# Patient Record
Sex: MALE | Race: WHITE | NOT HISPANIC OR LATINO | Employment: OTHER | URBAN - METROPOLITAN AREA
[De-identification: names, ages, dates, MRNs, and addresses within clinical notes are randomized per-mention and may not be internally consistent; named-entity substitution may affect disease eponyms.]

---

## 2017-01-09 ENCOUNTER — CONVERSION ENCOUNTER (OUTPATIENT)
Dept: CARDIOLOGY | Facility: CLINIC | Age: 77
End: 2017-01-09

## 2017-01-16 ENCOUNTER — HOSPITAL ENCOUNTER (OUTPATIENT)
Dept: PREADMISSION TESTING | Facility: HOSPITAL | Age: 77
Discharge: HOME | End: 2017-01-16
Attending: INTERNAL MEDICINE | Admitting: INTERNAL MEDICINE

## 2017-01-16 LAB
ALBUMIN SERPL-MCNC: 3.2 G/DL (ref 3.5–4.8)
ALBUMIN/GLOB SERPL: 1.3 {RATIO} (ref 1–1.7)
ALP SERPL-CCNC: 61 IU/L (ref 32–91)
ALT SERPL-CCNC: 42 IU/L (ref 17–63)
ANION GAP SERPL CALC-SCNC: 14.8 MMOL/L (ref 10–20)
AST SERPL-CCNC: 33 IU/L (ref 15–41)
BASOPHILS # BLD AUTO: 0.1 10*3/UL (ref 0–0.2)
BASOPHILS NFR BLD AUTO: 1 % (ref 0–2)
BILIRUB SERPL-MCNC: 0.9 MG/DL (ref 0.3–1.2)
BUN SERPL-MCNC: 17 MG/DL (ref 8–20)
BUN/CREAT SERPL: 14.2 (ref 6.2–20.3)
CALCIUM SERPL-MCNC: 9 MG/DL (ref 8.9–10.3)
CHLORIDE SERPL-SCNC: 107 MMOL/L (ref 101–111)
CONV CO2: 27 MMOL/L (ref 22–32)
CONV TOTAL PROTEIN: 5.6 G/DL (ref 6.1–7.9)
CREAT UR-MCNC: 1.2 MG/DL (ref 0.7–1.2)
DIFFERENTIAL METHOD BLD: (no result)
EOSINOPHIL # BLD AUTO: 0.2 10*3/UL (ref 0–0.3)
EOSINOPHIL # BLD AUTO: 2 % (ref 0–3)
ERYTHROCYTE [DISTWIDTH] IN BLOOD BY AUTOMATED COUNT: 15.3 % (ref 11.5–14.5)
GLOBULIN UR ELPH-MCNC: 2.4 G/DL (ref 2.5–3.8)
GLUCOSE SERPL-MCNC: 106 MG/DL (ref 65–99)
HCT VFR BLD AUTO: 48.1 % (ref 40–54)
HGB BLD-MCNC: 15.7 G/DL (ref 14–18)
LYMPHOCYTES # BLD AUTO: 1.9 10*3/UL (ref 0.8–4.8)
LYMPHOCYTES NFR BLD AUTO: 23 % (ref 18–42)
MAGNESIUM SERPL-MCNC: 1.8 MG/DL (ref 1.8–2.5)
MCH RBC QN AUTO: 31.1 PG (ref 26–32)
MCHC RBC AUTO-ENTMCNC: 32.6 G/DL (ref 32–36)
MCV RBC AUTO: 95.3 FL (ref 80–94)
MONOCYTES # BLD AUTO: 0.7 10*3/UL (ref 0.1–1.3)
MONOCYTES NFR BLD AUTO: 8 % (ref 2–11)
NEUTROPHILS # BLD AUTO: 5.7 10*3/UL (ref 2.3–8.6)
NEUTROPHILS NFR BLD AUTO: 66 % (ref 50–75)
NRBC BLD AUTO-RTO: 0 /100{WBCS}
NRBC/RBC NFR BLD MANUAL: 0 10*3/UL
PLATELET # BLD AUTO: 204 10*3/UL (ref 150–450)
PMV BLD AUTO: 10 FL (ref 7.4–10.4)
POTASSIUM SERPL-SCNC: 3.8 MMOL/L (ref 3.6–5.1)
RBC # BLD AUTO: 5.05 10*6/UL (ref 4.6–6)
SODIUM SERPL-SCNC: 145 MMOL/L (ref 136–144)
TSH SERPL-ACNC: 1.57 UIU/ML (ref 0.34–5.6)
WBC # BLD AUTO: 8.5 10*3/UL (ref 4.5–11.5)

## 2017-03-06 ENCOUNTER — HOSPITAL ENCOUNTER (OUTPATIENT)
Dept: LAB | Facility: HOSPITAL | Age: 77
Discharge: HOME | End: 2017-03-06
Attending: INTERNAL MEDICINE | Admitting: INTERNAL MEDICINE

## 2017-03-06 LAB
ANION GAP SERPL CALC-SCNC: 13.3 MMOL/L (ref 10–20)
BUN SERPL-MCNC: 18 MG/DL (ref 8–20)
BUN/CREAT SERPL: 16.4 (ref 6.2–20.3)
CALCIUM SERPL-MCNC: 9.1 MG/DL (ref 8.9–10.3)
CHLORIDE SERPL-SCNC: 107 MMOL/L (ref 101–111)
CONV CO2: 26 MMOL/L (ref 22–32)
CREAT UR-MCNC: 1.1 MG/DL (ref 0.7–1.2)
GLUCOSE SERPL-MCNC: 114 MG/DL (ref 65–99)
POTASSIUM SERPL-SCNC: 4.3 MMOL/L (ref 3.6–5.1)
SODIUM SERPL-SCNC: 142 MMOL/L (ref 136–144)

## 2017-05-11 ENCOUNTER — HOSPITAL ENCOUNTER (OUTPATIENT)
Dept: FAMILY MEDICINE CLINIC | Facility: CLINIC | Age: 77
Setting detail: SPECIMEN
Discharge: HOME | End: 2017-05-11
Attending: HOSPITALIST | Admitting: HOSPITALIST

## 2017-05-11 LAB
ALBUMIN SERPL-MCNC: 3.6 G/DL (ref 3.5–4.8)
ALBUMIN/GLOB SERPL: 1.1 {RATIO} (ref 1–1.7)
ALP SERPL-CCNC: 57 IU/L (ref 32–91)
ALT SERPL-CCNC: 48 IU/L (ref 17–63)
ANION GAP SERPL CALC-SCNC: 14 MMOL/L (ref 10–20)
AST SERPL-CCNC: 43 IU/L (ref 15–41)
BASOPHILS # BLD AUTO: 0.1 10*3/UL (ref 0–0.2)
BASOPHILS NFR BLD AUTO: 1 % (ref 0–2)
BILIRUB SERPL-MCNC: 1.8 MG/DL (ref 0.3–1.2)
BUN SERPL-MCNC: 21 MG/DL (ref 8–20)
BUN/CREAT SERPL: 16.2 (ref 6.2–20.3)
CALCIUM SERPL-MCNC: 9.2 MG/DL (ref 8.9–10.3)
CHLORIDE SERPL-SCNC: 106 MMOL/L (ref 101–111)
CHOLEST SERPL-MCNC: 178 MG/DL
CHOLEST/HDLC SERPL: 4.1 {RATIO}
CONV CO2: 27 MMOL/L (ref 22–32)
CONV LDL CHOLESTEROL DIRECT: 116 MG/DL (ref 0–100)
CONV TOTAL PROTEIN: 6.8 G/DL (ref 6.1–7.9)
CREAT UR-MCNC: 1.3 MG/DL (ref 0.7–1.2)
DIFFERENTIAL METHOD BLD: (no result)
EOSINOPHIL # BLD AUTO: 0.1 10*3/UL (ref 0–0.3)
EOSINOPHIL # BLD AUTO: 2 % (ref 0–3)
ERYTHROCYTE [DISTWIDTH] IN BLOOD BY AUTOMATED COUNT: 17.1 % (ref 11.5–14.5)
FOLATE SERPL-MCNC: >24.8 NG/ML (ref 5.9–24.8)
GLOBULIN UR ELPH-MCNC: 3.2 G/DL (ref 2.5–3.8)
GLUCOSE SERPL-MCNC: 109 MG/DL (ref 65–99)
HCT VFR BLD AUTO: 51.4 % (ref 40–54)
HDLC SERPL-MCNC: 44 MG/DL
HGB BLD-MCNC: 16.9 G/DL (ref 14–18)
LDLC/HDLC SERPL: 2.6 {RATIO}
LIPID INTERPRETATION: ABNORMAL
LYMPHOCYTES # BLD AUTO: 2.1 10*3/UL (ref 0.8–4.8)
LYMPHOCYTES NFR BLD AUTO: 27 % (ref 18–42)
MCH RBC QN AUTO: 31.4 PG (ref 26–32)
MCHC RBC AUTO-ENTMCNC: 33 G/DL (ref 32–36)
MCV RBC AUTO: 95.2 FL (ref 80–94)
MONOCYTES # BLD AUTO: 0.8 10*3/UL (ref 0.1–1.3)
MONOCYTES NFR BLD AUTO: 10 % (ref 2–11)
NEUTROPHILS # BLD AUTO: 4.8 10*3/UL (ref 2.3–8.6)
NEUTROPHILS NFR BLD AUTO: 60 % (ref 50–75)
NRBC BLD AUTO-RTO: 0 /100{WBCS}
NRBC/RBC NFR BLD MANUAL: 0 10*3/UL
PLATELET # BLD AUTO: 207 10*3/UL (ref 150–450)
PMV BLD AUTO: 10.4 FL (ref 7.4–10.4)
POTASSIUM SERPL-SCNC: 4 MMOL/L (ref 3.6–5.1)
PSA SERPL-MCNC: 0.54 NG/ML (ref 0–4)
RBC # BLD AUTO: 5.4 10*6/UL (ref 4.6–6)
SODIUM SERPL-SCNC: 143 MMOL/L (ref 136–144)
TRIGL SERPL-MCNC: 86 MG/DL
VIT B12 SERPL-MCNC: 960 PG/ML (ref 180–914)
VLDLC SERPL CALC-MCNC: 18.3 MG/DL
WBC # BLD AUTO: 7.9 10*3/UL (ref 4.5–11.5)

## 2017-05-18 ENCOUNTER — HOSPITAL ENCOUNTER (OUTPATIENT)
Dept: PHYSICAL THERAPY | Facility: HOSPITAL | Age: 77
Setting detail: RECURRING SERIES
Discharge: HOME | End: 2017-06-30
Attending: HOSPITALIST | Admitting: HOSPITALIST

## 2017-07-11 ENCOUNTER — HOSPITAL ENCOUNTER (OUTPATIENT)
Dept: URGENT CARE | Facility: CLINIC | Age: 77
Setting detail: SPECIMEN
Discharge: HOME | End: 2017-07-11
Attending: FAMILY MEDICINE | Admitting: FAMILY MEDICINE

## 2017-07-11 LAB
BACTERIA SPEC AEROBE CULT: NORMAL
Lab: NORMAL
MICRO REPORT STATUS: NORMAL
SPECIMEN SOURCE: NORMAL

## 2017-07-14 ENCOUNTER — HOSPITAL ENCOUNTER (OUTPATIENT)
Dept: OTHER | Facility: HOSPITAL | Age: 77
Discharge: HOME | End: 2017-07-14
Attending: INTERNAL MEDICINE | Admitting: INTERNAL MEDICINE

## 2017-07-14 LAB
ALBUMIN SERPL-MCNC: 3.5 G/DL (ref 3.5–4.8)
ALBUMIN/GLOB SERPL: 1.1 {RATIO} (ref 1–1.7)
ALP SERPL-CCNC: 76 IU/L (ref 32–91)
ALT SERPL-CCNC: 63 IU/L (ref 17–63)
ANION GAP SERPL CALC-SCNC: 14.9 MMOL/L (ref 10–20)
AST SERPL-CCNC: 57 IU/L (ref 15–41)
BASOPHILS # BLD AUTO: 0.1 10*3/UL (ref 0–0.2)
BASOPHILS NFR BLD AUTO: 1 % (ref 0–2)
BILIRUB SERPL-MCNC: 2.2 MG/DL (ref 0.3–1.2)
BUN SERPL-MCNC: 24 MG/DL (ref 8–20)
BUN/CREAT SERPL: 16 (ref 6.2–20.3)
CALCIUM SERPL-MCNC: 9.3 MG/DL (ref 8.9–10.3)
CHLORIDE SERPL-SCNC: 108 MMOL/L (ref 101–111)
CONV CO2: 26 MMOL/L (ref 22–32)
CONV TOTAL PROTEIN: 6.6 G/DL (ref 6.1–7.9)
CREAT UR-MCNC: 1.5 MG/DL (ref 0.7–1.2)
DIFFERENTIAL METHOD BLD: (no result)
EOSINOPHIL # BLD AUTO: 0.1 10*3/UL (ref 0–0.3)
EOSINOPHIL # BLD AUTO: 1 % (ref 0–3)
ERYTHROCYTE [DISTWIDTH] IN BLOOD BY AUTOMATED COUNT: 15.4 % (ref 11.5–14.5)
GLOBULIN UR ELPH-MCNC: 3.1 G/DL (ref 2.5–3.8)
GLUCOSE SERPL-MCNC: 89 MG/DL (ref 65–99)
HCT VFR BLD AUTO: 52.2 % (ref 40–54)
HGB BLD-MCNC: 17.5 G/DL (ref 14–18)
LYMPHOCYTES # BLD AUTO: 2.9 10*3/UL (ref 0.8–4.8)
LYMPHOCYTES NFR BLD AUTO: 29 % (ref 18–42)
MCH RBC QN AUTO: 33 PG (ref 26–32)
MCHC RBC AUTO-ENTMCNC: 33.4 G/DL (ref 32–36)
MCV RBC AUTO: 98.7 FL (ref 80–94)
MONOCYTES # BLD AUTO: 0.9 10*3/UL (ref 0.1–1.3)
MONOCYTES NFR BLD AUTO: 9 % (ref 2–11)
NEUTROPHILS # BLD AUTO: 6 10*3/UL (ref 2.3–8.6)
NEUTROPHILS NFR BLD AUTO: 60 % (ref 50–75)
NRBC BLD AUTO-RTO: 0 /100{WBCS}
NRBC/RBC NFR BLD MANUAL: 0 10*3/UL
PLATELET # BLD AUTO: 175 10*3/UL (ref 150–450)
PMV BLD AUTO: 10.7 FL (ref 7.4–10.4)
POTASSIUM SERPL-SCNC: 3.9 MMOL/L (ref 3.6–5.1)
RBC # BLD AUTO: 5.29 10*6/UL (ref 4.6–6)
SODIUM SERPL-SCNC: 145 MMOL/L (ref 136–144)
TSH SERPL-ACNC: 2.23 UIU/ML (ref 0.34–5.6)
WBC # BLD AUTO: 10 10*3/UL (ref 4.5–11.5)

## 2017-07-21 ENCOUNTER — HOSPITAL ENCOUNTER (OUTPATIENT)
Dept: LAB | Facility: HOSPITAL | Age: 77
Discharge: HOME | End: 2017-07-21
Attending: INTERNAL MEDICINE | Admitting: INTERNAL MEDICINE

## 2017-07-21 LAB
ANION GAP SERPL CALC-SCNC: 16.2 MMOL/L (ref 10–20)
BUN SERPL-MCNC: 24 MG/DL (ref 8–20)
BUN/CREAT SERPL: 15 (ref 6.2–20.3)
CALCIUM SERPL-MCNC: 9.7 MG/DL (ref 8.9–10.3)
CHLORIDE SERPL-SCNC: 104 MMOL/L (ref 101–111)
CONV CO2: 30 MMOL/L (ref 22–32)
CREAT UR-MCNC: 1.6 MG/DL (ref 0.7–1.2)
GLUCOSE SERPL-MCNC: 136 MG/DL (ref 65–99)
POTASSIUM SERPL-SCNC: 5.2 MMOL/L (ref 3.6–5.1)
SODIUM SERPL-SCNC: 145 MMOL/L (ref 136–144)

## 2017-08-03 ENCOUNTER — HOSPITAL ENCOUNTER (OUTPATIENT)
Dept: FAMILY MEDICINE CLINIC | Facility: CLINIC | Age: 77
Setting detail: SPECIMEN
Discharge: HOME | End: 2017-08-03
Attending: HOSPITALIST | Admitting: HOSPITALIST

## 2017-08-03 LAB
ALBUMIN SERPL-MCNC: 3.6 G/DL (ref 3.5–4.8)
ALBUMIN/GLOB SERPL: 1.2 {RATIO} (ref 1–1.7)
ALP SERPL-CCNC: 64 IU/L (ref 32–91)
ALT SERPL-CCNC: 48 IU/L (ref 17–63)
ANION GAP SERPL CALC-SCNC: 14.7 MMOL/L (ref 10–20)
AST SERPL-CCNC: 44 IU/L (ref 15–41)
BILIRUB SERPL-MCNC: 2 MG/DL (ref 0.3–1.2)
BUN SERPL-MCNC: 24 MG/DL (ref 8–20)
BUN/CREAT SERPL: 15 (ref 6.2–20.3)
CALCIUM SERPL-MCNC: 9.5 MG/DL (ref 8.9–10.3)
CHLORIDE SERPL-SCNC: 104 MMOL/L (ref 101–111)
CONV CO2: 29 MMOL/L (ref 22–32)
CONV TOTAL PROTEIN: 6.7 G/DL (ref 6.1–7.9)
CREAT UR-MCNC: 1.6 MG/DL (ref 0.7–1.2)
GLOBULIN UR ELPH-MCNC: 3.1 G/DL (ref 2.5–3.8)
GLUCOSE SERPL-MCNC: 106 MG/DL (ref 65–99)
MAGNESIUM SERPL-MCNC: 2.3 MG/DL (ref 1.8–2.5)
POTASSIUM SERPL-SCNC: 4.7 MMOL/L (ref 3.6–5.1)
SODIUM SERPL-SCNC: 143 MMOL/L (ref 136–144)

## 2017-08-21 ENCOUNTER — HOSPITAL ENCOUNTER (OUTPATIENT)
Dept: OTHER | Facility: HOSPITAL | Age: 77
Discharge: HOME | End: 2017-08-21
Attending: INTERNAL MEDICINE | Admitting: INTERNAL MEDICINE

## 2017-08-21 LAB
ANION GAP SERPL CALC-SCNC: 12.3 MMOL/L (ref 10–20)
BASOPHILS # BLD AUTO: 0.1 10*3/UL (ref 0–0.2)
BASOPHILS NFR BLD AUTO: 1 % (ref 0–2)
BUN SERPL-MCNC: 25 MG/DL (ref 8–20)
BUN/CREAT SERPL: 16.7 (ref 6.2–20.3)
CALCIUM SERPL-MCNC: 9.2 MG/DL (ref 8.9–10.3)
CHLORIDE SERPL-SCNC: 105 MMOL/L (ref 101–111)
CONV CO2: 32 MMOL/L (ref 22–32)
CREAT UR-MCNC: 1.5 MG/DL (ref 0.7–1.2)
DIFFERENTIAL METHOD BLD: (no result)
EOSINOPHIL # BLD AUTO: 0.1 10*3/UL (ref 0–0.3)
EOSINOPHIL # BLD AUTO: 2 % (ref 0–3)
ERYTHROCYTE [DISTWIDTH] IN BLOOD BY AUTOMATED COUNT: 15.5 % (ref 11.5–14.5)
GLUCOSE SERPL-MCNC: 80 MG/DL (ref 65–99)
HCT VFR BLD AUTO: 54 % (ref 40–54)
HGB BLD-MCNC: 17.8 G/DL (ref 14–18)
INR PPP: 1.3
LYMPHOCYTES # BLD AUTO: 1.5 10*3/UL (ref 0.8–4.8)
LYMPHOCYTES NFR BLD AUTO: 19 % (ref 18–42)
MCH RBC QN AUTO: 33 PG (ref 26–32)
MCHC RBC AUTO-ENTMCNC: 33 G/DL (ref 32–36)
MCV RBC AUTO: 99.9 FL (ref 80–94)
MONOCYTES # BLD AUTO: 0.8 10*3/UL (ref 0.1–1.3)
MONOCYTES NFR BLD AUTO: 10 % (ref 2–11)
NEUTROPHILS # BLD AUTO: 5.4 10*3/UL (ref 2.3–8.6)
NEUTROPHILS NFR BLD AUTO: 68 % (ref 50–75)
NRBC BLD AUTO-RTO: 0 /100{WBCS}
NRBC/RBC NFR BLD MANUAL: 0 10*3/UL
PLATELET # BLD AUTO: 153 10*3/UL (ref 150–450)
PMV BLD AUTO: 10.4 FL (ref 7.4–10.4)
POTASSIUM SERPL-SCNC: 4.3 MMOL/L (ref 3.6–5.1)
PROTHROMBIN TIME: 13.9 SEC (ref 9.6–11.7)
RBC # BLD AUTO: 5.41 10*6/UL (ref 4.6–6)
SODIUM SERPL-SCNC: 145 MMOL/L (ref 136–144)
WBC # BLD AUTO: 7.8 10*3/UL (ref 4.5–11.5)

## 2017-08-22 ENCOUNTER — ON CAMPUS - OUTPATIENT (AMBULATORY)
Dept: URBAN - METROPOLITAN AREA HOSPITAL 85 | Facility: HOSPITAL | Age: 77
End: 2017-08-22
Payer: COMMERCIAL

## 2017-08-22 DIAGNOSIS — D69.6 THROMBOCYTOPENIA, UNSPECIFIED: ICD-10-CM

## 2017-08-22 DIAGNOSIS — K62.5 HEMORRHAGE OF ANUS AND RECTUM: ICD-10-CM

## 2017-08-22 PROCEDURE — 99243 OFF/OP CNSLTJ NEW/EST LOW 30: CPT | Performed by: INTERNAL MEDICINE

## 2017-08-28 ENCOUNTER — CONVERSION ENCOUNTER (OUTPATIENT)
Dept: CARDIOLOGY | Facility: CLINIC | Age: 77
End: 2017-08-28

## 2017-08-30 ENCOUNTER — HOSPITAL ENCOUNTER (OUTPATIENT)
Dept: FAMILY MEDICINE CLINIC | Facility: CLINIC | Age: 77
Setting detail: SPECIMEN
Discharge: HOME | End: 2017-08-30
Attending: HOSPITALIST | Admitting: HOSPITALIST

## 2017-08-30 LAB
ALBUMIN SERPL-MCNC: 3.5 G/DL (ref 3.5–4.8)
ALBUMIN/GLOB SERPL: 1 {RATIO} (ref 1–1.7)
ALP SERPL-CCNC: 76 IU/L (ref 32–91)
ALT SERPL-CCNC: 26 IU/L (ref 17–63)
ANION GAP SERPL CALC-SCNC: 15.1 MMOL/L (ref 10–20)
AST SERPL-CCNC: 33 IU/L (ref 15–41)
BASOPHILS # BLD AUTO: 0.1 10*3/UL (ref 0–0.2)
BASOPHILS NFR BLD AUTO: 1 % (ref 0–2)
BILIRUB SERPL-MCNC: 1.4 MG/DL (ref 0.3–1.2)
BUN SERPL-MCNC: 27 MG/DL (ref 8–20)
BUN/CREAT SERPL: 16.9 (ref 6.2–20.3)
CALCIUM SERPL-MCNC: 9.3 MG/DL (ref 8.9–10.3)
CHLORIDE SERPL-SCNC: 104 MMOL/L (ref 101–111)
CONV CO2: 27 MMOL/L (ref 22–32)
CONV TOTAL PROTEIN: 6.9 G/DL (ref 6.1–7.9)
CREAT UR-MCNC: 1.6 MG/DL (ref 0.7–1.2)
DIFFERENTIAL METHOD BLD: (no result)
EOSINOPHIL # BLD AUTO: 0.1 10*3/UL (ref 0–0.3)
EOSINOPHIL # BLD AUTO: 2 % (ref 0–3)
ERYTHROCYTE [DISTWIDTH] IN BLOOD BY AUTOMATED COUNT: 15 % (ref 11.5–14.5)
GLOBULIN UR ELPH-MCNC: 3.4 G/DL (ref 2.5–3.8)
GLUCOSE SERPL-MCNC: 102 MG/DL (ref 65–99)
HCT VFR BLD AUTO: 53 % (ref 40–54)
HGB BLD-MCNC: 17.9 G/DL (ref 14–18)
LYMPHOCYTES # BLD AUTO: 2.9 10*3/UL (ref 0.8–4.8)
LYMPHOCYTES NFR BLD AUTO: 31 % (ref 18–42)
MAGNESIUM SERPL-MCNC: 2.2 MG/DL (ref 1.8–2.5)
MCH RBC QN AUTO: 33.2 PG (ref 26–32)
MCHC RBC AUTO-ENTMCNC: 33.9 G/DL (ref 32–36)
MCV RBC AUTO: 98 FL (ref 80–94)
MONOCYTES # BLD AUTO: 0.9 10*3/UL (ref 0.1–1.3)
MONOCYTES NFR BLD AUTO: 10 % (ref 2–11)
NEUTROPHILS # BLD AUTO: 5.1 10*3/UL (ref 2.3–8.6)
NEUTROPHILS NFR BLD AUTO: 56 % (ref 50–75)
NRBC BLD AUTO-RTO: 0 /100{WBCS}
NRBC/RBC NFR BLD MANUAL: 0 10*3/UL
PLATELET # BLD AUTO: 249 10*3/UL (ref 150–450)
PMV BLD AUTO: 9 FL (ref 7.4–10.4)
POTASSIUM SERPL-SCNC: 4.1 MMOL/L (ref 3.6–5.1)
RBC # BLD AUTO: 5.41 10*6/UL (ref 4.6–6)
SODIUM SERPL-SCNC: 142 MMOL/L (ref 136–144)
WBC # BLD AUTO: 9.1 10*3/UL (ref 4.5–11.5)

## 2017-10-02 ENCOUNTER — HOSPITAL ENCOUNTER (OUTPATIENT)
Dept: PREADMISSION TESTING | Facility: HOSPITAL | Age: 77
Discharge: HOME | End: 2017-10-02
Attending: INTERNAL MEDICINE | Admitting: INTERNAL MEDICINE

## 2017-10-02 LAB
ALBUMIN SERPL-MCNC: 3.6 G/DL (ref 3.5–4.8)
ALBUMIN/GLOB SERPL: 1.2 {RATIO} (ref 1–1.7)
ALP SERPL-CCNC: 54 IU/L (ref 32–91)
ALT SERPL-CCNC: 27 IU/L (ref 17–63)
ANION GAP SERPL CALC-SCNC: 10.6 MMOL/L (ref 10–20)
AST SERPL-CCNC: 31 IU/L (ref 15–41)
BASOPHILS # BLD AUTO: 0.1 10*3/UL (ref 0–0.2)
BASOPHILS NFR BLD AUTO: 1 % (ref 0–2)
BILIRUB SERPL-MCNC: 1.2 MG/DL (ref 0.3–1.2)
BUN SERPL-MCNC: 20 MG/DL (ref 8–20)
BUN/CREAT SERPL: 13.3 (ref 6.2–20.3)
CALCIUM SERPL-MCNC: 9.3 MG/DL (ref 8.9–10.3)
CHLORIDE SERPL-SCNC: 106 MMOL/L (ref 101–111)
CONV CO2: 30 MMOL/L (ref 22–32)
CONV TOTAL PROTEIN: 6.7 G/DL (ref 6.1–7.9)
CREAT UR-MCNC: 1.5 MG/DL (ref 0.7–1.2)
DIFFERENTIAL METHOD BLD: (no result)
EOSINOPHIL # BLD AUTO: 0.1 10*3/UL (ref 0–0.3)
EOSINOPHIL # BLD AUTO: 2 % (ref 0–3)
ERYTHROCYTE [DISTWIDTH] IN BLOOD BY AUTOMATED COUNT: 15.2 % (ref 11.5–14.5)
GLOBULIN UR ELPH-MCNC: 3.1 G/DL (ref 2.5–3.8)
GLUCOSE SERPL-MCNC: 82 MG/DL (ref 65–99)
HCT VFR BLD AUTO: 49.2 % (ref 40–54)
HGB BLD-MCNC: 16.6 G/DL (ref 14–18)
LYMPHOCYTES # BLD AUTO: 2.2 10*3/UL (ref 0.8–4.8)
LYMPHOCYTES NFR BLD AUTO: 28 % (ref 18–42)
MAGNESIUM SERPL-MCNC: 2.1 MG/DL (ref 1.8–2.5)
MCH RBC QN AUTO: 33.1 PG (ref 26–32)
MCHC RBC AUTO-ENTMCNC: 33.7 G/DL (ref 32–36)
MCV RBC AUTO: 98.1 FL (ref 80–94)
MONOCYTES # BLD AUTO: 0.8 10*3/UL (ref 0.1–1.3)
MONOCYTES NFR BLD AUTO: 10 % (ref 2–11)
NEUTROPHILS # BLD AUTO: 4.8 10*3/UL (ref 2.3–8.6)
NEUTROPHILS NFR BLD AUTO: 59 % (ref 50–75)
NRBC BLD AUTO-RTO: 0 /100{WBCS}
NRBC/RBC NFR BLD MANUAL: 0 10*3/UL
PLATELET # BLD AUTO: 168 10*3/UL (ref 150–450)
PMV BLD AUTO: 9.9 FL (ref 7.4–10.4)
POTASSIUM SERPL-SCNC: 3.6 MMOL/L (ref 3.6–5.1)
RBC # BLD AUTO: 5.02 10*6/UL (ref 4.6–6)
SODIUM SERPL-SCNC: 143 MMOL/L (ref 136–144)
TSH SERPL-ACNC: 1.24 UIU/ML (ref 0.34–5.6)
WBC # BLD AUTO: 8 10*3/UL (ref 4.5–11.5)

## 2017-10-09 ENCOUNTER — CONVERSION ENCOUNTER (OUTPATIENT)
Dept: CARDIOLOGY | Facility: CLINIC | Age: 77
End: 2017-10-09

## 2017-11-14 ENCOUNTER — HOSPITAL ENCOUNTER (OUTPATIENT)
Dept: OTHER | Facility: HOSPITAL | Age: 77
Discharge: HOME | End: 2017-11-14
Attending: INTERNAL MEDICINE | Admitting: INTERNAL MEDICINE

## 2018-02-27 ENCOUNTER — HOSPITAL ENCOUNTER (OUTPATIENT)
Dept: FAMILY MEDICINE CLINIC | Facility: CLINIC | Age: 78
Setting detail: SPECIMEN
Discharge: HOME | End: 2018-02-27
Attending: INTERNAL MEDICINE | Admitting: INTERNAL MEDICINE

## 2018-02-27 LAB
ANION GAP SERPL CALC-SCNC: 13.5 MMOL/L (ref 10–20)
BUN SERPL-MCNC: 33 MG/DL (ref 8–20)
BUN/CREAT SERPL: 18.3 (ref 6.2–20.3)
CALCIUM SERPL-MCNC: 9.4 MG/DL (ref 8.9–10.3)
CHLORIDE SERPL-SCNC: 103 MMOL/L (ref 101–111)
CONV CO2: 30 MMOL/L (ref 22–32)
CREAT UR-MCNC: 1.8 MG/DL (ref 0.7–1.2)
GLUCOSE SERPL-MCNC: 120 MG/DL (ref 65–99)
MAGNESIUM SERPL-MCNC: 2.3 MG/DL (ref 1.8–2.5)
POTASSIUM SERPL-SCNC: 4.5 MMOL/L (ref 3.6–5.1)
SODIUM SERPL-SCNC: 142 MMOL/L (ref 136–144)

## 2018-05-24 ENCOUNTER — HOSPITAL ENCOUNTER (OUTPATIENT)
Dept: FAMILY MEDICINE CLINIC | Facility: CLINIC | Age: 78
Setting detail: SPECIMEN
Discharge: HOME | End: 2018-05-24
Attending: HOSPITALIST | Admitting: HOSPITALIST

## 2018-05-24 LAB
ALBUMIN SERPL-MCNC: 3.8 G/DL (ref 3.5–4.8)
ALBUMIN/GLOB SERPL: 1.4 {RATIO} (ref 1–1.7)
ALP SERPL-CCNC: 47 IU/L (ref 32–91)
ALT SERPL-CCNC: 19 IU/L (ref 17–63)
ANION GAP SERPL CALC-SCNC: 13.3 MMOL/L (ref 10–20)
AST SERPL-CCNC: 28 IU/L (ref 15–41)
BASOPHILS # BLD AUTO: 0.1 10*3/UL (ref 0–0.2)
BASOPHILS NFR BLD AUTO: 1 % (ref 0–2)
BILIRUB SERPL-MCNC: 1.5 MG/DL (ref 0.3–1.2)
BUN SERPL-MCNC: 29 MG/DL (ref 8–20)
BUN/CREAT SERPL: 18.1 (ref 6.2–20.3)
CALCIUM SERPL-MCNC: 9.5 MG/DL (ref 8.9–10.3)
CHLORIDE SERPL-SCNC: 104 MMOL/L (ref 101–111)
CHOLEST SERPL-MCNC: 180 MG/DL
CHOLEST/HDLC SERPL: 3.8 {RATIO}
CONV CO2: 27 MMOL/L (ref 22–32)
CONV LDL CHOLESTEROL DIRECT: 116 MG/DL (ref 0–100)
CONV TOTAL PROTEIN: 6.6 G/DL (ref 6.1–7.9)
CREAT UR-MCNC: 1.6 MG/DL (ref 0.7–1.2)
DIFFERENTIAL METHOD BLD: (no result)
EOSINOPHIL # BLD AUTO: 0.2 10*3/UL (ref 0–0.3)
EOSINOPHIL # BLD AUTO: 2 % (ref 0–3)
ERYTHROCYTE [DISTWIDTH] IN BLOOD BY AUTOMATED COUNT: 14.9 % (ref 11.5–14.5)
GLOBULIN UR ELPH-MCNC: 2.8 G/DL (ref 2.5–3.8)
GLUCOSE SERPL-MCNC: 117 MG/DL (ref 65–99)
HCT VFR BLD AUTO: 50.3 % (ref 40–54)
HDLC SERPL-MCNC: 48 MG/DL
HGB BLD-MCNC: 16.5 G/DL (ref 14–18)
LDLC/HDLC SERPL: 2.4 {RATIO}
LIPID INTERPRETATION: ABNORMAL
LYMPHOCYTES # BLD AUTO: 1.9 10*3/UL (ref 0.8–4.8)
LYMPHOCYTES NFR BLD AUTO: 23 % (ref 18–42)
MAGNESIUM SERPL-MCNC: 2.3 MG/DL (ref 1.8–2.5)
MCH RBC QN AUTO: 34 PG (ref 26–32)
MCHC RBC AUTO-ENTMCNC: 32.9 G/DL (ref 32–36)
MCV RBC AUTO: 103.5 FL (ref 80–94)
MONOCYTES # BLD AUTO: 0.6 10*3/UL (ref 0.1–1.3)
MONOCYTES NFR BLD AUTO: 7 % (ref 2–11)
NEUTROPHILS # BLD AUTO: 5.4 10*3/UL (ref 2.3–8.6)
NEUTROPHILS NFR BLD AUTO: 67 % (ref 50–75)
NRBC BLD AUTO-RTO: 0 /100{WBCS}
NRBC/RBC NFR BLD MANUAL: 0 10*3/UL
PLATELET # BLD AUTO: 165 10*3/UL (ref 150–450)
PMV BLD AUTO: 10.9 FL (ref 7.4–10.4)
POTASSIUM SERPL-SCNC: 4.3 MMOL/L (ref 3.6–5.1)
RBC # BLD AUTO: 4.86 10*6/UL (ref 4.6–6)
SODIUM SERPL-SCNC: 140 MMOL/L (ref 136–144)
TRIGL SERPL-MCNC: 101 MG/DL
VLDLC SERPL CALC-MCNC: 16.5 MG/DL
WBC # BLD AUTO: 8.1 10*3/UL (ref 4.5–11.5)

## 2018-06-26 ENCOUNTER — OFFICE (AMBULATORY)
Dept: URBAN - METROPOLITAN AREA PATHOLOGY 4 | Facility: PATHOLOGY | Age: 78
End: 2018-06-26
Payer: COMMERCIAL

## 2018-06-26 ENCOUNTER — ON CAMPUS - OUTPATIENT (AMBULATORY)
Dept: URBAN - METROPOLITAN AREA HOSPITAL 2 | Facility: HOSPITAL | Age: 78
End: 2018-06-26
Payer: COMMERCIAL

## 2018-06-26 VITALS
OXYGEN SATURATION: 99 % | TEMPERATURE: 98 F | SYSTOLIC BLOOD PRESSURE: 136 MMHG | HEART RATE: 71 BPM | DIASTOLIC BLOOD PRESSURE: 82 MMHG | RESPIRATION RATE: 17 BRPM | SYSTOLIC BLOOD PRESSURE: 88 MMHG | SYSTOLIC BLOOD PRESSURE: 104 MMHG | HEIGHT: 74 IN | SYSTOLIC BLOOD PRESSURE: 132 MMHG | OXYGEN SATURATION: 100 % | DIASTOLIC BLOOD PRESSURE: 85 MMHG | SYSTOLIC BLOOD PRESSURE: 91 MMHG | SYSTOLIC BLOOD PRESSURE: 82 MMHG | HEART RATE: 70 BPM | RESPIRATION RATE: 16 BRPM | SYSTOLIC BLOOD PRESSURE: 73 MMHG | DIASTOLIC BLOOD PRESSURE: 51 MMHG | SYSTOLIC BLOOD PRESSURE: 84 MMHG | SYSTOLIC BLOOD PRESSURE: 128 MMHG | SYSTOLIC BLOOD PRESSURE: 110 MMHG | SYSTOLIC BLOOD PRESSURE: 79 MMHG | DIASTOLIC BLOOD PRESSURE: 81 MMHG | DIASTOLIC BLOOD PRESSURE: 52 MMHG | WEIGHT: 195 LBS | DIASTOLIC BLOOD PRESSURE: 58 MMHG | DIASTOLIC BLOOD PRESSURE: 67 MMHG | SYSTOLIC BLOOD PRESSURE: 95 MMHG | SYSTOLIC BLOOD PRESSURE: 94 MMHG | HEART RATE: 72 BPM | HEART RATE: 65 BPM | DIASTOLIC BLOOD PRESSURE: 50 MMHG | HEART RATE: 69 BPM | OXYGEN SATURATION: 98 % | DIASTOLIC BLOOD PRESSURE: 60 MMHG | DIASTOLIC BLOOD PRESSURE: 68 MMHG | RESPIRATION RATE: 18 BRPM | DIASTOLIC BLOOD PRESSURE: 55 MMHG | DIASTOLIC BLOOD PRESSURE: 57 MMHG | HEART RATE: 68 BPM

## 2018-06-26 DIAGNOSIS — Z86.010 PERSONAL HISTORY OF COLONIC POLYPS: ICD-10-CM

## 2018-06-26 DIAGNOSIS — K57.30 DIVERTICULOSIS OF LARGE INTESTINE WITHOUT PERFORATION OR ABS: ICD-10-CM

## 2018-06-26 DIAGNOSIS — D12.2 BENIGN NEOPLASM OF ASCENDING COLON: ICD-10-CM

## 2018-06-26 DIAGNOSIS — K63.5 POLYP OF COLON: ICD-10-CM

## 2018-06-26 DIAGNOSIS — D12.0 BENIGN NEOPLASM OF CECUM: ICD-10-CM

## 2018-06-26 DIAGNOSIS — K64.0 FIRST DEGREE HEMORRHOIDS: ICD-10-CM

## 2018-06-26 DIAGNOSIS — K62.1 RECTAL POLYP: ICD-10-CM

## 2018-06-26 PROBLEM — D12.4 BENIGN NEOPLASM OF DESCENDING COLON: Status: ACTIVE | Noted: 2018-06-26

## 2018-06-26 LAB
GI HISTOLOGY: A. UNSPECIFIED: (no result)
GI HISTOLOGY: B. UNSPECIFIED: (no result)
GI HISTOLOGY: C. UNSPECIFIED: (no result)
GI HISTOLOGY: D. UNSPECIFIED: (no result)
GI HISTOLOGY: E. UNSPECIFIED: (no result)
GI HISTOLOGY: PDF REPORT: (no result)

## 2018-06-26 PROCEDURE — 88305 TISSUE EXAM BY PATHOLOGIST: CPT | Mod: 33 | Performed by: INTERNAL MEDICINE

## 2018-06-26 PROCEDURE — 45380 COLONOSCOPY AND BIOPSY: CPT | Mod: 33,59 | Performed by: INTERNAL MEDICINE

## 2018-06-26 PROCEDURE — 45385 COLONOSCOPY W/LESION REMOVAL: CPT | Mod: 33 | Performed by: INTERNAL MEDICINE

## 2018-06-26 RX ADMIN — PROPOFOL: 10 INJECTION, EMULSION INTRAVENOUS at 10:04

## 2019-05-09 ENCOUNTER — HOSPITAL ENCOUNTER (OUTPATIENT)
Dept: LAB | Facility: HOSPITAL | Age: 79
Discharge: HOME | End: 2019-05-09
Attending: INTERNAL MEDICINE | Admitting: INTERNAL MEDICINE

## 2019-05-09 LAB
ANION GAP SERPL CALC-SCNC: 14.7 MMOL/L (ref 10–20)
BNP SERPL-MCNC: 684 PG/ML
BUN SERPL-MCNC: 25 MG/DL (ref 8–20)
BUN/CREAT SERPL: 19.2 (ref 6.2–20.3)
CALCIUM SERPL-MCNC: 9 MG/DL (ref 8.9–10.3)
CHLORIDE SERPL-SCNC: 102 MMOL/L (ref 101–111)
CONV CO2: 26 MMOL/L (ref 22–32)
CREAT UR-MCNC: 1.3 MG/DL (ref 0.7–1.2)
GLUCOSE SERPL-MCNC: 81 MG/DL (ref 65–99)
MAGNESIUM SERPL-MCNC: 2 MG/DL (ref 1.8–2.5)
POTASSIUM SERPL-SCNC: 3.7 MMOL/L (ref 3.6–5.1)
SODIUM SERPL-SCNC: 139 MMOL/L (ref 136–144)

## 2019-05-30 ENCOUNTER — HOSPITAL ENCOUNTER (OUTPATIENT)
Dept: FAMILY MEDICINE CLINIC | Facility: CLINIC | Age: 79
Setting detail: SPECIMEN
Discharge: HOME | End: 2019-05-30
Attending: HOSPITALIST | Admitting: HOSPITALIST

## 2019-05-30 ENCOUNTER — CONVERSION ENCOUNTER (OUTPATIENT)
Dept: FAMILY MEDICINE CLINIC | Facility: CLINIC | Age: 79
End: 2019-05-30

## 2019-05-30 LAB
25(OH)D3 SERPL-MCNC: 26 NG/ML (ref 30–100)
ALBUMIN SERPL-MCNC: 4 G/DL (ref 3.5–4.8)
ALBUMIN/GLOB SERPL: 1.3 {RATIO} (ref 1–1.7)
ALP SERPL-CCNC: 73 IU/L (ref 32–91)
ALT SERPL-CCNC: 23 IU/L (ref 17–63)
ANION GAP SERPL CALC-SCNC: 12.4 MMOL/L (ref 10–20)
AST SERPL-CCNC: 33 IU/L (ref 15–41)
BASOPHILS # BLD AUTO: 0.1 10*3/UL (ref 0–0.2)
BASOPHILS NFR BLD AUTO: 1 % (ref 0–2)
BILIRUB SERPL-MCNC: 1.3 MG/DL (ref 0.3–1.2)
BUN SERPL-MCNC: 25 MG/DL (ref 8–20)
BUN/CREAT SERPL: 16.7 (ref 6.2–20.3)
CALCIUM SERPL-MCNC: 9.3 MG/DL (ref 8.9–10.3)
CHLORIDE SERPL-SCNC: 105 MMOL/L (ref 101–111)
CHOLEST SERPL-MCNC: 196 MG/DL
CHOLEST/HDLC SERPL: 3.1 {RATIO}
CONV CO2: 29 MMOL/L (ref 22–32)
CONV LDL CHOLESTEROL DIRECT: 125 MG/DL (ref 0–100)
CONV TOTAL PROTEIN: 7.2 G/DL (ref 6.1–7.9)
CREAT UR-MCNC: 1.5 MG/DL (ref 0.7–1.2)
DIFFERENTIAL METHOD BLD: (no result)
EOSINOPHIL # BLD AUTO: 0.2 10*3/UL (ref 0–0.3)
EOSINOPHIL # BLD AUTO: 3 % (ref 0–3)
ERYTHROCYTE [DISTWIDTH] IN BLOOD BY AUTOMATED COUNT: 15.8 % (ref 11.5–14.5)
FOLATE SERPL-MCNC: 14.9 NG/ML (ref 5.9–24.8)
GLOBULIN UR ELPH-MCNC: 3.2 G/DL (ref 2.5–3.8)
GLUCOSE SERPL-MCNC: 122 MG/DL (ref 65–99)
HCT VFR BLD AUTO: 53.5 % (ref 40–54)
HDLC SERPL-MCNC: 63 MG/DL
HGB BLD-MCNC: 17.8 G/DL (ref 14–18)
LDLC/HDLC SERPL: 2 {RATIO}
LIPID INTERPRETATION: ABNORMAL
LYMPHOCYTES # BLD AUTO: 1.8 10*3/UL (ref 0.8–4.8)
LYMPHOCYTES NFR BLD AUTO: 26 % (ref 18–42)
MCH RBC QN AUTO: 34.4 PG (ref 26–32)
MCHC RBC AUTO-ENTMCNC: 33.2 G/DL (ref 32–36)
MCV RBC AUTO: 103.4 FL (ref 80–94)
MONOCYTES # BLD AUTO: 0.5 10*3/UL (ref 0.1–1.3)
MONOCYTES NFR BLD AUTO: 8 % (ref 2–11)
NEUTROPHILS # BLD AUTO: 4.2 10*3/UL (ref 2.3–8.6)
NEUTROPHILS NFR BLD AUTO: 62 % (ref 50–75)
NRBC BLD AUTO-RTO: 0 /100{WBCS}
NRBC/RBC NFR BLD MANUAL: 0 10*3/UL
PLATELET # BLD AUTO: 180 10*3/UL (ref 150–450)
PMV BLD AUTO: 10.9 FL (ref 7.4–10.4)
POTASSIUM SERPL-SCNC: 4.4 MMOL/L (ref 3.6–5.1)
PSA SERPL-MCNC: 0.86 NG/ML (ref 0–4)
RBC # BLD AUTO: 5.18 10*6/UL (ref 4.6–6)
SODIUM SERPL-SCNC: 142 MMOL/L (ref 136–144)
T3 SERPL-MCNC: 0.98 NG/ML (ref 0.87–1.78)
T4 FREE SERPL-MCNC: 0.83 NG/DL (ref 0.58–1.64)
TRIGL SERPL-MCNC: 89 MG/DL
TSH SERPL-ACNC: 2.23 UIU/ML (ref 0.34–5.6)
VIT B12 SERPL-MCNC: 280 PG/ML (ref 180–914)
VLDLC SERPL CALC-MCNC: 7.6 MG/DL
WBC # BLD AUTO: 6.8 10*3/UL (ref 4.5–11.5)

## 2019-06-03 VITALS
HEART RATE: 78 BPM | SYSTOLIC BLOOD PRESSURE: 140 MMHG | DIASTOLIC BLOOD PRESSURE: 54 MMHG | SYSTOLIC BLOOD PRESSURE: 140 MMHG | DIASTOLIC BLOOD PRESSURE: 86 MMHG | SYSTOLIC BLOOD PRESSURE: 104 MMHG | DIASTOLIC BLOOD PRESSURE: 78 MMHG

## 2019-06-04 VITALS
RESPIRATION RATE: 16 BRPM | SYSTOLIC BLOOD PRESSURE: 116 MMHG | DIASTOLIC BLOOD PRESSURE: 82 MMHG | WEIGHT: 201.6 LBS | HEIGHT: 74 IN | HEART RATE: 88 BPM | BODY MASS INDEX: 25.87 KG/M2

## 2019-06-06 NOTE — PROGRESS NOTES
Vital Signs:    Patient Profile:    79 Years Old Male  Height:     74 inches  Weight:     201.6 pounds  BMI:        25.88     Temp:       97.5 degrees F oral  Pulse rate: 88 / minute  Resp:       16 per minute  BP Sittin / 82  (left arm)    Cuff size:  large      Problems: Active problems were reviewed with the patient during this visit.  Medications: Medications were reviewed with the patient during this visit.  Allergies: Allergies were reviewed with the patient during this visit.        Vitals Entered By: Jaylyn Sharma MA      Referring Provider:  Jason Ponce MD  Primary Provider:  Jason Ponce MD    CC:  physical.    History of Present Illness:         The patient comes in today for a Annual exam.  Here for physical and wellness.  Tired all the time.  Some SOA.  Saw Dr Curran in april         When questioned about any new concerns, the patient voices concerns with nothing in particular.  When asked about their health maintenence items, they report their Colonoscopy is UTD and PSA is UTD.           The ROS is positive for SOA and fatique.  The ROS is negative for chest pain, dizzyness, headaches, fever, nausea, vomiting, bowel problems and bladder problems.        Past Medical History:     Reviewed history from 2018 and no changes required:        mitral regurg        tricuspid regurg        rt BBB        Atrial Fibrillation        Cardiomyopathy, takotsubo        Coronary Artery Disease, non obstructive        Hypertension        Gouty Arthritis         Acid reflux disease        Allergies        Osteoarthritis        AF ablation 2017        bone spur neck-         Congestive heart failure         VT--- 2018    Past Surgical History:     Reviewed history from 2017 and no changes required:        Rotator Cuff Repair right        Amputation right  index and middle finger        Sinus surgery 2014        Heart Catherization - 2016 - Non  obstr CAD; Takotsubo CM        Heart Ablation- 2017 [facility]        Heart Catherization: 17 (treating medically)        Cardiac Ablation 17 [facility]    Family History Summary:      Reviewed history Last on 2019 and no changes required:2019  Mother - Has Family History of Other Medical Problems - Entered On: 2016  Father - Has Family History of Alcoholism - Entered On: 2016    General Comments - FH:  Mother - Parkinson's Disease -  of old age at 92    Social History:     Reviewed history from 2018 and no changes required:         , 5 children, retired        4-5 beers weekly        Risk Factors:     Smoked Tobacco Use:  Never smoker  Smokeless Tobacco Use:  Never  Passive smoke exposure:  no  Drug use:  no  HIV high-risk behavior:  no  Caffeine use:  3 drinks per day  Alcohol use:  yes     Type:  occasionally     Drinks per day:  <1     Has patient --        Felt need to cut down:  no        Been annoyed by complaints:  no        Felt guilty about drinking:  no        Needed eye opener in the morning:  no     Counseled to quit/cut down alcohol use:  no  Exercise:  yes     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  Seatbelt use:  100 %  Sun Exposure:  frequently    Family History Risk Factors:     Family History of MI in females < 65 years old:  no     Family History of MI in males < 55 years old:  no    Previous Tobacco Use: Signed On - 12/10/2018  Smoked Tobacco Use:  Never smoker      Pack-years:  25  Smokeless Tobacco Use:  Never  Passive smoke exposure:  no  Drug use:  no  HIV high-risk behavior:  no  Caffeine use:  3 drinks per day    Previous Alcohol Use: Signed On - 12/10/2018  Alcohol use:  yes     Type:  occasionally     Drinks per day:  <1     Has patient --        Felt need to cut down:  no        Been annoyed by complaints:  no        Felt guilty about drinking:  no        Needed eye opener in the morning:  no     Counseled to quit/cut down alcohol use:  no  Exercise:   yes     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  Seatbelt use:  100 %  Sun Exposure:  frequently    Family History Risk Factors:     Family History of MI in females < 65 years old:  no     Family History of MI in males < 55 years old:  no    Colonoscopy History:     Date of Last Colonoscopy:  06/03/2015        Physical Exam   Height:  74  Weight:  207  BP:  116/82 mm HG    Medication List:  CARDIZEM  MG ORAL CAPSULE EXTENDED RELEASE 24 HOUR (DILTIAZEM HCL COATED BEADS) Take one daily  LOSARTAN POTASSIUM 25 MG ORAL TABLET (LOSARTAN POTASSIUM) .qd  COLCHICINE 0.6 MG ORAL TABLET (COLCHICINE) as needed  DESONIDE 0.05 % EXTERNAL CREAM (DESONIDE) apply bid  * ARMENDARIZ YEAST bid  ALLOPURINOL 300 MG TABLET (ALLOPURINOL) TAKE ONE TABLET BY MOUTH DAILY  MAGNESIUM OXIDE 400 MG ORAL TABLET (MAGNESIUM OXIDE) Take one (1) tablet by mouth twice a day  XARELTO ORAL TABLET 15 MG (RIVAROXABAN) TAKE 1 TABLET BY MOUTH ONE TIME A DAY      Depression   During the past month have you often been bothered by feeling down, depressed, or hopeless? No  During the past month have you often been bothered by little interest or pleasure in doing things? No    Surgical History   Rotator Cuff Repair right  Amputation right  index and middle finger  Sinus surgery February 2014  Heart Catherization - 12/8/2016 - Non obstr CAD; Takotsubo CM  Heart Ablation- 1/2017 [facility]  Heart Catherization: 7/24/17 (treating medically)  Cardiac Ablation 8/22/17 [facility]    Risk Factors  Tobacco Use: Never smoker  Passive smoke exposure: no  Exercise: yes  Type of Exercise: yard & farm work  Illicit Drug use: no    Advanced directives discussed:   yes  Advanced directives reviewed:  yes    Level of Function   Escobar Index of Crosby in Activities of Daily Living  BATHING: Crosby  DRESSING: Crosby  TOILETING: Crosby  TRANSFERRING: Crosby  CONTINENCE: Crosby  FEEDING: Crosby  ADL Score: 0  The patient is independent.    Home Safety   Do  you have adequate housing? yes  Do you have transportation? yes  Do you have sufficient food? yes  Do you have access to a telephone? yes  Actual or threats of physical, emotional abuse? no  Do you have actual or threats of sexual abuse? no  Do you feel safe at home? yes  Home Safety Assessment Needed?   no    Falls Information:   Falls in the past 2 months? no  Falls in the past 6 months? no  CNS to follow up? no    Balance:   Are there difficulties with balance? no  Have you ever experienced lightheadedness/dizziness w/ position changes? no  Do you have episodes of dizziness? no  Have you experienced blackouts? no    Physical Examination   General Appearance   In no acute distress.  Alert & oriented.  Behavior and affect appropriate to situation  Skin   AK left upper flank  HEENT   PERRLA, EOMI, TM's normal.  Pharynx clear  Neck   Supple.  No adenopathy  Cardiovascular   Regular rate and rhythm  Lungs   Clear to auscultation  Abdomen   Soft, non-tender.  Bowel sounds present.  No hepatosplenomegaly  Back   decreased overall flexibility, degenerative changes  Musculoskeletal   OA changes, degenerative changes  Neurologic   CN grossly intact  Psych   Alert and cooperative; normal mood and affect; normal attention span and concentration        Impression & Recommendations:    Problem # 1:  Preventive Health Care Exam (ICD-V70.0) (CBJ45-V24.00)  This patient overall looks good related to their annual checkup.  Problem areas were addressed and they were encouraged to continue good lifestyle habits and behaviors.    Orders:  Medicare-Subsequent Year (AWV) ()  [facility] CBC W/DIFF; PATH REVIEW IF INDICATED (CBC)  [facility] LIPID PANEL (LIPID)  [facility] COMPREHENSIVE METABOLIC PANEL (CMP) (MPC)  [facility] VITAMIN B12 (B12)  [facility] FOLATE (FOL)  [facility] VITAMIN D TOTAL (VITD)  [facility] THYROID STIMULATING HORMONE (TSH) (TSH)  [facility] T4 THYROXINE FREE (FT4)  [facility] T3 TRIODOTHYRONINE TOTAL *not to be ordered w/ T3 FREE (T3)      Problem # 2:  Atrial  Fibrillation (ICD-427.31) (NYF51-A42.91)    The following medications were removed from the medication list:     Metoprolol Tartrate 100 Mg Tab (Metoprolol tartrate) ..... 1/2 tablet bid    Reviewed the following:  INR: 1.7 (11/22/2016)    Orders:  Medicare-Subsequent Year (AWV) ()  [facility] CBC W/DIFF; PATH REVIEW IF INDICATED (CBC)  [facility] LIPID PANEL (LIPID)  [facility] COMPREHENSIVE METABOLIC PANEL (CMP) (MPC)  [facility] VITAMIN B12 (B12)  [facility] FOLATE (FOL)  [facility] VITAMIN D TOTAL (VITD)  [facility] THYROID STIMULATING HORMONE (TSH) (TSH)  [facility] T4 THYROXINE FREE (FT4)  [facility] T3 TRIODOTHYRONINE TOTAL *not to be ordered w/ T3 FREE (T3)      Problem # 3:  HYPERTENSION (ICD-401.9) (TVG38-J88)    The following medications were removed from the medication list:     Metoprolol Tartrate 100 Mg Tab (Metoprolol tartrate) ..... 1/2 tablet bid     Furosemide 40 Mg Tablet (Furosemide) ..... Take one tablet by mouth daily    His updated medication list for this problem includes:     Cardizem Cd 180 Mg Oral Capsule Extended Release 24 Hour (Diltiazem hcl coated beads) ..... Take one daily     Losartan Potassium 25 Mg Oral Tablet (Losartan potassium) ..... .qd    Orders:  Medicare-Subsequent Year (AWV) ()  [facility] CBC W/DIFF; PATH REVIEW IF INDICATED (CBC)  [facility] LIPID PANEL (LIPID)  [facility] COMPREHENSIVE METABOLIC PANEL (CMP) (MPC)  [facility] VITAMIN B12 (B12)  [facility] FOLATE (FOL)  [facility] VITAMIN D TOTAL (VITD)  [facility] THYROID STIMULATING HORMONE (TSH) (TSH)  FM T4 THYROXINE FREE (FT4)  FM T3 TRIODOTHYRONINE TOTAL *not to be ordered w/ T3 FREE (T3)    BP today: 116/82  Prior BP: 122/70 (04/09/2019)    Labs Reviewed:  Creat: 1.3 (05/09/2019)  Chol: 180 (05/24/2018)   HDL: 48 (05/24/2018)   LDL: 116 (05/24/2018)         Problem # 4:  HYPERLIPIDEMIA (ICD-272.4) (HQT23-D49.5)    Labs Reviewed:  Chol: 180 (05/24/2018)   HDL: 48 (05/24/2018)   LDL: 116 (05/24/2018)     SGOT: 28 (05/24/2018)   SGPT: 19 (05/24/2018)    Orders:  Medicare-Subsequent Year (AWV) ()  [facility] CBC  W/DIFF; PATH REVIEW IF INDICATED (CBC)  [facility] LIPID PANEL (LIPID)  [facility] COMPREHENSIVE METABOLIC PANEL (CMP) (MPC)  [facility] VITAMIN B12 (B12)  [facility] FOLATE (FOL)  [facility] VITAMIN D TOTAL (VITD)  [facility] THYROID STIMULATING HORMONE (TSH) (TSH)  [facility] T4 THYROXINE FREE (FT4)  [facility] T3 TRIODOTHYRONINE TOTAL *not to be ordered w/ T3 FREE (T3)      Problem # 5:  Fatigue and malaise (ICD-780.79) (VUE98-J54.83)  will stop the b-blocker and try a ca-channel blocker    Problem # 6:  Takotsubo cardiomyopathy (ICD-429.83) (TNG22-X65.81)  ok to try stopping the lasix and KCL  Orders:  Medicare-Subsequent Year (AWV) ()  [facility] CBC W/DIFF; PATH REVIEW IF INDICATED (CBC)  [facility] LIPID PANEL (LIPID)  [facility] COMPREHENSIVE METABOLIC PANEL (CMP) (MPC)  [facility] VITAMIN B12 (B12)  [facility] FOLATE (FOL)  [facility] VITAMIN D TOTAL (VITD)  [facility] THYROID STIMULATING HORMONE (TSH) (TSH)  [facility] T4 THYROXINE FREE (FT4)  [facility] T3 TRIODOTHYRONINE TOTAL *not to be ordered w/ T3 FREE (T3)      Problem # 7:  Ak (actinic keratosis) (ICD-702.0) (LTI01-S79.0)  verrucca freeze to lesion because of irritation and bleeding and itching  Orders:  Verrucca Freeze ([id number])      Medications Added to Medication List This Visit:  1)  Cardizem Cd 180 Mg Oral Capsule Extended Release 24 Hour (Diltiazem hcl coated beads) .... Take one daily    Other Orders:  [facility] PSA TOTAL (SCREENING) (PSA)      Patient Instructions:  1)  During this visit for their annual exam, we reviewed their personal history, social history and family history.  We went over their medications and all the recommended health maintenence items for their age group. They were given the opportunity to ask   questions and discuss other concerns.  2)  Please schedule a follow-up appointment as needed.                Medication Administration    Orders Added:  1)  Medicare-Subsequent Year (AWV) []  2)  [facility] CBC W/DIFF; PATH REVIEW IF INDICATED [CBC]  3)  [facility] LIPID PANEL [LIPID]  4)  [facility] COMPREHENSIVE METABOLIC PANEL (CMP) [MPC]  5)  [facility]  VITAMIN B12 [B12]  6)  [facility] FOLATE [FOL]  7)  [facility] VITAMIN D TOTAL [VITD]  8)  [facility] THYROID STIMULATING HORMONE (TSH) [TSH]  9)  [facility] T4 THYROXINE FREE [FT4]  10)  [facility] T3 TRIODOTHYRONINE TOTAL *not to be ordered w/ T3 FREE [T3]  11)  [facility] PSA TOTAL (SCREENING) [PSA]  12)  Verrucca Freeze [[id number]]        Electronically signed by Jason Ponce MD on 05/30/2019 at 10:28 AM  ________________________________________________________________________       Disclaimer: Converted Note message may not contain all data elements that existed in the legacy source system. Please see [employer] for the original note details.

## 2019-06-11 ENCOUNTER — OFFICE (AMBULATORY)
Dept: URBAN - METROPOLITAN AREA PATHOLOGY 4 | Facility: PATHOLOGY | Age: 79
End: 2019-06-11

## 2019-06-11 ENCOUNTER — ON CAMPUS - OUTPATIENT (AMBULATORY)
Dept: URBAN - METROPOLITAN AREA HOSPITAL 2 | Facility: HOSPITAL | Age: 79
End: 2019-06-11
Payer: COMMERCIAL

## 2019-06-11 VITALS
DIASTOLIC BLOOD PRESSURE: 65 MMHG | TEMPERATURE: 97.3 F | DIASTOLIC BLOOD PRESSURE: 80 MMHG | SYSTOLIC BLOOD PRESSURE: 111 MMHG | HEART RATE: 75 BPM | HEIGHT: 74 IN | DIASTOLIC BLOOD PRESSURE: 70 MMHG | HEART RATE: 69 BPM | DIASTOLIC BLOOD PRESSURE: 111 MMHG | SYSTOLIC BLOOD PRESSURE: 101 MMHG | SYSTOLIC BLOOD PRESSURE: 117 MMHG | HEART RATE: 71 BPM | DIASTOLIC BLOOD PRESSURE: 73 MMHG | DIASTOLIC BLOOD PRESSURE: 63 MMHG | SYSTOLIC BLOOD PRESSURE: 123 MMHG | SYSTOLIC BLOOD PRESSURE: 112 MMHG | RESPIRATION RATE: 16 BRPM | DIASTOLIC BLOOD PRESSURE: 66 MMHG | OXYGEN SATURATION: 98 % | WEIGHT: 199 LBS | OXYGEN SATURATION: 100 % | HEART RATE: 67 BPM | SYSTOLIC BLOOD PRESSURE: 143 MMHG | DIASTOLIC BLOOD PRESSURE: 79 MMHG | SYSTOLIC BLOOD PRESSURE: 140 MMHG | SYSTOLIC BLOOD PRESSURE: 121 MMHG | HEART RATE: 72 BPM | OXYGEN SATURATION: 97 % | HEART RATE: 70 BPM

## 2019-06-11 DIAGNOSIS — Z86.010 PERSONAL HISTORY OF COLONIC POLYPS: ICD-10-CM

## 2019-06-11 DIAGNOSIS — K57.30 DIVERTICULOSIS OF LARGE INTESTINE WITHOUT PERFORATION OR ABS: ICD-10-CM

## 2019-06-11 DIAGNOSIS — D12.0 BENIGN NEOPLASM OF CECUM: ICD-10-CM

## 2019-06-11 DIAGNOSIS — D12.3 BENIGN NEOPLASM OF TRANSVERSE COLON: ICD-10-CM

## 2019-06-11 DIAGNOSIS — K64.1 SECOND DEGREE HEMORRHOIDS: ICD-10-CM

## 2019-06-11 LAB
GI HISTOLOGY: A. UNSPECIFIED: (no result)
GI HISTOLOGY: B. UNSPECIFIED: (no result)
GI HISTOLOGY: PDF REPORT: (no result)

## 2019-06-11 PROCEDURE — 45385 COLONOSCOPY W/LESION REMOVAL: CPT | Mod: PT | Performed by: INTERNAL MEDICINE

## 2019-06-11 PROCEDURE — 88305 TISSUE EXAM BY PATHOLOGIST: CPT | Performed by: INTERNAL MEDICINE

## 2019-06-11 PROCEDURE — 45380 COLONOSCOPY AND BIOPSY: CPT | Mod: 59,PT | Performed by: INTERNAL MEDICINE

## 2019-06-17 ENCOUNTER — TELEPHONE (OUTPATIENT)
Dept: FAMILY MEDICINE CLINIC | Facility: CLINIC | Age: 79
End: 2019-06-17

## 2019-07-05 ENCOUNTER — CLINICAL SUPPORT NO REQUIREMENTS (OUTPATIENT)
Dept: CARDIOLOGY | Facility: CLINIC | Age: 79
End: 2019-07-05

## 2019-07-05 DIAGNOSIS — I42.9 CARDIOMYOPATHY, UNSPECIFIED TYPE (HCC): Primary | ICD-10-CM

## 2019-07-05 DIAGNOSIS — Z95.810 PRESENCE OF BIVENTRICULAR IMPLANTABLE CARDIOVERTER-DEFIBRILLATOR (ICD): ICD-10-CM

## 2019-07-05 PROCEDURE — 93296 REM INTERROG EVL PM/IDS: CPT | Performed by: NURSE PRACTITIONER

## 2019-07-05 PROCEDURE — 93295 DEV INTERROG REMOTE 1/2/MLT: CPT | Performed by: NURSE PRACTITIONER

## 2019-07-09 ENCOUNTER — CLINICAL SUPPORT NO REQUIREMENTS (OUTPATIENT)
Dept: CARDIOLOGY | Facility: CLINIC | Age: 79
End: 2019-07-09

## 2019-07-29 PROBLEM — Z95.810 PRESENCE OF BIVENTRICULAR IMPLANTABLE CARDIOVERTER-DEFIBRILLATOR (ICD): Status: ACTIVE | Noted: 2019-07-29

## 2019-07-29 PROBLEM — I42.9 CARDIOMYOPATHY: Status: ACTIVE | Noted: 2019-07-29

## 2019-07-29 NOTE — PROGRESS NOTES
REMOTE DEVICE INTERROGATION    Remote device interrogation is reviewed.     Device Company: [employer]    Battery Stable.  Time to MADISYN 6.5 years    Presenting EGM: Bi Ventricular paced    Arrhythmia Logbook Reviewed: No sustained events    Device function appears Stable    Continue remote device interrogations every 3 months.

## 2019-09-03 RX ORDER — DILTIAZEM HYDROCHLORIDE 180 MG/1
CAPSULE, COATED, EXTENDED RELEASE ORAL
COMMUNITY
Start: 2019-05-30 | End: 2019-09-03 | Stop reason: SDUPTHER

## 2019-09-03 RX ORDER — DILTIAZEM HYDROCHLORIDE 180 MG/1
180 CAPSULE, COATED, EXTENDED RELEASE ORAL DAILY
Qty: 90 CAPSULE | Refills: 3 | Status: SHIPPED | OUTPATIENT
Start: 2019-09-03 | End: 2019-09-03 | Stop reason: SDUPTHER

## 2019-09-03 RX ORDER — DILTIAZEM HYDROCHLORIDE 180 MG/1
180 CAPSULE, COATED, EXTENDED RELEASE ORAL DAILY
Qty: 90 CAPSULE | Refills: 3 | Status: SHIPPED | OUTPATIENT
Start: 2019-09-03 | End: 2020-09-09 | Stop reason: SDUPTHER

## 2019-09-03 NOTE — TELEPHONE ENCOUNTER
PATIENT ASK IF YOU WILL SEND THE MEDICATION CARDIZEM 180 MG TO [facility]  FOR 90 DAYS WITH 3 REFILLS. HE HAS BEEN GETTING IT REFILLED AT [facility] BUT IT WILL BE CHEAPER TO SEND THRU MAIL PHARMACY  HE SAID.

## 2019-09-30 ENCOUNTER — TELEPHONE (OUTPATIENT)
Dept: FAMILY MEDICINE CLINIC | Facility: CLINIC | Age: 79
End: 2019-09-30

## 2019-09-30 RX ORDER — LOSARTAN POTASSIUM 25 MG/1
TABLET ORAL
COMMUNITY
Start: 2019-04-09 | End: 2019-09-30 | Stop reason: SDUPTHER

## 2019-09-30 RX ORDER — LOSARTAN POTASSIUM 25 MG/1
25 TABLET ORAL DAILY
Qty: 90 TABLET | Refills: 1 | Status: SHIPPED | OUTPATIENT
Start: 2019-09-30 | End: 2020-03-31 | Stop reason: SDUPTHER

## 2019-09-30 NOTE — TELEPHONE ENCOUNTER
PATIENT NEEDS REFILL ON LOSARTAN POTASSIUM 25 MG TAB, TAKE ONE TABLET BY MOUTH DAILY. QTY 90 SENT TO [facility] PLEASE.

## 2019-10-07 ENCOUNTER — FLU SHOT (OUTPATIENT)
Dept: FAMILY MEDICINE CLINIC | Facility: CLINIC | Age: 79
End: 2019-10-07

## 2019-10-07 DIAGNOSIS — Z23 IMMUNIZATION DUE: Primary | ICD-10-CM

## 2019-10-07 PROCEDURE — 90653 IIV ADJUVANT VACCINE IM: CPT | Performed by: HOSPITALIST

## 2019-10-07 PROCEDURE — G0008 ADMIN INFLUENZA VIRUS VAC: HCPCS | Performed by: HOSPITALIST

## 2019-10-08 ENCOUNTER — OFFICE VISIT (OUTPATIENT)
Dept: CARDIOLOGY | Facility: CLINIC | Age: 79
End: 2019-10-08

## 2019-10-08 ENCOUNTER — CLINICAL SUPPORT NO REQUIREMENTS (OUTPATIENT)
Dept: CARDIOLOGY | Facility: CLINIC | Age: 79
End: 2019-10-08

## 2019-10-08 VITALS
HEART RATE: 89 BPM | SYSTOLIC BLOOD PRESSURE: 132 MMHG | BODY MASS INDEX: 26.36 KG/M2 | OXYGEN SATURATION: 96 % | HEIGHT: 74 IN | DIASTOLIC BLOOD PRESSURE: 70 MMHG | WEIGHT: 205.4 LBS

## 2019-10-08 DIAGNOSIS — I10 ESSENTIAL HYPERTENSION: ICD-10-CM

## 2019-10-08 DIAGNOSIS — Z95.810 PRESENCE OF BIVENTRICULAR IMPLANTABLE CARDIOVERTER-DEFIBRILLATOR (ICD): ICD-10-CM

## 2019-10-08 DIAGNOSIS — I34.0 NONRHEUMATIC MITRAL VALVE REGURGITATION: ICD-10-CM

## 2019-10-08 DIAGNOSIS — I42.0 DILATED CARDIOMYOPATHY (HCC): Primary | ICD-10-CM

## 2019-10-08 PROBLEM — I50.9 CONGESTIVE HEART FAILURE (HCC): Status: RESOLVED | Noted: 2019-10-08 | Resolved: 2019-10-08

## 2019-10-08 PROBLEM — E78.5 HYPERLIPIDEMIA: Status: ACTIVE | Noted: 2019-10-08

## 2019-10-08 PROBLEM — I50.9 CONGESTIVE HEART FAILURE (HCC): Status: ACTIVE | Noted: 2019-10-08

## 2019-10-08 PROBLEM — E78.2 HYPERLIPIDEMIA, MIXED: Status: ACTIVE | Noted: 2019-10-08

## 2019-10-08 PROCEDURE — 93000 ELECTROCARDIOGRAM COMPLETE: CPT | Performed by: INTERNAL MEDICINE

## 2019-10-08 PROCEDURE — 99213 OFFICE O/P EST LOW 20 MIN: CPT | Performed by: INTERNAL MEDICINE

## 2019-10-08 RX ORDER — MAGNESIUM OXIDE 400 MG/1
TABLET ORAL DAILY
COMMUNITY
Start: 2017-07-21 | End: 2020-06-01

## 2019-10-08 RX ORDER — COLCHICINE 0.6 MG/1
TABLET ORAL DAILY
COMMUNITY
Start: 2019-04-09 | End: 2019-10-09 | Stop reason: SDUPTHER

## 2019-10-08 RX ORDER — BISOPROLOL FUMARATE 5 MG/1
5 TABLET, FILM COATED ORAL DAILY
Qty: 30 TABLET | Refills: 2 | Status: SHIPPED | OUTPATIENT
Start: 2019-10-08 | End: 2019-12-13 | Stop reason: SINTOL

## 2019-10-08 RX ORDER — ALLOPURINOL 300 MG/1
TABLET ORAL DAILY
COMMUNITY
Start: 2019-08-31 | End: 2020-03-23

## 2019-10-09 RX ORDER — COLCHICINE 0.6 MG/1
0.6 TABLET ORAL 2 TIMES DAILY
Qty: 30 TABLET | Refills: 2 | Status: SHIPPED | OUTPATIENT
Start: 2019-10-09 | End: 2021-04-12

## 2019-10-15 ENCOUNTER — TELEPHONE (OUTPATIENT)
Dept: CARDIOLOGY | Facility: CLINIC | Age: 79
End: 2019-10-15

## 2019-10-15 NOTE — TELEPHONE ENCOUNTER
Bisoprolol 5mg is the medication that he was put on 10/9/19.  He is extremely tired and has no energy.  You asked him to call and let you know how he is doing in a week.

## 2019-10-15 NOTE — TELEPHONE ENCOUNTER
Instructed to decrease Bystolic to 2.5mg daily and let us know if it makes a difference.  Keep track of bp and hR.  States hr consistently in the 70's.

## 2019-10-25 ENCOUNTER — CLINICAL SUPPORT (OUTPATIENT)
Dept: FAMILY MEDICINE CLINIC | Facility: CLINIC | Age: 79
End: 2019-10-25

## 2019-10-25 DIAGNOSIS — Z23 IMMUNIZATION DUE: Primary | ICD-10-CM

## 2019-10-25 PROCEDURE — G0009 ADMIN PNEUMOCOCCAL VACCINE: HCPCS | Performed by: HOSPITALIST

## 2019-10-25 PROCEDURE — 90670 PCV13 VACCINE IM: CPT | Performed by: HOSPITALIST

## 2019-11-03 PROBLEM — I42.0 DILATED CARDIOMYOPATHY: Status: ACTIVE | Noted: 2019-11-03

## 2019-12-10 ENCOUNTER — TELEPHONE (OUTPATIENT)
Dept: FAMILY MEDICINE CLINIC | Facility: CLINIC | Age: 79
End: 2019-12-10

## 2019-12-10 NOTE — TELEPHONE ENCOUNTER
Patient called stating that he is a current patient of [facility] and would like to transfer care to you after the first of the year.

## 2019-12-10 NOTE — TELEPHONE ENCOUNTER
Ok that's fine. Please see if he wouldn't mind scheduling an appt around jan/feb so we can go over a couple of things I see in his chart and change in medication that dr bryant made a couple of months ago

## 2019-12-13 ENCOUNTER — OFFICE VISIT (OUTPATIENT)
Dept: CARDIOLOGY | Facility: CLINIC | Age: 79
End: 2019-12-13

## 2019-12-13 ENCOUNTER — CLINICAL SUPPORT NO REQUIREMENTS (OUTPATIENT)
Dept: CARDIOLOGY | Facility: CLINIC | Age: 79
End: 2019-12-13

## 2019-12-13 VITALS
HEART RATE: 71 BPM | DIASTOLIC BLOOD PRESSURE: 89 MMHG | BODY MASS INDEX: 26.19 KG/M2 | SYSTOLIC BLOOD PRESSURE: 164 MMHG | WEIGHT: 204 LBS

## 2019-12-13 DIAGNOSIS — Z95.810 PRESENCE OF BIVENTRICULAR IMPLANTABLE CARDIOVERTER-DEFIBRILLATOR (ICD): ICD-10-CM

## 2019-12-13 DIAGNOSIS — I42.0 DILATED CARDIOMYOPATHY (HCC): Primary | ICD-10-CM

## 2019-12-13 DIAGNOSIS — I48.0 PAROXYSMAL ATRIAL FIBRILLATION (HCC): ICD-10-CM

## 2019-12-13 DIAGNOSIS — I10 ESSENTIAL HYPERTENSION: ICD-10-CM

## 2019-12-13 PROCEDURE — 99214 OFFICE O/P EST MOD 30 MIN: CPT | Performed by: INTERNAL MEDICINE

## 2019-12-13 RX ORDER — CHOLINE 650 MG
1000 TABLET ORAL DAILY
COMMUNITY
End: 2021-05-13

## 2019-12-13 NOTE — PROGRESS NOTES
CC---Cardiomyopathy, VT, atrial fibrillation     Sub--Mr. Hank Zarate is a very pleasant and functionally active 79-year-old white male with history of hypertensive heart disease, MR, TR, and right bundle branch block on ECG.  Patient has history of rheumatic fever since age of 11 and has been noticed to have increasing symptoms of shortness of breath and fatigue and recently evaluated in the hospital needing intravenous diuretics and a repeat cardiac catheterization showing significant LV dysfunction and referred for possible ICD insertion and also had a hematuria and was evaluated by urologist and continues to be on anti coagulation without any further problems with hematuria and had benign workup for hematuria.  Patient did improve his ejection fraction and his heart failure status when he was in sinus rhythm when he underwent ablation several months ago.  Recurrent LV dysfunction and heart failure symptoms with class 3 symptomatology with recurrent persistent atrial fibrillation noted. Patient could not tolerate Tikosyn in the past.  Redo ablation was done with extensive ablation and significant left atrial scarring and enlargement noted and patient was placed on amiodarone briefly. Patient started having recurrent atrial arrhythmias despite extensive ablation and compensated functional class 2--  persists to have severely reduced EF-- Biventricular ICD inserted and since then patient is having minimal arrhythmias with remarkable improvement of symptoms and out of hospital for more than  2 years--Patient is in functional class I and hypertension is being managed by the primary care physician and Dr. Bunny Curran and patient is unable to tolerate bisoprolol      Assessment and plan    Persistent symptomatic atrial fibrillation with recurrent class 3 combined congestive heart failure --post redo ablation -- currently in sinus rhythm  Left ventricular ejection fraction below 30% -- post ICD   prior VT with  appropriate device therapy with rescue-- no recurrence  History of rheumatic fever with chronic right bundle-branch block  Hypertension  Bilateral leg edema resolved   Biventricular ICD in situ and interrogation done without significant atrial fibrillation and thresholds and right ventricular left ventricular optimized to optimize biventricular pacing and increase the longevity of the battery and interrogation attached to the chart  Follow-up in 1 year    Vital Signs:Blood pressure 164/89 pulse rate 71 patient is afebrile respiration 12 times a minute      Current Allergies (reviewed today):  HYDROCODONE (Critical)      Past Medical History:     Reviewed history from 03/12/2018 and no changes required:        mitral regurg        tricuspid regurg        rt BBB        Atrial Fibrillation        Cardiomyopathy, takotsubo        Coronary Artery Disease, non obstructive        Hypertension        Gouty Arthritis         Acid reflux disease        Allergies        Osteoarthritis        AF ablation January 2017        bone spur neck- 2017        Congestive heart failure         VT--- February 2018    Past Surgical History:     Reviewed history from 09/01/2017 and no changes required:        Rotator Cuff Repair right        Amputation right  index and middle finger        Sinus surgery February 2014        Heart Catherization - 12/8/2016 - Non obstr CAD; Takotsubo CM        Heart Ablation- 1/2017 [facility]        Heart Catherization: 7/24/17 (treating medically)        Cardiac Ablation 8/22/17 [facility]        Social History:     Reviewed history from 05/17/2018 and no changes required:         , 5 children, retired        4-5 beers weekly        Review of Systems   General: No fatigue or tiredness, No change in weight   Eyes: No redness  Cardiovascular: No chest pain, no palpitations  Respiratory: No shortness of breath  Gastrointestinal: No nausea or vomiting, bleeding  Genitourinary: no hematuria or dysuria  Musculoskeletal:  No arthralgia or myalgia  Skin: No rash  Neurologic: No numbness, tingling, syncope  Hematologic/Lymphatic: No abnormal bleeding          Physical Exam    General:      well developed, well nourished, in no acute distress.    Head:      normocephalic and atraumatic.    Eyes:      PERRL/EOM intact, conjunctiva and sclera clear with out nystagmus.    Neck:      no masses, thyromegaly, trachea central with normal respiratory effort  Lungs:      clear bilaterally to auscultation.    Heart:      non-displaced PMI, chest non-tender; regular rate and rhythm, S1, S2 without murmurs, rubs, or gallops  Skin:      intact without lesions or rashes.    Psych:      alert and cooperative; normal mood and affect; normal attention span and concentration.      Extremities with trace ankle edema without any cyanosis or clubbing    Muscular skeletal patient walks with a normal gait without kyphosis    Neurological no focal deficits and alert oriented x3    Abdomen soft nontender no hepatomegaly      Electronically signed by Hira cOhoa MD, 12/13/19, 9:49 AM.

## 2019-12-16 PROCEDURE — 93284 PRGRMG EVAL IMPLANTABLE DFB: CPT | Performed by: INTERNAL MEDICINE

## 2020-01-03 ENCOUNTER — TELEPHONE (OUTPATIENT)
Dept: FAMILY MEDICINE CLINIC | Facility: CLINIC | Age: 80
End: 2020-01-03

## 2020-01-03 NOTE — TELEPHONE ENCOUNTER
PATIENT NEEDS REFILL ON XARELTO TABS 15MG TAKE ONE TABLET DAILY. QTY 90.     Patient is asking to have this sent to [facility] pharmacy please.

## 2020-01-06 ENCOUNTER — CLINICAL SUPPORT NO REQUIREMENTS (OUTPATIENT)
Dept: CARDIOLOGY | Facility: CLINIC | Age: 80
End: 2020-01-06

## 2020-01-06 DIAGNOSIS — I42.0 DILATED CARDIOMYOPATHY (HCC): Primary | ICD-10-CM

## 2020-01-06 DIAGNOSIS — Z95.810 PRESENCE OF BIVENTRICULAR IMPLANTABLE CARDIOVERTER-DEFIBRILLATOR (ICD): ICD-10-CM

## 2020-01-10 PROCEDURE — 93296 REM INTERROG EVL PM/IDS: CPT | Performed by: NURSE PRACTITIONER

## 2020-01-10 PROCEDURE — 93295 DEV INTERROG REMOTE 1/2/MLT: CPT | Performed by: NURSE PRACTITIONER

## 2020-01-10 NOTE — PROGRESS NOTES
REMOTE DEVICE INTERROGATION    Remote device interrogation is reviewed.     Device Company: [employer]    Battery Stable.  Time to MADISYN 7 years    Presenting EGM: A paced Bi V Paced    Arrhythmia Logbook Reviewed: No sustained events    Device function appears Stable    Continue remote device interrogations every 3 months.

## 2020-02-17 ENCOUNTER — LAB (OUTPATIENT)
Dept: FAMILY MEDICINE CLINIC | Facility: CLINIC | Age: 80
End: 2020-02-17

## 2020-02-17 ENCOUNTER — OFFICE VISIT (OUTPATIENT)
Dept: FAMILY MEDICINE CLINIC | Facility: CLINIC | Age: 80
End: 2020-02-17

## 2020-02-17 VITALS
TEMPERATURE: 97.7 F | BODY MASS INDEX: 26.05 KG/M2 | HEART RATE: 74 BPM | SYSTOLIC BLOOD PRESSURE: 144 MMHG | OXYGEN SATURATION: 95 % | WEIGHT: 203 LBS | RESPIRATION RATE: 12 BRPM | DIASTOLIC BLOOD PRESSURE: 96 MMHG | HEIGHT: 74 IN

## 2020-02-17 DIAGNOSIS — I48.91 ATRIAL FIBRILLATION, UNSPECIFIED TYPE (HCC): ICD-10-CM

## 2020-02-17 DIAGNOSIS — I42.0 DILATED CARDIOMYOPATHY (HCC): ICD-10-CM

## 2020-02-17 DIAGNOSIS — R09.89 BIBASILAR CRACKLES: ICD-10-CM

## 2020-02-17 DIAGNOSIS — M1A.9XX0 CHRONIC GOUT WITHOUT TOPHUS, UNSPECIFIED CAUSE, UNSPECIFIED SITE: ICD-10-CM

## 2020-02-17 DIAGNOSIS — I10 ESSENTIAL HYPERTENSION: ICD-10-CM

## 2020-02-17 DIAGNOSIS — I10 ESSENTIAL HYPERTENSION: Primary | ICD-10-CM

## 2020-02-17 LAB
ALBUMIN SERPL-MCNC: 4.2 G/DL (ref 3.5–5.2)
ALBUMIN/GLOB SERPL: 1.4 G/DL
ALP SERPL-CCNC: 82 U/L (ref 39–117)
ALT SERPL W P-5'-P-CCNC: 13 U/L (ref 1–41)
ANION GAP SERPL CALCULATED.3IONS-SCNC: 10.6 MMOL/L (ref 5–15)
AST SERPL-CCNC: 22 U/L (ref 1–40)
BASOPHILS # BLD AUTO: 0.09 10*3/MM3 (ref 0–0.2)
BASOPHILS NFR BLD AUTO: 1 % (ref 0–1.5)
BILIRUB SERPL-MCNC: 1.2 MG/DL (ref 0.2–1.2)
BUN BLD-MCNC: 17 MG/DL (ref 8–23)
BUN/CREAT SERPL: 14.4 (ref 7–25)
CALCIUM SPEC-SCNC: 9.5 MG/DL (ref 8.6–10.5)
CHLORIDE SERPL-SCNC: 102 MMOL/L (ref 98–107)
CHOLEST SERPL-MCNC: 165 MG/DL (ref 0–200)
CO2 SERPL-SCNC: 28.4 MMOL/L (ref 22–29)
CREAT BLD-MCNC: 1.18 MG/DL (ref 0.76–1.27)
DEPRECATED RDW RBC AUTO: 48.6 FL (ref 37–54)
EOSINOPHIL # BLD AUTO: 0.11 10*3/MM3 (ref 0–0.4)
EOSINOPHIL NFR BLD AUTO: 1.2 % (ref 0.3–6.2)
ERYTHROCYTE [DISTWIDTH] IN BLOOD BY AUTOMATED COUNT: 14 % (ref 12.3–15.4)
GFR SERPL CREATININE-BSD FRML MDRD: 59 ML/MIN/1.73
GLOBULIN UR ELPH-MCNC: 3.1 GM/DL
GLUCOSE BLD-MCNC: 98 MG/DL (ref 65–99)
HCT VFR BLD AUTO: 47.3 % (ref 37.5–51)
HDLC SERPL-MCNC: 72 MG/DL (ref 40–60)
HGB BLD-MCNC: 16.5 G/DL (ref 13–17.7)
IMM GRANULOCYTES # BLD AUTO: 0.05 10*3/MM3 (ref 0–0.05)
IMM GRANULOCYTES NFR BLD AUTO: 0.6 % (ref 0–0.5)
LDLC SERPL CALC-MCNC: 78 MG/DL (ref 0–100)
LDLC/HDLC SERPL: 1.08 {RATIO}
LYMPHOCYTES # BLD AUTO: 1.76 10*3/MM3 (ref 0.7–3.1)
LYMPHOCYTES NFR BLD AUTO: 19.7 % (ref 19.6–45.3)
MAGNESIUM SERPL-MCNC: 2.2 MG/DL (ref 1.6–2.4)
MCH RBC QN AUTO: 33.3 PG (ref 26.6–33)
MCHC RBC AUTO-ENTMCNC: 34.9 G/DL (ref 31.5–35.7)
MCV RBC AUTO: 95.6 FL (ref 79–97)
MONOCYTES # BLD AUTO: 0.86 10*3/MM3 (ref 0.1–0.9)
MONOCYTES NFR BLD AUTO: 9.6 % (ref 5–12)
NEUTROPHILS # BLD AUTO: 6.05 10*3/MM3 (ref 1.7–7)
NEUTROPHILS NFR BLD AUTO: 67.9 % (ref 42.7–76)
NRBC BLD AUTO-RTO: 0.1 /100 WBC (ref 0–0.2)
NT-PROBNP SERPL-MCNC: 760.1 PG/ML (ref 5–1800)
PLATELET # BLD AUTO: 187 10*3/MM3 (ref 140–450)
PMV BLD AUTO: 11.8 FL (ref 6–12)
POTASSIUM BLD-SCNC: 4.5 MMOL/L (ref 3.5–5.2)
PROT SERPL-MCNC: 7.3 G/DL (ref 6–8.5)
RBC # BLD AUTO: 4.95 10*6/MM3 (ref 4.14–5.8)
SODIUM BLD-SCNC: 141 MMOL/L (ref 136–145)
TRIGL SERPL-MCNC: 76 MG/DL (ref 0–150)
TSH SERPL DL<=0.05 MIU/L-ACNC: 2.68 UIU/ML (ref 0.27–4.2)
URATE SERPL-MCNC: 3.6 MG/DL (ref 3.4–7)
VLDLC SERPL-MCNC: 15.2 MG/DL (ref 5–40)
WBC NRBC COR # BLD: 8.92 10*3/MM3 (ref 3.4–10.8)

## 2020-02-17 PROCEDURE — 80061 LIPID PANEL: CPT | Performed by: INTERNAL MEDICINE

## 2020-02-17 PROCEDURE — 36415 COLL VENOUS BLD VENIPUNCTURE: CPT

## 2020-02-17 PROCEDURE — 85025 COMPLETE CBC W/AUTO DIFF WBC: CPT | Performed by: INTERNAL MEDICINE

## 2020-02-17 PROCEDURE — 80053 COMPREHEN METABOLIC PANEL: CPT | Performed by: INTERNAL MEDICINE

## 2020-02-17 PROCEDURE — 83735 ASSAY OF MAGNESIUM: CPT | Performed by: INTERNAL MEDICINE

## 2020-02-17 PROCEDURE — 84550 ASSAY OF BLOOD/URIC ACID: CPT | Performed by: INTERNAL MEDICINE

## 2020-02-17 PROCEDURE — 83880 ASSAY OF NATRIURETIC PEPTIDE: CPT | Performed by: INTERNAL MEDICINE

## 2020-02-17 PROCEDURE — 84443 ASSAY THYROID STIM HORMONE: CPT | Performed by: INTERNAL MEDICINE

## 2020-02-17 PROCEDURE — 99214 OFFICE O/P EST MOD 30 MIN: CPT | Performed by: INTERNAL MEDICINE

## 2020-02-17 RX ORDER — DESONIDE 0.5 MG/G
CREAM TOPICAL 2 TIMES DAILY
COMMUNITY
End: 2021-04-17

## 2020-02-17 RX ORDER — CLOTRIMAZOLE AND BETAMETHASONE DIPROPIONATE 10; .64 MG/G; MG/G
CREAM TOPICAL 2 TIMES DAILY PRN
COMMUNITY
End: 2021-05-13

## 2020-02-20 DIAGNOSIS — R09.89 BIBASILAR CRACKLES: ICD-10-CM

## 2020-03-23 RX ORDER — ALLOPURINOL 300 MG/1
TABLET ORAL
Qty: 90 TABLET | Refills: 4 | Status: SHIPPED | OUTPATIENT
Start: 2020-03-23 | End: 2021-03-22 | Stop reason: SDUPTHER

## 2020-03-31 ENCOUNTER — TELEPHONE (OUTPATIENT)
Dept: FAMILY MEDICINE CLINIC | Facility: CLINIC | Age: 80
End: 2020-03-31

## 2020-03-31 RX ORDER — LOSARTAN POTASSIUM 25 MG/1
25 TABLET ORAL DAILY
Qty: 90 TABLET | Refills: 1 | Status: SHIPPED | OUTPATIENT
Start: 2020-03-31 | End: 2020-09-09

## 2020-03-31 NOTE — TELEPHONE ENCOUNTER
PATIENT CALLED TO REQUEST FOR DR. GARCIA TO WRITE HIM A NEW PRESCRIPTION FOR THE LOSARTAN (COZAAR) 25 MG TABLET.    THE PATIENT STATED THAT SOMEONE FROM HIS PHARMACY INFORMED HIM THAT THEY HAD FAXED REQUESTS TO HIS PRIMARY CARE PROVIDER TO REQUEST FOR THIS PRESCRIPTION TO BE RENEWED FOR HIM, BUT THEY HAVE NOT HEARD ANYTHING IN RESPONSE YET. THE PATIENT STATED THAT HE WAS PREVIOUSLY A PATIENT OF DR. PEREZ, BUT HE IS NOW A PATIENT OF DR. GARCIA, SO THAT MAY BE WHERE THE MIX UP IS.    THE PATIENT STATED THAT HE TAKES 1 TABLET A DAY, AND HE CURRENTLY HAS ABOUT 10 TABLETS LEFT OF THIS MEDICATION. THE PATIENT STATED THAT HE GETS THIS MEDICATION AS A 90-DAY SUPPLY (90 TABLETS).    I CONFIRMED THE CORRECT PHARMACY WITH THE PATIENT [facility].    IF THERE ARE ANY QUESTIONS OR CONCERNS PLEASE CALL THE PATIENT [phone] OR [facility] -[phone].

## 2020-04-10 ENCOUNTER — CLINICAL SUPPORT NO REQUIREMENTS (OUTPATIENT)
Dept: CARDIOLOGY | Facility: CLINIC | Age: 80
End: 2020-04-10

## 2020-04-10 DIAGNOSIS — Z95.810 PRESENCE OF BIVENTRICULAR IMPLANTABLE CARDIOVERTER-DEFIBRILLATOR (ICD): ICD-10-CM

## 2020-04-10 DIAGNOSIS — I42.0 DILATED CARDIOMYOPATHY (HCC): Primary | ICD-10-CM

## 2020-04-10 PROCEDURE — 93296 REM INTERROG EVL PM/IDS: CPT | Performed by: NURSE PRACTITIONER

## 2020-04-10 PROCEDURE — 93295 DEV INTERROG REMOTE 1/2/MLT: CPT | Performed by: NURSE PRACTITIONER

## 2020-04-10 NOTE — PROGRESS NOTES
REMOTE DEVICE INTERROGATION    Received and reviewed on:   April 10, 2020    Remote device interrogation is reviewed.     Device Company: [employer]    Battery Stable.  Time to MADISYN 7 years    Presenting EGM: A paced Bi V Paced    Arrhythmia Logbook Reviewed: No sustained events    Device function appears Stable    Continue remote device interrogations every 3 months.

## 2020-06-01 ENCOUNTER — OFFICE VISIT (OUTPATIENT)
Dept: FAMILY MEDICINE CLINIC | Facility: CLINIC | Age: 80
End: 2020-06-01

## 2020-06-01 VITALS
TEMPERATURE: 97.6 F | WEIGHT: 196.4 LBS | DIASTOLIC BLOOD PRESSURE: 74 MMHG | HEIGHT: 74 IN | BODY MASS INDEX: 25.21 KG/M2 | HEART RATE: 61 BPM | RESPIRATION RATE: 12 BRPM | SYSTOLIC BLOOD PRESSURE: 138 MMHG

## 2020-06-01 DIAGNOSIS — I42.0 DILATED CARDIOMYOPATHY (HCC): ICD-10-CM

## 2020-06-01 DIAGNOSIS — Z00.00 PREVENTATIVE HEALTH CARE: Primary | ICD-10-CM

## 2020-06-01 DIAGNOSIS — I10 ESSENTIAL HYPERTENSION: ICD-10-CM

## 2020-06-01 DIAGNOSIS — R09.89 BIBASILAR CRACKLES: ICD-10-CM

## 2020-06-01 PROCEDURE — G0439 PPPS, SUBSEQ VISIT: HCPCS | Performed by: INTERNAL MEDICINE

## 2020-06-01 NOTE — PROGRESS NOTES
The ABCs of the Annual Wellness Visit  Subsequent Medicare Wellness Visit    Chief Complaint   Patient presents with   • Annual Exam       Subjective   History of Present Illness:  Hank Ponce is a 80 y.o. male who presents for a Subsequent Medicare Wellness Visit.    HEALTH RISK ASSESSMENT    Recent Hospitalizations:  No hospitalization(s) within the last year.    Current Medical Providers:  Patient Care Team:  Ruby Alvarez MD as PCP - General (Internal Medicine)    Smoking Status:  Social History     Tobacco Use   Smoking Status Never Smoker   Smokeless Tobacco Never Used       Alcohol Consumption:  Social History     Substance and Sexual Activity   Alcohol Use Yes    Comment: occasionally       Depression Screen:   PHQ-2/PHQ-9 Depression Screening 6/1/2020   Little interest or pleasure in doing things 0   Feeling down, depressed, or hopeless 0   Total Score 0       Fall Risk Screen:  STEADI Fall Risk Assessment was completed, and patient is at LOW risk for falls.Assessment completed on:2/17/2020    Health Habits and Functional and Cognitive Screening:  Functional & Cognitive Status 6/1/2020   Do you have difficulty preparing food and eating? No   Do you have difficulty bathing yourself, getting dressed or grooming yourself? No   Do you have difficulty using the toilet? No   Do you have difficulty moving around from place to place? No   Do you have trouble with steps or getting out of a bed or a chair? No   Current Diet Well Balanced Diet   Dental Exam Up to date   Eye Exam Up to date   Exercise (times per week) 7 times per week   Current Exercise Activities Include Gardening   Do you need help using the phone?  No   Are you deaf or do you have serious difficulty hearing?  No   Do you need help with transportation? No   Do you need help shopping? No   Do you need help preparing meals?  No   Do you need help with housework?  No   Do you need help with laundry? No   Do you need help taking your  medications? No   Do you need help managing money? No   Do you ever drive or ride in a car without wearing a seat belt? No   Have you felt unusual stress, anger or loneliness in the last month? No   Who do you live with? Spouse   If you need help, do you have trouble finding someone available to you? No   Have you been bothered in the last four weeks by sexual problems? No   Do you have difficulty concentrating, remembering or making decisions? No         Does the patient have evidence of cognitive impairment? No    Asprin use counseling:Does not need ASA (and currently is not on it)    Age-appropriate Screening Schedule:  Refer to the list below for future screening recommendations based on patient's age, sex and/or medical conditions. Orders for these recommended tests are listed in the plan section. The patient has been provided with a written plan.    Health Maintenance   Topic Date Due   • TDAP/TD VACCINES (1 - Tdap) 01/28/1951   • ZOSTER VACCINE (1 of 2) 01/28/1989   • INFLUENZA VACCINE  08/01/2020   • LIPID PANEL  02/17/2021          The following portions of the patient's history were reviewed and updated as appropriate: allergies, current medications, past family history, past medical history, past social history, past surgical history and problem list.    Outpatient Medications Prior to Visit   Medication Sig Dispense Refill   • allopurinol (ZYLOPRIM) 300 MG tablet TAKE 1 TABLET DAILY 90 tablet 4   • Brewers Yeast 500 MG tablet Take 1,000 mg by mouth.     • clotrimazole-betamethasone (LOTRISONE) 1-0.05 % cream Apply  topically to the appropriate area as directed 2 (Two) Times a Day.     • colchicine 0.6 MG tablet Take 1 tablet by mouth 2 (Two) Times a Day. 30 tablet 2   • diltiaZEM CD (CARDIZEM CD) 180 MG 24 hr capsule Take 1 capsule by mouth Daily. 90 capsule 3   • losartan (COZAAR) 25 MG tablet Take 1 tablet by mouth Daily. 90 tablet 1   • magnesium oxide (MAG-OX) 400 MG tablet Daily.     • rivaroxaban  (Xarelto) 15 MG tablet Take 1 tablet by mouth Daily. 90 tablet 1   • desonide (DESOWEN) 0.05 % cream Apply  topically to the appropriate area as directed 2 (Two) Times a Day.     • triamcinolone (KENALOG) 0.1 % ointment Apply  topically to the appropriate area as directed 2 (Two) Times a Day.       No facility-administered medications prior to visit.        Patient Active Problem List   Diagnosis   • Presence of biventricular implantable cardioverter-defibrillator (ICD)   • Hyperlipidemia, mixed   • Mitral regurgitation   • Tricuspid regurgitation   • Essential hypertension   • Takotsubo cardiomyopathy   • Dilated cardiomyopathy (CMS/HCC)       Advanced Care Planning:  ACP discussion was held with the patient during this visit. Patient has an advance directive in EMR which is still valid.     Review of Systems   Constitutional: Negative for activity change, appetite change, fatigue and fever.   HENT: Negative for congestion, ear pain, postnasal drip, sinus pain and sore throat.    Eyes: Negative for photophobia, pain and redness.   Respiratory: Negative for cough and shortness of breath.    Cardiovascular: Negative for chest pain and palpitations.   Gastrointestinal: Negative for abdominal pain, diarrhea, nausea and vomiting.   Endocrine: Negative for polydipsia and polyuria.   Genitourinary: Negative for dysuria and hematuria.   Musculoskeletal: Negative for arthralgias and myalgias.   Skin: Negative for rash and wound.   Neurological: Negative for dizziness, light-headedness and headaches.   Psychiatric/Behavioral: Negative for dysphoric mood and sleep disturbance. The patient is not nervous/anxious.        Compared to one year ago, the patient feels his physical health is the same.  Compared to one year ago, the patient feels his mental health is the same.    Reviewed chart for potential of high risk medication in the elderly: yes  Reviewed chart for potential of harmful drug interactions in the  "elderly:yes    Objective         Vitals:    06/01/20 0846   BP: 138/74   BP Location: Right arm   Patient Position: Sitting   Cuff Size: Adult   Pulse: 61   Resp: 12   Temp: 97.6 °F (36.4 °C)   TempSrc: Oral   Weight: 89.1 kg (196 lb 6.4 oz)   Height: 188 cm (74\")       Body mass index is 25.22 kg/m².  Discussed the patient's BMI with him. The BMI is above average; BMI management plan is completed.    Physical Exam   Constitutional: He is oriented to person, place, and time. He appears well-developed and well-nourished. No distress.   HENT:   Head: Normocephalic and atraumatic.   Right Ear: External ear normal.   Left Ear: External ear normal.   Mouth/Throat: Oropharynx is clear and moist.   Eyes: Conjunctivae and EOM are normal. Right eye exhibits no discharge. Left eye exhibits no discharge. No scleral icterus.   Neck: Normal range of motion. Neck supple. No tracheal deviation present.   Cardiovascular: Normal rate, regular rhythm and normal heart sounds. Exam reveals no gallop and no friction rub.   No murmur heard.  Pulmonary/Chest: Effort normal and breath sounds normal. No respiratory distress.   Bilateral basilar crackles   Abdominal: Soft. Bowel sounds are normal. He exhibits no distension. There is no tenderness. There is no guarding.   Musculoskeletal: Normal range of motion. He exhibits no edema or deformity.   Lymphadenopathy:     He has no cervical adenopathy.   Neurological: He is alert and oriented to person, place, and time. No cranial nerve deficit. He exhibits normal muscle tone.   Skin: Skin is warm and dry. No rash noted. He is not diaphoretic.   Psychiatric: He has a normal mood and affect. His behavior is normal. Thought content normal.   Vitals reviewed.            Assessment/Plan   Medicare Risks and Personalized Health Plan  CMS Preventative Services Quick Reference  Advance Directive Discussion  Cardiovascular risk  Prostate Cancer Screening     The above risks/problems have been discussed " with the patient.  Pertinent information has been shared with the patient in the After Visit Summary.  Follow up plans and orders are seen below in the Assessment/Plan Section.    Diagnoses and all orders for this visit:    1. Preventative health care (Primary)    2. Essential hypertension    3. Dilated cardiomyopathy (CMS/HCC)    4. Bibasilar crackles      Crackles are still present  cxr was clear  bp is better  Had a gout flare, so went back on allopurinol  Otherwise screenings are utd  Continue to work on diet/exercise to keep bp and cholesterol under cholesterol      Follow Up:  No follow-ups on file.     An After Visit Summary and PPPS were given to the patient.

## 2020-07-06 ENCOUNTER — LAB (OUTPATIENT)
Dept: FAMILY MEDICINE CLINIC | Facility: CLINIC | Age: 80
End: 2020-07-06

## 2020-07-06 DIAGNOSIS — Z12.5 SCREENING FOR PROSTATE CANCER: ICD-10-CM

## 2020-07-06 DIAGNOSIS — I10 ESSENTIAL HYPERTENSION: Primary | ICD-10-CM

## 2020-07-06 DIAGNOSIS — I10 ESSENTIAL HYPERTENSION: ICD-10-CM

## 2020-07-06 LAB
ALBUMIN SERPL-MCNC: 3.9 G/DL (ref 3.5–5.2)
ALBUMIN/GLOB SERPL: 1.4 G/DL
ALP SERPL-CCNC: 65 U/L (ref 39–117)
ALT SERPL W P-5'-P-CCNC: 17 U/L (ref 1–41)
ANION GAP SERPL CALCULATED.3IONS-SCNC: 9.1 MMOL/L (ref 5–15)
AST SERPL-CCNC: 19 U/L (ref 1–40)
BILIRUB SERPL-MCNC: 1.1 MG/DL (ref 0–1.2)
BUN SERPL-MCNC: 17 MG/DL (ref 8–23)
BUN/CREAT SERPL: 13.8 (ref 7–25)
CALCIUM SPEC-SCNC: 9.2 MG/DL (ref 8.6–10.5)
CHLORIDE SERPL-SCNC: 106 MMOL/L (ref 98–107)
CO2 SERPL-SCNC: 25.9 MMOL/L (ref 22–29)
CREAT SERPL-MCNC: 1.23 MG/DL (ref 0.76–1.27)
GFR SERPL CREATININE-BSD FRML MDRD: 56 ML/MIN/1.73
GLOBULIN UR ELPH-MCNC: 2.7 GM/DL
GLUCOSE SERPL-MCNC: 103 MG/DL (ref 65–99)
MAGNESIUM SERPL-MCNC: 2.1 MG/DL (ref 1.6–2.4)
POTASSIUM SERPL-SCNC: 4 MMOL/L (ref 3.5–5.2)
PROT SERPL-MCNC: 6.6 G/DL (ref 6–8.5)
PSA SERPL-MCNC: 1.12 NG/ML (ref 0–4)
SODIUM SERPL-SCNC: 141 MMOL/L (ref 136–145)

## 2020-07-06 PROCEDURE — 80053 COMPREHEN METABOLIC PANEL: CPT | Performed by: INTERNAL MEDICINE

## 2020-07-06 PROCEDURE — 36415 COLL VENOUS BLD VENIPUNCTURE: CPT

## 2020-07-06 PROCEDURE — 83735 ASSAY OF MAGNESIUM: CPT | Performed by: INTERNAL MEDICINE

## 2020-07-06 PROCEDURE — G0103 PSA SCREENING: HCPCS | Performed by: INTERNAL MEDICINE

## 2020-07-07 ENCOUNTER — CLINICAL SUPPORT NO REQUIREMENTS (OUTPATIENT)
Dept: CARDIOLOGY | Facility: CLINIC | Age: 80
End: 2020-07-07

## 2020-07-07 ENCOUNTER — OFFICE VISIT (OUTPATIENT)
Dept: CARDIOLOGY | Facility: CLINIC | Age: 80
End: 2020-07-07

## 2020-07-07 VITALS
HEIGHT: 74 IN | BODY MASS INDEX: 24.64 KG/M2 | HEART RATE: 70 BPM | OXYGEN SATURATION: 99 % | DIASTOLIC BLOOD PRESSURE: 68 MMHG | SYSTOLIC BLOOD PRESSURE: 110 MMHG | WEIGHT: 192 LBS

## 2020-07-07 DIAGNOSIS — Z95.810 PRESENCE OF BIVENTRICULAR IMPLANTABLE CARDIOVERTER-DEFIBRILLATOR (ICD): Primary | ICD-10-CM

## 2020-07-07 DIAGNOSIS — E78.2 HYPERLIPIDEMIA, MIXED: ICD-10-CM

## 2020-07-07 DIAGNOSIS — I10 ESSENTIAL HYPERTENSION: ICD-10-CM

## 2020-07-07 DIAGNOSIS — I34.0 NONRHEUMATIC MITRAL VALVE REGURGITATION: ICD-10-CM

## 2020-07-07 DIAGNOSIS — I42.0 DILATED CARDIOMYOPATHY (HCC): ICD-10-CM

## 2020-07-07 DIAGNOSIS — I07.1 TRICUSPID VALVE INSUFFICIENCY, UNSPECIFIED ETIOLOGY: ICD-10-CM

## 2020-07-07 DIAGNOSIS — I51.81 TAKOTSUBO CARDIOMYOPATHY: ICD-10-CM

## 2020-07-07 PROCEDURE — 99213 OFFICE O/P EST LOW 20 MIN: CPT | Performed by: INTERNAL MEDICINE

## 2020-07-07 PROCEDURE — 93000 ELECTROCARDIOGRAM COMPLETE: CPT | Performed by: INTERNAL MEDICINE

## 2020-07-07 NOTE — PROGRESS NOTES
Cardiology Office Visit Note      Referring physician:  Dr. Mandy Alvarez     Reason For Followup: 9 month follow up with device check     HPI:  Hank Ponce is a 80 y.o. male presents today for a follow up visit with device check. Patient has known hx  dilated nonischemic cardiomyopathy , right bundle branch block and recurrent atrial fibrillation who underwent AF ablation with subsequent bi V ICD implant 2017.    Hank is currently doing excellent functional activity status and has no specific cardiopulmonary or other symptoms at this time.    He denies chest pain, dyspnea, PND,  orthopnea, palpitations, near syncope, lower extremity edema or feelings of his heart racing.    He has been compliant with his medications.     Past Medical History:   Diagnosis Date   • Acid reflux disease    • Allergic rhinitis    • Arthritis     gouty   • Atrial fibrillation (CMS/HCC)    • BBB (bundle branch block)     right   • Bone spur 2017    neck   • Congestive heart failure (CHF) (CMS/HCC)    • Congestive heart failure (CMS/HCC) 10/8/2019   • Coronary artery disease     non obstructive   • Essential hypertension 11/29/2011   • Hyperlipidemia, mixed 10/8/2019   • Hypertension    • Mitral regurgitation    • Osteoarthritis    • Presence of biventricular implantable cardioverter-defibrillator (ICD) 7/29/2019   • S/P ablation of atrial fibrillation 01/2017   • Takotsubo cardiomyopathy    • Takotsubo cardiomyopathy    • Tricuspid regurgitation    • VT (ventricular tachycardia) (CMS/HCC) 02/2018       Past Surgical History:   Procedure Laterality Date   • AMPUTATION      index and middle finger   • CARDIAC ABLATION  08/22/2017    BHF   • CARDIAC CATHETERIZATION  12/08/2016    non obst CAD; takotsubo CM   • CARDIAC CATHETERIZATION  07/24/2017    treating medically   • CARDIAC DEFIBRILLATOR PLACEMENT  2017    Bs   • INTERNAL CARDIAC DEFIBRILLATOR INSERTION  2017    BiV ICD BS   • OTHER SURGICAL HISTORY  01/2017    Heart  ablation-BHF   • ROTATOR CUFF REPAIR Right    • SINUS SURGERY  02/2014         Current Outpatient Medications:   •  allopurinol (ZYLOPRIM) 300 MG tablet, TAKE 1 TABLET DAILY, Disp: 90 tablet, Rfl: 4  •  Brewers Yeast 500 MG tablet, Take 1,000 mg by mouth., Disp: , Rfl:   •  clotrimazole-betamethasone (LOTRISONE) 1-0.05 % cream, Apply  topically to the appropriate area as directed 2 (Two) Times a Day., Disp: , Rfl:   •  colchicine 0.6 MG tablet, Take 1 tablet by mouth 2 (Two) Times a Day., Disp: 30 tablet, Rfl: 2  •  desonide (DESOWEN) 0.05 % cream, Apply  topically to the appropriate area as directed 2 (Two) Times a Day., Disp: , Rfl:   •  diltiaZEM CD (CARDIZEM CD) 180 MG 24 hr capsule, Take 1 capsule by mouth Daily., Disp: 90 capsule, Rfl: 3  •  losartan (COZAAR) 25 MG tablet, Take 1 tablet by mouth Daily., Disp: 90 tablet, Rfl: 1  •  rivaroxaban (Xarelto) 15 MG tablet, Take 1 tablet by mouth Daily., Disp: 90 tablet, Rfl: 1  •  triamcinolone (KENALOG) 0.1 % ointment, Apply  topically to the appropriate area as directed 2 (Two) Times a Day., Disp: , Rfl:     Social History     Socioeconomic History   • Marital status:      Spouse name: Not on file   • Number of children: Not on file   • Years of education: Not on file   • Highest education level: Not on file   Tobacco Use   • Smoking status: Never Smoker   • Smokeless tobacco: Never Used   Substance and Sexual Activity   • Alcohol use: Yes     Comment: occasionally   • Drug use: Never   • Sexual activity: Defer       Family History   Problem Relation Age of Onset   • Parkinsonism Mother    • Alcohol abuse Father          Review of Systems   General: denies fever, chills, anorexia, weight loss  Eyes: denies blurring, diplopia  Ear/Nose/Throat: denies ear pain, nosebleeds, hoarseness  Cardiovascular: See HPI  Respiratory: denies excessive sputum, hemoptysis, wheezing  Gastrointestinal: denies nausea, vomiting, change in bowel habits, abdominal  "pain  Genitourinary: Denies hematuria dysuria; occasional nocturia  Musculoskeletal: Modest generalized arthralgias for age; no functionally limiting issues  Skin: denies rashes, itching, suspicious lesions  Neurologic: denies focal neuro deficits  Psychiatric: denies depression, anxiety  Endocrine: denies cold intolerance, heat intolerance  Hematologic/Lymphatic: denies abnormal bruising, bleeding  Allergic/Immunologic: denies urticaria or persistent infections      Objective     Visit Vitals  /68   Pulse 70   Ht 188 cm (74.02\")   Wt 87.1 kg (192 lb)   SpO2 99%   BMI 24.64 kg/m²           Physical Exam  General:     Obese, well developed,, in no acute distress.    Head:     normocephalic and atraumatic.    Eyes:    PERRL/EOM intact, conjunctiva and sclera clear with out nystagmus.    Neck:    no jvd or bruits  Chest Wall:    no deformities   Lungs:    clear bilaterally to auscultation with adequate global airflow   Heart:    non-displaced PMI; regular rate and rhythm, normal S1, S2 without murmurs, rubs, or gallops-100% AV sequential pacing rate of 70  Abdomen:  Soft, nontender without HSM  Msk:    no deformity; adequate R OM  Pulses:    pulses normal in all 4 extremities.    Extremities:    no clubbing, cyanosis, edema; missing digit from right hand-old traumatic injury  Neurologic:    no focal sensory or motor deficits  Skin:    intact without lesions or rashes.    Psych:    alert and cooperative; normal mood and affect; normal attention span and concentration.            ECG 12 Lead  Date/Time: 7/7/2020 9:07 AM  Performed by: AGUSTO Curran MD  Authorized by: AGUSTO Curran MD   Comparison: compared with previous ECG from 10/8/2019  Similar to previous ECG  Rhythm: paced    Clinical impression: abnormal EKG  Comments: A Paced   Biventricular paced at rate of 70              Assessment:   Problems Addressed this Visit        Cardiovascular and Mediastinum    Presence of biventricular implantable " cardioverter-defibrillator (ICD) - Primary    -Satisfactory device site and functioning  Full interrogation today shows satisfactory battery status with 14 mode switches totaling less than 1 minute        Hyperlipidemia, mixed    Is currently not on statin therapy;?  Intolerance  -Managed by PCP        Mitral regurgitation    -Mild by echo; hemodynamically stable well compensated        Tricuspid regurgitation    Mild; well compensated        Essential hypertension    -Well-regulated on diltiazem and losartan        Takotsubo cardiomyopathy    Hemodynamically well compensated on current meds plus biventricular ICD        Dilated cardiomyopathy (CMS/HCC)    -Hemodynamically well compensated on current meds plus bi V ICD                Plan:  Mr. Pierre remains very well compensated with excellent functional activity tolerance and no cardiopulmonary complaints at this time.  Consequently, he is advised to continue with current medications as listed reviewed.  Return to clinic 6 months or sooner if needed.    AGUSTO Curran MD  7/8/2020 23:39    This report was generated using the Dragon voice recognition system.

## 2020-07-10 ENCOUNTER — CLINICAL SUPPORT NO REQUIREMENTS (OUTPATIENT)
Dept: CARDIOLOGY | Facility: CLINIC | Age: 80
End: 2020-07-10

## 2020-07-10 DIAGNOSIS — I42.0 DILATED CARDIOMYOPATHY (HCC): Primary | ICD-10-CM

## 2020-07-10 DIAGNOSIS — Z95.810 PRESENCE OF BIVENTRICULAR IMPLANTABLE CARDIOVERTER-DEFIBRILLATOR (ICD): ICD-10-CM

## 2020-07-10 PROCEDURE — 93295 DEV INTERROG REMOTE 1/2/MLT: CPT | Performed by: NURSE PRACTITIONER

## 2020-07-10 PROCEDURE — 93296 REM INTERROG EVL PM/IDS: CPT | Performed by: NURSE PRACTITIONER

## 2020-07-16 NOTE — PROGRESS NOTES
REMOTE DEVICE INTERROGATION    Received and reviewed on:   7/10/2020    Remote device interrogation is reviewed.     Device Company: None scientific    Battery Stable.  Time to MADISYN 5.5 years    Presenting EGM: A paced Bi V Paced    Arrhythmia Logbook Reviewed: No events    Device function appears Stable    Continue remote device interrogations every 3 months.

## 2020-09-09 ENCOUNTER — TELEPHONE (OUTPATIENT)
Dept: FAMILY MEDICINE CLINIC | Facility: CLINIC | Age: 80
End: 2020-09-09

## 2020-09-09 RX ORDER — RIVAROXABAN 15 MG/1
TABLET, FILM COATED ORAL
Qty: 90 TABLET | Refills: 1 | Status: SHIPPED | OUTPATIENT
Start: 2020-09-09 | End: 2021-03-22 | Stop reason: SDUPTHER

## 2020-09-09 RX ORDER — LOSARTAN POTASSIUM 25 MG/1
TABLET ORAL
Qty: 90 TABLET | Refills: 1 | Status: SHIPPED | OUTPATIENT
Start: 2020-09-09 | End: 2021-03-22 | Stop reason: SDUPTHER

## 2020-09-09 RX ORDER — DILTIAZEM HYDROCHLORIDE 180 MG/1
180 CAPSULE, COATED, EXTENDED RELEASE ORAL DAILY
Qty: 90 CAPSULE | Refills: 3 | Status: SHIPPED | OUTPATIENT
Start: 2020-09-09 | End: 2021-01-06 | Stop reason: SDUPTHER

## 2020-09-09 NOTE — TELEPHONE ENCOUNTER
PATIENT IS REQUESTING A REFILL ON DILTIAZEM HCL ER  MG SENT TO [facility].

## 2020-09-10 ENCOUNTER — FLU SHOT (OUTPATIENT)
Dept: FAMILY MEDICINE CLINIC | Facility: CLINIC | Age: 80
End: 2020-09-10

## 2020-09-10 DIAGNOSIS — Z23 NEED FOR VACCINATION: Primary | ICD-10-CM

## 2020-09-10 PROCEDURE — G0008 ADMIN INFLUENZA VIRUS VAC: HCPCS | Performed by: INTERNAL MEDICINE

## 2020-09-10 PROCEDURE — 90653 IIV ADJUVANT VACCINE IM: CPT | Performed by: INTERNAL MEDICINE

## 2020-10-26 ENCOUNTER — TELEPHONE (OUTPATIENT)
Dept: FAMILY MEDICINE CLINIC | Facility: CLINIC | Age: 80
End: 2020-10-26

## 2020-10-26 NOTE — TELEPHONE ENCOUNTER
I can't see his immunizations for some reason. If he hasn't had both prevnar and pneumovax, then he'll need the one he's missing. The pna vaccines have to be  by at least a year

## 2020-10-27 ENCOUNTER — CLINICAL SUPPORT (OUTPATIENT)
Dept: FAMILY MEDICINE CLINIC | Facility: CLINIC | Age: 80
End: 2020-10-27

## 2020-10-27 DIAGNOSIS — Z23 NEED FOR VACCINATION: Primary | ICD-10-CM

## 2020-10-27 PROCEDURE — G0009 ADMIN PNEUMOCOCCAL VACCINE: HCPCS | Performed by: INTERNAL MEDICINE

## 2020-10-27 PROCEDURE — 90732 PPSV23 VACC 2 YRS+ SUBQ/IM: CPT | Performed by: INTERNAL MEDICINE

## 2020-11-16 ENCOUNTER — TELEPHONE (OUTPATIENT)
Dept: CARDIOLOGY | Facility: CLINIC | Age: 80
End: 2020-11-16

## 2020-11-16 ENCOUNTER — TELEPHONE (OUTPATIENT)
Dept: FAMILY MEDICINE CLINIC | Facility: CLINIC | Age: 80
End: 2020-11-16

## 2020-11-16 VITALS — DIASTOLIC BLOOD PRESSURE: 75 MMHG | HEART RATE: 103 BPM | SYSTOLIC BLOOD PRESSURE: 120 MMHG

## 2020-11-16 NOTE — TELEPHONE ENCOUNTER
Pt came into office to have his BP and HR checked. He had been keeping record at home and has noticed an increase in both since end of Oct. And is now getting concerned. His BP was 120/75 and his HR was 103.Pt states he has a pacemaker and a defibulator and his HR is set to 70.  This was after allowing pt to sit for 5 min. I advised pt to contact his cardiologist. I informed pt Dr. Alvarez was out of the office today. I spoke with Judy and she advised the same.

## 2020-11-16 NOTE — TELEPHONE ENCOUNTER
Patient called and stated that his heart rate is up in 107 range he said he has a heart rate set at 70 for his pacemaker/ICD. Patient is going to press the home monitor.

## 2020-11-17 ENCOUNTER — TELEPHONE (OUTPATIENT)
Dept: CARDIOLOGY | Facility: CLINIC | Age: 80
End: 2020-11-17

## 2020-11-17 NOTE — TELEPHONE ENCOUNTER
Gave message to patient at 11:33am.  He said he is not having any chest pains or sob.  After he left our office yesterday, he called his cardiologist Dr Curran and they accessed patient's equipment remotely and discovered he is in Afib.  Patient is waiting for a call from Dr Curran now.  So he is handling this with his cardiologist.

## 2020-11-17 NOTE — TELEPHONE ENCOUNTER
Patient called and stated that Sultana called him yesterday and told patient she was going to talk to Dr. Curran about about his remote print out and patient was wondering what Dr. Curran said.

## 2020-11-17 NOTE — TELEPHONE ENCOUNTER
A pacemaker set to 70 means it can't get below 70, but it can certainly go higher.  Is he feeling ok? Chest pain, shortness of breath?  We should probably start a low dose beta blocker until he can get in to see cardiology.

## 2020-12-08 ENCOUNTER — TELEPHONE (OUTPATIENT)
Dept: CARDIOLOGY | Facility: CLINIC | Age: 80
End: 2020-12-08

## 2020-12-08 NOTE — TELEPHONE ENCOUNTER
Home monitor Latitude showing episodes of atrial flutter.   Please check on pt.     Make sure he is still taking xarelto and diltiazem.     Please schedule him a f/u with Dr. Ochoa for atrial flutter.  He has seen him before.

## 2020-12-10 NOTE — TELEPHONE ENCOUNTER
Patient returning a call He is taking both the xarelto and diltiazem He will keep his appointment with

## 2020-12-10 NOTE — TELEPHONE ENCOUNTER
I called left message for him to call ma back . He is set up for pacer check and to see Dr Ochoa on 12/14/2020 at 9:30 and 10 am

## 2020-12-14 ENCOUNTER — CLINICAL SUPPORT NO REQUIREMENTS (OUTPATIENT)
Dept: CARDIOLOGY | Facility: CLINIC | Age: 80
End: 2020-12-14

## 2020-12-14 ENCOUNTER — OFFICE VISIT (OUTPATIENT)
Dept: CARDIOLOGY | Facility: CLINIC | Age: 80
End: 2020-12-14

## 2020-12-14 VITALS
OXYGEN SATURATION: 99 % | RESPIRATION RATE: 18 BRPM | DIASTOLIC BLOOD PRESSURE: 87 MMHG | SYSTOLIC BLOOD PRESSURE: 146 MMHG | BODY MASS INDEX: 25.54 KG/M2 | HEART RATE: 97 BPM | HEIGHT: 74 IN | WEIGHT: 199 LBS

## 2020-12-14 DIAGNOSIS — I42.0 DILATED CARDIOMYOPATHY (HCC): Primary | ICD-10-CM

## 2020-12-14 DIAGNOSIS — I48.92 ATRIAL FLUTTER WITH RAPID VENTRICULAR RESPONSE (HCC): ICD-10-CM

## 2020-12-14 DIAGNOSIS — I10 ESSENTIAL HYPERTENSION: ICD-10-CM

## 2020-12-14 DIAGNOSIS — Z95.810 PRESENCE OF BIVENTRICULAR IMPLANTABLE CARDIOVERTER-DEFIBRILLATOR (ICD): ICD-10-CM

## 2020-12-14 DIAGNOSIS — I51.81 TAKOTSUBO CARDIOMYOPATHY: ICD-10-CM

## 2020-12-14 DIAGNOSIS — I07.1 TRICUSPID VALVE INSUFFICIENCY, UNSPECIFIED ETIOLOGY: ICD-10-CM

## 2020-12-14 DIAGNOSIS — I48.0 PAROXYSMAL ATRIAL FIBRILLATION (HCC): ICD-10-CM

## 2020-12-14 DIAGNOSIS — I34.0 NONRHEUMATIC MITRAL VALVE REGURGITATION: ICD-10-CM

## 2020-12-14 PROCEDURE — 93000 ELECTROCARDIOGRAM COMPLETE: CPT | Performed by: INTERNAL MEDICINE

## 2020-12-14 PROCEDURE — 93284 PRGRMG EVAL IMPLANTABLE DFB: CPT | Performed by: INTERNAL MEDICINE

## 2020-12-14 PROCEDURE — 99214 OFFICE O/P EST MOD 30 MIN: CPT | Performed by: INTERNAL MEDICINE

## 2020-12-14 RX ORDER — LANOLIN ALCOHOL/MO/W.PET/CERES
1000 CREAM (GRAM) TOPICAL DAILY
COMMUNITY

## 2020-12-14 NOTE — PROGRESS NOTES
CC---Cardiomyopathy, VT, atrial fibrillation     Sub--Mr. Hank Zarate is a very pleasant and functionally active 80-year-old white male with history of hypertensive heart disease, MR, TR, and right bundle branch block on ECG.  Patient has history of rheumatic fever since age of 11 and has been noticed to have increasing symptoms of shortness of breath and fatigue and recently evaluated in the hospital needing intravenous diuretics and a repeat cardiac catheterization showing significant LV dysfunction and  also had a hematuria and was evaluated by urologist and continues to be on anti coagulation without any further problems with hematuria and had benign workup for hematuria.  Patient did improve his ejection fraction and his heart failure status when he was in sinus rhythm when he underwent ablation several months ago.  Recurrent LV dysfunction and heart failure symptoms with class 3 symptomatology with recurrent persistent atrial fibrillation noted. Patient could not tolerate Tikosyn in the past.  Redo ablation was done with extensive ablation and significant left atrial scarring and enlargement noted and patient was placed on amiodarone briefly. Patient started having recurrent atrial arrhythmias despite extensive ablation and compensated functional class 2--  persists to have severely reduced EF-- Biventricular ICD inserted and since then patient is having minimal arrhythmias with remarkable improvement of symptoms and out of hospital for more than  2 years--patient has noticed recurrent palpitations with elevated heart rate with minimal symptoms and was noted to have recurrent atrial flutter and came back for evaluation       Assessment and plan  Recurrent atrial flutter--could not be paced terminated--ICD programmed to DDIR mode to avoid tracking of the arrhythmia--options for arrhythmia  includes cardioversion with duration of antiarrhythmic versus AV node ablation versus flutter ablation--patient  educated and he wants to follow-up after new year and decide therapy  Persistent symptomatic atrial fibrillation with recurrent class 3 combined congestive heart failure --post redo ablation   Left ventricular ejection fraction below 30% -- post ICD   prior VT with appropriate device therapy with rescue-- no recurrence  History of rheumatic fever with chronic right bundle-branch block  Hypertension  Bilateral leg edema resolved   Biventricular ICD in situ and interrogation attached         Vital Signs:Blood pressure 146/87 pulse rate 97 patient is afebrile respiration 12 times a minute        Past Medical History:     Reviewed history from 03/12/2018 and no changes required:        mitral regurg        tricuspid regurg        rt BBB        Atrial Fibrillation        Cardiomyopathy, takotsubo        Coronary Artery Disease, non obstructive        Hypertension        Gouty Arthritis         Acid reflux disease        Allergies        Osteoarthritis        AF ablation January 2017        bone spur neck- 2017        Congestive heart failure         VT--- February 2018    Past Surgical History:     Reviewed history from 09/01/2017 and no changes required:        Rotator Cuff Repair right        Amputation right  index and middle finger        Sinus surgery February 2014        Heart Catherization - 12/8/2016 - Non obstr CAD; Takotsubo CM        Heart Ablation- 1/2017 [facility]        Heart Catherization: 7/24/17 (treating medically)        Cardiac Ablation 8/22/17 [facility]      Review of Systems   General: No fatigue or tiredness, No change in weight   Eyes: No redness  Cardiovascular: No chest pain, positive for  palpitations  Respiratory: No shortness of breath  Gastrointestinal: No nausea or vomiting, bleeding  Genitourinary: no hematuria or dysuria  Musculoskeletal: No arthralgia or myalgia  Skin: No rash  Neurologic: No numbness, tingling, syncope  Hematologic/Lymphatic: No abnormal bleeding          Physical  Exam    General:      well developed, well nourished, in no acute distress.    Head:      normocephalic and atraumatic.    Eyes:      PERRL/EOM intact, conjunctiva and sclera clear with out nystagmus.    Neck:      no masses, thyromegaly, trachea central with normal respiratory effort  Lungs:      clear bilaterally to auscultation.    Heart:     regular rate and rhythm, S1, S2 without murmurs, rubs, or gallops  Skin:      intact without lesions or rashes.    Psych:      alert and cooperative; normal mood and affect; normal attention span and concentration.      Extremities with trace ankle edema without any cyanosis or clubbing    Muscular skeletal patient walks with a normal gait without kyphosis    Neurological no focal deficits and alert oriented x3        ECG 12 Lead    Date/Time: 12/14/2020 12:41 PM  Performed by: Hira Ochoa MD  Authorized by: Hira Ochoa MD   Comparison: compared with previous ECG   Comparison to previous ECG: Compared to prior EKG atrial flutter replaced sinus rhythm  Rhythm: atrial flutter and paced  Rate: tachycardic  Conduction: right bundle branch block    Clinical impression: abnormal EKG  Comments: Ventricular pacing with fusion noted with underlying flutter          Electronically signed by Hira Ochoa MD, 12/14/20, 12:41 PM EST.   171

## 2021-01-05 ENCOUNTER — OFFICE VISIT (OUTPATIENT)
Dept: CARDIOLOGY | Facility: CLINIC | Age: 81
End: 2021-01-05

## 2021-01-05 VITALS
BODY MASS INDEX: 25.67 KG/M2 | HEIGHT: 74 IN | DIASTOLIC BLOOD PRESSURE: 88 MMHG | WEIGHT: 200 LBS | HEART RATE: 106 BPM | SYSTOLIC BLOOD PRESSURE: 150 MMHG

## 2021-01-05 DIAGNOSIS — I42.0 DILATED CARDIOMYOPATHY (HCC): ICD-10-CM

## 2021-01-05 DIAGNOSIS — Z95.810 PRESENCE OF BIVENTRICULAR IMPLANTABLE CARDIOVERTER-DEFIBRILLATOR (ICD): ICD-10-CM

## 2021-01-05 DIAGNOSIS — I34.0 NONRHEUMATIC MITRAL VALVE REGURGITATION: ICD-10-CM

## 2021-01-05 DIAGNOSIS — I10 ESSENTIAL HYPERTENSION: Primary | ICD-10-CM

## 2021-01-05 DIAGNOSIS — I07.1 TRICUSPID VALVE INSUFFICIENCY, UNSPECIFIED ETIOLOGY: ICD-10-CM

## 2021-01-05 PROCEDURE — 99214 OFFICE O/P EST MOD 30 MIN: CPT | Performed by: INTERNAL MEDICINE

## 2021-01-05 PROCEDURE — 93000 ELECTROCARDIOGRAM COMPLETE: CPT | Performed by: INTERNAL MEDICINE

## 2021-01-05 NOTE — PROGRESS NOTES
Cardiology Office Visit Note      Referring physician:     Bunny Curran MD       Reason For Followup:  6 month follow up with Device check    HPI:  Hank Ponce is a 80 y.o. male who presents for follow up on Afib/flutter    . He has a significant medical history that includes right bundle branch block, Takotsubo cardiomyopathy, mitral regurgitation, hypertensive heart disease and recurrent atrial fibrillation who underwent AF ablation with subsequent bi V ICD implant 2017.     He is currently doing well but states his heart has been racing since October and this concerns him.   He denies chest pain, increased shortness of air, dizziness or syncope but has modest lower leg edema.    Hank denies any recent fever, cough, anosmia or other COVID-19 symptoms and is unaware of any specific COVID-19 exposures.    He is compliant with his medications, they were reviewed in office today.     Past Medical History:   Diagnosis Date   • Acid reflux disease    • Allergic rhinitis    • Arthritis     gouty   • Atrial fibrillation (CMS/HCC)    • BBB (bundle branch block)     right   • Bone spur 2017    neck   • Congestive heart failure (CHF) (CMS/HCC)    • Congestive heart failure (CMS/HCC) 10/8/2019   • Coronary artery disease     non obstructive   • Essential hypertension 11/29/2011   • Hyperlipidemia, mixed 10/8/2019   • Hypertension    • Mitral regurgitation    • Osteoarthritis    • Presence of biventricular implantable cardioverter-defibrillator (ICD) 7/29/2019   • S/P ablation of atrial fibrillation 01/2017   • Takotsubo cardiomyopathy    • Takotsubo cardiomyopathy    • Tricuspid regurgitation    • VT (ventricular tachycardia) (CMS/HCC) 02/2018       Past Surgical History:   Procedure Laterality Date   • AMPUTATION      index and middle finger   • CARDIAC ABLATION  08/22/2017    B[facility]   • CARDIAC CATHETERIZATION  12/08/2016    non obst CAD; takotsubo CM   • CARDIAC CATHETERIZATION  07/24/2017    treating  medically   • CARDIAC DEFIBRILLATOR PLACEMENT  2017    Bs   • INTERNAL CARDIAC DEFIBRILLATOR INSERTION  2017    BiV ICD BS   • OTHER SURGICAL HISTORY  01/2017    Heart ablation-[facility]   • ROTATOR CUFF REPAIR Right    • SINUS SURGERY  02/2014         Current Outpatient Medications:   •  allopurinol (ZYLOPRIM) 300 MG tablet, TAKE 1 TABLET DAILY, Disp: 90 tablet, Rfl: 4  •  Brewers Yeast 500 MG tablet, Take 1,000 mg by mouth., Disp: , Rfl:   •  dilTIAZem CD (Cardizem CD) 180 MG 24 hr capsule, Take 1 capsule by mouth Daily., Disp: 90 capsule, Rfl: 3  •  losartan (COZAAR) 25 MG tablet, TAKE 1 TABLET DAILY, Disp: 90 tablet, Rfl: 1  •  vitamin B-12 (CYANOCOBALAMIN) 1000 MCG tablet, Take 1,000 mcg by mouth Daily., Disp: , Rfl:   •  XARELTO 15 MG tablet, TAKE 1 TABLET DAILY, Disp: 90 tablet, Rfl: 1  •  clotrimazole-betamethasone (LOTRISONE) 1-0.05 % cream, Apply  topically to the appropriate area as directed 2 (Two) Times a Day., Disp: , Rfl:   •  colchicine 0.6 MG tablet, Take 1 tablet by mouth 2 (Two) Times a Day. (Patient taking differently: Take 0.6 mg by mouth 2 (Two) Times a Day. Only takes as needed), Disp: 30 tablet, Rfl: 2  •  desonide (DESOWEN) 0.05 % cream, Apply  topically to the appropriate area as directed 2 (Two) Times a Day., Disp: , Rfl:   •  triamcinolone (KENALOG) 0.1 % ointment, Apply  topically to the appropriate area as directed 2 (Two) Times a Day., Disp: , Rfl:     Social History     Socioeconomic History   • Marital status:      Spouse name: Not on file   • Number of children: Not on file   • Years of education: Not on file   • Highest education level: Not on file   Tobacco Use   • Smoking status: Never Smoker   • Smokeless tobacco: Never Used   Substance and Sexual Activity   • Alcohol use: Yes     Comment: occasionally   • Drug use: Never   • Sexual activity: Defer       Family History   Problem Relation Age of Onset   • Parkinsonism Mother    • Alcohol abuse Father          Review of Systems  "  General: denies fever, chills, anorexia, weight loss  Eyes: denies blurring, diplopia  Ear/Nose/Throat: denies ear pain, nosebleeds, hoarseness  Cardiovascular: See HPI  Respiratory: denies excessive sputum, hemoptysis, wheezing  Gastrointestinal: denies nausea, vomiting, change in bowel habits, abdominal pain  Genitourinary: Denies hematuria dysuria or nocturia  Musculoskeletal: Only minor arthralgias; no functional limitations   Skin: denies rashes, itching, suspicious lesions  Neurologic: denies focal neuro deficits  Psychiatric: denies depression, anxiety  Endocrine: denies cold intolerance, heat intolerance  Hematologic/Lymphatic: denies abnormal bruising, bleeding  Allergic/Immunologic: denies urticaria or persistent infections      Objective     Visit Vitals  /88   Pulse 106   Ht 188 cm (74.02\")   Wt 90.7 kg (200 lb)   BMI 25.67 kg/m²           Physical Exam  General:      Pleasant, well-nourished, well developed,, in no acute distress.    Head:     normocephalic and atraumatic.    Eyes:    PERRL/EOM intact, conjunctiva and sclera clear with out nystagmus.    Neck:    no jvd or bruits  Chest Wall:    no deformities; satisfactory left subclavian space generator site  Lungs:    clear bilaterally to auscultation with adequate global airflow   Heart:    non-displaced PMI; regular rate and rhythm, normal S1, S2 without murmurs, rubs, or gallops  Abdomen:  Soft, nontender without HSM  Msk:     Adequate ROM; status post right index finger amputation  Pulses:    pulses normal in all 4 extremities.    Extremities:    no clubbing, cyanosis,or edema   Neurologic:    no focal sensory or motor deficits  Skin:    intact without lesions or rashes.    Psych:    alert and cooperative; normal mood and affect; normal attention span and concentration.            ECG 12 Lead    Date/Time: 1/5/2021 11:53 AM  Performed by: AGUSTO Curran MD  Authorized by: AGUSTO Curran MD   Comparison: compared with previous ECG from " 7/7/2020  Comparison to previous ECG: Last tracing showed AV sequential paced rhythm at rate of 70 bpm  Rhythm: atrial flutter  Conduction: right bundle branch block and left posterior fascicular block              Assessment:   Problems Addressed this Visit        Other    Presence of biventricular implantable cardioverter-defibrillator (ICD)  -Satisfactory device site and functioning      Mitral regurgitation    -Remains hemodynamically stable well compensated        Tricuspid regurgitation  -Mild to moderate lateral sternal findings well compensated      Essential hypertension - Primary  -Marginally well-regulated on current medication listed and reviewed in detail  -Patient educated home BP/HR log.  [place]coat component for blood pressure readings            Dilated cardiomyopathy (CMS/HCC)    -Currently well compensated on current medical therapy patient is a flutter       Atrial flutter with RVR; heart rate now in the low 100s  --Currently maintained on diltiazem  mg 1 p.o. daily and fully anticoagulated using Xarelto   Diagnoses       Codes Comments    Essential hypertension    -  Primary ICD-10-CM: I10  ICD-9-CM: 401.9     Presence of biventricular implantable cardioverter-defibrillator (ICD)     ICD-10-CM: Z95.810  ICD-9-CM: V45.02     Dilated cardiomyopathy (CMS/HCC)     ICD-10-CM: I42.0  ICD-9-CM: 425.4     Tricuspid valve insufficiency, unspecified etiology     ICD-10-CM: I07.1  ICD-9-CM: 397.0     Nonrheumatic mitral valve regurgitation     ICD-10-CM: I34.0  ICD-9-CM: 424.0             Plan:  Overall appears hemodynamically stable well compensated with no specific symptoms despite a phlebolith RVR around 100 bpm.  I am adding Toprol-XL 25 mg 1 p.o. daily today.  Mr. Pierre advised to follow-up with Dr. Ochoa regarding possible AVM cryoablation  Return to clinic 4 months or sooner if needed.    AGUSTO Curran MD  1/5/2021 18:20 EST    This report was generated using the Dragon voice  recognition system.

## 2021-01-06 ENCOUNTER — TELEPHONE (OUTPATIENT)
Dept: FAMILY MEDICINE CLINIC | Facility: CLINIC | Age: 81
End: 2021-01-06

## 2021-01-06 RX ORDER — DILTIAZEM HYDROCHLORIDE 180 MG/1
180 CAPSULE, COATED, EXTENDED RELEASE ORAL DAILY
Qty: 90 CAPSULE | Refills: 3 | Status: SHIPPED | OUTPATIENT
Start: 2021-01-06 | End: 2021-04-14 | Stop reason: HOSPADM

## 2021-01-06 NOTE — TELEPHONE ENCOUNTER
Patient states his last refill request for Diltiazem was sent to pharmacy and filled as [facility] because Filippo VILLANUEVA uses a new  for this medication.  Patient states he knows this is a common brand but the formulary is different and in the past  2-4 weeks of taking medication he has had an increase in heart rate.    Patient has seen his cardiologist regarding this and he is concerned due to the increase.    Patient wants his Diltiazem sent to his Mail order so he can get the correct formulary that works for him.    Patient brought in a log of BP and HR placed in MEBs box in front office for review.    Please send a new script of Diltiazem to mail order on file for patient.    Thank you.

## 2021-01-11 ENCOUNTER — OFFICE VISIT (OUTPATIENT)
Dept: CARDIOLOGY | Facility: CLINIC | Age: 81
End: 2021-01-11

## 2021-01-11 ENCOUNTER — CLINICAL SUPPORT NO REQUIREMENTS (OUTPATIENT)
Dept: CARDIOLOGY | Facility: CLINIC | Age: 81
End: 2021-01-11

## 2021-01-11 VITALS
DIASTOLIC BLOOD PRESSURE: 83 MMHG | HEART RATE: 90 BPM | BODY MASS INDEX: 26.05 KG/M2 | WEIGHT: 203 LBS | OXYGEN SATURATION: 99 % | SYSTOLIC BLOOD PRESSURE: 146 MMHG

## 2021-01-11 DIAGNOSIS — I48.0 PAROXYSMAL ATRIAL FIBRILLATION (HCC): Primary | ICD-10-CM

## 2021-01-11 DIAGNOSIS — I42.0 DILATED CARDIOMYOPATHY (HCC): Primary | ICD-10-CM

## 2021-01-11 DIAGNOSIS — I48.92 ATRIAL FLUTTER WITH RAPID VENTRICULAR RESPONSE (HCC): ICD-10-CM

## 2021-01-11 DIAGNOSIS — Z95.810 PRESENCE OF BIVENTRICULAR IMPLANTABLE CARDIOVERTER-DEFIBRILLATOR (ICD): ICD-10-CM

## 2021-01-11 DIAGNOSIS — I42.0 DILATED CARDIOMYOPATHY (HCC): ICD-10-CM

## 2021-01-11 DIAGNOSIS — I10 ESSENTIAL HYPERTENSION: ICD-10-CM

## 2021-01-11 PROCEDURE — 99214 OFFICE O/P EST MOD 30 MIN: CPT | Performed by: INTERNAL MEDICINE

## 2021-01-11 NOTE — PROGRESS NOTES
CC---Cardiomyopathy, VT, atrial fibrillation     Sub--Mr. Hank Zarate is a very pleasant and functionally active 80-year-old white male with history of hypertensive heart disease, MR, TR, and right bundle branch block on ECG.  Patient has history of rheumatic fever since age of 11 and has been noticed to have increasing symptoms of shortness of breath and fatigue and recently evaluated in the hospital needing intravenous diuretics and a repeat cardiac catheterization showing significant LV dysfunction and  also had a hematuria and was evaluated by urologist and continues to be on anti coagulation without any further problems with hematuria and had benign workup for hematuria.  Patient did improve his ejection fraction and his heart failure status when he was in sinus rhythm when he underwent ablation several months ago.  Recurrent LV dysfunction and heart failure symptoms with class 3 symptomatology with recurrent persistent atrial fibrillation noted. Patient could not tolerate Tikosyn in the past.  Redo ablation was done with extensive ablation and significant left atrial scarring and enlargement noted and patient was placed on amiodarone briefly. Patient started having recurrent atrial arrhythmias despite extensive ablation and compensated functional class 2--  persists to have severely reduced EF-- Biventricular ICD inserted and since then patient is having minimal arrhythmias with remarkable improvement of symptoms and out of hospital for more than  2 years--patient has noticed recurrent palpitations with elevated heart rate with minimal symptoms and was noted to have recurrent atrial flutter and came back for evaluation   Patient is on Cardizem for rate control and denies any new symptoms and is awaiting to get Covid vaccine      Assessment and plan  Recurrent atrial flutter--could not be paced terminated--ICD programmed to DDIR mode to avoid tracking of the arrhythmia--AV node ablation recommended and  patient wants to wait till he gets his vaccine   Persistent symptomatic atrial fibrillation with recurrent class 3 combined congestive heart failure --post redo ablation   Left ventricular ejection fraction below 30% -- post ICD   prior VT with appropriate device therapy with rescue-- no recurrence  History of rheumatic fever with chronic right bundle-branch block  Hypertension  Bilateral leg edema resolved   Biventricular ICD in situ  Medications reviewed and follow-up in 2 months to evaluate for AV node ablation            Past Medical History:     Reviewed history from 03/12/2018 and no changes required:        mitral regurg        tricuspid regurg        rt BBB        Atrial Fibrillation        Cardiomyopathy, takotsubo        Coronary Artery Disease, non obstructive        Hypertension        Gouty Arthritis         Acid reflux disease        Allergies        Osteoarthritis        AF ablation January 2017        bone spur neck- 2017        Congestive heart failure         VT--- February 2018    Past Surgical History:     Reviewed history from 09/01/2017 and no changes required:        Rotator Cuff Repair right        Amputation right  index and middle finger        Sinus surgery February 2014        Heart Catherization - 12/8/2016 - Non obstr CAD; Takotsubo CM        Heart Ablation- 1/2017 [facility]        Heart Catherization: 7/24/17 (treating medically)        Cardiac Ablation 8/22/17 [facility]          Physical Exam    General:      well developed, well nourished, in no acute distress.    Head:      normocephalic and atraumatic.    Eyes:      PERRL/EOM intact, conjunctiva and sclera clear with out nystagmus.    Neck:      no masses, thyromegaly, trachea central with normal respiratory effort  Lungs:      clear bilaterally to auscultation.    Heart:    irregular rate and rhythm, S1, S2 without murmurs, rubs, or gallops        Electronically signed by Hira Ochoa MD, 01/11/21, 1:00 PM EST.

## 2021-01-26 ENCOUNTER — LAB (OUTPATIENT)
Dept: FAMILY MEDICINE CLINIC | Facility: CLINIC | Age: 81
End: 2021-01-26

## 2021-01-26 ENCOUNTER — TELEPHONE (OUTPATIENT)
Dept: FAMILY MEDICINE CLINIC | Facility: CLINIC | Age: 81
End: 2021-01-26

## 2021-01-26 ENCOUNTER — OFFICE VISIT (OUTPATIENT)
Dept: FAMILY MEDICINE CLINIC | Facility: CLINIC | Age: 81
End: 2021-01-26

## 2021-01-26 VITALS
SYSTOLIC BLOOD PRESSURE: 142 MMHG | DIASTOLIC BLOOD PRESSURE: 68 MMHG | OXYGEN SATURATION: 96 % | HEART RATE: 118 BPM | RESPIRATION RATE: 16 BRPM | WEIGHT: 210 LBS | BODY MASS INDEX: 26.95 KG/M2 | TEMPERATURE: 96.4 F | HEIGHT: 74 IN

## 2021-01-26 DIAGNOSIS — I50.9 ACUTE ON CHRONIC CONGESTIVE HEART FAILURE, UNSPECIFIED HEART FAILURE TYPE (HCC): Primary | ICD-10-CM

## 2021-01-26 DIAGNOSIS — I48.21 PERMANENT ATRIAL FIBRILLATION (HCC): ICD-10-CM

## 2021-01-26 DIAGNOSIS — R60.1 GENERALIZED EDEMA: ICD-10-CM

## 2021-01-26 PROCEDURE — 99214 OFFICE O/P EST MOD 30 MIN: CPT | Performed by: FAMILY MEDICINE

## 2021-01-26 PROCEDURE — 83880 ASSAY OF NATRIURETIC PEPTIDE: CPT | Performed by: FAMILY MEDICINE

## 2021-01-26 PROCEDURE — 80048 BASIC METABOLIC PNL TOTAL CA: CPT | Performed by: FAMILY MEDICINE

## 2021-01-26 PROCEDURE — 85027 COMPLETE CBC AUTOMATED: CPT | Performed by: FAMILY MEDICINE

## 2021-01-26 PROCEDURE — 36415 COLL VENOUS BLD VENIPUNCTURE: CPT | Performed by: FAMILY MEDICINE

## 2021-01-26 RX ORDER — FUROSEMIDE 20 MG/1
20 TABLET ORAL DAILY
Qty: 90 TABLET | Refills: 0 | Status: SHIPPED | OUTPATIENT
Start: 2021-01-26 | End: 2021-04-12

## 2021-01-26 RX ORDER — METOPROLOL TARTRATE 50 MG/1
50 TABLET, FILM COATED ORAL 2 TIMES DAILY
Qty: 180 TABLET | Refills: 1 | Status: SHIPPED | OUTPATIENT
Start: 2021-01-26 | End: 2021-02-08 | Stop reason: SINTOL

## 2021-01-26 NOTE — PROGRESS NOTES
Chief Complaint   Patient presents with   • Weight Gain   • Leg Swelling     HPI  Hank Ponce is a 80 y.o. male that presents for   Chief Complaint   Patient presents with   • Weight Gain   • Leg Swelling     Weight gain: patient reports 15lb weight gain in 2 months. He further increased leg swelling and increased SOB. Patient reports intermittent orthopnea. Last night he awakened SOB, which improved w/ sitting up. He is not taking any water pills at this time.     Afib: Status post failed ablation.  Maintained on diltiazem 180 daily and Xarelto 15 daily.  today but generally around  at home.  No chest pain or palpitations.  Leg swelling as above.  Follow w/ Magdy and Gabriela    Review of Systems   Constitutional: Positive for unexpected weight gain. Negative for chills, fever and unexpected weight loss.   HENT: Negative for congestion and rhinorrhea.    Eyes: Negative for visual disturbance.   Respiratory: Positive for shortness of breath.    Cardiovascular: Positive for leg swelling. Negative for chest pain and palpitations.        Orthopnea   Gastrointestinal: Negative for abdominal pain and vomiting.     The following portions of the patient's history were reviewed and updated as appropriate: problem list, past medical history, past surgical history, allergies, current medication    Problem List Tab  Patient History Tab  Immunizations Tab  Medications Tab  Chart Review Tab  Care Everywhere Tab  Synopsis Tab    PE  Vitals:    01/26/21 1452   BP: 142/68   Pulse: 118   Resp: 16   Temp: 96.4 °F (35.8 °C)   SpO2: 96%     Body mass index is 26.96 kg/m².  General: Well nourished, NAD  Head: AT/NC  Eyes: EOMI, anicteric sclera  ENT: MMM w/o erythema  Neck: Supple, no thyromegaly or LAD  Resp: Bibasilar crackles, SCR, BS equal  CV: Tachycaridc w/ irregularly irregular rhythm. 3/6 BERTIN at RUSB; 2+ pulses  GI: Soft, NT/ND, +BS  MSK: FROM, no deformity, 1+ edema  Skin: Warm, dry, intact  Neuro:  Alert and oriented. No focal deficits  Psych: Appropriate mood and affect    Imaging  No Images in the past 120 days found..    Assessment/Plan   Hank Ponce is a 80 y.o. male that presents for   Chief Complaint   Patient presents with   • Weight Gain   • Leg Swelling     Diagnoses and all orders for this visit:    1. Acute on chronic congestive heart failure, unspecified heart failure type (CMS/HCC) (Primary): Based on patient's history of dilated cardiomyopathy (although no recent echo), reports of weight gain, increased leg swelling, and orthopnea, I am concerned for acute CHF exacerbation.  Etiology unclear but could be secondary to relative tachycardia with poorly controlled A. fib as below.  We will try to slow with new beta-blocker and diuresis with Lasix as below.  New echo likely also helpful  -     furosemide (LASIX) 20 MG tablet; Take 1 tablet by mouth Daily. Take 2 pills daily for first 3 days  Dispense: 90 tablet; Refill: 0  -     XR Chest PA & Lateral; Future  -     CBC (No Diff)  -     Basic Metabolic Panel  -     proBNP  -     Adult Transthoracic Echo Complete W/ Cont if Necessary Per Protocol; Future  -     Compression Knee High Stockings    2. Permanent atrial fibrillation (CMS/HCC): Status post failed ablation.  Heart rate 118 today and irregular.  We will add metoprolol as below as heart rates even 9200 at home.  Slowing may improve some of his CHF concerns.  Diuresis may also be helpful  -     metoprolol tartrate (LOPRESSOR) 50 MG tablet; Take 1 tablet by mouth 2 (Two) Times a Day.  Dispense: 180 tablet; Refill: 1    3. Generalized edema   -     Adult Transthoracic Echo Complete W/ Cont if Necessary Per Protocol; Future    Follow-up in 2 weeks for recheck or sooner as needed

## 2021-01-26 NOTE — TELEPHONE ENCOUNTER
PT CALLED STATING HE TOOK THE RX HE WAS GIVEN TODAY FOR COMPRESSION STOCKINGS TO [facility], AND HIS INSURANCE WON'T BE ACCEPTED THERE. HE IS WONDERING WHAT THE OTHER PHARMACY WAS DR. CRISTINA MENTIONED THAT CARRIES THE COMPRESSION STOCKINGS, SO HE CAN GO PICK THEM UP.    HE STATES IT WAS A STORE IN [place].    HE CAN BE REACHED [phone]

## 2021-01-27 ENCOUNTER — HOSPITAL ENCOUNTER (OUTPATIENT)
Dept: GENERAL RADIOLOGY | Facility: HOSPITAL | Age: 81
Discharge: HOME | End: 2021-01-27
Admitting: FAMILY MEDICINE

## 2021-01-27 DIAGNOSIS — I50.9 ACUTE ON CHRONIC CONGESTIVE HEART FAILURE, UNSPECIFIED HEART FAILURE TYPE (HCC): ICD-10-CM

## 2021-01-27 LAB
ANION GAP SERPL CALCULATED.3IONS-SCNC: 11 MMOL/L (ref 5–15)
BUN SERPL-MCNC: 18 MG/DL (ref 8–23)
BUN/CREAT SERPL: 16.7 (ref 7–25)
CALCIUM SPEC-SCNC: 9.1 MG/DL (ref 8.6–10.5)
CHLORIDE SERPL-SCNC: 105 MMOL/L (ref 98–107)
CO2 SERPL-SCNC: 27 MMOL/L (ref 22–29)
CREAT SERPL-MCNC: 1.08 MG/DL (ref 0.76–1.27)
DEPRECATED RDW RBC AUTO: 49.3 FL (ref 37–54)
ERYTHROCYTE [DISTWIDTH] IN BLOOD BY AUTOMATED COUNT: 13.6 % (ref 12.3–15.4)
GFR SERPL CREATININE-BSD FRML MDRD: 66 ML/MIN/1.73
GLUCOSE SERPL-MCNC: 96 MG/DL (ref 65–99)
HCT VFR BLD AUTO: 49.1 % (ref 37.5–51)
HGB BLD-MCNC: 15.9 G/DL (ref 13–17.7)
MCH RBC QN AUTO: 32.1 PG (ref 26.6–33)
MCHC RBC AUTO-ENTMCNC: 32.4 G/DL (ref 31.5–35.7)
MCV RBC AUTO: 99.2 FL (ref 79–97)
NT-PROBNP SERPL-MCNC: 1860 PG/ML (ref 0–1800)
PLATELET # BLD AUTO: 194 10*3/MM3 (ref 140–450)
PMV BLD AUTO: 11.9 FL (ref 6–12)
POTASSIUM SERPL-SCNC: 4.2 MMOL/L (ref 3.5–5.2)
RBC # BLD AUTO: 4.95 10*6/MM3 (ref 4.14–5.8)
SODIUM SERPL-SCNC: 143 MMOL/L (ref 136–145)
WBC # BLD AUTO: 7.64 10*3/MM3 (ref 3.4–10.8)

## 2021-01-27 PROCEDURE — 71046 X-RAY EXAM CHEST 2 VIEWS: CPT

## 2021-01-29 ENCOUNTER — HOSPITAL ENCOUNTER (OUTPATIENT)
Dept: CARDIOLOGY | Facility: HOSPITAL | Age: 81
Discharge: HOME | End: 2021-01-29
Admitting: FAMILY MEDICINE

## 2021-01-29 VITALS
DIASTOLIC BLOOD PRESSURE: 82 MMHG | SYSTOLIC BLOOD PRESSURE: 139 MMHG | HEIGHT: 74 IN | WEIGHT: 210 LBS | BODY MASS INDEX: 26.95 KG/M2

## 2021-01-29 DIAGNOSIS — R60.1 GENERALIZED EDEMA: ICD-10-CM

## 2021-01-29 DIAGNOSIS — I50.9 ACUTE ON CHRONIC CONGESTIVE HEART FAILURE, UNSPECIFIED HEART FAILURE TYPE (HCC): ICD-10-CM

## 2021-01-29 PROCEDURE — 93306 TTE W/DOPPLER COMPLETE: CPT | Performed by: INTERNAL MEDICINE

## 2021-01-29 PROCEDURE — 93306 TTE W/DOPPLER COMPLETE: CPT

## 2021-02-01 ENCOUNTER — TELEPHONE (OUTPATIENT)
Dept: FAMILY MEDICINE CLINIC | Facility: CLINIC | Age: 81
End: 2021-02-01

## 2021-02-01 NOTE — TELEPHONE ENCOUNTER
Lets have him stop the metoprolol.  His echo is still pending but I will be in touch when the results return.  If he would like, I can see him at [phone] today.  Or I can see him at a later time after having a day or 2 off the metoprolol

## 2021-02-01 NOTE — TELEPHONE ENCOUNTER
PATIENT WAS IN FOR AN APPOINTMENT WITH DR. CRISTINA ON 01/26 AND HE WAS TOLD TO CALL IN A WEEK TO LET DR. CRISTINA KNOW HOW HE IS FEELING. PATIENT STATES HIS CONDITION IS DECLINING. HE BELIEVES IT MAY BE THE MEDICATION. HE IS EXTREMELY WEAK AND HAS DIFFICULTY BREATHING WITH ANY TYPE OF ACTIVITY. PATIENT DID GET CHEST X-RAY ON Wednesday 01/27 AND THE ECHO ON Friday 01/29 IF DR. CRISTINA WILL TAKE A LOOK AT THOSE. PATIENT STATES THAT YEARS AGO THE CARDIOLOGIST DR. CRISTINA PUT HIM ON metoprolol tartrate (LOPRESSOR) 50 MG tablet   BUT TOOK HIM OFF IMMEDIATELY FOR WEAKNESS AND BREATHING SHORTAGE, HE FORGOT ABOUT THAT DURING THE APPT WHEN HE WAS GIVEN THE SCRIPT.     PATIENT WANTS DR. CRISTINA AWARE OF THAT FACT OF HIS CONDITION WORSENING.     PATIENT REQUESTING CALL BACK FROM DR. CRISTINA FOR NEXT STEP AND FINDINGS ON LAST WEEKS SCANS.     PATIENT CAN BE REACHED AT: [phone]

## 2021-02-01 NOTE — TELEPHONE ENCOUNTER
Gave message to patient at 1:29pm.  He voiced understanding.  He already has an appt scheduled with [facility] on 2/8 so he will wait until then to be seen.  I told him to call back in a few days if he wasn't getting better.

## 2021-02-02 LAB
BH CV ECHO MEAS - ACS: 2.3 CM
BH CV ECHO MEAS - AO MAX PG (FULL): 2.2 MMHG
BH CV ECHO MEAS - AO MAX PG: 3.4 MMHG
BH CV ECHO MEAS - AO MEAN PG (FULL): 1.6 MMHG
BH CV ECHO MEAS - AO MEAN PG: 2.4 MMHG
BH CV ECHO MEAS - AO ROOT AREA (BSA CORRECTED): 1.5
BH CV ECHO MEAS - AO ROOT AREA: 9.3 CM^2
BH CV ECHO MEAS - AO ROOT DIAM: 3.4 CM
BH CV ECHO MEAS - AO V2 MAX: 91.9 CM/SEC
BH CV ECHO MEAS - AO V2 MEAN: 75.5 CM/SEC
BH CV ECHO MEAS - AO V2 VTI: 16.2 CM
BH CV ECHO MEAS - AORTIC HR: 72.2 BPM
BH CV ECHO MEAS - AORTIC R-R: 0.83 SEC
BH CV ECHO MEAS - ASC AORTA: 2.5 CM
BH CV ECHO MEAS - AVA(I,A): 2 CM^2
BH CV ECHO MEAS - AVA(I,D): 2 CM^2
BH CV ECHO MEAS - AVA(V,A): 2 CM^2
BH CV ECHO MEAS - AVA(V,D): 2 CM^2
BH CV ECHO MEAS - BSA(HAYCOCK): 2.2 M^2
BH CV ECHO MEAS - BSA: 2.2 M^2
BH CV ECHO MEAS - BZI_BMI: 27 KILOGRAMS/M^2
BH CV ECHO MEAS - BZI_METRIC_HEIGHT: 188 CM
BH CV ECHO MEAS - BZI_METRIC_WEIGHT: 95.3 KG
BH CV ECHO MEAS - CI(AO): 4.9 L/MIN/M^2
BH CV ECHO MEAS - CI(LVOT): 1.1 L/MIN/M^2
BH CV ECHO MEAS - CO(AO): 10.8 L/MIN
BH CV ECHO MEAS - CO(LVOT): 2.4 L/MIN
BH CV ECHO MEAS - EDV(CUBED): 223.3 ML
BH CV ECHO MEAS - EDV(MOD-SP2): 124.5 ML
BH CV ECHO MEAS - EDV(MOD-SP4): 138.7 ML
BH CV ECHO MEAS - EDV(TEICH): 184.6 ML
BH CV ECHO MEAS - EF(CUBED): 59.1 %
BH CV ECHO MEAS - EF(MOD-BP): 35 %
BH CV ECHO MEAS - EF(MOD-SP2): 50 %
BH CV ECHO MEAS - EF(MOD-SP4): 44.6 %
BH CV ECHO MEAS - EF(TEICH): 49.8 %
BH CV ECHO MEAS - ESV(CUBED): 91.4 ML
BH CV ECHO MEAS - ESV(MOD-SP2): 62.2 ML
BH CV ECHO MEAS - ESV(MOD-SP4): 76.8 ML
BH CV ECHO MEAS - ESV(TEICH): 92.7 ML
BH CV ECHO MEAS - FS: 25.7 %
BH CV ECHO MEAS - IVS/LVPW: 1
BH CV ECHO MEAS - IVSD: 1.3 CM
BH CV ECHO MEAS - LA DIMENSION(2D): 5.1 CM
BH CV ECHO MEAS - LV DIASTOLIC VOL/BSA (35-75): 62.5 ML/M^2
BH CV ECHO MEAS - LV MASS(C)D: 373.5 GRAMS
BH CV ECHO MEAS - LV MASS(C)DI: 168.4 GRAMS/M^2
BH CV ECHO MEAS - LV MAX PG: 1.2 MMHG
BH CV ECHO MEAS - LV MEAN PG: 0.73 MMHG
BH CV ECHO MEAS - LV SYSTOLIC VOL/BSA (12-30): 34.6 ML/M^2
BH CV ECHO MEAS - LV V1 MAX: 54.7 CM/SEC
BH CV ECHO MEAS - LV V1 MEAN: 41.2 CM/SEC
BH CV ECHO MEAS - LV V1 VTI: 9.9 CM
BH CV ECHO MEAS - LVIDD: 6.1 CM
BH CV ECHO MEAS - LVIDS: 4.5 CM
BH CV ECHO MEAS - LVOT AREA: 3.3 CM^2
BH CV ECHO MEAS - LVOT DIAM: 2.1 CM
BH CV ECHO MEAS - LVPWD: 1.3 CM
BH CV ECHO MEAS - MR MAX PG: 124 MMHG
BH CV ECHO MEAS - MR MAX VEL: 491.8 CM/SEC
BH CV ECHO MEAS - MV DEC TIME: 0.17 SEC
BH CV ECHO MEAS - MV E MAX VEL: 92.3 CM/SEC
BH CV ECHO MEAS - MV MAX PG: 2.5 MMHG
BH CV ECHO MEAS - MV MEAN PG: 1 MMHG
BH CV ECHO MEAS - MV V2 MAX: 79.6 CM/SEC
BH CV ECHO MEAS - MV V2 MEAN: 46.1 CM/SEC
BH CV ECHO MEAS - MV V2 VTI: 15.8 CM
BH CV ECHO MEAS - MVA(VTI): 2.1 CM^2
BH CV ECHO MEAS - PA MAX PG (FULL): 1.6 MMHG
BH CV ECHO MEAS - PA MAX PG: 4.5 MMHG
BH CV ECHO MEAS - PA MEAN PG (FULL): 1.2 MMHG
BH CV ECHO MEAS - PA MEAN PG: 2.5 MMHG
BH CV ECHO MEAS - PA V2 MAX: 105.5 CM/SEC
BH CV ECHO MEAS - PA V2 MEAN: 75 CM/SEC
BH CV ECHO MEAS - PA V2 VTI: 19.5 CM
BH CV ECHO MEAS - PI END-D VEL: 129 CM/SEC
BH CV ECHO MEAS - PVA(I,A): 4 CM^2
BH CV ECHO MEAS - PVA(I,D): 4 CM^2
BH CV ECHO MEAS - PVA(V,A): 3.9 CM^2
BH CV ECHO MEAS - PVA(V,D): 3.9 CM^2
BH CV ECHO MEAS - QP/QS: 2.4
BH CV ECHO MEAS - RAP SYSTOLE: 15 MMHG
BH CV ECHO MEAS - RV MAX PG: 2.9 MMHG
BH CV ECHO MEAS - RV MEAN PG: 1.3 MMHG
BH CV ECHO MEAS - RV V1 MAX: 85.1 CM/SEC
BH CV ECHO MEAS - RV V1 MEAN: 51.9 CM/SEC
BH CV ECHO MEAS - RV V1 VTI: 16 CM
BH CV ECHO MEAS - RVDD: 4.4 CM
BH CV ECHO MEAS - RVOT AREA: 4.8 CM^2
BH CV ECHO MEAS - RVOT DIAM: 2.5 CM
BH CV ECHO MEAS - RVSP: 46.3 MMHG
BH CV ECHO MEAS - SI(AO): 67.4 ML/M^2
BH CV ECHO MEAS - SI(CUBED): 59.5 ML/M^2
BH CV ECHO MEAS - SI(LVOT): 14.8 ML/M^2
BH CV ECHO MEAS - SI(MOD-SP2): 28.1 ML/M^2
BH CV ECHO MEAS - SI(MOD-SP4): 27.9 ML/M^2
BH CV ECHO MEAS - SI(TEICH): 41.4 ML/M^2
BH CV ECHO MEAS - SV(AO): 149.6 ML
BH CV ECHO MEAS - SV(CUBED): 131.9 ML
BH CV ECHO MEAS - SV(LVOT): 32.8 ML
BH CV ECHO MEAS - SV(MOD-SP2): 62.3 ML
BH CV ECHO MEAS - SV(MOD-SP4): 61.8 ML
BH CV ECHO MEAS - SV(RVOT): 77.3 ML
BH CV ECHO MEAS - SV(TEICH): 91.9 ML
BH CV ECHO MEAS - TR MAX VEL: 272.9 CM/SEC
LV EF 2D ECHO EST: 35 %

## 2021-02-08 ENCOUNTER — LAB (OUTPATIENT)
Dept: FAMILY MEDICINE CLINIC | Facility: CLINIC | Age: 81
End: 2021-02-08

## 2021-02-08 ENCOUNTER — OFFICE VISIT (OUTPATIENT)
Dept: FAMILY MEDICINE CLINIC | Facility: CLINIC | Age: 81
End: 2021-02-08

## 2021-02-08 VITALS
RESPIRATION RATE: 16 BRPM | SYSTOLIC BLOOD PRESSURE: 128 MMHG | TEMPERATURE: 96.6 F | BODY MASS INDEX: 25.54 KG/M2 | OXYGEN SATURATION: 98 % | HEIGHT: 74 IN | DIASTOLIC BLOOD PRESSURE: 80 MMHG | WEIGHT: 199 LBS | HEART RATE: 65 BPM

## 2021-02-08 DIAGNOSIS — I50.23 ACUTE ON CHRONIC SYSTOLIC CONGESTIVE HEART FAILURE (HCC): Primary | ICD-10-CM

## 2021-02-08 DIAGNOSIS — I48.21 PERMANENT ATRIAL FIBRILLATION (HCC): ICD-10-CM

## 2021-02-08 LAB
ANION GAP SERPL CALCULATED.3IONS-SCNC: 13.7 MMOL/L (ref 5–15)
BUN SERPL-MCNC: 19 MG/DL (ref 8–23)
BUN/CREAT SERPL: 16.4 (ref 7–25)
CALCIUM SPEC-SCNC: 9.7 MG/DL (ref 8.6–10.5)
CHLORIDE SERPL-SCNC: 100 MMOL/L (ref 98–107)
CO2 SERPL-SCNC: 27.3 MMOL/L (ref 22–29)
CREAT SERPL-MCNC: 1.16 MG/DL (ref 0.76–1.27)
GFR SERPL CREATININE-BSD FRML MDRD: 60 ML/MIN/1.73
GLUCOSE SERPL-MCNC: 100 MG/DL (ref 65–99)
POTASSIUM SERPL-SCNC: 4.1 MMOL/L (ref 3.5–5.2)
SODIUM SERPL-SCNC: 141 MMOL/L (ref 136–145)

## 2021-02-08 PROCEDURE — 99214 OFFICE O/P EST MOD 30 MIN: CPT | Performed by: FAMILY MEDICINE

## 2021-02-08 PROCEDURE — 36415 COLL VENOUS BLD VENIPUNCTURE: CPT

## 2021-02-08 PROCEDURE — 80048 BASIC METABOLIC PNL TOTAL CA: CPT | Performed by: FAMILY MEDICINE

## 2021-02-08 NOTE — PROGRESS NOTES
Chief Complaint   Patient presents with   • Congestive Heart Failure     HPI  Hank Ponce is a 81 y.o. male that presents for   Chief Complaint   Patient presents with   • Congestive Heart Failure     CHF exacerbation: patient was seen 2 weeks ago for CHF exacerbation. He was started on metoprolol (which had to be discontinued) and increased his lasix. Patient is currently taking lasix 40 daily.  He reports that the fluid in his belly is much improved.  No significant lower extremity edema.  No more orthopnea. Echo repeated at last visit and EF found to be 30-35%. BiV ICD in place. Has appt w/ Garimella 3/19.     Review of Systems   Constitutional: Negative for chills, fever and unexpected weight loss.   Eyes: Negative for visual disturbance.   Respiratory: Negative for cough and shortness of breath.    Cardiovascular: Negative for chest pain and palpitations.     The following portions of the patient's history were reviewed and updated as appropriate: problem list, past medical history, past surgical history, allergies, current medications    Problem List Tab  Patient History Tab  Immunizations Tab  Medications Tab  Chart Review Tab  Care Everywhere Tab  Synopsis Tab    PE  Vitals:    02/08/21 1151   BP: 128/80   Pulse: 65   Resp: 16   Temp: 96.6 °F (35.9 °C)   SpO2: 98%     Body mass index is 25.55 kg/m².  General: Well nourished, NAD  Head: AT/NC  Eyes: EOMI, anicteric sclera  Resp: Bibasilar crackles, SCR, BS equal  CV: Irregularly irregular w/o m/r/g; 2+ pulses.  Distant heart sounds  GI: Soft, NT/ND, +BS  MSK: FROM, no deformity, no edema  Skin: Warm, dry, intact  Neuro: Alert and oriented. No focal deficits  Psych: Appropriate mood and affect    Imaging  Xr Chest Pa & Lateral    Result Date: 1/27/2021  Cardiomegaly, pulmonary vascular congestion and evidence of COPD without acute cardiopulmonary abnormality.  Electronically Signed By-Jason Lockhart MD On:1/27/2021 3:29 PM This report was finalized  on [id number] by  Jason Lockhart MD.      Assessment/Plan   Hank Ponce is a 81 y.o. male that presents for   Chief Complaint   Patient presents with   • Congestive Heart Failure     Diagnoses and all orders for this visit:    1. Acute on chronic systolic congestive heart failure (CMS/HCC) (Primary): January 2021 echo reviewed with EF 30 to 35%.  Patient with BiV ICD in place.  Symptomatically improved on increased Lasix.  Dry weight appears to be 199 pounds  -     Basic Metabolic Panel  - Continue home Lasix 20 daily  - Instructed to increase Lasix dose to 40 mg in the event that his weight increases by 3 pounds  - Continue cardiology [id number]    2. Permanent atrial fibrillation (CMS/HCC): Patient with chronic, permanent atrial fibrillation.  He has undergone ablation that has failed in the past.  It has been recommended that he undergo another ablation.  Patient is concerned as the procedure has been described as completely suffering the conduction system of his heart.  Patient was concerned about dependence on pacemaker and inability of his heart to beat if anything were to happen to the pacemaker.  This was discussed with the patient and discussed ventricular or junctional rhythm rather than asystole that would occur in the event of pacemaker failure.  We also discussed the dependability of pacemaker   -Continue home Xarelto and diltiazem   -Continue cardiology [id number]    Follow-up as needed

## 2021-03-19 ENCOUNTER — OFFICE VISIT (OUTPATIENT)
Dept: CARDIOLOGY | Facility: CLINIC | Age: 81
End: 2021-03-19

## 2021-03-19 ENCOUNTER — PREP FOR SURGERY (OUTPATIENT)
Dept: OTHER | Facility: HOSPITAL | Age: 81
End: 2021-03-19

## 2021-03-19 VITALS
DIASTOLIC BLOOD PRESSURE: 88 MMHG | SYSTOLIC BLOOD PRESSURE: 142 MMHG | HEART RATE: 115 BPM | BODY MASS INDEX: 25.55 KG/M2 | OXYGEN SATURATION: 99 % | WEIGHT: 199 LBS

## 2021-03-19 DIAGNOSIS — I34.0 NONRHEUMATIC MITRAL VALVE REGURGITATION: ICD-10-CM

## 2021-03-19 DIAGNOSIS — I42.0 DILATED CARDIOMYOPATHY (HCC): ICD-10-CM

## 2021-03-19 DIAGNOSIS — I48.92 ATRIAL FLUTTER WITH RAPID VENTRICULAR RESPONSE (HCC): ICD-10-CM

## 2021-03-19 DIAGNOSIS — Z95.810 PRESENCE OF BIVENTRICULAR IMPLANTABLE CARDIOVERTER-DEFIBRILLATOR (ICD): Primary | ICD-10-CM

## 2021-03-19 PROCEDURE — 93000 ELECTROCARDIOGRAM COMPLETE: CPT | Performed by: INTERNAL MEDICINE

## 2021-03-19 PROCEDURE — 99214 OFFICE O/P EST MOD 30 MIN: CPT | Performed by: INTERNAL MEDICINE

## 2021-03-19 RX ORDER — SODIUM CHLORIDE 0.9 % (FLUSH) 0.9 %
3 SYRINGE (ML) INJECTION EVERY 12 HOURS SCHEDULED
Status: CANCELLED | OUTPATIENT
Start: 2021-03-19

## 2021-03-19 RX ORDER — SODIUM CHLORIDE 0.9 % (FLUSH) 0.9 %
3-10 SYRINGE (ML) INJECTION AS NEEDED
Status: CANCELLED | OUTPATIENT
Start: 2021-03-19

## 2021-03-19 NOTE — PROGRESS NOTES
CC---Cardiomyopathy, VT, atrial fibrillation     Sub--Mr. Hank Zarate is a very pleasant and functionally active 81-year-old white male with history of hypertensive heart disease, MR, TR, and right bundle branch block on ECG.  Patient has history of rheumatic fever since age of 11 and has been noticed to have increasing symptoms of shortness of breath and fatigue and recently evaluated in the hospital needing intravenous diuretics and a repeat cardiac catheterization showing significant LV dysfunction and  also had a hematuria and was evaluated by urologist and continues to be on anti coagulation without any further problems with hematuria and had benign workup for hematuria.  Patient did improve his ejection fraction and his heart failure status when he was in sinus rhythm when he underwent ablation several months ago.  Recurrent LV dysfunction and heart failure symptoms with class 3 symptomatology with recurrent persistent atrial fibrillation noted. Patient could not tolerate Tikosyn in the past.  Redo ablation was done with extensive ablation and significant left atrial scarring and enlargement noted and patient was placed on amiodarone briefly. Patient started having recurrent atrial arrhythmias despite extensive ablation and compensated functional class 2--  persists to have severely reduced EF-- Biventricular ICD inserted and since then patient is having minimal arrhythmias with remarkable improvement of symptoms and out of hospital for more than  2 years--patient has noticed recurrent palpitations with elevated heart rate with  symptoms and was noted to have recurrent atrial flutter and came back for evaluation   Patient is on Cardizem for rate control and denies any new symptoms and is post Covid vaccine            Past Medical History:     Reviewed history from 03/12/2018 and no changes required:        mitral regurg        tricuspid regurg        rt BBB        Atrial Fibrillation         Cardiomyopathy, takotsubo        Coronary Artery Disease, non obstructive        Hypertension        Gouty Arthritis         Acid reflux disease        Allergies        Osteoarthritis        AF ablation January 2017        bone spur neck- 2017        Congestive heart failure         VT--- February 2018    Past Surgical History:     Reviewed history from 09/01/2017 and no changes required:        Rotator Cuff Repair right        Amputation right  index and middle finger        Sinus surgery February 2014        Heart Catherization - 12/8/2016 - Non obstr CAD; Takotsubo CM        Heart Ablation- 1/2017 [facility]        Heart Catherization: 7/24/17 (treating medically)        Cardiac Ablation 8/22/17 [facility]          Physical Exam    General:      well developed, well nourished, in no acute distress.    Head:      normocephalic and atraumatic.    Eyes:      PERRL/EOM intact, conjunctiva and sclera clear with out nystagmus.    Neck:      no masses, thyromegaly, trachea central with normal respiratory effort  Lungs:      clear bilaterally to auscultation.    Heart:    irregular rate and rhythm, S1, S2 without murmurs, rubs, or gallops          Assessment and plan  Recurrent atrial flutter--could not be paced terminated--ICD programmed to DDIR mode to avoid tracking of the arrhythmia--AV node ablation recommended and scheduled   Persistent symptomatic atrial fibrillation with recurrent class 3 combined congestive heart failure --post redo ablation   Left ventricular ejection fraction 35% -- post ICD   prior VT with appropriate device therapy with rescue-- no recurrence  History of rheumatic fever with chronic right bundle-branch block  Hypertension  Bilateral leg edema resolved   Biventricular ICD in situ  Medications reviewed         ECG 12 Lead    Date/Time: 3/19/2021 11:07 AM  Performed by: Hira Ochoa MD  Authorized by: Hira Ochoa MD   Comparison: compared with previous ECG   Similar to previous ECG  Rhythm:  atrial flutter and paced  Rate: tachycardic            Electronically signed by Hira Ochoa MD, 03/19/21, 11:07 AM EDT.

## 2021-03-22 NOTE — TELEPHONE ENCOUNTER
DELETE AFTER REVIEWING: Send the encounter HIGH prior  Caller: Kris Hank LOPEZ    Relationship: Self    Best call back number: [phone]    Medication needed:   Requested Prescriptions     Pending Prescriptions Disp Refills   • losartan (COZAAR) 25 MG tablet 90 tablet 1     Sig: Take 1 tablet by mouth Daily.   • rivaroxaban (Xarelto) 15 MG tablet 90 tablet 1     Sig: Take 1 tablet by mouth Daily.   • allopurinol (ZYLOPRIM) 300 MG tablet 90 tablet 4     Sig: Take 1 tablet by mouth Daily.       When do you need the refill by: 3/22/21    What additional details did the patient provide when requesting the medication:     Does the patient have less than a 3 day supply:  [x] Yes  [] No    What is the patient's preferred pharmacy: [facility] - [place] [address] [address] [facility] - [phone] [facility] [phone] FX

## 2021-03-23 PROBLEM — I48.92 ATRIAL FLUTTER WITH RAPID VENTRICULAR RESPONSE: Status: ACTIVE | Noted: 2021-03-23

## 2021-03-23 RX ORDER — LOSARTAN POTASSIUM 25 MG/1
25 TABLET ORAL DAILY
Qty: 90 TABLET | Refills: 1 | Status: SHIPPED | OUTPATIENT
Start: 2021-03-23 | End: 2021-04-18 | Stop reason: HOSPADM

## 2021-03-23 RX ORDER — ALLOPURINOL 300 MG/1
300 TABLET ORAL DAILY
Qty: 90 TABLET | Refills: 1 | Status: SHIPPED | OUTPATIENT
Start: 2021-03-23 | End: 2021-08-17 | Stop reason: SDUPTHER

## 2021-04-10 DIAGNOSIS — I50.9 ACUTE ON CHRONIC CONGESTIVE HEART FAILURE, UNSPECIFIED HEART FAILURE TYPE (HCC): ICD-10-CM

## 2021-04-12 ENCOUNTER — LAB (OUTPATIENT)
Dept: LAB | Facility: HOSPITAL | Age: 81
End: 2021-04-12

## 2021-04-12 DIAGNOSIS — Z95.810 PRESENCE OF BIVENTRICULAR IMPLANTABLE CARDIOVERTER-DEFIBRILLATOR (ICD): ICD-10-CM

## 2021-04-12 DIAGNOSIS — I42.0 DILATED CARDIOMYOPATHY (HCC): ICD-10-CM

## 2021-04-12 DIAGNOSIS — I48.92 ATRIAL FLUTTER WITH RAPID VENTRICULAR RESPONSE (HCC): ICD-10-CM

## 2021-04-12 LAB
ALBUMIN SERPL-MCNC: 3.8 G/DL (ref 3.5–5.2)
ALBUMIN/GLOB SERPL: 1.4 G/DL
ALP SERPL-CCNC: 81 U/L (ref 39–117)
ALT SERPL W P-5'-P-CCNC: 18 U/L (ref 1–41)
ANION GAP SERPL CALCULATED.3IONS-SCNC: 9 MMOL/L (ref 5–15)
AST SERPL-CCNC: 26 U/L (ref 1–40)
BASOPHILS # BLD AUTO: 0.06 10*3/MM3 (ref 0–0.2)
BASOPHILS NFR BLD AUTO: 0.8 % (ref 0–1.5)
BILIRUB SERPL-MCNC: 1.5 MG/DL (ref 0–1.2)
BUN SERPL-MCNC: 19 MG/DL (ref 8–23)
BUN/CREAT SERPL: 15.6 (ref 7–25)
CALCIUM SPEC-SCNC: 8.8 MG/DL (ref 8.6–10.5)
CHLORIDE SERPL-SCNC: 106 MMOL/L (ref 98–107)
CO2 SERPL-SCNC: 28 MMOL/L (ref 22–29)
CREAT SERPL-MCNC: 1.22 MG/DL (ref 0.76–1.27)
DEPRECATED RDW RBC AUTO: 52.2 FL (ref 37–54)
EOSINOPHIL # BLD AUTO: 0.07 10*3/MM3 (ref 0–0.4)
EOSINOPHIL NFR BLD AUTO: 0.9 % (ref 0.3–6.2)
ERYTHROCYTE [DISTWIDTH] IN BLOOD BY AUTOMATED COUNT: 14.4 % (ref 12.3–15.4)
GFR SERPL CREATININE-BSD FRML MDRD: 57 ML/MIN/1.73
GLOBULIN UR ELPH-MCNC: 2.8 GM/DL
GLUCOSE SERPL-MCNC: 90 MG/DL (ref 65–99)
HCT VFR BLD AUTO: 48.1 % (ref 37.5–51)
HGB BLD-MCNC: 16.5 G/DL (ref 13–17.7)
IMM GRANULOCYTES # BLD AUTO: 0.05 10*3/MM3 (ref 0–0.05)
IMM GRANULOCYTES NFR BLD AUTO: 0.6 % (ref 0–0.5)
LYMPHOCYTES # BLD AUTO: 1.45 10*3/MM3 (ref 0.7–3.1)
LYMPHOCYTES NFR BLD AUTO: 18.4 % (ref 19.6–45.3)
MAGNESIUM SERPL-MCNC: 2 MG/DL (ref 1.6–2.4)
MCH RBC QN AUTO: 33.7 PG (ref 26.6–33)
MCHC RBC AUTO-ENTMCNC: 34.3 G/DL (ref 31.5–35.7)
MCV RBC AUTO: 98.2 FL (ref 79–97)
MONOCYTES # BLD AUTO: 0.73 10*3/MM3 (ref 0.1–0.9)
MONOCYTES NFR BLD AUTO: 9.2 % (ref 5–12)
NEUTROPHILS NFR BLD AUTO: 5.54 10*3/MM3 (ref 1.7–7)
NEUTROPHILS NFR BLD AUTO: 70.1 % (ref 42.7–76)
NRBC BLD AUTO-RTO: 0 /100 WBC (ref 0–0.2)
PLATELET # BLD AUTO: 186 10*3/MM3 (ref 140–450)
PMV BLD AUTO: 11.8 FL (ref 6–12)
POTASSIUM SERPL-SCNC: 4.1 MMOL/L (ref 3.5–5.2)
PROT SERPL-MCNC: 6.6 G/DL (ref 6–8.5)
RBC # BLD AUTO: 4.9 10*6/MM3 (ref 4.14–5.8)
SARS-COV-2 ORF1AB RESP QL NAA+PROBE: NOT DETECTED
SODIUM SERPL-SCNC: 143 MMOL/L (ref 136–145)
WBC # BLD AUTO: 7.9 10*3/MM3 (ref 3.4–10.8)

## 2021-04-12 PROCEDURE — U0004 COV-19 TEST NON-CDC HGH THRU: HCPCS | Performed by: INTERNAL MEDICINE

## 2021-04-12 PROCEDURE — 36415 COLL VENOUS BLD VENIPUNCTURE: CPT

## 2021-04-12 PROCEDURE — 85025 COMPLETE CBC W/AUTO DIFF WBC: CPT

## 2021-04-12 PROCEDURE — C9803 HOPD COVID-19 SPEC COLLECT: HCPCS | Performed by: INTERNAL MEDICINE

## 2021-04-12 PROCEDURE — 83735 ASSAY OF MAGNESIUM: CPT

## 2021-04-12 PROCEDURE — 80053 COMPREHEN METABOLIC PANEL: CPT

## 2021-04-12 RX ORDER — COLCHICINE 0.6 MG/1
0.6 TABLET ORAL 2 TIMES DAILY PRN
COMMUNITY
End: 2022-07-29

## 2021-04-12 RX ORDER — FUROSEMIDE 20 MG/1
TABLET ORAL
Qty: 90 TABLET | Refills: 1 | Status: SHIPPED | OUTPATIENT
Start: 2021-04-12 | End: 2021-04-12

## 2021-04-12 RX ORDER — FUROSEMIDE 20 MG/1
20 TABLET ORAL DAILY
COMMUNITY
End: 2021-11-15

## 2021-04-13 ENCOUNTER — ANESTHESIA EVENT (OUTPATIENT)
Dept: CARDIOLOGY | Facility: HOSPITAL | Age: 81
End: 2021-04-13

## 2021-04-14 ENCOUNTER — HOSPITAL ENCOUNTER (OUTPATIENT)
Facility: HOSPITAL | Age: 81
Setting detail: HOSPITAL OUTPATIENT SURGERY
Discharge: HOME | End: 2021-04-14
Attending: INTERNAL MEDICINE | Admitting: INTERNAL MEDICINE

## 2021-04-14 ENCOUNTER — ANESTHESIA (OUTPATIENT)
Dept: CARDIOLOGY | Facility: HOSPITAL | Age: 81
End: 2021-04-14

## 2021-04-14 VITALS
WEIGHT: 202.82 LBS | SYSTOLIC BLOOD PRESSURE: 121 MMHG | DIASTOLIC BLOOD PRESSURE: 62 MMHG | RESPIRATION RATE: 18 BRPM | HEIGHT: 73 IN | TEMPERATURE: 97.4 F | BODY MASS INDEX: 26.88 KG/M2 | HEART RATE: 73 BPM | OXYGEN SATURATION: 46 %

## 2021-04-14 DIAGNOSIS — I48.92 ATRIAL FLUTTER WITH RAPID VENTRICULAR RESPONSE (HCC): ICD-10-CM

## 2021-04-14 DIAGNOSIS — I42.0 DILATED CARDIOMYOPATHY (HCC): ICD-10-CM

## 2021-04-14 DIAGNOSIS — Z95.810 PRESENCE OF BIVENTRICULAR IMPLANTABLE CARDIOVERTER-DEFIBRILLATOR (ICD): ICD-10-CM

## 2021-04-14 LAB
ACT BLD: 153 SECONDS (ref 89–137)
ACT BLD: 158 SECONDS (ref 89–137)
ACT BLD: 274 SECONDS (ref 89–137)

## 2021-04-14 PROCEDURE — 85347 COAGULATION TIME ACTIVATED: CPT

## 2021-04-14 PROCEDURE — C1894 INTRO/SHEATH, NON-LASER: HCPCS | Performed by: INTERNAL MEDICINE

## 2021-04-14 PROCEDURE — 93650 ICAR CATH ABLTJ AV NODE FUNC: CPT | Performed by: INTERNAL MEDICINE

## 2021-04-14 PROCEDURE — 25010000002 PROPOFOL 10 MG/ML EMULSION: Performed by: REGISTERED NURSE

## 2021-04-14 PROCEDURE — 25010000002 PROTAMINE SULFATE PER 10 MG: Performed by: ANESTHESIOLOGY

## 2021-04-14 PROCEDURE — C1732 CATH, EP, DIAG/ABL, 3D/VECT: HCPCS | Performed by: INTERNAL MEDICINE

## 2021-04-14 PROCEDURE — 25010000002 FENTANYL CITRATE (PF) 100 MCG/2ML SOLUTION: Performed by: REGISTERED NURSE

## 2021-04-14 PROCEDURE — 25010000002 HEPARIN (PORCINE) PER 1000 UNITS: Performed by: REGISTERED NURSE

## 2021-04-14 RX ORDER — HEPARIN SODIUM 1000 [USP'U]/ML
INJECTION, SOLUTION INTRAVENOUS; SUBCUTANEOUS AS NEEDED
Status: DISCONTINUED | OUTPATIENT
Start: 2021-04-14 | End: 2021-04-14 | Stop reason: SURG

## 2021-04-14 RX ORDER — HYDROCODONE BITARTRATE AND ACETAMINOPHEN 5; 325 MG/1; MG/1
1 TABLET ORAL EVERY 4 HOURS PRN
Status: DISCONTINUED | OUTPATIENT
Start: 2021-04-14 | End: 2021-04-14 | Stop reason: HOSPADM

## 2021-04-14 RX ORDER — SODIUM CHLORIDE 0.9 % (FLUSH) 0.9 %
10 SYRINGE (ML) INJECTION EVERY 12 HOURS SCHEDULED
Status: DISCONTINUED | OUTPATIENT
Start: 2021-04-14 | End: 2021-04-14 | Stop reason: HOSPADM

## 2021-04-14 RX ORDER — ONDANSETRON 2 MG/ML
4 INJECTION INTRAMUSCULAR; INTRAVENOUS EVERY 6 HOURS PRN
Status: DISCONTINUED | OUTPATIENT
Start: 2021-04-14 | End: 2021-04-14 | Stop reason: HOSPADM

## 2021-04-14 RX ORDER — SODIUM CHLORIDE 0.9 % (FLUSH) 0.9 %
10 SYRINGE (ML) INJECTION AS NEEDED
Status: DISCONTINUED | OUTPATIENT
Start: 2021-04-14 | End: 2021-04-14 | Stop reason: HOSPADM

## 2021-04-14 RX ORDER — PHENYLEPHRINE HCL IN 0.9% NACL 1 MG/10 ML
SYRINGE (ML) INTRAVENOUS AS NEEDED
Status: DISCONTINUED | OUTPATIENT
Start: 2021-04-14 | End: 2021-04-14 | Stop reason: SURG

## 2021-04-14 RX ORDER — ACETAMINOPHEN 325 MG/1
650 TABLET ORAL EVERY 4 HOURS PRN
Status: DISCONTINUED | OUTPATIENT
Start: 2021-04-14 | End: 2021-04-14 | Stop reason: HOSPADM

## 2021-04-14 RX ORDER — SODIUM CHLORIDE 9 MG/ML
9 INJECTION, SOLUTION INTRAVENOUS CONTINUOUS PRN
Status: DISCONTINUED | OUTPATIENT
Start: 2021-04-14 | End: 2021-04-14 | Stop reason: HOSPADM

## 2021-04-14 RX ORDER — PROTAMINE SULFATE 10 MG/ML
INJECTION, SOLUTION INTRAVENOUS AS NEEDED
Status: DISCONTINUED | OUTPATIENT
Start: 2021-04-14 | End: 2021-04-14 | Stop reason: SURG

## 2021-04-14 RX ORDER — LIDOCAINE HYDROCHLORIDE 10 MG/ML
0.5 INJECTION, SOLUTION EPIDURAL; INFILTRATION; INTRACAUDAL; PERINEURAL ONCE AS NEEDED
Status: DISCONTINUED | OUTPATIENT
Start: 2021-04-14 | End: 2021-04-14 | Stop reason: HOSPADM

## 2021-04-14 RX ORDER — FENTANYL CITRATE 50 UG/ML
INJECTION, SOLUTION INTRAMUSCULAR; INTRAVENOUS AS NEEDED
Status: DISCONTINUED | OUTPATIENT
Start: 2021-04-14 | End: 2021-04-14 | Stop reason: SURG

## 2021-04-14 RX ORDER — EPHEDRINE SULFATE 50 MG/ML
INJECTION INTRAVENOUS AS NEEDED
Status: DISCONTINUED | OUTPATIENT
Start: 2021-04-14 | End: 2021-04-14 | Stop reason: SURG

## 2021-04-14 RX ORDER — LIDOCAINE HYDROCHLORIDE 20 MG/ML
INJECTION, SOLUTION INFILTRATION; PERINEURAL AS NEEDED
Status: DISCONTINUED | OUTPATIENT
Start: 2021-04-14 | End: 2021-04-14 | Stop reason: HOSPADM

## 2021-04-14 RX ORDER — SODIUM CHLORIDE 0.9 % (FLUSH) 0.9 %
3 SYRINGE (ML) INJECTION EVERY 12 HOURS SCHEDULED
Status: DISCONTINUED | OUTPATIENT
Start: 2021-04-14 | End: 2021-04-14 | Stop reason: HOSPADM

## 2021-04-14 RX ORDER — METOPROLOL SUCCINATE 25 MG/1
25 TABLET, EXTENDED RELEASE ORAL DAILY
Qty: 90 TABLET | Refills: 3 | Status: SHIPPED | OUTPATIENT
Start: 2021-04-14 | End: 2021-05-17 | Stop reason: SINTOL

## 2021-04-14 RX ORDER — METOPROLOL SUCCINATE 25 MG/1
25 TABLET, EXTENDED RELEASE ORAL DAILY
Qty: 90 TABLET | Refills: 3 | Status: SHIPPED | OUTPATIENT
Start: 2021-04-14 | End: 2021-04-14 | Stop reason: SDUPTHER

## 2021-04-14 RX ORDER — ACETAMINOPHEN 650 MG/1
650 SUPPOSITORY RECTAL EVERY 4 HOURS PRN
Status: DISCONTINUED | OUTPATIENT
Start: 2021-04-14 | End: 2021-04-14 | Stop reason: HOSPADM

## 2021-04-14 RX ORDER — SODIUM CHLORIDE 0.9 % (FLUSH) 0.9 %
3-10 SYRINGE (ML) INJECTION AS NEEDED
Status: DISCONTINUED | OUTPATIENT
Start: 2021-04-14 | End: 2021-04-14 | Stop reason: HOSPADM

## 2021-04-14 RX ADMIN — CEFAZOLIN SODIUM 2 G: 1 INJECTION, POWDER, FOR SOLUTION INTRAMUSCULAR; INTRAVENOUS at 11:06

## 2021-04-14 RX ADMIN — EPHEDRINE SULFATE 10 MG: 50 INJECTION INTRAVENOUS at 12:00

## 2021-04-14 RX ADMIN — EPHEDRINE SULFATE 5 MG: 50 INJECTION INTRAVENOUS at 11:49

## 2021-04-14 RX ADMIN — EPHEDRINE SULFATE 5 MG: 50 INJECTION INTRAVENOUS at 11:32

## 2021-04-14 RX ADMIN — Medication 100 MCG: at 11:30

## 2021-04-14 RX ADMIN — FENTANYL CITRATE 50 MCG: 50 INJECTION, SOLUTION INTRAMUSCULAR; INTRAVENOUS at 11:01

## 2021-04-14 RX ADMIN — Medication 100 MCG: at 11:24

## 2021-04-14 RX ADMIN — SODIUM CHLORIDE 9 ML/HR: 9 INJECTION, SOLUTION INTRAVENOUS at 08:31

## 2021-04-14 RX ADMIN — Medication 50 MCG: at 11:42

## 2021-04-14 RX ADMIN — HEPARIN SODIUM 5000 UNITS: 1000 INJECTION, SOLUTION INTRAVENOUS; SUBCUTANEOUS at 11:58

## 2021-04-14 RX ADMIN — PROPOFOL 75 MCG/KG/MIN: 10 INJECTION, EMULSION INTRAVENOUS at 10:56

## 2021-04-14 RX ADMIN — Medication 100 MCG: at 11:49

## 2021-04-14 RX ADMIN — Medication 100 MCG: at 12:00

## 2021-04-14 RX ADMIN — PROTAMINE SULFATE 100 MG: 10 INJECTION, SOLUTION INTRAVENOUS at 12:30

## 2021-04-14 RX ADMIN — FENTANYL CITRATE 50 MCG: 50 INJECTION, SOLUTION INTRAMUSCULAR; INTRAVENOUS at 11:06

## 2021-04-14 RX ADMIN — Medication 100 MCG: at 11:16

## 2021-04-14 RX ADMIN — EPHEDRINE SULFATE 5 MG: 50 INJECTION INTRAVENOUS at 11:42

## 2021-04-14 RX ADMIN — HEPARIN SODIUM 3000 UNITS: 1000 INJECTION, SOLUTION INTRAVENOUS; SUBCUTANEOUS at 12:04

## 2021-04-14 NOTE — ANESTHESIA PREPROCEDURE EVALUATION
Anesthesia Evaluation     Patient summary reviewed and Nursing notes reviewed   no history of anesthetic complications:  NPO Solid Status: > 8 hours  NPO Liquid Status: > 8 hours           Airway   Mallampati: II  TM distance: >3 FB  Neck ROM: full  No difficulty expected  Dental - normal exam     Pulmonary - normal exam   (+) a smoker Former,   Cardiovascular - normal exam    (+) hypertension, valvular problems/murmurs MR and TI, CAD, dysrhythmias Atrial Fib, Atrial Flutter, CHF Systolic <55%, hyperlipidemia,       Neuro/Psych- negative ROS  GI/Hepatic/Renal/Endo    (+)  GERD,      Musculoskeletal     Abdominal  - normal exam   Substance History - negative use     OB/GYN negative ob/gyn ROS         Other   arthritis,                    Anesthesia Plan    ASA 4     MAC     intravenous induction     Anesthetic plan, all risks, benefits, and alternatives have been provided, discussed and informed consent has been obtained with: patient.    Plan discussed with CRNA.

## 2021-04-14 NOTE — NURSING NOTE
At 1259, notified Dr. Ochoa of act of 153. MD okay with pulling sheath with act at 153. Also okay with patient discharging before 2100 time as long as no issues with site.

## 2021-04-14 NOTE — ANESTHESIA POSTPROCEDURE EVALUATION
Patient: Hank Ponce    Procedure Summary     Date: 04/14/21 Room / Location: [place] CATH LAB 3 / [facility] C[facility] INVASIVE LOCATION    Anesthesia Start: 1056 Anesthesia Stop: 1245    Procedure: AV node ablation (N/A ) Diagnosis:       Presence of biventricular implantable cardioverter-defibrillator (ICD)      Atrial flutter with rapid ventricular response (CMS/HCC)      Dilated cardiomyopathy (CMS/HCC)      (Successful AV node ablation via retroaortic approach without complications)    Providers: Hira Ochoa MD Provider: Hira Sierra MD    Anesthesia Type: MAC ASA Status: 4          Anesthesia Type: MAC    Vitals  Vitals Value Taken Time   /79 04/14/21 1601   Temp     Pulse 71 04/14/21 1625   Resp     SpO2 46 % 04/14/21 1513   Vitals shown include unvalidated device data.        Post Anesthesia Care and Evaluation    Patient location during evaluation: PACU  Patient participation: complete - patient participated  Level of consciousness: awake  Pain scale: See nurse's notes for pain score.  Pain management: adequate  Airway patency: patent  Anesthetic complications: No anesthetic complications  PONV Status: none  Cardiovascular status: acceptable  Respiratory status: acceptable  Hydration status: acceptable    Comments: Patient seen and examined postoperatively; vital signs stable; SpO2 greater than or equal to 90%; cardiopulmonary status stable; nausea/vomiting adequately controlled; pain adequately controlled; no apparent anesthesia complications; patient discharged from anesthesia care when discharge criteria were met

## 2021-04-14 NOTE — DISCHARGE INSTRUCTIONS
Post Cath Instructions    1) Drink plenty of fluids for the next 24 hours.  This helps to eliminate the dye used in your procedure through urination.  You may resume a normal diet; however, try to avoid foods that would cause gas or constipation.    2) Sedative medication given to you during your catheterization may decrease your judgement and reaction time for up to 24-48 hours.  Therefore:  a. DO NOT drive or operate hazardous machinery (48 hours)  b. DO NOT consume alcoholic beverages  c. DO NOT make any important/legal decisions  d. Have someone stay with you for at least 24 hours    3) To allow proper healing and prevent bleeding, the following activities are to be strictly avoided for the next 24-48 hours:  a. Excessive bending at wound site  b. Straining (anything that would tense up muscles around the affected puncture site)  c. Lifting objects greater than 5 pounds, pushing, or pulling for 5 days  i. For Groin Cases:  1. Refrain from sexual activity  2. Refrain from running or vigorous walking  3. No prolonged sitting or standing  4. Limit stair climbing as much as possible    4) Keep the puncture site clean and dry.  You may remove the dressing tomorrow and replace it with a band-aid for at least one additional day.  Gently clean the site with mild soap and water.  No scrubbing/rubbing and lightly pat the area dry.  Showers are acceptable; however, avoid submerging in water (tub baths, hot tubs, swimming pools, dishwater, etc…) for at least one week.  The site should be completely healed before resuming these activities to reduce the risk of infection.  Check the site often.  Watch for signs and symptoms of infection and notify your physician if any of the following occur:  a. Bleeding or an increase in swelling at the puncture site  b. Fever  c. Increased soreness around puncture site  d. Foul odor or significant drainage from the puncture site  e. Swelling, redness, or warmth at the puncture site    **A  bruise or small “pea sized” lump under the skin at the puncture site is not unusual.  This should disappear within 3-4 weeks.**  5) CONTACT YOUR PHYSICIAN OR CALL 911 IF YOU EXPERIENCE ANY OF THE FOLLOWING:  a. Increased angina (chest pain) or frequent sensations of pressure, burning, pain, or other discomfort in the chest, arm, jaws, or stomach  b. Lightheadedness, dizziness, faint feeling, sweating, or difficulty breathing  c. Odd sensation changes like numbness, tingling, coldness, or pain in the arm or leg in which the catheter was inserted  d. Limb in which the catheter was inserted becomes pale/bluish in color    IMPORTANT:  Although this occurs very rarely, if you should develop bright red or excessive bleeding, feel a “pop” inside at the insertion site, or notice a sudden increase in swelling larger than a walnut, you should call 911.  Hold continuous firm pressure to the access site until emergency personnel arrive.  It is best if someone else can do this for you.

## 2021-04-16 PROCEDURE — 93295 DEV INTERROG REMOTE 1/2/MLT: CPT | Performed by: NURSE PRACTITIONER

## 2021-04-16 PROCEDURE — 93296 REM INTERROG EVL PM/IDS: CPT | Performed by: NURSE PRACTITIONER

## 2021-04-17 ENCOUNTER — HOSPITAL ENCOUNTER (INPATIENT)
Facility: HOSPITAL | Age: 81
LOS: 1 days | Discharge: ANOTHER HOSPITAL | End: 2021-04-18
Attending: EMERGENCY MEDICINE | Admitting: HOSPITALIST

## 2021-04-17 ENCOUNTER — APPOINTMENT (OUTPATIENT)
Dept: GENERAL RADIOLOGY | Facility: HOSPITAL | Age: 81
End: 2021-04-17

## 2021-04-17 DIAGNOSIS — I48.11 LONGSTANDING PERSISTENT ATRIAL FIBRILLATION (HCC): ICD-10-CM

## 2021-04-17 DIAGNOSIS — R09.02 HYPOXIA: ICD-10-CM

## 2021-04-17 DIAGNOSIS — J18.9 PNEUMONIA OF RIGHT LUNG DUE TO INFECTIOUS ORGANISM, UNSPECIFIED PART OF LUNG: Primary | ICD-10-CM

## 2021-04-17 DIAGNOSIS — R77.8 ELEVATED TROPONIN: ICD-10-CM

## 2021-04-17 PROBLEM — E53.8 LOW SERUM VITAMIN B12: Status: ACTIVE | Noted: 2021-04-17

## 2021-04-17 PROBLEM — I50.20 HEART FAILURE WITH REDUCED EJECTION FRACTION: Status: ACTIVE | Noted: 2021-04-17

## 2021-04-17 PROBLEM — R79.89 ELEVATED SERUM CREATININE: Chronic | Status: ACTIVE | Noted: 2021-04-17

## 2021-04-17 PROBLEM — R79.89 ELEVATED TROPONIN: Status: ACTIVE | Noted: 2021-04-17

## 2021-04-17 LAB
ALBUMIN SERPL-MCNC: 3.2 G/DL (ref 3.5–5.2)
ALBUMIN/GLOB SERPL: 1 G/DL
ALP SERPL-CCNC: 96 U/L (ref 39–117)
ALT SERPL W P-5'-P-CCNC: 42 U/L (ref 1–41)
ANION GAP SERPL CALCULATED.3IONS-SCNC: 15 MMOL/L (ref 5–15)
APTT PPP: 36.6 SECONDS (ref 24–31)
AST SERPL-CCNC: 83 U/L (ref 1–40)
BASOPHILS # BLD AUTO: 0 10*3/MM3 (ref 0–0.2)
BASOPHILS NFR BLD AUTO: 0.2 % (ref 0–1.5)
BILIRUB SERPL-MCNC: 3 MG/DL (ref 0–1.2)
BUN SERPL-MCNC: 34 MG/DL (ref 8–23)
BUN/CREAT SERPL: 20.2 (ref 7–25)
CALCIUM SPEC-SCNC: 9.3 MG/DL (ref 8.6–10.5)
CHLORIDE SERPL-SCNC: 100 MMOL/L (ref 98–107)
CO2 SERPL-SCNC: 22 MMOL/L (ref 22–29)
CREAT SERPL-MCNC: 1.68 MG/DL (ref 0.76–1.27)
DEPRECATED RDW RBC AUTO: 55.6 FL (ref 37–54)
EOSINOPHIL # BLD AUTO: 0 10*3/MM3 (ref 0–0.4)
EOSINOPHIL NFR BLD AUTO: 0 % (ref 0.3–6.2)
ERYTHROCYTE [DISTWIDTH] IN BLOOD BY AUTOMATED COUNT: 16 % (ref 12.3–15.4)
GFR SERPL CREATININE-BSD FRML MDRD: 39 ML/MIN/1.73
GLOBULIN UR ELPH-MCNC: 3.3 GM/DL
GLUCOSE SERPL-MCNC: 166 MG/DL (ref 65–99)
HCT VFR BLD AUTO: 46.5 % (ref 37.5–51)
HGB BLD-MCNC: 15.9 G/DL (ref 13–17.7)
HOLD SPECIMEN: NORMAL
INR PPP: 1.74 (ref 0.93–1.1)
LYMPHOCYTES # BLD AUTO: 0.4 10*3/MM3 (ref 0.7–3.1)
LYMPHOCYTES NFR BLD AUTO: 4.3 % (ref 19.6–45.3)
MAGNESIUM SERPL-MCNC: 2.1 MG/DL (ref 1.6–2.4)
MCH RBC QN AUTO: 33.8 PG (ref 26.6–33)
MCHC RBC AUTO-ENTMCNC: 34.1 G/DL (ref 31.5–35.7)
MCV RBC AUTO: 99 FL (ref 79–97)
MONOCYTES # BLD AUTO: 0.6 10*3/MM3 (ref 0.1–0.9)
MONOCYTES NFR BLD AUTO: 7.1 % (ref 5–12)
NEUTROPHILS NFR BLD AUTO: 7.7 10*3/MM3 (ref 1.7–7)
NEUTROPHILS NFR BLD AUTO: 88.4 % (ref 42.7–76)
NRBC BLD AUTO-RTO: 0.2 /100 WBC (ref 0–0.2)
NT-PROBNP SERPL-MCNC: ABNORMAL PG/ML (ref 0–1800)
PLATELET # BLD AUTO: 145 10*3/MM3 (ref 140–450)
PMV BLD AUTO: 10.2 FL (ref 6–12)
POTASSIUM SERPL-SCNC: 4.4 MMOL/L (ref 3.5–5.2)
PROT SERPL-MCNC: 6.5 G/DL (ref 6–8.5)
PROTHROMBIN TIME: 18.6 SECONDS (ref 9.6–11.7)
RBC # BLD AUTO: 4.7 10*6/MM3 (ref 4.14–5.8)
SODIUM SERPL-SCNC: 137 MMOL/L (ref 136–145)
TROPONIN T SERPL-MCNC: 0.34 NG/ML (ref 0–0.03)
WBC # BLD AUTO: 8.8 10*3/MM3 (ref 3.4–10.8)

## 2021-04-17 PROCEDURE — 83735 ASSAY OF MAGNESIUM: CPT | Performed by: EMERGENCY MEDICINE

## 2021-04-17 PROCEDURE — 85610 PROTHROMBIN TIME: CPT | Performed by: EMERGENCY MEDICINE

## 2021-04-17 PROCEDURE — 25010000002 FUROSEMIDE PER 20 MG: Performed by: EMERGENCY MEDICINE

## 2021-04-17 PROCEDURE — 85025 COMPLETE CBC W/AUTO DIFF WBC: CPT | Performed by: EMERGENCY MEDICINE

## 2021-04-17 PROCEDURE — 25010000002 VANCOMYCIN 10 G RECONSTITUTED SOLUTION: Performed by: EMERGENCY MEDICINE

## 2021-04-17 PROCEDURE — 25010000003 AMPICILLIN-SULBACTAM PER 1.5 G: Performed by: EMERGENCY MEDICINE

## 2021-04-17 PROCEDURE — 87899 AGENT NOS ASSAY W/OPTIC: CPT | Performed by: PHYSICIAN ASSISTANT

## 2021-04-17 PROCEDURE — 94640 AIRWAY INHALATION TREATMENT: CPT

## 2021-04-17 PROCEDURE — 94799 UNLISTED PULMONARY SVC/PX: CPT

## 2021-04-17 PROCEDURE — 85730 THROMBOPLASTIN TIME PARTIAL: CPT | Performed by: EMERGENCY MEDICINE

## 2021-04-17 PROCEDURE — U0004 COV-19 TEST NON-CDC HGH THRU: HCPCS | Performed by: EMERGENCY MEDICINE

## 2021-04-17 PROCEDURE — 87040 BLOOD CULTURE FOR BACTERIA: CPT | Performed by: EMERGENCY MEDICINE

## 2021-04-17 PROCEDURE — 25010000002 VANCOMYCIN PER 500 MG: Performed by: EMERGENCY MEDICINE

## 2021-04-17 PROCEDURE — 80053 COMPREHEN METABOLIC PANEL: CPT | Performed by: EMERGENCY MEDICINE

## 2021-04-17 PROCEDURE — 99284 EMERGENCY DEPT VISIT MOD MDM: CPT

## 2021-04-17 PROCEDURE — 93005 ELECTROCARDIOGRAM TRACING: CPT | Performed by: EMERGENCY MEDICINE

## 2021-04-17 PROCEDURE — 87641 MR-STAPH DNA AMP PROBE: CPT | Performed by: PHYSICIAN ASSISTANT

## 2021-04-17 PROCEDURE — 71045 X-RAY EXAM CHEST 1 VIEW: CPT

## 2021-04-17 PROCEDURE — 81001 URINALYSIS AUTO W/SCOPE: CPT | Performed by: PHYSICIAN ASSISTANT

## 2021-04-17 PROCEDURE — 84300 ASSAY OF URINE SODIUM: CPT | Performed by: PHYSICIAN ASSISTANT

## 2021-04-17 PROCEDURE — 83880 ASSAY OF NATRIURETIC PEPTIDE: CPT | Performed by: EMERGENCY MEDICINE

## 2021-04-17 PROCEDURE — 93005 ELECTROCARDIOGRAM TRACING: CPT

## 2021-04-17 PROCEDURE — 84484 ASSAY OF TROPONIN QUANT: CPT | Performed by: EMERGENCY MEDICINE

## 2021-04-17 PROCEDURE — 99222 1ST HOSP IP/OBS MODERATE 55: CPT | Performed by: PHYSICIAN ASSISTANT

## 2021-04-17 RX ORDER — GUAIFENESIN 600 MG/1
600 TABLET, EXTENDED RELEASE ORAL EVERY 12 HOURS SCHEDULED
Status: DISCONTINUED | OUTPATIENT
Start: 2021-04-17 | End: 2021-04-19 | Stop reason: HOSPADM

## 2021-04-17 RX ORDER — ONDANSETRON 2 MG/ML
4 INJECTION INTRAMUSCULAR; INTRAVENOUS EVERY 6 HOURS PRN
Status: DISCONTINUED | OUTPATIENT
Start: 2021-04-17 | End: 2021-04-19 | Stop reason: HOSPADM

## 2021-04-17 RX ORDER — ONDANSETRON 4 MG/1
4 TABLET, FILM COATED ORAL EVERY 6 HOURS PRN
Status: DISCONTINUED | OUTPATIENT
Start: 2021-04-17 | End: 2021-04-19 | Stop reason: HOSPADM

## 2021-04-17 RX ORDER — SODIUM CHLORIDE 0.9 % (FLUSH) 0.9 %
10 SYRINGE (ML) INJECTION AS NEEDED
Status: DISCONTINUED | OUTPATIENT
Start: 2021-04-17 | End: 2021-04-19 | Stop reason: HOSPADM

## 2021-04-17 RX ORDER — CALCIUM GLUCONATE 20 MG/ML
1 INJECTION, SOLUTION INTRAVENOUS AS NEEDED
Status: DISCONTINUED | OUTPATIENT
Start: 2021-04-17 | End: 2021-04-19 | Stop reason: HOSPADM

## 2021-04-17 RX ORDER — SODIUM CHLORIDE 0.9 % (FLUSH) 0.9 %
10 SYRINGE (ML) INJECTION EVERY 12 HOURS SCHEDULED
Status: DISCONTINUED | OUTPATIENT
Start: 2021-04-17 | End: 2021-04-19 | Stop reason: HOSPADM

## 2021-04-17 RX ORDER — MAGNESIUM SULFATE HEPTAHYDRATE 40 MG/ML
2 INJECTION, SOLUTION INTRAVENOUS AS NEEDED
Status: DISCONTINUED | OUTPATIENT
Start: 2021-04-17 | End: 2021-04-19 | Stop reason: HOSPADM

## 2021-04-17 RX ORDER — ALLOPURINOL 300 MG/1
300 TABLET ORAL DAILY
Status: DISCONTINUED | OUTPATIENT
Start: 2021-04-18 | End: 2021-04-19 | Stop reason: HOSPADM

## 2021-04-17 RX ORDER — FUROSEMIDE 10 MG/ML
40 INJECTION INTRAMUSCULAR; INTRAVENOUS ONCE
Status: COMPLETED | OUTPATIENT
Start: 2021-04-17 | End: 2021-04-17

## 2021-04-17 RX ORDER — ACETAMINOPHEN 650 MG/1
650 SUPPOSITORY RECTAL EVERY 4 HOURS PRN
Status: DISCONTINUED | OUTPATIENT
Start: 2021-04-17 | End: 2021-04-19 | Stop reason: HOSPADM

## 2021-04-17 RX ORDER — POTASSIUM CHLORIDE 20 MEQ/1
40 TABLET, EXTENDED RELEASE ORAL AS NEEDED
Status: DISCONTINUED | OUTPATIENT
Start: 2021-04-17 | End: 2021-04-19 | Stop reason: HOSPADM

## 2021-04-17 RX ORDER — NITROGLYCERIN 0.4 MG/1
0.4 TABLET SUBLINGUAL
Status: DISCONTINUED | OUTPATIENT
Start: 2021-04-17 | End: 2021-04-19 | Stop reason: HOSPADM

## 2021-04-17 RX ORDER — FUROSEMIDE 10 MG/ML
40 INJECTION INTRAMUSCULAR; INTRAVENOUS DAILY
Status: DISCONTINUED | OUTPATIENT
Start: 2021-04-18 | End: 2021-04-19 | Stop reason: HOSPADM

## 2021-04-17 RX ORDER — VANCOMYCIN 1.75 GRAM/500 ML IN 0.9 % SODIUM CHLORIDE INTRAVENOUS
20 ONCE
Status: COMPLETED | OUTPATIENT
Start: 2021-04-17 | End: 2021-04-18

## 2021-04-17 RX ORDER — CHOLECALCIFEROL (VITAMIN D3) 125 MCG
5 CAPSULE ORAL NIGHTLY PRN
Status: DISCONTINUED | OUTPATIENT
Start: 2021-04-17 | End: 2021-04-19 | Stop reason: HOSPADM

## 2021-04-17 RX ORDER — ALBUTEROL SULFATE 2.5 MG/3ML
2.5 SOLUTION RESPIRATORY (INHALATION) EVERY 6 HOURS PRN
Status: DISCONTINUED | OUTPATIENT
Start: 2021-04-17 | End: 2021-04-19 | Stop reason: HOSPADM

## 2021-04-17 RX ORDER — ACETAMINOPHEN 325 MG/1
650 TABLET ORAL EVERY 4 HOURS PRN
Status: DISCONTINUED | OUTPATIENT
Start: 2021-04-17 | End: 2021-04-19 | Stop reason: HOSPADM

## 2021-04-17 RX ORDER — LANOLIN ALCOHOL/MO/W.PET/CERES
1000 CREAM (GRAM) TOPICAL DAILY
Status: DISCONTINUED | OUTPATIENT
Start: 2021-04-18 | End: 2021-04-19 | Stop reason: HOSPADM

## 2021-04-17 RX ORDER — ACETAMINOPHEN 160 MG/5ML
650 SOLUTION ORAL EVERY 4 HOURS PRN
Status: DISCONTINUED | OUTPATIENT
Start: 2021-04-17 | End: 2021-04-19 | Stop reason: HOSPADM

## 2021-04-17 RX ORDER — POTASSIUM CHLORIDE 1.5 G/1.77G
40 POWDER, FOR SOLUTION ORAL AS NEEDED
Status: DISCONTINUED | OUTPATIENT
Start: 2021-04-17 | End: 2021-04-19 | Stop reason: HOSPADM

## 2021-04-17 RX ORDER — CALCIUM GLUCONATE 20 MG/ML
2 INJECTION, SOLUTION INTRAVENOUS AS NEEDED
Status: DISCONTINUED | OUTPATIENT
Start: 2021-04-17 | End: 2021-04-19 | Stop reason: HOSPADM

## 2021-04-17 RX ORDER — METOPROLOL SUCCINATE 25 MG/1
25 TABLET, EXTENDED RELEASE ORAL DAILY
Status: DISCONTINUED | OUTPATIENT
Start: 2021-04-18 | End: 2021-04-19 | Stop reason: HOSPADM

## 2021-04-17 RX ORDER — MAGNESIUM SULFATE HEPTAHYDRATE 40 MG/ML
4 INJECTION, SOLUTION INTRAVENOUS AS NEEDED
Status: DISCONTINUED | OUTPATIENT
Start: 2021-04-17 | End: 2021-04-19 | Stop reason: HOSPADM

## 2021-04-17 RX ORDER — IPRATROPIUM BROMIDE AND ALBUTEROL SULFATE 2.5; .5 MG/3ML; MG/3ML
3 SOLUTION RESPIRATORY (INHALATION) ONCE
Status: COMPLETED | OUTPATIENT
Start: 2021-04-17 | End: 2021-04-17

## 2021-04-17 RX ADMIN — SODIUM CHLORIDE 500 ML: 9 INJECTION, SOLUTION INTRAVENOUS at 22:02

## 2021-04-17 RX ADMIN — SODIUM CHLORIDE 3 G: 900 INJECTION INTRAVENOUS at 21:51

## 2021-04-17 RX ADMIN — IPRATROPIUM BROMIDE AND ALBUTEROL SULFATE 3 ML: 2.5; .5 SOLUTION RESPIRATORY (INHALATION) at 21:29

## 2021-04-17 RX ADMIN — VANCOMYCIN HYDROCHLORIDE 1750 MG: 10 INJECTION, POWDER, LYOPHILIZED, FOR SOLUTION INTRAVENOUS at 22:50

## 2021-04-17 RX ADMIN — GUAIFENESIN 600 MG: 600 TABLET, EXTENDED RELEASE ORAL at 23:43

## 2021-04-17 RX ADMIN — FUROSEMIDE 40 MG: 10 INJECTION, SOLUTION INTRAMUSCULAR; INTRAVENOUS at 23:44

## 2021-04-17 RX ADMIN — Medication 10 ML: at 23:43

## 2021-04-18 ENCOUNTER — APPOINTMENT (OUTPATIENT)
Dept: CT IMAGING | Facility: HOSPITAL | Age: 81
End: 2021-04-18

## 2021-04-18 ENCOUNTER — APPOINTMENT (OUTPATIENT)
Dept: CARDIOLOGY | Facility: HOSPITAL | Age: 81
End: 2021-04-18

## 2021-04-18 VITALS
TEMPERATURE: 99.1 F | SYSTOLIC BLOOD PRESSURE: 138 MMHG | RESPIRATION RATE: 16 BRPM | BODY MASS INDEX: 26.31 KG/M2 | DIASTOLIC BLOOD PRESSURE: 75 MMHG | OXYGEN SATURATION: 94 % | HEART RATE: 69 BPM | HEIGHT: 74 IN | WEIGHT: 205 LBS

## 2021-04-18 LAB
ABO GROUP BLD: NORMAL
ALBUMIN SERPL-MCNC: 2.9 G/DL (ref 3.5–5.2)
ALBUMIN SERPL-MCNC: 3.2 G/DL (ref 3.5–5.2)
ALBUMIN/GLOB SERPL: 0.9 G/DL
ALBUMIN/GLOB SERPL: 1.1 G/DL
ALP SERPL-CCNC: 81 U/L (ref 39–117)
ALP SERPL-CCNC: 91 U/L (ref 39–117)
ALT SERPL W P-5'-P-CCNC: 41 U/L (ref 1–41)
ALT SERPL W P-5'-P-CCNC: 43 U/L (ref 1–41)
ANION GAP SERPL CALCULATED.3IONS-SCNC: 12 MMOL/L (ref 5–15)
ANION GAP SERPL CALCULATED.3IONS-SCNC: 13 MMOL/L (ref 5–15)
ANION GAP SERPL CALCULATED.3IONS-SCNC: 14 MMOL/L (ref 5–15)
ANISOCYTOSIS BLD QL: ABNORMAL
ANISOCYTOSIS BLD QL: ABNORMAL
APTT PPP: 41 SECONDS (ref 24–31)
AST SERPL-CCNC: 73 U/L (ref 1–40)
AST SERPL-CCNC: 79 U/L (ref 1–40)
BACTERIA UR QL AUTO: ABNORMAL /HPF
BASO STIPL COARSE BLD QL SMEAR: ABNORMAL
BASOPHILS # BLD AUTO: 0 10*3/MM3 (ref 0–0.2)
BASOPHILS NFR BLD AUTO: 0.2 % (ref 0–1.5)
BH CV ECHO MEAS - % IVS THICK: 47.3 %
BH CV ECHO MEAS - % LVPW THICK: 10.9 %
BH CV ECHO MEAS - ACS: 2.1 CM
BH CV ECHO MEAS - AO MAX PG (FULL): -0.72 MMHG
BH CV ECHO MEAS - AO MAX PG: 4.6 MMHG
BH CV ECHO MEAS - AO MEAN PG (FULL): -0.17 MMHG
BH CV ECHO MEAS - AO MEAN PG: 2.7 MMHG
BH CV ECHO MEAS - AO ROOT AREA (BSA CORRECTED): 1.5
BH CV ECHO MEAS - AO ROOT AREA: 8.3 CM^2
BH CV ECHO MEAS - AO ROOT DIAM: 3.3 CM
BH CV ECHO MEAS - AO V2 MAX: 107.7 CM/SEC
BH CV ECHO MEAS - AO V2 MEAN: 79.3 CM/SEC
BH CV ECHO MEAS - AO V2 VTI: 16.2 CM
BH CV ECHO MEAS - AVA(I,A): 6.1 CM^2
BH CV ECHO MEAS - AVA(I,D): 6.1 CM^2
BH CV ECHO MEAS - AVA(V,A): 5.3 CM^2
BH CV ECHO MEAS - AVA(V,D): 5.3 CM^2
BH CV ECHO MEAS - BSA(HAYCOCK): 2.2 M^2
BH CV ECHO MEAS - BSA: 2.2 M^2
BH CV ECHO MEAS - BZI_BMI: 26.3 KILOGRAMS/M^2
BH CV ECHO MEAS - BZI_METRIC_HEIGHT: 188 CM
BH CV ECHO MEAS - BZI_METRIC_WEIGHT: 93 KG
BH CV ECHO MEAS - EDV(CUBED): 160.7 ML
BH CV ECHO MEAS - EDV(MOD-SP4): 126.3 ML
BH CV ECHO MEAS - EDV(TEICH): 143.5 ML
BH CV ECHO MEAS - EF(CUBED): 41.1 %
BH CV ECHO MEAS - EF(MOD-SP4): 37.8 %
BH CV ECHO MEAS - EF(TEICH): 33.7 %
BH CV ECHO MEAS - ESV(CUBED): 94.6 ML
BH CV ECHO MEAS - ESV(MOD-SP4): 78.6 ML
BH CV ECHO MEAS - ESV(TEICH): 95.2 ML
BH CV ECHO MEAS - FS: 16.2 %
BH CV ECHO MEAS - IVS/LVPW: 0.76
BH CV ECHO MEAS - IVSD: 1.1 CM
BH CV ECHO MEAS - IVSS: 1.7 CM
BH CV ECHO MEAS - LA DIMENSION(2D): 5 CM
BH CV ECHO MEAS - LV DIASTOLIC VOL/BSA (35-75): 57.5 ML/M^2
BH CV ECHO MEAS - LV MASS(C)D: 303.8 GRAMS
BH CV ECHO MEAS - LV MASS(C)DI: 138.4 GRAMS/M^2
BH CV ECHO MEAS - LV MASS(C)S: 330 GRAMS
BH CV ECHO MEAS - LV MASS(C)SI: 150.3 GRAMS/M^2
BH CV ECHO MEAS - LV MAX PG: 5.4 MMHG
BH CV ECHO MEAS - LV MEAN PG: 2.9 MMHG
BH CV ECHO MEAS - LV SYSTOLIC VOL/BSA (12-30): 35.8 ML/M^2
BH CV ECHO MEAS - LV V1 MAX: 115.7 CM/SEC
BH CV ECHO MEAS - LV V1 MEAN: 78.6 CM/SEC
BH CV ECHO MEAS - LV V1 VTI: 19.9 CM
BH CV ECHO MEAS - LVIDD: 5.4 CM
BH CV ECHO MEAS - LVIDS: 4.6 CM
BH CV ECHO MEAS - LVOT AREA: 5 CM^2
BH CV ECHO MEAS - LVOT DIAM: 2.5 CM
BH CV ECHO MEAS - LVPWD: 1.5 CM
BH CV ECHO MEAS - LVPWS: 1.7 CM
BH CV ECHO MEAS - MV A MAX VEL: 33 CM/SEC
BH CV ECHO MEAS - MV DEC SLOPE: 702.7 CM/SEC^2
BH CV ECHO MEAS - MV DEC TIME: 0.17 SEC
BH CV ECHO MEAS - MV E MAX VEL: 119.2 CM/SEC
BH CV ECHO MEAS - MV E/A: 3.6
BH CV ECHO MEAS - MV MAX PG: 8 MMHG
BH CV ECHO MEAS - MV MEAN PG: 2.2 MMHG
BH CV ECHO MEAS - MV V2 MAX: 141.7 CM/SEC
BH CV ECHO MEAS - MV V2 MEAN: 64 CM/SEC
BH CV ECHO MEAS - MV V2 VTI: 26.6 CM
BH CV ECHO MEAS - MVA(VTI): 3.7 CM^2
BH CV ECHO MEAS - PA ACC TIME: 0.08 SEC
BH CV ECHO MEAS - PA MAX PG (FULL): 3.6 MMHG
BH CV ECHO MEAS - PA MAX PG: 6.7 MMHG
BH CV ECHO MEAS - PA MEAN PG (FULL): 2.7 MMHG
BH CV ECHO MEAS - PA MEAN PG: 4.1 MMHG
BH CV ECHO MEAS - PA PR(ACCEL): 43.2 MMHG
BH CV ECHO MEAS - PA V2 MAX: 129.4 CM/SEC
BH CV ECHO MEAS - PA V2 MEAN: 98 CM/SEC
BH CV ECHO MEAS - PA V2 VTI: 26.4 CM
BH CV ECHO MEAS - RAP SYSTOLE: 15 MMHG
BH CV ECHO MEAS - RV MAX PG: 3.1 MMHG
BH CV ECHO MEAS - RV MEAN PG: 1.4 MMHG
BH CV ECHO MEAS - RV V1 MAX: 87.8 CM/SEC
BH CV ECHO MEAS - RV V1 MEAN: 54.2 CM/SEC
BH CV ECHO MEAS - RV V1 VTI: 13.1 CM
BH CV ECHO MEAS - RVDD: 3.9 CM
BH CV ECHO MEAS - RVSP: 60.6 MMHG
BH CV ECHO MEAS - SI(AO): 61.6 ML/M^2
BH CV ECHO MEAS - SI(CUBED): 30.1 ML/M^2
BH CV ECHO MEAS - SI(LVOT): 45 ML/M^2
BH CV ECHO MEAS - SI(MOD-SP4): 21.7 ML/M^2
BH CV ECHO MEAS - SI(TEICH): 22 ML/M^2
BH CV ECHO MEAS - SV(AO): 135.2 ML
BH CV ECHO MEAS - SV(CUBED): 66.1 ML
BH CV ECHO MEAS - SV(LVOT): 98.8 ML
BH CV ECHO MEAS - SV(MOD-SP4): 47.7 ML
BH CV ECHO MEAS - SV(TEICH): 48.3 ML
BH CV ECHO MEAS - TR MAX VEL: 337.6 CM/SEC
BILIRUB SERPL-MCNC: 2.2 MG/DL (ref 0–1.2)
BILIRUB SERPL-MCNC: 2.3 MG/DL (ref 0–1.2)
BILIRUB UR QL STRIP: ABNORMAL
BLD GP AB SCN SERPL QL: NEGATIVE
BUN SERPL-MCNC: 31 MG/DL (ref 8–23)
BUN SERPL-MCNC: 33 MG/DL (ref 8–23)
BUN SERPL-MCNC: 35 MG/DL (ref 8–23)
BUN/CREAT SERPL: 20.1 (ref 7–25)
BUN/CREAT SERPL: 20.8 (ref 7–25)
BUN/CREAT SERPL: 23.6 (ref 7–25)
CALCIUM SPEC-SCNC: 7.7 MG/DL (ref 8.6–10.5)
CALCIUM SPEC-SCNC: 7.9 MG/DL (ref 8.6–10.5)
CALCIUM SPEC-SCNC: 8.7 MG/DL (ref 8.6–10.5)
CHLORIDE SERPL-SCNC: 102 MMOL/L (ref 98–107)
CHLORIDE SERPL-SCNC: 99 MMOL/L (ref 98–107)
CHLORIDE SERPL-SCNC: 99 MMOL/L (ref 98–107)
CHOLEST SERPL-MCNC: 92 MG/DL (ref 0–200)
CLARITY UR: CLEAR
CO2 SERPL-SCNC: 24 MMOL/L (ref 22–29)
CO2 SERPL-SCNC: 24 MMOL/L (ref 22–29)
CO2 SERPL-SCNC: 26 MMOL/L (ref 22–29)
COLOR UR: ABNORMAL
CREAT SERPL-MCNC: 1.48 MG/DL (ref 0.76–1.27)
CREAT SERPL-MCNC: 1.54 MG/DL (ref 0.76–1.27)
CREAT SERPL-MCNC: 1.59 MG/DL (ref 0.76–1.27)
DEPRECATED RDW RBC AUTO: 53.8 FL (ref 37–54)
DEPRECATED RDW RBC AUTO: 54.3 FL (ref 37–54)
DEPRECATED RDW RBC AUTO: 54.3 FL (ref 37–54)
EOSINOPHIL # BLD AUTO: 0 10*3/MM3 (ref 0–0.4)
EOSINOPHIL NFR BLD AUTO: 0 % (ref 0.3–6.2)
ERYTHROCYTE [DISTWIDTH] IN BLOOD BY AUTOMATED COUNT: 15.8 % (ref 12.3–15.4)
ERYTHROCYTE [DISTWIDTH] IN BLOOD BY AUTOMATED COUNT: 15.8 % (ref 12.3–15.4)
ERYTHROCYTE [DISTWIDTH] IN BLOOD BY AUTOMATED COUNT: 15.9 % (ref 12.3–15.4)
GFR SERPL CREATININE-BSD FRML MDRD: 42 ML/MIN/1.73
GFR SERPL CREATININE-BSD FRML MDRD: 43 ML/MIN/1.73
GFR SERPL CREATININE-BSD FRML MDRD: 46 ML/MIN/1.73
GLOBULIN UR ELPH-MCNC: 3 GM/DL
GLOBULIN UR ELPH-MCNC: 3.2 GM/DL
GLUCOSE BLDC GLUCOMTR-MCNC: 141 MG/DL (ref 70–105)
GLUCOSE SERPL-MCNC: 116 MG/DL (ref 65–99)
GLUCOSE SERPL-MCNC: 121 MG/DL (ref 65–99)
GLUCOSE SERPL-MCNC: 147 MG/DL (ref 65–99)
GLUCOSE UR STRIP-MCNC: NEGATIVE MG/DL
HCT VFR BLD AUTO: 41.4 % (ref 37.5–51)
HCT VFR BLD AUTO: 44.5 % (ref 37.5–51)
HCT VFR BLD AUTO: 45.9 % (ref 37.5–51)
HCT VFR BLD AUTO: 46.3 % (ref 37.5–51)
HDLC SERPL-MCNC: 37 MG/DL (ref 40–60)
HGB BLD-MCNC: 14.1 G/DL (ref 13–17.7)
HGB BLD-MCNC: 14.8 G/DL (ref 13–17.7)
HGB BLD-MCNC: 15.5 G/DL (ref 13–17.7)
HGB BLD-MCNC: 15.6 G/DL (ref 13–17.7)
HGB UR QL STRIP.AUTO: NEGATIVE
HOLD SPECIMEN: NORMAL
HYALINE CASTS UR QL AUTO: ABNORMAL /LPF
INR PPP: 2 (ref 0.93–1.1)
KETONES UR QL STRIP: NEGATIVE
L PNEUMO1 AG UR QL IA: NEGATIVE
LARGE PLATELETS: ABNORMAL
LARGE PLATELETS: ABNORMAL
LDLC SERPL CALC-MCNC: 43 MG/DL (ref 0–100)
LDLC/HDLC SERPL: 1.22 {RATIO}
LEUKOCYTE ESTERASE UR QL STRIP.AUTO: NEGATIVE
LYMPHOCYTES # BLD AUTO: 0.4 10*3/MM3 (ref 0.7–3.1)
LYMPHOCYTES # BLD MANUAL: 0.23 10*3/MM3 (ref 0.7–3.1)
LYMPHOCYTES # BLD MANUAL: 1 10*3/MM3 (ref 0.7–3.1)
LYMPHOCYTES NFR BLD AUTO: 4.8 % (ref 19.6–45.3)
LYMPHOCYTES NFR BLD MANUAL: 1 % (ref 19.6–45.3)
LYMPHOCYTES NFR BLD MANUAL: 11 % (ref 5–12)
LYMPHOCYTES NFR BLD MANUAL: 4 % (ref 19.6–45.3)
LYMPHOCYTES NFR BLD MANUAL: 7 % (ref 5–12)
MACROCYTES BLD QL SMEAR: ABNORMAL
MACROCYTES BLD QL SMEAR: ABNORMAL
MAGNESIUM SERPL-MCNC: 1.9 MG/DL (ref 1.6–2.4)
MAXIMAL PREDICTED HEART RATE: 138 BPM
MCH RBC QN AUTO: 33.7 PG (ref 26.6–33)
MCH RBC QN AUTO: 33.8 PG (ref 26.6–33)
MCH RBC QN AUTO: 34.2 PG (ref 26.6–33)
MCHC RBC AUTO-ENTMCNC: 33.6 G/DL (ref 31.5–35.7)
MCHC RBC AUTO-ENTMCNC: 34 G/DL (ref 31.5–35.7)
MCHC RBC AUTO-ENTMCNC: 34 G/DL (ref 31.5–35.7)
MCV RBC AUTO: 100.6 FL (ref 79–97)
MCV RBC AUTO: 100.6 FL (ref 79–97)
MCV RBC AUTO: 99 FL (ref 79–97)
MONOCYTES # BLD AUTO: 0.6 10*3/MM3 (ref 0.1–0.9)
MONOCYTES # BLD AUTO: 0.8 10*3/MM3 (ref 0.1–0.9)
MONOCYTES # BLD AUTO: 1.22 10*3/MM3 (ref 0.1–0.9)
MONOCYTES NFR BLD AUTO: 8.6 % (ref 5–12)
MRSA DNA SPEC QL NAA+PROBE: NORMAL
NEUTROPHILS # BLD AUTO: 10.37 10*3/MM3 (ref 1.7–7)
NEUTROPHILS # BLD AUTO: 8.88 10*3/MM3 (ref 1.7–7)
NEUTROPHILS NFR BLD AUTO: 6.4 10*3/MM3 (ref 1.7–7)
NEUTROPHILS NFR BLD AUTO: 86.4 % (ref 42.7–76)
NEUTROPHILS NFR BLD MANUAL: 79 % (ref 42.7–76)
NEUTROPHILS NFR BLD MANUAL: 88 % (ref 42.7–76)
NEUTS BAND NFR BLD MANUAL: 1 % (ref 0–5)
NEUTS BAND NFR BLD MANUAL: 3 % (ref 0–5)
NITRITE UR QL STRIP: NEGATIVE
NRBC BLD AUTO-RTO: 0.2 /100 WBC (ref 0–0.2)
PH UR STRIP.AUTO: 5.5 [PH] (ref 5–8)
PLATELET # BLD AUTO: 119 10*3/MM3 (ref 140–450)
PLATELET # BLD AUTO: 141 10*3/MM3 (ref 140–450)
PLATELET # BLD AUTO: 143 10*3/MM3 (ref 140–450)
PMV BLD AUTO: 10.2 FL (ref 6–12)
PMV BLD AUTO: 9.3 FL (ref 6–12)
PMV BLD AUTO: 9.4 FL (ref 6–12)
POTASSIUM SERPL-SCNC: 3.4 MMOL/L (ref 3.5–5.2)
POTASSIUM SERPL-SCNC: 3.4 MMOL/L (ref 3.5–5.2)
POTASSIUM SERPL-SCNC: 3.5 MMOL/L (ref 3.5–5.2)
PROCALCITONIN SERPL-MCNC: 1.5 NG/ML (ref 0–0.25)
PROT SERPL-MCNC: 6.1 G/DL (ref 6–8.5)
PROT SERPL-MCNC: 6.2 G/DL (ref 6–8.5)
PROT UR QL STRIP: ABNORMAL
PROTHROMBIN TIME: 21.3 SECONDS (ref 9.6–11.7)
QT INTERVAL: 465 MS
QT INTERVAL: 535 MS
RBC # BLD AUTO: 4.18 10*6/MM3 (ref 4.14–5.8)
RBC # BLD AUTO: 4.56 10*6/MM3 (ref 4.14–5.8)
RBC # BLD AUTO: 4.6 10*6/MM3 (ref 4.14–5.8)
RBC # UR: ABNORMAL /HPF
REF LAB TEST METHOD: ABNORMAL
RH BLD: POSITIVE
S PNEUM AG SPEC QL LA: NEGATIVE
SARS-COV-2 ORF1AB RESP QL NAA+PROBE: NOT DETECTED
SCAN SLIDE: NORMAL
SCAN SLIDE: NORMAL
SMALL PLATELETS BLD QL SMEAR: ADEQUATE
SMALL PLATELETS BLD QL SMEAR: ADEQUATE
SODIUM SERPL-SCNC: 137 MMOL/L (ref 136–145)
SODIUM SERPL-SCNC: 138 MMOL/L (ref 136–145)
SODIUM SERPL-SCNC: 138 MMOL/L (ref 136–145)
SODIUM UR-SCNC: <20 MMOL/L
SP GR UR STRIP: 1.02 (ref 1–1.03)
SQUAMOUS #/AREA URNS HPF: ABNORMAL /HPF
STRESS TARGET HR: 117 BPM
T&S EXPIRATION DATE: NORMAL
TRIGL SERPL-MCNC: 50 MG/DL (ref 0–150)
TROPONIN T SERPL-MCNC: 0.26 NG/ML (ref 0–0.03)
TROPONIN T SERPL-MCNC: 0.28 NG/ML (ref 0–0.03)
UROBILINOGEN UR QL STRIP: ABNORMAL
VANCOMYCIN SERPL-MCNC: 13.4 MCG/ML (ref 5–40)
VARIANT LYMPHS NFR BLD MANUAL: 1 % (ref 0–5)
VARIANT LYMPHS NFR BLD MANUAL: 5 % (ref 0–5)
VIT B12 BLD-MCNC: 1438 PG/ML (ref 211–946)
VLDLC SERPL-MCNC: 12 MG/DL (ref 5–40)
WBC # BLD AUTO: 11.1 10*3/MM3 (ref 3.4–10.8)
WBC # BLD AUTO: 11.4 10*3/MM3 (ref 3.4–10.8)
WBC # BLD AUTO: 7.4 10*3/MM3 (ref 3.4–10.8)
WBC MORPH BLD: NORMAL
WBC MORPH BLD: NORMAL
WBC UR QL AUTO: ABNORMAL /HPF

## 2021-04-18 PROCEDURE — 85018 HEMOGLOBIN: CPT | Performed by: HOSPITALIST

## 2021-04-18 PROCEDURE — 85025 COMPLETE CBC W/AUTO DIFF WBC: CPT | Performed by: HOSPITALIST

## 2021-04-18 PROCEDURE — 85730 THROMBOPLASTIN TIME PARTIAL: CPT | Performed by: PSYCHIATRY & NEUROLOGY

## 2021-04-18 PROCEDURE — 85610 PROTHROMBIN TIME: CPT | Performed by: PSYCHIATRY & NEUROLOGY

## 2021-04-18 PROCEDURE — 93005 ELECTROCARDIOGRAM TRACING: CPT | Performed by: HOSPITALIST

## 2021-04-18 PROCEDURE — 85014 HEMATOCRIT: CPT | Performed by: HOSPITALIST

## 2021-04-18 PROCEDURE — 72125 CT NECK SPINE W/O DYE: CPT

## 2021-04-18 PROCEDURE — 99222 1ST HOSP IP/OBS MODERATE 55: CPT | Performed by: INTERNAL MEDICINE

## 2021-04-18 PROCEDURE — 93306 TTE W/DOPPLER COMPLETE: CPT

## 2021-04-18 PROCEDURE — 85025 COMPLETE CBC W/AUTO DIFF WBC: CPT | Performed by: PHYSICIAN ASSISTANT

## 2021-04-18 PROCEDURE — 70450 CT HEAD/BRAIN W/O DYE: CPT

## 2021-04-18 PROCEDURE — 82607 VITAMIN B-12: CPT | Performed by: PHYSICIAN ASSISTANT

## 2021-04-18 PROCEDURE — 84484 ASSAY OF TROPONIN QUANT: CPT | Performed by: PHYSICIAN ASSISTANT

## 2021-04-18 PROCEDURE — 99238 HOSP IP/OBS DSCHRG MGMT 30/<: CPT | Performed by: HOSPITALIST

## 2021-04-18 PROCEDURE — 80053 COMPREHEN METABOLIC PANEL: CPT | Performed by: PHYSICIAN ASSISTANT

## 2021-04-18 PROCEDURE — 86901 BLOOD TYPING SEROLOGIC RH(D): CPT | Performed by: HOSPITALIST

## 2021-04-18 PROCEDURE — 82962 GLUCOSE BLOOD TEST: CPT

## 2021-04-18 PROCEDURE — 85007 BL SMEAR W/DIFF WBC COUNT: CPT | Performed by: PSYCHIATRY & NEUROLOGY

## 2021-04-18 PROCEDURE — 86901 BLOOD TYPING SEROLOGIC RH(D): CPT

## 2021-04-18 PROCEDURE — 25010000003 AMPICILLIN-SULBACTAM PER 1.5 G: Performed by: HOSPITALIST

## 2021-04-18 PROCEDURE — 83735 ASSAY OF MAGNESIUM: CPT | Performed by: PHYSICIAN ASSISTANT

## 2021-04-18 PROCEDURE — 25010000002 FUROSEMIDE PER 20 MG: Performed by: PHYSICIAN ASSISTANT

## 2021-04-18 PROCEDURE — 86900 BLOOD TYPING SEROLOGIC ABO: CPT

## 2021-04-18 PROCEDURE — 25010000003 AMPICILLIN-SULBACTAM PER 1.5 G: Performed by: PHYSICIAN ASSISTANT

## 2021-04-18 PROCEDURE — 70486 CT MAXILLOFACIAL W/O DYE: CPT

## 2021-04-18 PROCEDURE — 85025 COMPLETE CBC W/AUTO DIFF WBC: CPT | Performed by: PSYCHIATRY & NEUROLOGY

## 2021-04-18 PROCEDURE — 93306 TTE W/DOPPLER COMPLETE: CPT | Performed by: INTERNAL MEDICINE

## 2021-04-18 PROCEDURE — 86900 BLOOD TYPING SEROLOGIC ABO: CPT | Performed by: HOSPITALIST

## 2021-04-18 PROCEDURE — 80061 LIPID PANEL: CPT | Performed by: PHYSICIAN ASSISTANT

## 2021-04-18 PROCEDURE — 84145 PROCALCITONIN (PCT): CPT | Performed by: PHYSICIAN ASSISTANT

## 2021-04-18 PROCEDURE — 85007 BL SMEAR W/DIFF WBC COUNT: CPT | Performed by: HOSPITALIST

## 2021-04-18 PROCEDURE — 80202 ASSAY OF VANCOMYCIN: CPT | Performed by: PHYSICIAN ASSISTANT

## 2021-04-18 PROCEDURE — 86850 RBC ANTIBODY SCREEN: CPT | Performed by: HOSPITALIST

## 2021-04-18 PROCEDURE — 84484 ASSAY OF TROPONIN QUANT: CPT | Performed by: HOSPITALIST

## 2021-04-18 PROCEDURE — 80053 COMPREHEN METABOLIC PANEL: CPT | Performed by: PSYCHIATRY & NEUROLOGY

## 2021-04-18 RX ORDER — OXYMETAZOLINE HYDROCHLORIDE 0.05 G/100ML
2 SPRAY NASAL EVERY 4 HOURS PRN
Status: DISCONTINUED | OUTPATIENT
Start: 2021-04-18 | End: 2021-04-19 | Stop reason: HOSPADM

## 2021-04-18 RX ORDER — ECHINACEA PURPUREA EXTRACT 125 MG
2 TABLET ORAL
Status: DISCONTINUED | OUTPATIENT
Start: 2021-04-18 | End: 2021-04-19 | Stop reason: HOSPADM

## 2021-04-18 RX ORDER — OXYCODONE HYDROCHLORIDE 5 MG/1
5 TABLET ORAL EVERY 6 HOURS PRN
Status: DISCONTINUED | OUTPATIENT
Start: 2021-04-18 | End: 2021-04-19 | Stop reason: HOSPADM

## 2021-04-18 RX ORDER — NALOXONE HCL 0.4 MG/ML
0.2 VIAL (ML) INJECTION
Status: DISCONTINUED | OUTPATIENT
Start: 2021-04-18 | End: 2021-04-19 | Stop reason: HOSPADM

## 2021-04-18 RX ADMIN — ALLOPURINOL 300 MG: 300 TABLET ORAL at 09:34

## 2021-04-18 RX ADMIN — RIVAROXABAN 15 MG: 15 TABLET, FILM COATED ORAL at 09:34

## 2021-04-18 RX ADMIN — SODIUM CHLORIDE 3 G: 900 INJECTION INTRAVENOUS at 21:31

## 2021-04-18 RX ADMIN — SODIUM CHLORIDE 3 G: 900 INJECTION INTRAVENOUS at 09:33

## 2021-04-18 RX ADMIN — SODIUM CHLORIDE 3 G: 900 INJECTION INTRAVENOUS at 17:54

## 2021-04-18 RX ADMIN — Medication 10 ML: at 09:34

## 2021-04-18 RX ADMIN — CYANOCOBALAMIN TAB 1000 MCG 1000 MCG: 1000 TAB at 09:34

## 2021-04-18 RX ADMIN — METOPROLOL SUCCINATE 25 MG: 25 TABLET, EXTENDED RELEASE ORAL at 09:34

## 2021-04-18 RX ADMIN — FUROSEMIDE 40 MG: 10 INJECTION, SOLUTION INTRAMUSCULAR; INTRAVENOUS at 09:34

## 2021-04-18 RX ADMIN — Medication 10 ML: at 21:32

## 2021-04-18 RX ADMIN — GUAIFENESIN 600 MG: 600 TABLET, EXTENDED RELEASE ORAL at 09:34

## 2021-04-18 NOTE — PROGRESS NOTES
"Pharmacy Antimicrobial Dosing Service    Subjective:  Hank Ponce is a 81 y.o.male admitted with acute on chronic HF. Pharmacy has been consulted to dose Vancomycin for possible PNA.    PMH: HF, Afib, JASKARAN, gout      Assessment/Plan    1. Day #1 Vancomycin: Pulse dosing d/t renal dysfxn. Vanc 1750mg (~19 mg/kg ABW) given in ED. Will initiate pulse dosing at this time due to JASKARAN. Random level with am labs on 4/18. Will plan to re-dose when vancomycin level <20 mcg/mL. MRSA nasal ordered.    2. Day #1 Ampicillin/Sulbactam: 3g IV q12h for JASKARAN.    Will continue to monitor drug levels, renal function, culture and sensitivities, and patient clinical status.       Objective:  Relevant clinical data and objective history reviewed:  188 cm (74\")   93.4 kg (206 lb)   Ideal body weight: 82.2 kg (181 lb 3.5 oz)  Adjusted ideal body weight: 86.7 kg (191 lb 2.1 oz)  Body mass index is 26.45 kg/m².        Results from last 7 days   Lab Units 04/17/21 2041 04/12/21  1153   CREATININE mg/dL 1.68* 1.22     Estimated Creatinine Clearance: 44.8 mL/min (A) (by C-G formula based on SCr of 1.68 mg/dL (H)).  No intake/output data recorded.    Results from last 7 days   Lab Units 04/17/21 2041 04/12/21  1153   WBC 10*3/mm3 8.80 7.90     Temperature    04/17/21 1959 04/17/21 2206   Temp: 99 °F (37.2 °C) 98.7 °F (37.1 °C)     Baseline culture/source/susceptibility:  Microbiology Results (last 10 days)       Procedure Component Value - Date/Time    COVID PRE-OP / PRE-PROCEDURE SCREENING ORDER (NO ISOLATION) - Swab, Nasopharynx [[id number]]  (Normal) Collected: 04/12/21 1153    Lab Status: Final result Specimen: Swab from Nasopharynx Updated: 04/12/21 2051    Narrative:      The following orders were created for panel order COVID PRE-OP / PRE-PROCEDURE SCREENING ORDER (NO ISOLATION) - Swab, Nasopharynx.  Procedure                               Abnormality         Status                     ---------                               " -----------         ------                     COVID-19,APTIMA PANTHER,...[[id number]]  Normal              Final result                 Please view results for these tests on the individual orders.    COVID-19,APTIMA PANTHER,[facility] IN-[facility], NP/OP SWAB IN UTM/VTM/[facility] TRANSPORT MEDIA,24 HR TAT - Swab, Nasopharynx [[id number]]  (Normal) Collected: 04/12/21 1153    Lab Status: Final result Specimen: Swab from Nasopharynx Updated: 04/12/21 2051     COVID19 Not Detected    Narrative:      Fact sheet for providers: https://www.fda.gov/media/896960/download     Fact sheet for patients: https://www.fda.gov/media/476414/download    Test performed by RT PCR.              Anti-Infectives (From admission, onward)      Ordered     Dose/Rate Route Frequency Start Stop    04/17/21 2254  ampicillin-sulbactam (UNASYN) 3 g in sodium chloride 0.9 % 100 mL IVPB-MBP     Ordering Provider: Miguel Mcgovern PA-C    3 g Intravenous Every 6 Hours 04/18/21 0315 04/25/21 0314 04/17/21 2259  vancomycin 1500 mg/500 mL 0.9% NS IVPB ([facility])     Ordering Provider: Miguel Mcgovern PA-C    15 mg/kg × 93.4 kg Intravenous As Needed 04/17/21 2258 04/24/21 2257 04/17/21 2254  Pharmacy to dose vancomycin     Ordering Provider: Miguel Mcgovern PA-C     Does not apply Continuous PRN 04/17/21 2253 04/24/21 2252 04/17/21 2113  vancomycin IVPB 1750 mg in 0.9% Sodium Chloride (premix) 500 mL     Ordering Provider: Jonathan Chow MD    20 mg/kg × 93.4 kg  over 105 Minutes Intravenous Once 04/17/21 2115 04/17/21 2113  ampicillin-sulbactam (UNASYN) 3 g in sodium chloride 0.9 % 100 mL IVPB-MBP     Ordering Provider: Jonathan Chow MD    3 g Intravenous Once 04/17/21 2114 04/17/21 2251            Magdalena Villar [facility]  04/17/21 23:04 EDT

## 2021-04-18 NOTE — PLAN OF CARE
Problem: Adult Inpatient Plan of Care  Goal: Plan of Care Review  Outcome: Ongoing, Progressing  Goal: Patient-Specific Goal (Individualized)  Outcome: Ongoing, Progressing  Goal: Absence of Hospital-Acquired Illness or Injury  Outcome: Ongoing, Progressing  Intervention: Identify and Manage Fall Risk  Recent Flowsheet Documentation  Taken 4/18/2021 0301 by Venessa Cintron RN  Safety Promotion/Fall Prevention:   fall prevention program maintained   safety round/check completed  Taken 4/18/2021 0017 by Venessa Cintron RN  Safety Promotion/Fall Prevention:   fall prevention program maintained   safety round/check completed  Goal: Optimal Comfort and Wellbeing  Outcome: Ongoing, Progressing  Goal: Readiness for Transition of Care  Outcome: Ongoing, Progressing  Intervention: Mutually Develop Transition Plan  Recent Flowsheet Documentation  Taken 4/17/2021 2347 by Venessa Cnitron RN  Equipment Currently Used at Home: none  Transportation Anticipated:   car, drives self   family or friend will provide  Patient/Family Anticipated Services at Transition: none  Patient/Family Anticipates Transition to: home with family     Problem: Fall Injury Risk  Goal: Absence of Fall and Fall-Related Injury  Outcome: Ongoing, Progressing  Intervention: Promote Injury-Free Environment  Recent Flowsheet Documentation  Taken 4/18/2021 0301 by Venessa Cintron RN  Safety Promotion/Fall Prevention:   fall prevention program maintained   safety round/check completed  Taken 4/18/2021 0017 by Venessa Cintron RN  Safety Promotion/Fall Prevention:   fall prevention program maintained   safety round/check completed     Problem: Fluid Volume Excess  Goal: Fluid Balance  Outcome: Ongoing, Progressing   Goal Outcome Evaluation:

## 2021-04-18 NOTE — PLAN OF CARE
Goal Outcome Evaluation:  Plan of Care Reviewed With: patient  Patient was found on the floor in a pool of blood coming from gash in nose after an unwitnessed fall by bedside RN. Fast team was called. Bedside RN applied pressure to nose wound and RN's assisted

## 2021-04-18 NOTE — CONSULTS
Referring Provider: Hospitalist  Reason for Consultation: Shortness of breath    Patient Care Team:  Bunny Curran MD as PCP - General (Internal Medicine)    Chief complaint shortness of breath    Subjective .     History of present illness:  Hank Ponce is a 81 y.o. male with history of congestive heart failure with cardiomyopathy history of atrial fibrillation status post ablation hypertension hyperlipidemia history of ICD placement coronary artery disease presented to the hospital complains of increasing shortness of breath for the last 2 days.  Patient recently had AV ute ablation for his atrial fibrillation and was sent home and started having the symptoms in the last 2 days.  He has minimal lower extremity edema.  No complains any chest pain.  No PND orthopnea.  No palpitation dizziness syncope.  Patient has been taking his meds regularly.  In the ER patient had a troponin of 0.3 and a BNP level was high and was admitted to the hospital.  His EKG showed a paced rhythm at 73 bpm.  Review of Systems   Constitutional: Negative for fever and malaise/fatigue.   HENT: Negative for ear pain and nosebleeds.    Eyes: Negative for blurred vision and double vision.   Cardiovascular: Positive for leg swelling. Negative for chest pain, dyspnea on exertion and palpitations.   Respiratory: Positive for shortness of breath. Negative for cough.    Skin: Negative for rash.   Musculoskeletal: Negative for joint pain.   Gastrointestinal: Negative for abdominal pain, nausea and vomiting.   Neurological: Negative for focal weakness and headaches.   Psychiatric/Behavioral: Negative for depression. The patient is not nervous/anxious.    All other systems reviewed and are negative.      History  Past Medical History:   Diagnosis Date   • Acid reflux disease    • Allergic rhinitis    • Arthritis     gouty   • Atrial fibrillation (CMS/HCC)    • BBB (bundle branch block)     right   • Bone spur 2017    neck   •  Congestive heart failure (CHF) (CMS/[facility])    • Congestive heart failure (CMS/HCC) 10/8/2019   • Coronary artery disease     non obstructive   • Essential hypertension 2011   • Hyperlipidemia, mixed 10/8/2019   • Hypertension    • Mitral regurgitation    • Osteoarthritis    • Presence of biventricular implantable cardioverter-defibrillator (ICD) 2019   • S/P ablation of atrial fibrillation 2017   • Takotsubo cardiomyopathy    • Tricuspid regurgitation    • VT (ventricular tachycardia) (CMS/[facility]) 2018       Past Surgical History:   Procedure Laterality Date   • AMPUTATION      index and middle finger   • CARDIAC ABLATION  2017    BHF   • CARDIAC CATHETERIZATION  2016    non obst CAD; takotsubo CM   • CARDIAC CATHETERIZATION  2017    treating medically   • CARDIAC DEFIBRILLATOR PLACEMENT      Bs   • CARDIAC ELECTROPHYSIOLOGY PROCEDURE N/A 2021    Procedure: AV node ablation;  Surgeon: Hira Ochoa MD;  Location: [facility] INVASIVE LOCATION;  Service: Cardiovascular;  Laterality: N/A;   • INTERNAL CARDIAC DEFIBRILLATOR INSERTION      BiV ICD BS   • OTHER SURGICAL HISTORY  2017    Heart ablation-[facility]   • ROTATOR CUFF REPAIR Right    • SINUS SURGERY  2014       Family History   Problem Relation Age of Onset   • Parkinsonism Mother    • Alcohol abuse Father        Social History     Tobacco Use   • Smoking status: Former Smoker     Quit date:      Years since quittin.3   • Smokeless tobacco: Never Used   Vaping Use   • Vaping Use: Never used   Substance Use Topics   • Alcohol use: Yes     Comment: occasionally   • Drug use: Never        Medications Prior to Admission   Medication Sig Dispense Refill Last Dose   • allopurinol (ZYLOPRIM) 300 MG tablet Take 1 tablet by mouth Daily. 90 tablet 1    • Brewers Yeast 500 MG tablet Take 1,000 mg by mouth Daily.      • clotrimazole-betamethasone (LOTRISONE) 1-0.05 % cream Apply  topically to the appropriate area as  "directed 2 (Two) Times a Day As Needed.      • colchicine 0.6 MG tablet Take 0.6 mg by mouth 2 (Two) Times a Day As Needed for Muscle / Joint Pain.      • furosemide (LASIX) 20 MG tablet Take 20 mg by mouth Daily. Takes 2 tab for 3 days if gets over 202 lbs      • losartan (COZAAR) 25 MG tablet Take 1 tablet by mouth Daily. 90 tablet 1    • metoprolol succinate XL (TOPROL-XL) 25 MG 24 hr tablet Take 1 tablet by mouth Daily. 90 tablet 3    • rivaroxaban (Xarelto) 15 MG tablet Take 1 tablet by mouth Daily. 90 tablet 1    • vitamin B-12 (CYANOCOBALAMIN) 1000 MCG tablet Take 1,000 mcg by mouth Daily.            Hydrocodone    Scheduled Meds:allopurinol, 300 mg, Oral, Daily  ampicillin-sulbactam, 3 g, Intravenous, Q6H  furosemide, 40 mg, Intravenous, Daily  guaiFENesin, 600 mg, Oral, Q12H  metoprolol succinate XL, 25 mg, Oral, Daily  rivaroxaban, 15 mg, Oral, Daily  sodium chloride, 10 mL, Intravenous, Q12H  vitamin B-12, 1,000 mcg, Oral, Daily      Continuous Infusions:   PRN Meds:.•  acetaminophen **OR** acetaminophen **OR** acetaminophen  •  albuterol  •  Calcium Gluconate-NaCl **AND** calcium gluconate **AND** Calcium, Ionized  •  magnesium sulfate **OR** magnesium sulfate **OR** magnesium sulfate  •  melatonin  •  nitroglycerin  •  ondansetron **OR** ondansetron  •  potassium & sodium phosphates **OR** potassium & sodium phosphates  •  potassium chloride  •  potassium chloride  •  [COMPLETED] Insert peripheral IV **AND** sodium chloride  •  sodium chloride    Objective     VITAL SIGNS  Vitals:    04/18/21 0213 04/18/21 0601 04/18/21 1021 04/18/21 1319   BP: 134/63 135/75 120/65 120/65   BP Location: Left arm Left arm Right arm    Patient Position: Lying Lying Lying    Pulse: 70 70 70    Resp: 19 16 26    Temp: 98.3 °F (36.8 °C) 97.9 °F (36.6 °C) (!) 101.4 °F (38.6 °C)    TempSrc: Oral Oral Axillary    SpO2: 90% 98% 97%    Weight:  93.4 kg (205 lb 14.6 oz)  93 kg (205 lb)   Height:    188 cm (74\")       Flowsheet " "Rows      First Filed Value   Admission Height  188 cm (74\") Documented at 04/17/2021 1958   Admission Weight  93.4 kg (206 lb) Documented at 04/17/2021 1958           TELEMETRY: Ventricular pacemaker rhythm    Physical Exam:  Constitutional:       Appearance: Well-developed.   Eyes:      General: No scleral icterus.     Conjunctiva/sclera: Conjunctivae normal.      Pupils: Pupils are equal, round, and reactive to light.   HENT:      Head: Normocephalic and atraumatic.   Neck:      Vascular: No carotid bruit or JVD.   Pulmonary:      Effort: Pulmonary effort is normal.      Breath sounds: Normal breath sounds. No wheezing. No rales.   Cardiovascular:      Normal rate. Regular rhythm.   Pulses:     Intact distal pulses.   Abdominal:      General: Bowel sounds are normal.      Palpations: Abdomen is soft.   Musculoskeletal: Normal range of motion.      Cervical back: Normal range of motion and neck supple. Skin:     General: Skin is warm and dry.      Findings: No rash.   Neurological:      Mental Status: Alert.      Comments: No focal deficits          Results Review:   I reviewed the patient's new clinical results.  Lab Results (last 24 hours)     Procedure Component Value Units Date/Time    Vitamin B12 [[id number]]  (Abnormal) Collected: 04/18/21 0428    Specimen: Blood Updated: 04/18/21 1210     Vitamin B-12 1,438 pg/mL     Narrative:      Results may be falsely increased if patient taking Biotin.      Troponin [[id number]]  (Abnormal) Collected: 04/18/21 0428    Specimen: Blood Updated: 04/18/21 0529     Troponin T 0.257 ng/mL     Narrative:      Troponin T Reference Range:  <= 0.03 ng/mL-   Negative for AMI  >0.03 ng/mL-     Abnormal for myocardial necrosis.  Clinicians would have to utilize clinical acumen, EKG, Troponin and serial changes to determine if it is an Acute Myocardial Infarction or myocardial injury due to an underlying chronic condition.       Results may be falsely decreased if patient taking " "Biotin.      Procalcitonin [[id number]]  (Abnormal) Collected: 04/18/21 0428    Specimen: Blood Updated: 04/18/21 0528     Procalcitonin 1.50 ng/mL     Narrative:      As a Marker for Sepsis (Non-Neonates):     1. <0.5 ng/mL represents a low risk of severe sepsis and/or septic shock.  2. >2 ng/mL represents a high risk of severe sepsis and/or septic shock.    As a Marker for Lower Respiratory Tract Infections that require antibiotic therapy:  PCT on Admission     Antibiotic Therapy             6-12 Hrs later  >0.5                          Strongly Recommended            >0.25 - <0.5             Recommended  0.1 - 0.25                  Discouraged                       Remeasure/reassess PCT  <0.1                         Strongly Discouraged         Remeasure/reassess PCT      As 28 day mortality risk marker: \"Change in Procalcitonin Result\" (>80% or <=80%) if Day 0 (or Day 1) and Day 4 values are available. Refer to http://www.[facility] [place]pct-calculator.com/    Change in PCT <=80 %   A decrease of PCT levels below or equal to 80% defines a positive change in PCT test result representing a higher risk for 28-day all-cause mortality of patients diagnosed with severe sepsis or septic shock.    Change in PCT >80 %   A decrease of PCT levels of more than 80% defines a negative change in PCT result representing a lower risk for 28-day all-cause mortality of patients diagnosed with severe sepsis or septic shock.              Results may be falsely decreased if patient taking Biotin.     Vancomycin, Random [[id number]]  (Normal) Collected: 04/18/21 0428    Specimen: Blood Updated: 04/18/21 0526     Vancomycin Random 13.40 mcg/mL     Narrative:      Therapeutic Ranges for Vancomycin    Vancomycin Random   5.0-40.0 mcg/mL  Vancomycin Trough   5.0-20.0 mcg/mL  Vancomycin Peak     20.0-40.0 mcg/mL    Basic Metabolic Panel [[id number]]  (Abnormal) Collected: 04/18/21 0428    Specimen: Blood Updated: 04/18/21 0526     Glucose 121 mg/dL  "     BUN 33 mg/dL      Creatinine 1.59 mg/dL      Sodium 138 mmol/L      Potassium 3.5 mmol/L      Chloride 102 mmol/L      CO2 24.0 mmol/L      Calcium 7.7 mg/dL      eGFR Non African Amer 42 mL/min/1.73      BUN/Creatinine Ratio 20.8     Anion Gap 12.0 mmol/L     Narrative:      GFR Normal >60  Chronic Kidney Disease <60  Kidney Failure <15      Lipid Panel [[id number]]  (Abnormal) Collected: 04/18/21 0428    Specimen: Blood Updated: 04/18/21 0526     Total Cholesterol 92 mg/dL      Triglycerides 50 mg/dL      HDL Cholesterol 37 mg/dL      LDL Cholesterol  43 mg/dL      VLDL Cholesterol 12 mg/dL      LDL/HDL Ratio 1.22    Narrative:      Cholesterol Reference Ranges  (U.S. Department of Health and Human Services ATP III Classifications)    Desirable          <200 mg/dL  Borderline High    200-239 mg/dL  High Risk          >240 mg/dL      Triglyceride Reference Ranges  (U.S. Department of Health and Human Services ATP III Classifications)    Normal           <150 mg/dL  Borderline High  150-199 mg/dL  High             200-499 mg/dL  Very High        >500 mg/dL    HDL Reference Ranges  (U.S. Department of Health and Human Services ATP III Classifcations)    Low     <40 mg/dl (major risk factor for CHD)  High    >60 mg/dl ('negative' risk factor for CHD)        LDL Reference Ranges  (U.S. Department of Health and Human Services ATP III Classifcations)    Optimal          <100 mg/dL  Near Optimal     100-129 mg/dL  Borderline High  130-159 mg/dL  High             160-189 mg/dL  Very High        >189 mg/dL    Magnesium [[id number]]  (Normal) Collected: 04/18/21 0428    Specimen: Blood Updated: 04/18/21 0526     Magnesium 1.9 mg/dL     CBC & Differential [[id number]]  (Abnormal) Collected: 04/18/21 0428    Specimen: Blood Updated: 04/18/21 0500    Narrative:      The following orders were created for panel order CBC & Differential.  Procedure                               Abnormality         Status                      ---------                               -----------         ------                     CBC Auto Differential[[id number]]        Abnormal            Final result                 Please view results for these tests on the individual orders.    CBC Auto Differential [[id number]]  (Abnormal) Collected: 04/18/21 0428    Specimen: Blood Updated: 04/18/21 0500     WBC 7.40 10*3/mm3      RBC 4.18 10*6/mm3      Hemoglobin 14.1 g/dL      Hematocrit 41.4 %      MCV 99.0 fL      MCH 33.7 pg      MCHC 34.0 g/dL      RDW 15.8 %      RDW-SD 54.3 fl      MPV 9.3 fL      Platelets 119 10*3/mm3      Neutrophil % 86.4 %      Lymphocyte % 4.8 %      Monocyte % 8.6 %      Eosinophil % 0.0 %      Basophil % 0.2 %      Neutrophils, Absolute 6.40 10*3/mm3      Lymphocytes, Absolute 0.40 10*3/mm3      Monocytes, Absolute 0.60 10*3/mm3      Eosinophils, Absolute 0.00 10*3/mm3      Basophils, Absolute 0.00 10*3/mm3      nRBC 0.2 /100 WBC     MRSA Screen, PCR (Inpatient) - Swab, Nares [[id number]]  (Normal) Collected: 04/17/21 2359    Specimen: Swab from Nares Updated: 04/18/21 0255     MRSA PCR No MRSA Detected    Urinalysis, Microscopic Only - Urine, Clean Catch [[id number]]  (Abnormal) Collected: 04/17/21 2359    Specimen: Urine, Clean Catch Updated: 04/18/21 0057     RBC, UA 6-12 /HPF      WBC, UA 3-5 /HPF      Bacteria, UA None Seen /HPF      Squamous Epithelial Cells, UA 0-2 /HPF      Hyaline Casts, UA 3-6 /LPF      Methodology Manual Light Microscopy    Urinalysis With Microscopic If Indicated (No Culture) - Urine, Clean Catch [[id number]]  (Abnormal) Collected: 04/17/21 2359    Specimen: Urine, Clean Catch Updated: 04/18/21 0037     Color, UA Dark Yellow     Comment: Result checked         Appearance, UA Clear     pH, UA 5.5     Specific Gravity, UA 1.021     Glucose, UA Negative     Ketones, UA Negative     Bilirubin, UA Small (1+)     Comment: Confirmation testing is unavailable.  A serum bilirubin is recommended for further assessment.         Blood, UA Negative     Protein, UA 30 mg/dL (1+)     Leuk Esterase, UA Negative     Nitrite, UA Negative     Urobilinogen, UA 1.0 E.U./dL    S. Pneumo Ag Urine or CSF - Urine, Urine, Clean Catch [[id number]]  (Normal) Collected: 04/17/21 2359    Specimen: Urine, Clean Catch Updated: 04/18/21 0034     Strep Pneumo Ag Negative    Legionella Antigen, Urine - Urine, Urine, Clean Catch [[id number]]  (Normal) Collected: 04/17/21 2359    Specimen: Urine, Clean Catch Updated: 04/18/21 0034     LEGIONELLA ANTIGEN, URINE Negative    Sodium, Urine, Random - Urine, Clean Catch [[id number]] Collected: 04/17/21 2359    Specimen: Urine, Clean Catch Updated: 04/18/21 0027     Sodium, Urine <20 mmol/L     Narrative:      Reference intervals for random urine have not been established.  Clinical usage is dependent upon physician's interpretation in combination with other laboratory tests.       COVID PRE-OP / PRE-PROCEDURE SCREENING ORDER (NO ISOLATION) - Swab, Nasopharynx [[id number]] Collected: 04/17/21 2327    Specimen: Swab from Nasopharynx Updated: 04/17/21 2327    Narrative:      The following orders were created for panel order COVID PRE-OP / PRE-PROCEDURE SCREENING ORDER (NO ISOLATION) - Swab, Nasopharynx.  Procedure                               Abnormality         Status                     ---------                               -----------         ------                     COVID-19,APTIMA PANTHER,...[[id number]]                      In process                   Please view results for these tests on the individual orders.    COVID-19,APTIMA PANTHERKHALIDA IN-HOUSE, NP/OP SWAB IN UTM/VTM/[facility] TRANSPORT MEDIA,24 HR TAT - Swab, Nasopharynx [[id number]] Collected: 04/17/21 2327    Specimen: Swab from Nasopharynx Updated: 04/17/21 2327    Blood Culture - Blood, Arm, Right [[id number]] Collected: 04/17/21 2143    Specimen: Blood from Arm, Right Updated: 04/17/21 2146    Extra Tubes [[id number]] Collected: 04/17/21 2041     Specimen: Blood from Arm, Right Updated: 04/17/21 2145    Narrative:      The following orders were created for panel order Extra Tubes.  Procedure                               Abnormality         Status                     ---------                               -----------         ------                     Gold Top - SST[[id number]]                                   Final result                 Please view results for these tests on the individual orders.    Gold Top - SST [[id number]] Collected: 04/17/21 2041    Specimen: Blood from Arm, Right Updated: 04/17/21 2145     Extra Tube Hold for add-ons.     Comment: Auto resulted.       Blood Culture - Blood, Arm, Left [[id number]] Collected: 04/17/21 2134    Specimen: Blood from Arm, Left Updated: 04/17/21 2137    Comprehensive Metabolic Panel [[id number]]  (Abnormal) Collected: 04/17/21 2041    Specimen: Blood from Arm, Right Updated: 04/17/21 2111     Glucose 166 mg/dL      BUN 34 mg/dL      Creatinine 1.68 mg/dL      Sodium 137 mmol/L      Potassium 4.4 mmol/L      Comment: Slight hemolysis detected by analyzer. Results may be affected.        Chloride 100 mmol/L      CO2 22.0 mmol/L      Calcium 9.3 mg/dL      Total Protein 6.5 g/dL      Albumin 3.20 g/dL      ALT (SGPT) 42 U/L      AST (SGOT) 83 U/L      Alkaline Phosphatase 96 U/L      Total Bilirubin 3.0 mg/dL      eGFR Non African Amer 39 mL/min/1.73      Globulin 3.3 gm/dL      A/G Ratio 1.0 g/dL      BUN/Creatinine Ratio 20.2     Anion Gap 15.0 mmol/L     Narrative:      GFR Normal >60  Chronic Kidney Disease <60  Kidney Failure <15      Troponin [[id number]]  (Abnormal) Collected: 04/17/21 2041    Specimen: Blood from Arm, Right Updated: 04/17/21 2110     Troponin T 0.341 ng/mL     Narrative:      Troponin T Reference Range:  <= 0.03 ng/mL-   Negative for AMI  >0.03 ng/mL-     Abnormal for myocardial necrosis.  Clinicians would have to utilize clinical acumen, EKG, Troponin and serial changes to determine if  it is an Acute Myocardial Infarction or myocardial injury due to an underlying chronic condition.       Results may be falsely decreased if patient taking Biotin.      Magnesium [[id number]]  (Normal) Collected: 04/17/21 2041    Specimen: Blood from Arm, Right Updated: 04/17/21 2108     Magnesium 2.1 mg/dL     BNP [[id number]]  (Abnormal) Collected: 04/17/21 2041    Specimen: Blood from Arm, Right Updated: 04/17/21 2106     proBNP 14,004.0 pg/mL     Narrative:      Among patients with dyspnea, NT-proBNP is highly sensitive for the detection of acute congestive heart failure. In addition NT-proBNP of <300 pg/ml effectively rules out acute congestive heart failure with 99% negative predictive value.    Results may be falsely decreased if patient taking Biotin.      Protime-INR [[id number]]  (Abnormal) Collected: 04/17/21 2041    Specimen: Blood from Arm, Right Updated: 04/17/21 2103     Protime 18.6 Seconds      INR 1.74    aPTT [[id number]]  (Abnormal) Collected: 04/17/21 2041    Specimen: Blood from Arm, Right Updated: 04/17/21 2103     PTT 36.6 seconds     CBC & Differential [[id number]]  (Abnormal) Collected: 04/17/21 2041    Specimen: Blood from Arm, Right Updated: 04/17/21 2053    Narrative:      The following orders were created for panel order CBC & Differential.  Procedure                               Abnormality         Status                     ---------                               -----------         ------                     CBC Auto Differential[[id number]]        Abnormal            Final result                 Please view results for these tests on the individual orders.    CBC Auto Differential [[id number]]  (Abnormal) Collected: 04/17/21 2041    Specimen: Blood from Arm, Right Updated: 04/17/21 2053     WBC 8.80 10*3/mm3      RBC 4.70 10*6/mm3      Hemoglobin 15.9 g/dL      Hematocrit 46.5 %      MCV 99.0 fL      MCH 33.8 pg      MCHC 34.1 g/dL      RDW 16.0 %      RDW-SD 55.6 fl      MPV 10.2 fL       Platelets 145 10*3/mm3      Neutrophil % 88.4 %      Lymphocyte % 4.3 %      Monocyte % 7.1 %      Eosinophil % 0.0 %      Basophil % 0.2 %      Neutrophils, Absolute 7.70 10*3/mm3      Lymphocytes, Absolute 0.40 10*3/mm3      Monocytes, Absolute 0.60 10*3/mm3      Eosinophils, Absolute 0.00 10*3/mm3      Basophils, Absolute 0.00 10*3/mm3      nRBC 0.2 /100 WBC           Imaging Results (Last 24 Hours)     Procedure Component Value Units Date/Time    XR Chest 1 View [[id number]] Collected: 04/18/21 0743     Updated: 04/18/21 0748    Narrative:      DATE OF EXAM:  4/17/2021 8:40 PM     PROCEDURE:  XR CHEST 1 VW-     INDICATIONS:  Short of breath and congestive heart failure     COMPARISON:  01/27/2021     TECHNIQUE:   Single radiographic AP view of the chest was obtained.     FINDINGS:  The heart is enlarged. An AICD device is present. There is obscuration  margin left hemidiaphragm that may relate to underlying effusion or some  atelectasis/consolidation. There are interstitial changes involving the  right lung with more focal airspace disease in the mid chest. This could  be due to an inflammatory infectious process.        Impression:      1.Interval change with respect to the chest with findings that may  relate to multifocal pneumonia. An underlying pleural effusion at the  left base not excluded. Correlation with clinical findings recommended.  Depending on clinical findings additional imaging with CT may be  warranted.     Electronically Signed By-Moises Recinos MD On:4/18/2021 7:46 AM  This report was finalized on [id number] by  Moises Recinos MD.          EKG      I personally viewed and interpreted the patient's EKG/Telemetry data:    ECHOCARDIOGRAM:      STRESS MYOVIEW:    CARDIAC CATHETERIZATION:    OTHER:         Assessment/Plan     Active Problems:    Presence of biventricular implantable cardioverter-defibrillator (ICD)    Permanent atrial fibrillation (CMS/HCC)    Hyperlipidemia, mixed    Essential  hypertension    Dilated cardiomyopathy (CMS/HCC)    Low serum vitamin B12    Heart failure with reduced ejection fraction (CMS/HCC)    Elevated serum creatinine    Elevated troponin    Pneumonia  Shortness of breath  Renal insufficiency    Patient presented with shortness of breath and had pneumonia  Patient also has congestive heart failure with mild exacerbation  Patient had recent AV ute ablation  Patient's troponin is elevated but his creatinine is also high and will watch his troponin levels  We will check an echocardiogram for LV function valve abnormalities  Patient has atrial fibrillation is currently on metoprolol and Xarelto  Patient has congestive heart failure with cardiomyopathy and is status post ICD placement along with a pacemaker and currently has a ventricular pacemaker rhythm  Patient's renal function is abnormal and will be watched carefully and a nephrology consult will be obtained  Blood pressure is currently stable on medications    I discussed the patients findings and my recommendations with patient and nurse    Wilbert Gage MD  04/18/21  13:44 EDT

## 2021-04-18 NOTE — NURSING NOTE
Dr. Abel recommend neurosurgery consult, JULIO De León [phone], [facility] states physician is calling [facility] for possible transfer

## 2021-04-18 NOTE — H&P
[facility] Medicine Services      Patient Name: Hank Ponce  : 1940  MRN: [id number]  Primary Care Physician: Bunny Curran MD  Date of admission: 2021    Patient Care Team:  Bunny Curran MD as PCP - General (Internal Medicine)          Subjective   History Present Illness     Chief Complaint:   Chief Complaint   Patient presents with   • Shortness of Breath         Mr. Ponce is a 81 y.o. male with past medical history of A. fib status post recent ablation, low B12, hyperlipidemia, heart failure with reduced ejection fraction, hypertension and cardiomyopathy who presents to [facility] complaining of dyspnea.  Patient reports that he had an AV ablation on 2021.  Since that time he has noted increasing dyspnea especially with exertion including even mild exertion such as walking short distances on level ground.  His breathing has continued to worsen since onset and he notes a significant amount of weight gain as well as peripheral edema within the same timeframe.  Some associated nausea without vomiting as well as mild fever and weakness with near syncopal episodes are also reported.  He reports some constipation as well as a decrease in urinary output.  He denies any pain including chest pain, cough, sensation of tachycardia or palpitations.  Patient does not smoke or drink alcohol.    In the ED patient had a lab significant for troponin of 0.341, proBNP: 14,004.0, creatinine: 1.68 with a BUN of 34 and a BUN/creatinine ratio of 20.2, INR: 1.74, PT: 18.6, PTT: 36.6.  ED provider notes chest x-ray shows right-sided pneumonia.  EKG shows ventricular paced rhythm at 73    History of Present Illness    Review of Systems   Constitutional: Positive for fever and weight gain. Negative for chills and decreased appetite.   HENT: Negative.    Eyes: Negative.    Cardiovascular: Positive for dyspnea on exertion, leg swelling, near-syncope  and orthopnea. Negative for chest pain, irregular heartbeat, palpitations and syncope.   Respiratory: Positive for shortness of breath. Negative for cough and sputum production.    Endocrine: Negative.    Skin: Negative.    Musculoskeletal: Negative.    Gastrointestinal: Positive for constipation and nausea. Negative for abdominal pain, diarrhea, hematemesis, hematochezia, melena and vomiting.   Genitourinary:        Decreased urinary frequency and volume   Neurological: Negative.    Psychiatric/Behavioral: Negative.            Personal History     Past Medical History:   Past Medical History:   Diagnosis Date   • Acid reflux disease    • Allergic rhinitis    • Arthritis     gouty   • Atrial fibrillation (CMS/HCC)    • BBB (bundle branch block)     right   • Bone spur 2017    neck   • Congestive heart failure (CHF) (CMS/HCC)    • Congestive heart failure (CMS/HCC) 10/8/2019   • Coronary artery disease     non obstructive   • Essential hypertension 11/29/2011   • Hyperlipidemia, mixed 10/8/2019   • Hypertension    • Mitral regurgitation    • Osteoarthritis    • Presence of biventricular implantable cardioverter-defibrillator (ICD) 7/29/2019   • S/P ablation of atrial fibrillation 01/2017   • Takotsubo cardiomyopathy    • Tricuspid regurgitation    • VT (ventricular tachycardia) (CMS/HCC) 02/2018       Surgical History:      Past Surgical History:   Procedure Laterality Date   • AMPUTATION      index and middle finger   • CARDIAC ABLATION  08/22/2017    [facility]   • CARDIAC CATHETERIZATION  12/08/2016    non obst CAD; takotsubo CM   • CARDIAC CATHETERIZATION  07/24/2017    treating medically   • CARDIAC DEFIBRILLATOR PLACEMENT  2017    Bs   • CARDIAC ELECTROPHYSIOLOGY PROCEDURE N/A 4/14/2021    Procedure: AV node ablation;  Surgeon: Hira Ochoa MD;  Location: [facility] INVASIVE LOCATION;  Service: Cardiovascular;  Laterality: N/A;   • INTERNAL CARDIAC DEFIBRILLATOR INSERTION  2017    BiV ICD BS   • OTHER SURGICAL  HISTORY  01/2017    Heart ablation-[facility]   • ROTATOR CUFF REPAIR Right    • SINUS SURGERY  02/2014           Family History: family history includes Alcohol abuse in his father; Parkinsonism in his mother. Otherwise pertinent FHx was reviewed and unremarkable.     Social History:  reports that he quit smoking about 31 years ago. He has never used smokeless tobacco. He reports current alcohol use. He reports that he does not use drugs.      Medications:  Prior to Admission medications    Medication Sig Start Date End Date Taking? Authorizing Provider   allopurinol (ZYLOPRIM) 300 MG tablet Take 1 tablet by mouth Daily. 3/23/21   Michela Bedoya MD   Brewers Yeast 500 MG tablet Take 1,000 mg by mouth Daily.    Nickolas Rankin MD   clotrimazole-betamethasone (LOTRISONE) 1-0.05 % cream Apply  topically to the appropriate area as directed 2 (Two) Times a Day.    Nickolas Rankin MD   colchicine 0.6 MG tablet Take 0.6 mg by mouth 2 (Two) Times a Day As Needed for Muscle / Joint Pain.    Nickolas Rankin MD   desonide (DESOWEN) 0.05 % cream Apply  topically to the appropriate area as directed 2 (Two) Times a Day.    Nickolas Rankin MD   furosemide (LASIX) 20 MG tablet Take 20 mg by mouth Daily. Takes 2 tab for 3 days if gets over 202 lbs    Nickolas Rankin MD   losartan (COZAAR) 25 MG tablet Take 1 tablet by mouth Daily. 3/23/21   Michela Bedoya MD   metoprolol succinate XL (TOPROL-XL) 25 MG 24 hr tablet Take 1 tablet by mouth Daily. 4/14/21   Hira Ochoa MD   rivaroxaban (Xarelto) 15 MG tablet Take 1 tablet by mouth Daily. 3/23/21   Michela Bedoya MD   triamcinolone (KENALOG) 0.1 % ointment Apply  topically to the appropriate area as directed 2 (Two) Times a Day.    Nickolas Rankin MD   vitamin B-12 (CYANOCOBALAMIN) 1000 MCG tablet Take 1,000 mcg by mouth Daily.    Nickolas Rankin MD       Allergies:    Allergies   Allergen Reactions   • Hydrocodone Urinary  Retention       Objective   Objective     Vital Signs  Temp:  [99 °F (37.2 °C)] 99 °F (37.2 °C)  Heart Rate:  [70-71] 70  Resp:  [21-32] 21  BP: (148)/(70) 148/70  SpO2:  [88 %-98 %] 94 %  on   ;   Device (Oxygen Therapy): room air  Body mass index is 26.45 kg/m².    Physical Exam  Vitals reviewed.   Constitutional:       General: He is not in acute distress.     Appearance: Normal appearance. He is normal weight. He is not ill-appearing, toxic-appearing or diaphoretic.   HENT:      Head: Normocephalic.      Right Ear: External ear normal.      Left Ear: External ear normal.      Nose: Nose normal.      Mouth/Throat:      Mouth: Mucous membranes are moist.   Eyes:      Extraocular Movements: Extraocular movements intact.   Neck:      Comments: JVD present  Cardiovascular:      Rate and Rhythm: Normal rate and regular rhythm.      Pulses: Normal pulses.   Pulmonary:      Effort: Pulmonary effort is normal.      Breath sounds: Rales present.      Comments: Bilateral rales right greater than left  Abdominal:      General: Bowel sounds are normal. There is no distension.      Tenderness: There is no abdominal tenderness.   Musculoskeletal:         General: Normal range of motion.      Cervical back: Normal range of motion.      Right lower leg: Edema present.      Left lower leg: Edema present.   Skin:     General: Skin is warm and dry.   Neurological:      General: No focal deficit present.      Mental Status: He is alert and oriented to person, place, and time.   Psychiatric:         Mood and Affect: Mood normal.         Behavior: Behavior normal.         Thought Content: Thought content normal.         Judgment: Judgment normal.           Results Review:  I have personally reviewed most recent cardiac tracings, lab results and radiology images and interpretations and agree with findings, most notably: Troponin, proBNP, CBC, CMP, INR/PT, PTT, chest x-ray and EKG.    Results from last 7 days   Lab Units 04/17/21 2041    WBC 10*3/mm3 8.80   HEMOGLOBIN g/dL 15.9   HEMATOCRIT % 46.5   PLATELETS 10*3/mm3 145   INR  1.74*     Results from last 7 days   Lab Units 04/17/21 2041   SODIUM mmol/L 137   POTASSIUM mmol/L 4.4   CHLORIDE mmol/L 100   CO2 mmol/L 22.0   BUN mg/dL 34*   CREATININE mg/dL 1.68*   GLUCOSE mg/dL 166*   CALCIUM mg/dL 9.3   ALT (SGPT) U/L 42*   AST (SGOT) U/L 83*   TROPONIN T ng/mL 0.341*   PROBNP pg/mL 14,004.0*     Estimated Creatinine Clearance: 44.8 mL/min (A) (by C-G formula based on SCr of 1.68 mg/dL (H)).  Brief Urine Lab Results     None          Microbiology Results (last 10 days)     Procedure Component Value - Date/Time    COVID PRE-OP / PRE-PROCEDURE SCREENING ORDER (NO ISOLATION) - Swab, Nasopharynx [[id number]]  (Normal) Collected: 04/12/21 1153    Lab Status: Final result Specimen: Swab from Nasopharynx Updated: 04/12/21 2051    Narrative:      The following orders were created for panel order COVID PRE-OP / PRE-PROCEDURE SCREENING ORDER (NO ISOLATION) - Swab, Nasopharynx.  Procedure                               Abnormality         Status                     ---------                               -----------         ------                     COVID-19,APTIMA PANTHER,...[[id number]]  Normal              Final result                 Please view results for these tests on the individual orders.    COVID-19,APTIMA PANTHER,[facility] IN-HOUSE, NP/OP SWAB IN UTM/VTM/SALINE TRANSPORT MEDIA,24 HR TAT - Swab, Nasopharynx [[id number]]  (Normal) Collected: 04/12/21 1153    Lab Status: Final result Specimen: Swab from Nasopharynx Updated: 04/12/21 2051     COVID19 Not Detected    Narrative:      Fact sheet for providers: https://www.fda.gov/media/432865/download     Fact sheet for patients: https://www.fda.gov/media/447240/download    Test performed by RT PCR.          ECG/EMG Results (most recent)     Procedure Component Value Units Date/Time    ECG 12 Lead [[id number]] Collected: 04/17/21 2007     Updated: 04/17/21 2009      QT Interval 535 ms     Narrative:      HEART RATE= 73  bpm  RR Interval= 817  ms  WV Interval= 105  ms  P Horizontal Axis= 15  deg  P Front Axis= 0  deg  QRSD Interval= 192  ms  QT Interval= 535  ms  QRS Axis= 187  deg  T Wave Axis= 14  deg  - ABNORMAL ECG -  Ventricular-paced complexes  Biventricular paced rhythm  Electronically Signed By:   Date and Time of Study: 2021-04-17 20:07:26              Results for orders placed during the hospital encounter of 01/29/21    Adult Transthoracic Echo Complete W/ Cont if Necessary Per Protocol    Interpretation Summary  · The left ventricular cavity is moderately dilated.  · Left ventricular wall thickness is consistent with mild to moderate eccentric hypertrophy.  · Estimated left ventricular EF = 35% Estimated left ventricular EF was in agreement with the calculated left ventricular EF. Left ventricular ejection fraction appears to be 31 - 35%. Left ventricular systolic function is moderately decreased.  · The right ventricular cavity is moderately dilated.  · Moderately reduced right ventricular systolic function noted.  · The right atrial cavity is severely dilated.  · There is mainly affecting the non-coronary, left coronary and right coronary cusp(s).  · There is moderate, bileaflet mitral valve thickening present.  · Moderate to severe mitral valve regurgitation is present with multiple jets noted.  · Moderate tricuspid valve regurgitation is present.  · Estimated right ventricular systolic pressure from tricuspid regurgitation is moderately elevated (45-55 mmHg).    Transthoracic echocardiography reveals moderately dilated left ventricle with eccentric LVH with EF between 30 to 35%  Moderately dilated right ventricle with moderate right ventricular dysfunction with moderate RVH  Severe biatrial enlargement  Moderate pulmonary hypertension  Large PFO with bidirectional flow  Moderate to severe MR  Moderate eccentric TR  Mild aortic regurgitation  No  effusion      Electronically signed by Hira Ochoa MD, 02/02/21, 12:15 PM EST.      No radiology results for the last 7 days      Estimated Creatinine Clearance: 44.8 mL/min (A) (by C-G formula based on SCr of 1.68 mg/dL (H)).    Assessment/Plan   Assessment/Plan       Active Hospital Problems    Diagnosis  POA   • Heart failure with reduced ejection fraction (CMS/HCC) [I50.20]  Yes     Priority: High   • Elevated troponin [R77.8]  Yes     Priority: High   • Elevated serum creatinine [R79.89]  Yes     Priority: Medium   • Dilated cardiomyopathy (CMS/HCC) [I42.0]  Yes     Priority: Medium   • Presence of biventricular implantable cardioverter-defibrillator (ICD) [Z95.810]  Yes     Priority: Medium   • Permanent atrial fibrillation (CMS/HCC) [I48.21]  Yes     Priority: Medium   • Essential hypertension [I10]  Yes     Priority: Medium   • Low serum vitamin B12 [E53.8]  Yes     Priority: Low   • Hyperlipidemia, mixed [E78.2]  Yes     Priority: Low      Resolved Hospital Problems   No resolved problems to display.     Acute on chronic heart failure with reduced ejection fraction  -Patient presents with worsening dyspnea especially with exertion since AV ablation procedure on 4/14/2021  -proBNP: 14,004.0  -Echocardiogram from 1/29/2021 shows a moderately dilated left ventricular cavity with left ventricular wall thickness consistent with mild to moderate eccentric hypertrophy and an EF of 31-35%  -40 mg Lasix given in ED, continue daily with close monitoring of BMP  -Monitor daily weights and I and O´s  -2g Na+ diet   -Cardiology consulted    Pneumonia  -ED provider notes right-sided pneumonia on chest x-ray, possibly hospital-acquired versus aspiration given patient's recent ablation procedure  -WBCs: 8.80  -Check procalcitonin and urine antigens for strep pneumo and Legionella  -Blood cultures obtained and are pending  -Unasyn and vancomycin started in the ED, continue for now pending further work-up for  bacterial etiology  -Mucinex  -Continue O2 and pulse oximetry    Elevated troponin  -Patient reports some HICKS but denies any chest pain at present.  He underwent AV ablation on 4/14/2021  -Troponin: 0.341, trend  -Continue cardiac monitoring  -Cardiology consulted as above    Atrial fibrillation status post recent ablation  -EKG shows ventricular paced rhythm at 73  -Continue cardiac monitoring, metoprolol and Xarelto    Elevated serum creatinine  - Creatine: 1.68, BUN: 34, BUN/creatinine ratio: 20.2, (Baseline creatinine: 1.22 on 4/12/2021)  -Check UA and urine sodium  - Hold losartan  -40 mg Lasix given in ED, continue for now with close monitoring of BMP  -Avoid nephrotoxic medication and IV dye unless urgently needed  -Monitor BMP and I's and O's while admitted  -Patient may benefit from nephrology consult if renal function continues to worsen especially in light of active diuresis    History of dilated cardiomyopathy  -Patient has implantable defibrillator    Hypertension  -Moderately controlled with a blood pressure on admission of 148/70  - Continue metoprolol  -Hold losartan temporarily secondary to elevated serum creatinine above  - Monitor while admitted    Hyperlipidemia  -Check lipid panel    Gout  -Continue allopurinol    Low vitamin B12  -Check B12 level  -Continue supplementation            VTE Prophylaxis -Xarelto  Mechanical Order History:     None      Pharmalogical Order History:     None          CODE STATUS: Full  There are no questions and answers to display.             I discussed the patient's findings and my recommendations with patient and family.      Signature:Electronically signed by Miguel Mcgovern PA-C, 04/17/21, 10:53 PM EDT.    [facility] Hospitalist Team

## 2021-04-18 NOTE — PROGRESS NOTES
The patient was found on the floor with blood on his lower extremities and face.  Apparently the patient fell while ambulating.  Last Xarelto dose was around 9 AM.  We will send for stat CT of the head/cervical neck and facial bones.  Xarelto  will be discontinued.  Type and screen has been ordered.

## 2021-04-18 NOTE — ED PROVIDER NOTES
Subjective   History of Present Illness  81-year-old male with a history of congestive heart failure as well as atrial fibrillation and implantable defibrillator had an ablation of the AV node 3 days ago and patient states that since then he has had increasing shortness of breath and weakness.  He also states that this evening he had a bowel movement that was black.  She denies any vomiting or abdominal pain.  The patient has a history of Takotsubo cardiomyopathy as well as osteoarthritis hypertension and GERD.  Review of Systems   Constitutional: Positive for activity change, appetite change and fatigue.   HENT: Negative.    Eyes: Negative.    Respiratory: Positive for shortness of breath.    Cardiovascular: Negative.    Gastrointestinal: Positive for blood in stool.   Endocrine: Negative.    Genitourinary: Negative.    Musculoskeletal: Positive for arthralgias.   Skin: Negative.    Allergic/Immunologic: Negative.    Neurological: Negative.    Hematological: Bruises/bleeds easily.   Psychiatric/Behavioral: Negative.        Past Medical History:   Diagnosis Date   • Acid reflux disease    • Allergic rhinitis    • Arthritis     gouty   • Atrial fibrillation (CMS/HCC)    • BBB (bundle branch block)     right   • Bone spur 2017    neck   • Congestive heart failure (CHF) (CMS/HCC)    • Congestive heart failure (CMS/HCC) 10/8/2019   • Coronary artery disease     non obstructive   • Essential hypertension 11/29/2011   • Hyperlipidemia, mixed 10/8/2019   • Hypertension    • Mitral regurgitation    • Osteoarthritis    • Presence of biventricular implantable cardioverter-defibrillator (ICD) 7/29/2019   • S/P ablation of atrial fibrillation 01/2017   • Takotsubo cardiomyopathy    • Tricuspid regurgitation    • VT (ventricular tachycardia) (CMS/HCC) 02/2018       Allergies   Allergen Reactions   • Hydrocodone Urinary Retention       Past Surgical History:   Procedure Laterality Date   • AMPUTATION      index and middle finger    • CARDIAC ABLATION  2017    F   • CARDIAC CATHETERIZATION  2016    non obst CAD; takotsubo CM   • CARDIAC CATHETERIZATION  2017    treating medically   • CARDIAC DEFIBRILLATOR PLACEMENT      Bs   • CARDIAC ELECTROPHYSIOLOGY PROCEDURE N/A 2021    Procedure: AV node ablation;  Surgeon: Hira Ochoa MD;  Location: [facility] CATH INVASIVE LOCATION;  Service: Cardiovascular;  Laterality: N/A;   • INTERNAL CARDIAC DEFIBRILLATOR INSERTION      BiV ICD BS   • OTHER SURGICAL HISTORY  2017    Heart ablation-[facility]   • ROTATOR CUFF REPAIR Right    • SINUS SURGERY  2014       Family History   Problem Relation Age of Onset   • Parkinsonism Mother    • Alcohol abuse Father        Social History     Socioeconomic History   • Marital status:      Spouse name: Not on file   • Number of children: Not on file   • Years of education: Not on file   • Highest education level: Not on file   Tobacco Use   • Smoking status: Former Smoker     Quit date:      Years since quittin.3   • Smokeless tobacco: Never Used   Vaping Use   • Vaping Use: Never used   Substance and Sexual Activity   • Alcohol use: Yes     Comment: occasionally   • Drug use: Never   • Sexual activity: Defer           Objective   Physical Exam  The patient is currently awake and alert he is afebrile vital signs are stable with a blood pressure of 148/70.  His O2 sat is 93% and his respirations are elevated at 32.  The HEENT exam shows no scleral icterus the oropharynx is clear his neck is supple carotids are 2+ chest reveals a pacer defibrillator in the left upper chest wall.  Cardiovascular exam reveals a regular rhythm at a rate of 70 with a soft systolic murmur at the left lower sternal border the abdomen was soft and nontender his rectal exam was negative for stool and I did not see any bladder masses.  The patient has no cyanosis clubbing or edema neurologic exam shows no acute focal deficit.  Procedures            ED Course         EKG shows a biventricular pacemaker at a rate of 70        Results for orders placed or performed during the hospital encounter of 04/17/21   Comprehensive Metabolic Panel    Specimen: Arm, Right; Blood   Result Value Ref Range    Glucose 166 (H) 65 - 99 mg/dL    BUN 34 (H) 8 - 23 mg/dL    Creatinine 1.68 (H) 0.76 - 1.27 mg/dL    Sodium 137 136 - 145 mmol/L    Potassium 4.4 3.5 - 5.2 mmol/L    Chloride 100 98 - 107 mmol/L    CO2 22.0 22.0 - 29.0 mmol/L    Calcium 9.3 8.6 - 10.5 mg/dL    Total Protein 6.5 6.0 - 8.5 g/dL    Albumin 3.20 (L) 3.50 - 5.20 g/dL    ALT (SGPT) 42 (H) 1 - 41 U/L    AST (SGOT) 83 (H) 1 - 40 U/L    Alkaline Phosphatase 96 39 - 117 U/L    Total Bilirubin 3.0 (H) 0.0 - 1.2 mg/dL    eGFR Non African Amer 39 (L) >60 mL/min/1.73    Globulin 3.3 gm/dL    A/G Ratio 1.0 g/dL    BUN/Creatinine Ratio 20.2 7.0 - 25.0    Anion Gap 15.0 5.0 - 15.0 mmol/L   Protime-INR    Specimen: Arm, Right; Blood   Result Value Ref Range    Protime 18.6 (H) 9.6 - 11.7 Seconds    INR 1.74 (H) 0.93 - 1.10   aPTT    Specimen: Arm, Right; Blood   Result Value Ref Range    PTT 36.6 (H) 24.0 - 31.0 seconds   BNP    Specimen: Arm, Right; Blood   Result Value Ref Range    proBNP 14,004.0 (H) 0.0-1,800.0 pg/mL   Troponin    Specimen: Arm, Right; Blood   Result Value Ref Range    Troponin T 0.341 (C) 0.000 - 0.030 ng/mL   Magnesium    Specimen: Arm, Right; Blood   Result Value Ref Range    Magnesium 2.1 1.6 - 2.4 mg/dL   CBC Auto Differential    Specimen: Arm, Right; Blood   Result Value Ref Range    WBC 8.80 3.40 - 10.80 10*3/mm3    RBC 4.70 4.14 - 5.80 10*6/mm3    Hemoglobin 15.9 13.0 - 17.7 g/dL    Hematocrit 46.5 37.5 - 51.0 %    MCV 99.0 (H) 79.0 - 97.0 fL    MCH 33.8 (H) 26.6 - 33.0 pg    MCHC 34.1 31.5 - 35.7 g/dL    RDW 16.0 (H) 12.3 - 15.4 %    RDW-SD 55.6 (H) 37.0 - 54.0 fl    MPV 10.2 6.0 - 12.0 fL    Platelets 145 140 - 450 10*3/mm3    Neutrophil % 88.4 (H) 42.7 - 76.0 %    Lymphocyte % 4.3 (L)  19.6 - 45.3 %    Monocyte % 7.1 5.0 - 12.0 %    Eosinophil % 0.0 (L) 0.3 - 6.2 %    Basophil % 0.2 0.0 - 1.5 %    Neutrophils, Absolute 7.70 (H) 1.70 - 7.00 10*3/mm3    Lymphocytes, Absolute 0.40 (L) 0.70 - 3.10 10*3/mm3    Monocytes, Absolute 0.60 0.10 - 0.90 10*3/mm3    Eosinophils, Absolute 0.00 0.00 - 0.40 10*3/mm3    Basophils, Absolute 0.00 0.00 - 0.20 10*3/mm3    nRBC 0.2 0.0 - 0.2 /100 WBC   ECG 12 Lead   Result Value Ref Range    QT Interval 535 ms     Medications   sodium chloride 0.9 % flush 10 mL (has no administration in time range)   sodium chloride 0.9 % bolus 500 mL (has no administration in time range)   ampicillin-sulbactam (UNASYN) 3 g in sodium chloride 0.9 % 100 mL IVPB-MBP (has no administration in time range)   vancomycin IVPB 1750 mg in 0.9% Sodium Chloride (premix) 500 mL (has no administration in time range)   ipratropium-albuterol (DUO-NEB) nebulizer solution 3 mL (has no administration in time range)     No radiology results for the last day    The patient has a right-sided pneumonia on chest x-ray                           MDM  The patient had evidence of pneumonia on his x-ray on the right side.  His white count was 8800.  His respiratory rate was 32 and his O2 sat was 93%.  The patient will be admitted for antibiotic treatment as well as supplemental O2 bronchodilators  Final diagnoses:   Pneumonia of right lung due to infectious organism, unspecified part of lung   Hypoxia   Longstanding persistent atrial fibrillation (CMS/HCC)   Elevated troponin       ED Disposition  ED Disposition     None          No follow-up provider specified.       Medication List      No changes were made to your prescriptions during this visit.          Jonathan Chow MD  04/17/21 2110       Jonathan Chow MD  04/17/21 2132

## 2021-04-18 NOTE — PROGRESS NOTES
The patient had CT head, CT cervical spine and CT facial bones done after the fall.  Spoke to on-call ENT physician Dr. Mahan regarding comminuted fracture of the nasal area, left frontal sinus, lateral and posterior walls of the left maxillary sinus and more anterior wall of the right maxillary sinus and probable fracture of the nasal septum.  He recommended normal saline nasal flush  and Afrin for bleeding.  The patient will follow in the outpatient ENT clinic.  Hemoglobin and hematocrit will be trended.  Maxillary Axillary sinus is usually operated by facial surgeons at [facility].        I have discussed plan of care with the patient's daughter at the bedside.

## 2021-04-18 NOTE — NURSING NOTE
Code stroke called    NIH =0  Left pupil greater than right  Left pupil 5-6, right pupil 3-4.    Dr. Abel Notifed, Stat CT of head ordered.    Patient is alert with no focal deficits.    Code stroke labs ordered.

## 2021-04-19 NOTE — PROGRESS NOTES
The patient has been accepted at [facility] ICU.  Discussed case with OMFS service, who agreed to consult on the patient.  I have informed the family of transfer.

## 2021-04-19 NOTE — DISCHARGE SUMMARY
[facility] Medicine Services  DISCHARGE SUMMARY        Prepared For PCP:  Bunny Curran MD    Patient Name: Hank Ponce  : 1940  MRN: [id number]      Date of Admission:   2021    Date of Discharge:  2021    Length of stay:  LOS: 1 day     Hospital Course     Presenting Problem:   Hypoxia [R09.02]  Elevated troponin [R77.8]  Longstanding persistent atrial fibrillation (CMS/HCC) [I48.11]  Pneumonia of right lung due to infectious organism, unspecified part of lung [J18.9]  Acute hypoxemic respiratory failure (CMS/HCC) [J96.01]      Active Hospital Problems    Diagnosis  POA   • **Pneumonia [J18.9]  Yes     Priority: High   • Elevated serum creatinine [R79.89]  Yes     Priority: High   • Acute on chronic systolic congestive heart failure (CMS/HCC) [I50.23]  Yes     Priority: High   • Low serum vitamin B12 [E53.8]  Yes     Priority: Medium   • Heart failure with reduced ejection fraction (CMS/HCC) [I50.20]  Yes     Priority: Medium   • Elevated troponin [R77.8]  Yes     Priority: Medium   • Dilated cardiomyopathy (CMS/HCC) [I42.0]  Yes     Priority: Medium   • Hyperlipidemia, mixed [E78.2]  Yes     Priority: Medium   • Presence of biventricular implantable cardioverter-defibrillator (ICD) [Z95.810]  Yes     Priority: Medium   • Permanent atrial fibrillation (CMS/HCC) [I48.21]  Yes     Priority: Medium   • Essential hypertension [I10]  Yes      Resolved Hospital Problems   No resolved problems to display.           Hospital Course:      Problem list addressed during the hospitalization:      Acute on chronic systolic heart failure (POA)  -Last TTE 2021--> LVEF 31-35%, MR and TR  -Continue diuresis and daily weights  -Consulted cardiology        Pneumonia:  -Elevated procalcitonin and febrile during hospitalization  -Continue antibiotics  -Consulted speech for swallow eval  -MRSA screen negative     Elevated troponin:  -Denies chest pain  -Possibly due to  recent ablation  -Continue to trend  -Cardiology following     Fall complicated with nasal laceration, maxillary sinus fracture and possible nasal septum fracture:  -s/p stat CT head, cervical spine and facial bone  -Discussed case with on-call ENT Dr. Mahan  -Needs outpatient follow-up with ENT on discharge  -Fall precaution  -Hold Xarelto last dose 9 AM 4/18/2021     Acute blood loss anemia:  -Due to nasal laceration/epistaxis/maxillary sinus fracture  -Trend H&H every 6  -Type and screen ordered        JASKARAN on CKD III:  -Nephrotoxin hold  -Gentle diuresis with Lasix  -Losartan held     Atrial fibrillation s/p ablation 4/14/21  -on metoprolol and on Xarelto at home      History of dilated cardiomyopathy s/p BiV ICD:        Hypertension  - Continue metoprolol and hold losartan     Hyperlipidemia  -Check lipid panel  -On Brewers yeast at home     Gout  -Continue allopurinol     -Vitamin B12 deficiency  -Check B12 level  -Continue supplementation             Recommendation for Outpatient Providers:             Reasons For Change In Medications and Indications for New Medications:        Day of Discharge     HPI:     The patient is an 81-year-old male with history of atrial fibrillation on 4/14/2021 on chronic Xarelto, dilated cardiomyopathy LVEF 31-35% s/p BiV ICD, hyperlipidemia, hypertension, moderate to severe MR, moderate TR and  RBBB.     The patient presented to the emergency room on 4/17/2021 complaining of increased dyspnea, HICKS walking short distances, nausea., fever and lightheadedness.     In the ED, laboratory work was significant for troponin of 0.341, proBNP 14,004, creatinine 1.68, BUN 34, chest x-ray that showed pneumonia.     The patient was admitted to the medical floor for sepsis secondary to pneumonia, decompensated systolic heart failure and  elevated troponin.  T-max was 101.4F and required supplemental oxygen on admission.     The patient was evaluated by cardiologist in the morning of 4/18/2021.   Unfortunately the patient fell in his room in the afternoon of 4/18/2021 and sustained laceration of his nose bridge and epistaxis.  Stat CT head/cervical neck and facial bones was ordered.  It was significant for LeFort II classification fractures involving the lateral/posterior walls of the left maxillary sinus, anterior wall of right maxillary sinus, possibly nasal septum and possibly left frontal sinus. On call ENT physician was notified recommended outpatient follow-up, normal saline flush and Afrin for bleeding.     I have discussed the case with OMFS at [facility]. The patient will be admitted to the ICU at [facility].  Dr. Styles has accepted the patient into his service in the ICU and  OMFS will consult.    Vital Signs:   Temp:  [97.9 °F (36.6 °C)-101.7 °F (38.7 °C)] 101.7 °F (38.7 °C)  Heart Rate:  [70-77] 70  Resp:  [16-26] 16  BP: (113-177)/() 138/75     Physical Exam:    HENT:      Head:        Mouth/Throat:      Mouth: Mucous membranes are moist.   Eyes:      General: No scleral icterus.     Extraocular Movements: Extraocular movements intact.      Pupils: Pupils are equal, round, and reactive to light.   Cardiovascular:      Rate and Rhythm: Normal rate and regular rhythm.      Pulses: Normal pulses.   Pulmonary:      Effort: Pulmonary effort is normal.      Breath sounds: Normal breath sounds.   Abdominal:      General: Bowel sounds are normal.      Palpations: Abdomen is soft.   Musculoskeletal:         General: Normal range of motion.      Cervical back: Normal range of motion.      Comments: Moves all extremities equally.  Left forearm abrasion   Lymphadenopathy:      Cervical: No cervical adenopathy.   Skin:     General: Skin is warm.   Neurological:      Mental Status: He is alert and oriented to person, place, and time. Mental status is at baseline.      Cranial Nerves: Cranial nerves are intact.   Psychiatric:         Mood and Affect: Mood normal.         Behavior: Behavior is cooperative.         Pertinent  and/or Most Recent Results     Results from last 7 days   Lab Units 04/18/21  1646 04/18/21  1403 04/18/21  0428 04/17/21 2041 04/12/21  1153   WBC 10*3/mm3 11.40* 11.10* 7.40 8.80 7.90   HEMOGLOBIN g/dL 15.6 15.5 14.1 15.9 16.5   HEMATOCRIT % 45.9 46.3 41.4 46.5 48.1   PLATELETS 10*3/mm3 143 141 119* 145 186   SODIUM mmol/L 137 138 138 137 143   POTASSIUM mmol/L 3.4* 3.4* 3.5 4.4 4.1   CHLORIDE mmol/L 99 99 102 100 106   CO2 mmol/L 24.0 26.0 24.0 22.0 28.0   BUN mg/dL 35* 31* 33* 34* 19   CREATININE mg/dL 1.48* 1.54* 1.59* 1.68* 1.22   GLUCOSE mg/dL 147* 116* 121* 166* 90   CALCIUM mg/dL 7.9* 8.7 7.7* 9.3 8.8     Results from last 7 days   Lab Units 04/18/21  1647 04/18/21  1646 04/18/21  1403 04/17/21 2041 04/12/21  1153   BILIRUBIN mg/dL  --  2.3* 2.2* 3.0* 1.5*   ALK PHOS U/L  --  81 91 96 81   ALT (SGPT) U/L  --  41 43* 42* 18   AST (SGOT) U/L  --  79* 73* 83* 26   PROTIME Seconds 21.3*  --   --  18.6*  --    INR  2.00*  --   --  1.74*  --    APTT seconds 41.0*  --   --  36.6*  --      Results from last 7 days   Lab Units 04/18/21  0428   CHOLESTEROL mg/dL 92   TRIGLYCERIDES mg/dL 50   HDL CHOL mg/dL 37*     Results from last 7 days   Lab Units 04/18/21  1646 04/18/21 0428 04/17/21 2041   PROBNP pg/mL  --   --  14,004.0*   TROPONIN T ng/mL 0.276* 0.257* 0.341*   PROCALCITONIN ng/mL  --  1.50*  --        Brief Urine Lab Results  (Last result in the past 365 days)      Color   Clarity   Blood   Leuk Est   Nitrite   Protein   CREAT   Urine HCG        04/17/21 2359 Dark Yellow  Comment:  Result checked  Clear Negative Negative Negative 30 mg/dL (1+)               Microbiology Results Abnormal     Procedure Component Value - Date/Time    COVID PRE-OP / PRE-PROCEDURE SCREENING ORDER (NO ISOLATION) - Swab, Nasopharynx [[id number]]  (Normal) Collected: 04/17/21 5037    Lab Status: Final result Specimen: Swab from Nasopharynx Updated: 04/18/21 1615    Narrative:      The following orders were  created for panel order COVID PRE-OP / PRE-PROCEDURE SCREENING ORDER (NO ISOLATION) - Swab, Nasopharynx.  Procedure                               Abnormality         Status                     ---------                               -----------         ------                     COVID-19,APTIMA PANTHER,...[[id number]]  Normal              Final result                 Please view results for these tests on the individual orders.    COVID-19,APTIMA PANTHER,[facility] IN-HOUSE, NP/OP SWAB IN UTM/VTM/SALINE TRANSPORT MEDIA,24 HR TAT - Swab, Nasopharynx [[id number]]  (Normal) Collected: 04/17/21 2327    Lab Status: Final result Specimen: Swab from Nasopharynx Updated: 04/18/21 1615     COVID19 Not Detected    Narrative:      Fact sheet for providers: https://www.fda.gov/media/865742/download     Fact sheet for patients: https://www.fda.gov/media/156742/download    Test performed by RT PCR.    MRSA Screen, PCR (Inpatient) - Swab, Nares [[id number]]  (Normal) Collected: 04/17/21 2359    Lab Status: Final result Specimen: Swab from Nares Updated: 04/18/21 0255     MRSA PCR No MRSA Detected    S. Pneumo Ag Urine or CSF - Urine, Urine, Clean Catch [[id number]]  (Normal) Collected: 04/17/21 2359    Lab Status: Final result Specimen: Urine, Clean Catch Updated: 04/18/21 0034     Strep Pneumo Ag Negative    Legionella Antigen, Urine - Urine, Urine, Clean Catch [[id number]]  (Normal) Collected: 04/17/21 2359    Lab Status: Final result Specimen: Urine, Clean Catch Updated: 04/18/21 0034     LEGIONELLA ANTIGEN, URINE Negative          CT Head Without Contrast    Result Date: 4/18/2021  Impression: 1.No significant change in appearance of the head from the earlier study. 2.Fractures of the maxillofacial area are again noted. There is density within the paranasal sinuses suggesting underlying blood.  Electronically Signed By-Moises Recinos MD On:4/18/2021 5:01 PM This report was finalized on [id number] by  Moises Recinos MD.    CT Head Without  Contrast    Result Date: 4/18/2021  Impression: 1.Cerebral atrophy. 2.The probably are small matter changes involving the cerebral hemispheres which could reflect more chronic small vessel ischemic change. 3.There are fractures of the nasal area and maxillary sinuses. There is density within the paranasal sinuses that may relate to blood associated with the injury. Please see CT report of the maxillofacial area for more detailed findings.  Electronically Signed By-Moises Recinos MD On:4/18/2021 2:45 PM This report was finalized on [id number] by  Moises Recinos MD.    CT Cervical Spine Without Contrast    Result Date: 4/18/2021  Impression: 1.There are multilevel degenerative changes involving the cervical spine. 2.Density right apical area that may be secondary to pneumonia. It looks like there probably is a right pleural effusion.  Electronically Signed By-Moises Recinos MD On:4/18/2021 3:08 PM This report was finalized on [id number] by  Moises Recinos MD.    XR Chest 1 View    Result Date: 4/18/2021  Impression: 1.Interval change with respect to the chest with findings that may relate to multifocal pneumonia. An underlying pleural effusion at the left base not excluded. Correlation with clinical findings recommended. Depending on clinical findings additional imaging with CT may be warranted.  Electronically Signed By-Moises Recinos MD On:4/18/2021 7:46 AM This report was finalized on [id number] by  Moises Recinos MD.    CT Facial Bones Without Contrast    Result Date: 4/18/2021  Impression: 1. Fractures of the maxillofacial area as described. This probably fits under the LeFort 2 classification. There is density within the paranasal sinuses which could reflect blood.  Electronically Signed By-Moises Recinos MD On:4/18/2021 3:04 PM This report was finalized on [id number] by  Moises Recinos MD.              Results for orders placed during the hospital encounter of 04/17/21    Adult Transthoracic Echo Complete W/  Cont if Necessary Per Protocol    Interpretation Summary  · The left ventricular cavity is moderately dilated.  · Left ventricular ejection fraction appears to be 31 - 35%.  · The right ventricular cavity is moderately dilated.  · Severe mitral valve regurgitation is present.  · Severe tricuspid valve regurgitation is present.  · No pericardial effusion noted              Test Results Pending at Discharge  Pending Labs     Order Current Status    Hemoglobin & Hematocrit, Blood Collected (04/18/21 2012)    Blood Culture - Blood, Arm, Left In process    Blood Culture - Blood, Arm, Right In process            Procedures Performed           Consults:   Consults     Date and Time Order Name Status Description    4/18/2021  4:07 PM Inpatient ENT Consult      4/17/2021 10:54 PM Inpatient Cardiology Consult Completed     4/17/2021  9:47 PM Hospitalist (on-call MD unless specified) Completed             Discharge Details        Discharge Medications      Continue These Medications      Instructions Start Date   allopurinol 300 MG tablet  Commonly known as: ZYLOPRIM   300 mg, Oral, Daily      Brewers Yeast 500 MG tablet   1,000 mg, Oral, Daily      clotrimazole-betamethasone 1-0.05 % cream  Commonly known as: LOTRISONE   Topical, 2 Times Daily PRN      colchicine 0.6 MG tablet   0.6 mg, Oral, 2 Times Daily PRN      furosemide 20 MG tablet  Commonly known as: LASIX   20 mg, Oral, Daily, Takes 2 tab for 3 days if gets over 202 lbs       metoprolol succinate XL 25 MG 24 hr tablet  Commonly known as: TOPROL-XL   25 mg, Oral, Daily      vitamin B-12 1000 MCG tablet  Commonly known as: CYANOCOBALAMIN   1,000 mcg, Oral, Daily         Stop These Medications    losartan 25 MG tablet  Commonly known as: COZAAR     rivaroxaban 15 MG tablet  Commonly known as: Xarelto            Allergies   Allergen Reactions   • Hydrocodone Urinary Retention         Discharge Disposition:  Short Term Hospital (DC - External)    Diet:  Hospital:  Diet  Order   Procedures   • Diet Cardiac; 2gm Na+         Discharge Activity: Bedrest        CODE STATUS:    Code Status and Medical Interventions:   Ordered at: 04/17/21 2219     Code Status:    CPR     Medical Interventions (Level of Support Prior to Arrest):    Full         Follow-up Appointments  Future Appointments   Date Time Provider Department Center   4/30/2021 11:45 AM Hira Ochoa MD [facility] None   6/2/2021 10:15 AM Bunny Curran MD [facility]   7/6/2021 10:15 AM AGUSTO Curran MD [facility]       Additional Instructions for the Follow-ups that You Need to Schedule     Discharge Follow-up with PCP   As directed       Currently Documented PCP:    Bunny Curran MD    PCP Phone Number:    [phone]     Follow Up Details: 2 weeks                 Condition on Discharge:      Stable      This patient has been examined wearing appropriate Personal Protective Equipment and discussed with nursing. 04/18/21      Electronically signed by Manolo Stafford DO, 04/18/21, 8:35 PM EDT.      Time: I spent 45  minutes on this discharge activity which included face-to-face encounter with the patient/reviewing the data in the system/coordination of the care with the nursing staff as well as consultants/documentation/entering orders.

## 2021-04-19 NOTE — SIGNIFICANT NOTE
Bedside RN Genet reported to charge nurse unequal pupils with left pupil greater than right, and patient becoming acutely forgetful.    Charge RN Carmen responded, assessed patient, agrees with Genet;s assessment.    Code stroke called    NIH = 0  Glucose = 147    Last known well:  1620    Satisfactory oxygenation with 10L non-rebreather mask.    Patient answer questions appropriately, but is forgetful of recent events.      Stat CT and stroke order set labs ordered.  Dr. Abel called.    Patient nurse transported to CT.

## 2021-04-19 NOTE — PROGRESS NOTES
Spoke to you about transfer center regarding transferring the patient to [facility] service.  I was told that the patient was not accepted.  I try to transfer patient to [place] and spoke to Dr. [facility] oral surgeon he recommended transfer to [facility].  I have called back  again requested to talk to the ENT/trauma team and awaiting callback

## 2021-04-22 LAB
BACTERIA SPEC AEROBE CULT: NORMAL
BACTERIA SPEC AEROBE CULT: NORMAL

## 2021-05-10 ENCOUNTER — TELEPHONE (OUTPATIENT)
Dept: FAMILY MEDICINE CLINIC | Facility: CLINIC | Age: 81
End: 2021-05-10

## 2021-05-10 NOTE — TELEPHONE ENCOUNTER
Caller: Hank Ponce    Relationship to patient: Self    Best call back number: [phone]     New or established patient?  [] New  [x] Established    Date of discharge:5/3/2021 ([facility])    Facility discharged from: [facility]    Diagnosis/Symptoms:LEFORT 2 FRACTURE    Length of stay (If applicable): 4/22/2021 -5/3/2021      MR PONCE WAS ADMITED TO [facility] 4/22-5/3/2021, HE WAS DISCHARGED TO [facility] REHAB WHERE HE CURRENTLY IS LOCATED.       [facility] 4/22/2021-5/3/2021  [facility] 5/3/2021- CURRENT

## 2021-05-10 NOTE — TELEPHONE ENCOUNTER
Caller:     Relationship to patient: Hank Ponce     Best call back number: [phone]     Patient is needing:  MR. PONCE IS SCHEDULED FOR HOSPITAL FOLLOW UP 5/13/2021 WITH DR. JONNIE ARRIAGA WAS 5/24/2021

## 2021-05-13 ENCOUNTER — OFFICE VISIT (OUTPATIENT)
Dept: FAMILY MEDICINE CLINIC | Facility: CLINIC | Age: 81
End: 2021-05-13

## 2021-05-13 VITALS
OXYGEN SATURATION: 96 % | WEIGHT: 172 LBS | TEMPERATURE: 96.6 F | DIASTOLIC BLOOD PRESSURE: 60 MMHG | BODY MASS INDEX: 22.07 KG/M2 | RESPIRATION RATE: 16 BRPM | HEIGHT: 74 IN | HEART RATE: 77 BPM | SYSTOLIC BLOOD PRESSURE: 120 MMHG

## 2021-05-13 DIAGNOSIS — J18.9 PNEUMONIA DUE TO INFECTIOUS ORGANISM, UNSPECIFIED LATERALITY, UNSPECIFIED PART OF LUNG: ICD-10-CM

## 2021-05-13 DIAGNOSIS — Z09 HOSPITAL DISCHARGE FOLLOW-UP: Primary | ICD-10-CM

## 2021-05-13 PROBLEM — N18.30 STAGE 3 CHRONIC KIDNEY DISEASE: Status: ACTIVE | Noted: 2021-05-13

## 2021-05-13 PROCEDURE — 99496 TRANSJ CARE MGMT HIGH F2F 7D: CPT | Performed by: NURSE PRACTITIONER

## 2021-05-13 RX ORDER — CHLORHEXIDINE GLUCONATE 0.12 MG/ML
15 RINSE ORAL 2 TIMES DAILY
COMMUNITY
End: 2021-08-06

## 2021-05-13 RX ORDER — CHOLECALCIFEROL (VITAMIN D3) 125 MCG
5 CAPSULE ORAL
COMMUNITY
End: 2021-08-06

## 2021-05-13 RX ORDER — FAMOTIDINE 20 MG/1
20 TABLET, FILM COATED ORAL 2 TIMES DAILY
COMMUNITY
End: 2021-08-06

## 2021-05-13 RX ORDER — AMOXICILLIN 250 MG
1 CAPSULE ORAL DAILY
COMMUNITY
End: 2021-08-06

## 2021-05-13 NOTE — PATIENT INSTRUCTIONS
drink plenty of fluids for hydration   Stay as active as possible  Do deep breathing exercises with coughing every 2-4 hours through the day as discussed  Keep scheduled follow-up with Dr. Curran in [place]

## 2021-05-13 NOTE — PROGRESS NOTES
Transitional Care Follow Up Visit  Subjective     Hank Ponce is a 81 y.o. male who presents for a transitional care management visit.    Within 48 business hours after discharge our office contacted him via telephone to coordinate his care and needs.      I reviewed and discussed the details of that call along with the discharge summary, hospital problems, inpatient lab results, inpatient diagnostic studies, and consultation reports with Hank.     Current outpatient and discharge medications have been reconciled for the patient.  Reviewed by: TRINA Ernandez      No flowsheet data found.  Risk for Readmission (LACE) No data recorded    History of Present Illness     Continues to have shortness of breath when he is up and moving, is better if he takes his time rather than moving about quickly  Has now started to have a productive cough with thick green-yellow sputum  Has not been having any fevers or chest pain    Has follow up scheduled for next week with cardiology  Is concerned about the side effects of the toprol causing him to feel tired and sluggish - this was the same for him when he took this medicine in the past  Will defer management of heart medicines to his cardiologist    Is at home now, has support from children and siblings    Has lost some weight over the course of this illness and injury  Has been making an effort to eat - understands the importance of nutrition to his revocery  Encouraged to continue with a well balanced diet - expect return of appetite and weight gain now that he is home and to continue once he is able to eat his usual diet    Course During Hospital Stay:   Copied from  Hospital Note:  Problem list addressed during the hospitalization:      Acute on chronic systolic heart failure (POA)  -Last TTE 1/21/2021--> LVEF 31-35%, MR and TR  -Continue diuresis and daily weights  -Consulted cardiology     Pneumonia:  -Elevated procalcitonin and febrile during  hospitalization  -Continue antibiotics  -Consulted speech for swallow eval  -MRSA screen negative     Elevated troponin:  -Denies chest pain  -Possibly due to recent ablation  -Continue to trend  -Cardiology following     Fall complicated with nasal laceration, maxillary sinus fracture and possible nasal septum fracture:  -s/p stat CT head, cervical spine and facial bone  -Discussed case with on-call ENT Dr. Mahan  -Needs outpatient follow-up with ENT on discharge  -Fall precaution  -Hold Xarelto last dose 9 AM 4/18/2021     Acute blood loss anemia:  -Due to nasal laceration/epistaxis/maxillary sinus fracture  -Trend H&H every 6  -Type and screen ordered        JASKARAN on CKD III:  -Nephrotoxin hold  -Gentle diuresis with Lasix  -Losartan held     Atrial fibrillation s/p ablation 4/14/21  -on metoprolol and on Xarelto at home      History of dilated cardiomyopathy s/p BiV ICD:      Hypertension  - Continue metoprolol and hold losartan     Hyperlipidemia  -Check lipid panel  -On Brewers yeast at home     Gout  -Continue allopurinol     -Vitamin B12 deficiency  -Check B12 level  -Continue supplementation    **transferred to [facility] after the fall, then to rehab facility.     The following portions of the patient's history were reviewed and updated as appropriate: allergies, current medications and problem list.    Review of Systems   Constitutional: Negative.  Negative for fever.   HENT:        Recent fall with jaw fracture - findings as expected:  Facial edema  Nasal congestion  bruising   Respiratory: Positive for cough and shortness of breath. Negative for wheezing and stridor.         Shortness of breath is improving, cough has become productive    Genitourinary: Negative.    Neurological: Negative.        Objective   Physical Exam  Vitals reviewed. Exam conducted with a chaperone present (son).   Constitutional:       Appearance: Normal appearance.   HENT:      Mouth/Throat:      Mouth: Mucous membranes are  moist.      Pharynx: Oropharynx is clear.      Comments: Has wiring in place in the mouth from the jaw fracture repair, is able to open and close the mouth and swallow and chew  Neck:      Vascular: No carotid bruit.   Cardiovascular:      Rate and Rhythm: Normal rate.      Pulses: Normal pulses.   Pulmonary:      Effort: Pulmonary effort is normal.      Breath sounds: Rales present.      Comments: Crackles left upper lobe  Abdominal:      General: Abdomen is flat. Bowel sounds are normal.      Palpations: Abdomen is soft.   Musculoskeletal:         General: Swelling present. Normal range of motion.      Cervical back: Normal range of motion and neck supple.      Right lower le+ Edema present.      Left lower le+ Edema present.   Skin:     General: Skin is warm and dry.   Neurological:      Mental Status: He is alert and oriented to person, place, and time.         Assessment/Plan   Diagnoses and all orders for this visit:    1. Hospital discharge follow-up (Primary)    2. Pneumonia due to infectious organism, unspecified laterality, unspecified part of lung    pneumonia seems to be improving, continue with fluids for hydration, deep breathing and coughing exercises  Follow up if develops fever, increased shortness of breath, other symptoms of illness

## 2021-05-17 ENCOUNTER — OFFICE VISIT (OUTPATIENT)
Dept: CARDIOLOGY | Facility: CLINIC | Age: 81
End: 2021-05-17

## 2021-05-17 VITALS
SYSTOLIC BLOOD PRESSURE: 128 MMHG | DIASTOLIC BLOOD PRESSURE: 76 MMHG | OXYGEN SATURATION: 97 % | WEIGHT: 170 LBS | BODY MASS INDEX: 21.83 KG/M2

## 2021-05-17 DIAGNOSIS — I50.22 CHRONIC SYSTOLIC CONGESTIVE HEART FAILURE (HCC): ICD-10-CM

## 2021-05-17 DIAGNOSIS — Z95.810 PRESENCE OF BIVENTRICULAR IMPLANTABLE CARDIOVERTER-DEFIBRILLATOR (ICD): ICD-10-CM

## 2021-05-17 DIAGNOSIS — I42.0 DILATED CARDIOMYOPATHY (HCC): Primary | ICD-10-CM

## 2021-05-17 DIAGNOSIS — I48.11 LONGSTANDING PERSISTENT ATRIAL FIBRILLATION (HCC): ICD-10-CM

## 2021-05-17 PROCEDURE — 93284 PRGRMG EVAL IMPLANTABLE DFB: CPT | Performed by: INTERNAL MEDICINE

## 2021-05-17 PROCEDURE — 99214 OFFICE O/P EST MOD 30 MIN: CPT | Performed by: INTERNAL MEDICINE

## 2021-05-17 PROCEDURE — 93000 ELECTROCARDIOGRAM COMPLETE: CPT | Performed by: INTERNAL MEDICINE

## 2021-05-17 RX ORDER — LOSARTAN POTASSIUM 25 MG/1
1 TABLET ORAL DAILY
COMMUNITY
End: 2021-07-26 | Stop reason: SDUPTHER

## 2021-05-17 NOTE — PROGRESS NOTES
CC---Cardiomyopathy, VT, atrial fibrillation     Sub--Mr. Hank Zarate is a very pleasant and functionally active 81-year-old white male with history of hypertensive heart disease, MR, TR, and right bundle branch block on ECG.  Patient has history of rheumatic fever since age of 11 and has been noticed to have increasing symptoms of shortness of breath and fatigue and recently evaluated in the hospital needing intravenous diuretics and a repeat cardiac catheterization showing significant LV dysfunction and  also had a hematuria and was evaluated by urologist and continues to be on anti coagulation without any further problems with hematuria and had benign workup for hematuria.  Patient did improve his ejection fraction and his heart failure status when he was in sinus rhythm when he underwent ablation several months ago.  Recurrent LV dysfunction and heart failure symptoms with class 3 symptomatology with recurrent persistent atrial fibrillation noted. Patient could not tolerate Tikosyn in the past.  Redo ablation was done with extensive ablation and significant left atrial scarring and enlargement noted and patient was placed on amiodarone briefly. Patient started having recurrent atrial arrhythmias despite extensive ablation and compensated functional class 2--  persists to have severely reduced EF-- Biventricular ICD inserted and since then patient is having minimal arrhythmias with remarkable improvement of symptoms and out of hospital for more than  2 years--patient has noticed recurrent palpitations with elevated heart rate with  symptoms and was noted to have recurrent atrial flutter and AV node ablation few weeks ago   Post AV node ablation patient was discharged home without any complications.  Had a spontaneous fall with fracture of his maxillary sinus and the surrounding bony structures possibly with loss of consciousness and was transferred to [facility] and was found to have  right-sided pneumonia which was treated with antibiotics and also had surgery for his maxillary sinus fracture and comes in for evaluation  Complains of fatigue with class III dyspnea  Feels poorly with intake of beta-blockers          Past Medical History:     Reviewed history from 03/12/2018 and no changes required:        mitral regurg        tricuspid regurg        rt BBB        Atrial Fibrillation        Cardiomyopathy, takotsubo        Coronary Artery Disease, non obstructive        Hypertension        Gouty Arthritis         Acid reflux disease        Allergies        Osteoarthritis        AF ablation January 2017        bone spur neck- 2017        Congestive heart failure         VT--- February 2018    Past Surgical History:     Reviewed history from 09/01/2017 and no changes required:        Rotator Cuff Repair right        Amputation right  index and middle finger        Sinus surgery February 2014        Heart Catherization - 12/8/2016 - Non obstr CAD; Takotsubo CM        Heart Ablation- 1/2017 [facility]        Heart Catherization: 7/24/17 (treating medically)        Cardiac Ablation 8/22/17 [facility]          Physical Exam    General:      well developed, well nourished, in no acute distress.    Head:      normocephalic and atraumatic.    Eyes:      PERRL/EOM intact, conjunctiva and sclera clear with out nystagmus.    Neck:      no masses, thyromegaly, trachea central with normal respiratory effort  Lungs:      clear bilaterally to auscultation.    Heart:  Paced rhythm with occasional PVCs without murmurs, rubs, or gallops          Assessment and plan  Recurrent atrial flutter--persistent atrial arrhythmias --post AV node ablation   Post AV node ablation syncope with injury needing maxillary sinus surgery and also had pneumonia  Ongoing class III systolic heart failure and feels poorly with beta-blockers  Beta-blockers to be weaned off  Patient is referred to cardiac rehab  Records from [facility]  including labs scans discharge summary extensively reviewed  Biventricular ICD in situ--appropriately functioning and rate responsiveness programmed and patient was made to ambulate  Intermittent PVCs  Left ventricular ejection fraction 35% -- post ICD   prior VT with appropriate device therapy with rescue-- no recurrence  History of rheumatic fever with chronic right bundle-branch block  Hypertension  Bilateral leg edema mild in nature    Medications reviewed         ECG 12 Lead    Date/Time: 5/17/2021 12:48 PM  Performed by: Hira Ochoa MD  Authorized by: Hira Ochoa MD   Comparison: compared with previous ECG   Similar to previous ECG  Rhythm: atrial fibrillation and paced  Ectopy: unifocal PVCs  Rate: normal  Pacing: ventricular paced rhythm          Electronically signed by Hira Ochoa MD, 05/17/21, 12:48 PM EDT.

## 2021-05-20 ENCOUNTER — TRANSCRIBE ORDERS (OUTPATIENT)
Dept: CARDIAC REHAB | Facility: HOSPITAL | Age: 81
End: 2021-05-20

## 2021-05-20 DIAGNOSIS — I50.9 CONGESTIVE HEART FAILURE, UNSPECIFIED HF CHRONICITY, UNSPECIFIED HEART FAILURE TYPE (HCC): Primary | ICD-10-CM

## 2021-05-26 ENCOUNTER — TELEPHONE (OUTPATIENT)
Dept: CARDIAC REHAB | Facility: HOSPITAL | Age: 81
End: 2021-05-26

## 2021-05-26 NOTE — TELEPHONE ENCOUNTER
Called pt's son- Robbin to go over insurance coverage with him for father to start program. No answer, voicemail left.

## 2021-05-27 ENCOUNTER — TELEPHONE (OUTPATIENT)
Dept: FAMILY MEDICINE CLINIC | Facility: CLINIC | Age: 81
End: 2021-05-27

## 2021-05-27 NOTE — TELEPHONE ENCOUNTER
Caller: Hank Ponce    Relationship: Self    Best call back number: [phone]    What is the best time to reach you: ANYTIME    Who are you requesting to speak with (clinical staff, provider,  specific staff member): CLINICAL STAFF    Do you know the name of the person who called: HANK PONCE    What was the call regarding: PATIENT CALLED TO RE-SCHEDULE APPOINTMENT ON 21 FOR A MEDICARE WELLNESS VISIT DUE TO HIS WIFE'S  BEING THAT DAY     PATIENT RESCHEDULED FOR 2021, WANTING TO KNOW IF HE NEEDS TO BE FASTING FOR THE VISIT, HE IS CONCERNED ABOUT WAITING TIL 1:30 TO COME IN IF FASTING     Do you require a callback: YES

## 2021-06-14 ENCOUNTER — TELEPHONE (OUTPATIENT)
Dept: CARDIAC REHAB | Facility: HOSPITAL | Age: 81
End: 2021-06-14

## 2021-06-14 NOTE — TELEPHONE ENCOUNTER
Attempting to do initial  Assessment.  Patient does not want to start cardiac rehab program, therefore cancelled appointment provided phone number if patient wants to reschedule.

## 2021-06-15 ENCOUNTER — APPOINTMENT (OUTPATIENT)
Dept: CARDIAC REHAB | Facility: HOSPITAL | Age: 81
End: 2021-06-15

## 2021-07-02 NOTE — PROGRESS NOTES
Cardiology Office Visit Note      Referring physician:  Bunny Curran MD    Reason For Followup: Hospital follow up    HPI:  Hank Ponce is a 81 y.o. male who returns to the office for follow up. He underwent ablation per Dr. Ochoa in April, and was subsequentlyhospitalizedl for shortness of air related to pneumonia and CHF- then transferred to [facility] for aeFort's call fracture he sustained after he passed out while in the hospital... He had surgery to put plates in his skull  and then spent a month in rehab. He is feeling stronger, trying to regain some weight. No dizzy spells and no more syncopal episodes.    Even more unfortunately, his wife went into the hospital on 5/3/2021, the day he was discharged, for fallopian tube cancer and subsequently  of cancer on 2021.    His medications were reviewed today.    Past Medical History:   Diagnosis Date   • Acid reflux disease    • Allergic rhinitis    • Arthritis     gouty   • Atrial fibrillation (CMS/HCC)    • BBB (bundle branch block)     right   • Bone spur 2017    neck   • Congestive heart failure (CHF) (CMS/HCC)    • Congestive heart failure (CMS/HCC) 10/8/2019   • Coronary artery disease     non obstructive   • Essential hypertension 2011   • Hyperlipidemia, mixed 10/8/2019   • Hypertension    • Mitral regurgitation    • Osteoarthritis    • Presence of biventricular implantable cardioverter-defibrillator (ICD) 2019   • S/P ablation of atrial fibrillation 2017   • Takotsubo cardiomyopathy    • Tricuspid regurgitation    • VT (ventricular tachycardia) (CMS/HCC) 2018       Past Surgical History:   Procedure Laterality Date   • AMPUTATION      index and middle finger   • CARDIAC ABLATION  2017    BHF   • CARDIAC CATHETERIZATION  2016    non obst CAD; takotsubo CM   • CARDIAC CATHETERIZATION  2017    treating medically   • CARDIAC DEFIBRILLATOR PLACEMENT      Bs   • CARDIAC  ELECTROPHYSIOLOGY PROCEDURE N/A 2021    Procedure: AV node ablation;  Surgeon: Hira Ochoa MD;  Location: [facility] INVASIVE LOCATION;  Service: Cardiovascular;  Laterality: N/A;   • INTERNAL CARDIAC DEFIBRILLATOR INSERTION  2017    BiV ICD BS   • OTHER SURGICAL HISTORY  2017    Heart ablation-F   • ROTATOR CUFF REPAIR Right    • SINUS SURGERY  2014         Current Outpatient Medications:   •  allopurinol (ZYLOPRIM) 300 MG tablet, Take 1 tablet by mouth Daily., Disp: 90 tablet, Rfl: 1  •  BREWERS YEAST PO, Take  by mouth., Disp: , Rfl:   •  colchicine 0.6 MG tablet, Take 0.6 mg by mouth 2 (Two) Times a Day As Needed for Muscle / Joint Pain., Disp: , Rfl:   •  famotidine (PEPCID) 20 MG tablet, Take 20 mg by mouth 2 (Two) Times a Day., Disp: , Rfl:   •  furosemide (LASIX) 20 MG tablet, Take 20 mg by mouth Every Other Day., Disp: , Rfl:   •  losartan (COZAAR) 25 MG tablet, Take 1 tablet by mouth Daily., Disp: , Rfl:   •  rivaroxaban (XARELTO) 15 MG tablet, Take 15 mg by mouth Daily., Disp: , Rfl:   •  vitamin B-12 (CYANOCOBALAMIN) 1000 MCG tablet, Take 1,000 mcg by mouth Daily., Disp: , Rfl:   •  chlorhexidine (PERIDEX) 0.12 % solution, Apply 15 mL to the mouth or throat 2 (Two) Times a Day., Disp: , Rfl:   •  melatonin 5 MG tablet tablet, Take 5 mg by mouth., Disp: , Rfl:   •  sennosides-docusate (senna-docusate sodium) 8.6-50 MG per tablet, Take 1 tablet by mouth Daily., Disp: , Rfl:     Social History     Socioeconomic History   • Marital status:      Spouse name: Not on file   • Number of children: Not on file   • Years of education: Not on file   • Highest education level: Not on file   Tobacco Use   • Smoking status: Former Smoker     Quit date:      Years since quittin.5   • Smokeless tobacco: Never Used   Vaping Use   • Vaping Use: Never used   Substance and Sexual Activity   • Alcohol use: Yes     Comment: occasionally   • Drug use: Never   • Sexual activity: Defer  "      Family History   Problem Relation Age of Onset   • Parkinsonism Mother    • Alcohol abuse Father          Review of Systems   General: denies fever, chills, anorexia, weight loss  Eyes: denies blurring, diplopia  Ear/Nose/Throat: denies ear pain, nosebleeds, hoarseness  Cardiovascular: See HPI  Respiratory: denies excessive sputum, hemoptysis, wheezing  Gastrointestinal: denies nausea, vomiting, change in bowel habits, abdominal pain  Genitourinary: Has mild nocturia but no hematuria dysuria.  Musculoskeletal: Modest generalized arthralgias.  Complains of generalized lower extremity weakness, which he says is improving.  Skin: denies rashes, itching, suspicious lesions  Neurologic: denies focal neuro deficits  Psychiatric: denies depression, anxiety  Endocrine: denies cold intolerance, heat intolerance  Hematologic/Lymphatic: denies abnormal bruising, bleeding  Allergic/Immunologic: denies urticaria or persistent infections      Objective     Visit Vitals  /68   Pulse 99   Ht 188 cm (74\")   Wt 82.1 kg (181 lb)   SpO2 97%   BMI 23.24 kg/m²           Physical Exam  General:      Very pleasant, well-nourished, well developed,, in no acute distress.    Head:     normocephalic and atraumatic.    Eyes:    PERRL/EOM intact, conjunctiva and sclera clear with out nystagmus.    Neck:    no jvd or bruits  Chest Wall:    no deformities   Lungs:    clear bilaterally to auscultation with adequate global airflow   Heart:    non-displaced PMI; regular rate and rhythm, normal S1, S2 without murmurs, rubs, or gallops  Abdomen:  Soft, nontender without HSM  Msk:     Status post right index finger amputation from remote traumatic injury-well-healed,; adequate R OM and remaining joints  Pulses:    pulses normal in all 4 extremities.    Extremities:    no clubbing, cyanosis, edema   Neurologic:    no focal sensory or motor deficits  Skin:    intact without lesions or rashes.    Psych:    alert and cooperative; normal mood and " affect; normal attention span and concentration.            ECG 12 Lead    Date/Time: 7/6/2021 10:14 AM  Performed by: AGUSTO Curran MD  Authorized by: AGUSTO Curran MD   Comparison: compared with previous ECG from 5/17/2021  Similar to previous ECG  Rhythm: atrial fibrillation and paced    Clinical impression: abnormal EKG  Comments: Underlying atrial fibrillation with 100% ventricular paced rhythm              Assessment:   Problems Addressed this Visit        Other    Permanent atrial fibrillation (CMS/HCC)  -Sufficiently rate controlled, and biventricular paced      Takotsubo cardiomyopathy  -Currently well compensated, asymptomatic      Acute on chronic systolic congestive heart failure (CMS/HCC) - Primary  -Currently hemodynamically well compensated, asymptomatic    Status post BiV ICD/pacemaker in situ  -Satisfactory device site and functioning      Diagnoses       Codes Comments    Acute on chronic systolic congestive heart failure (CMS/HCC)    -  Primary ICD-10-CM: I50.23  ICD-9-CM: 428.23, 428.0     Takotsubo cardiomyopathy     ICD-10-CM: I51.81  ICD-9-CM: 429.83     Permanent atrial fibrillation (CMS/HCC)     ICD-10-CM: I48.21  ICD-9-CM: 427.31             Plan:  Continue current medications as listed reviewed in detail.  Return to clinic 4 months or sooner if needed for follow-up visit and device interrogation.    AGUSTO Curran MD  7/7/2021 18:04 EDT    This report was generated using the Dragon voice recognition system.

## 2021-07-06 ENCOUNTER — OFFICE VISIT (OUTPATIENT)
Dept: CARDIOLOGY | Facility: CLINIC | Age: 81
End: 2021-07-06

## 2021-07-06 VITALS
BODY MASS INDEX: 23.23 KG/M2 | SYSTOLIC BLOOD PRESSURE: 148 MMHG | HEIGHT: 74 IN | DIASTOLIC BLOOD PRESSURE: 68 MMHG | HEART RATE: 99 BPM | OXYGEN SATURATION: 97 % | WEIGHT: 181 LBS

## 2021-07-06 DIAGNOSIS — I51.81 TAKOTSUBO CARDIOMYOPATHY: ICD-10-CM

## 2021-07-06 DIAGNOSIS — I48.21 PERMANENT ATRIAL FIBRILLATION (HCC): ICD-10-CM

## 2021-07-06 DIAGNOSIS — I50.23 ACUTE ON CHRONIC SYSTOLIC CONGESTIVE HEART FAILURE (HCC): Primary | ICD-10-CM

## 2021-07-06 PROCEDURE — 99213 OFFICE O/P EST LOW 20 MIN: CPT | Performed by: INTERNAL MEDICINE

## 2021-07-06 PROCEDURE — 93000 ELECTROCARDIOGRAM COMPLETE: CPT | Performed by: INTERNAL MEDICINE

## 2021-07-16 PROCEDURE — 93296 REM INTERROG EVL PM/IDS: CPT | Performed by: NURSE PRACTITIONER

## 2021-07-16 PROCEDURE — 93295 DEV INTERROG REMOTE 1/2/MLT: CPT | Performed by: NURSE PRACTITIONER

## 2021-07-26 NOTE — TELEPHONE ENCOUNTER
PT IS NEEDING A REFILL FOR LOSARTAN 25 MG. PHARMACY IS [facility]

## 2021-07-27 RX ORDER — LOSARTAN POTASSIUM 25 MG/1
25 TABLET ORAL DAILY
Qty: 90 TABLET | Refills: 1 | Status: SHIPPED | OUTPATIENT
Start: 2021-07-27 | End: 2021-11-02

## 2021-07-30 ENCOUNTER — TELEPHONE (OUTPATIENT)
Dept: FAMILY MEDICINE CLINIC | Facility: CLINIC | Age: 81
End: 2021-07-30

## 2021-07-30 ENCOUNTER — HOSPITAL ENCOUNTER (EMERGENCY)
Facility: HOSPITAL | Age: 81
Discharge: HOME | End: 2021-07-30
Admitting: EMERGENCY MEDICINE

## 2021-07-30 ENCOUNTER — APPOINTMENT (OUTPATIENT)
Dept: GENERAL RADIOLOGY | Facility: HOSPITAL | Age: 81
End: 2021-07-30

## 2021-07-30 VITALS
BODY MASS INDEX: 24.02 KG/M2 | OXYGEN SATURATION: 99 % | TEMPERATURE: 97.8 F | HEART RATE: 85 BPM | DIASTOLIC BLOOD PRESSURE: 76 MMHG | HEIGHT: 74 IN | RESPIRATION RATE: 16 BRPM | WEIGHT: 187.17 LBS | SYSTOLIC BLOOD PRESSURE: 143 MMHG

## 2021-07-30 DIAGNOSIS — R06.00 DYSPNEA, UNSPECIFIED TYPE: Primary | ICD-10-CM

## 2021-07-30 LAB
ALBUMIN SERPL-MCNC: 3.5 G/DL (ref 3.5–5.2)
ALBUMIN/GLOB SERPL: 1.3 G/DL
ALP SERPL-CCNC: 90 U/L (ref 39–117)
ALT SERPL W P-5'-P-CCNC: 16 U/L (ref 1–41)
ANION GAP SERPL CALCULATED.3IONS-SCNC: 10 MMOL/L (ref 5–15)
AST SERPL-CCNC: 25 U/L (ref 1–40)
B PARAPERT DNA SPEC QL NAA+PROBE: NOT DETECTED
B PERT DNA SPEC QL NAA+PROBE: NOT DETECTED
BASOPHILS # BLD AUTO: 0.1 10*3/MM3 (ref 0–0.2)
BASOPHILS NFR BLD AUTO: 2 % (ref 0–1.5)
BILIRUB SERPL-MCNC: 1.5 MG/DL (ref 0–1.2)
BUN SERPL-MCNC: 20 MG/DL (ref 8–23)
BUN/CREAT SERPL: 17.1 (ref 7–25)
C PNEUM DNA NPH QL NAA+NON-PROBE: NOT DETECTED
CALCIUM SPEC-SCNC: 8.9 MG/DL (ref 8.6–10.5)
CHLORIDE SERPL-SCNC: 102 MMOL/L (ref 98–107)
CO2 SERPL-SCNC: 26 MMOL/L (ref 22–29)
CREAT SERPL-MCNC: 1.17 MG/DL (ref 0.76–1.27)
DEPRECATED RDW RBC AUTO: 51.2 FL (ref 37–54)
EOSINOPHIL # BLD AUTO: 0 10*3/MM3 (ref 0–0.4)
EOSINOPHIL NFR BLD AUTO: 0.6 % (ref 0.3–6.2)
ERYTHROCYTE [DISTWIDTH] IN BLOOD BY AUTOMATED COUNT: 15.4 % (ref 12.3–15.4)
FLUAV SUBTYP SPEC NAA+PROBE: NOT DETECTED
FLUBV RNA ISLT QL NAA+PROBE: NOT DETECTED
GFR SERPL CREATININE-BSD FRML MDRD: 60 ML/MIN/1.73
GLOBULIN UR ELPH-MCNC: 2.8 GM/DL
GLUCOSE SERPL-MCNC: 156 MG/DL (ref 65–99)
HADV DNA SPEC NAA+PROBE: NOT DETECTED
HCOV 229E RNA SPEC QL NAA+PROBE: NOT DETECTED
HCOV HKU1 RNA SPEC QL NAA+PROBE: NOT DETECTED
HCOV NL63 RNA SPEC QL NAA+PROBE: NOT DETECTED
HCOV OC43 RNA SPEC QL NAA+PROBE: NOT DETECTED
HCT VFR BLD AUTO: 42.5 % (ref 37.5–51)
HGB BLD-MCNC: 13.7 G/DL (ref 13–17.7)
HMPV RNA NPH QL NAA+NON-PROBE: NOT DETECTED
HPIV1 RNA SPEC QL NAA+PROBE: NOT DETECTED
HPIV2 RNA SPEC QL NAA+PROBE: NOT DETECTED
HPIV3 RNA NPH QL NAA+PROBE: NOT DETECTED
HPIV4 P GENE NPH QL NAA+PROBE: NOT DETECTED
LYMPHOCYTES # BLD AUTO: 0.7 10*3/MM3 (ref 0.7–3.1)
LYMPHOCYTES NFR BLD AUTO: 12.4 % (ref 19.6–45.3)
M PNEUMO IGG SER IA-ACNC: NOT DETECTED
MCH RBC QN AUTO: 31 PG (ref 26.6–33)
MCHC RBC AUTO-ENTMCNC: 32.4 G/DL (ref 31.5–35.7)
MCV RBC AUTO: 95.8 FL (ref 79–97)
MONOCYTES # BLD AUTO: 0.5 10*3/MM3 (ref 0.1–0.9)
MONOCYTES NFR BLD AUTO: 8.4 % (ref 5–12)
NEUTROPHILS NFR BLD AUTO: 4.4 10*3/MM3 (ref 1.7–7)
NEUTROPHILS NFR BLD AUTO: 76.6 % (ref 42.7–76)
NRBC BLD AUTO-RTO: 0.1 /100 WBC (ref 0–0.2)
NT-PROBNP SERPL-MCNC: 1461 PG/ML (ref 0–1800)
PLATELET # BLD AUTO: 198 10*3/MM3 (ref 140–450)
PMV BLD AUTO: 9.1 FL (ref 6–12)
POTASSIUM SERPL-SCNC: 4.2 MMOL/L (ref 3.5–5.2)
PROT SERPL-MCNC: 6.3 G/DL (ref 6–8.5)
RBC # BLD AUTO: 4.44 10*6/MM3 (ref 4.14–5.8)
RHINOVIRUS RNA SPEC NAA+PROBE: NOT DETECTED
RSV RNA NPH QL NAA+NON-PROBE: NOT DETECTED
SARS-COV-2 RNA NPH QL NAA+NON-PROBE: NOT DETECTED
SODIUM SERPL-SCNC: 138 MMOL/L (ref 136–145)
TROPONIN T SERPL-MCNC: <0.01 NG/ML (ref 0–0.03)
WBC # BLD AUTO: 5.8 10*3/MM3 (ref 3.4–10.8)

## 2021-07-30 PROCEDURE — 80053 COMPREHEN METABOLIC PANEL: CPT | Performed by: PHYSICIAN ASSISTANT

## 2021-07-30 PROCEDURE — 71045 X-RAY EXAM CHEST 1 VIEW: CPT

## 2021-07-30 PROCEDURE — 99283 EMERGENCY DEPT VISIT LOW MDM: CPT

## 2021-07-30 PROCEDURE — 84484 ASSAY OF TROPONIN QUANT: CPT | Performed by: PHYSICIAN ASSISTANT

## 2021-07-30 PROCEDURE — 96374 THER/PROPH/DIAG INJ IV PUSH: CPT

## 2021-07-30 PROCEDURE — 0202U NFCT DS 22 TRGT SARS-COV-2: CPT | Performed by: PHYSICIAN ASSISTANT

## 2021-07-30 PROCEDURE — 25010000002 FUROSEMIDE PER 20 MG: Performed by: PHYSICIAN ASSISTANT

## 2021-07-30 PROCEDURE — 93005 ELECTROCARDIOGRAM TRACING: CPT | Performed by: PHYSICIAN ASSISTANT

## 2021-07-30 PROCEDURE — 93005 ELECTROCARDIOGRAM TRACING: CPT

## 2021-07-30 PROCEDURE — 85025 COMPLETE CBC W/AUTO DIFF WBC: CPT | Performed by: PHYSICIAN ASSISTANT

## 2021-07-30 PROCEDURE — 83880 ASSAY OF NATRIURETIC PEPTIDE: CPT | Performed by: PHYSICIAN ASSISTANT

## 2021-07-30 RX ORDER — SODIUM CHLORIDE 0.9 % (FLUSH) 0.9 %
10 SYRINGE (ML) INJECTION AS NEEDED
Status: DISCONTINUED | OUTPATIENT
Start: 2021-07-30 | End: 2021-07-30 | Stop reason: HOSPADM

## 2021-07-30 RX ORDER — FUROSEMIDE 10 MG/ML
40 INJECTION INTRAMUSCULAR; INTRAVENOUS ONCE
Status: COMPLETED | OUTPATIENT
Start: 2021-07-30 | End: 2021-07-30

## 2021-07-30 RX ADMIN — FUROSEMIDE 40 MG: 10 INJECTION, SOLUTION INTRAMUSCULAR; INTRAVENOUS at 11:31

## 2021-07-30 NOTE — TELEPHONE ENCOUNTER
Magdy has a slot on 8/6/21 if he wants to see him instead this is an er f/u that says to f/u with cardio and pcp

## 2021-08-01 LAB — QT INTERVAL: 492 MS

## 2021-08-06 ENCOUNTER — LAB (OUTPATIENT)
Dept: FAMILY MEDICINE CLINIC | Facility: CLINIC | Age: 81
End: 2021-08-06

## 2021-08-06 ENCOUNTER — OFFICE VISIT (OUTPATIENT)
Dept: FAMILY MEDICINE CLINIC | Facility: CLINIC | Age: 81
End: 2021-08-06

## 2021-08-06 VITALS
BODY MASS INDEX: 23.49 KG/M2 | TEMPERATURE: 97.5 F | HEART RATE: 69 BPM | WEIGHT: 183 LBS | OXYGEN SATURATION: 98 % | DIASTOLIC BLOOD PRESSURE: 80 MMHG | HEIGHT: 74 IN | SYSTOLIC BLOOD PRESSURE: 130 MMHG | RESPIRATION RATE: 16 BRPM

## 2021-08-06 DIAGNOSIS — I50.23 ACUTE ON CHRONIC SYSTOLIC CONGESTIVE HEART FAILURE (HCC): ICD-10-CM

## 2021-08-06 DIAGNOSIS — I42.0 DILATED CARDIOMYOPATHY (HCC): Primary | ICD-10-CM

## 2021-08-06 DIAGNOSIS — R29.898 WEAKNESS OF BOTH LOWER EXTREMITIES: ICD-10-CM

## 2021-08-06 LAB
ANION GAP SERPL CALCULATED.3IONS-SCNC: 10.8 MMOL/L (ref 5–15)
BUN SERPL-MCNC: 24 MG/DL (ref 8–23)
BUN/CREAT SERPL: 21.6 (ref 7–25)
CALCIUM SPEC-SCNC: 9.3 MG/DL (ref 8.6–10.5)
CHLORIDE SERPL-SCNC: 105 MMOL/L (ref 98–107)
CO2 SERPL-SCNC: 28.2 MMOL/L (ref 22–29)
CREAT SERPL-MCNC: 1.11 MG/DL (ref 0.76–1.27)
CRP SERPL-MCNC: <0.3 MG/DL (ref 0–0.5)
DEPRECATED RDW RBC AUTO: 46.8 FL (ref 37–54)
ERYTHROCYTE [DISTWIDTH] IN BLOOD BY AUTOMATED COUNT: 13.4 % (ref 12.3–15.4)
ERYTHROCYTE [SEDIMENTATION RATE] IN BLOOD: 3 MM/HR (ref 0–20)
GFR SERPL CREATININE-BSD FRML MDRD: 63 ML/MIN/1.73
GLUCOSE SERPL-MCNC: 124 MG/DL (ref 65–99)
HCT VFR BLD AUTO: 43.7 % (ref 37.5–51)
HGB BLD-MCNC: 14 G/DL (ref 13–17.7)
MCH RBC QN AUTO: 30.7 PG (ref 26.6–33)
MCHC RBC AUTO-ENTMCNC: 32 G/DL (ref 31.5–35.7)
MCV RBC AUTO: 95.8 FL (ref 79–97)
PLATELET # BLD AUTO: 201 10*3/MM3 (ref 140–450)
PMV BLD AUTO: 10.9 FL (ref 6–12)
POTASSIUM SERPL-SCNC: 3.9 MMOL/L (ref 3.5–5.2)
RBC # BLD AUTO: 4.56 10*6/MM3 (ref 4.14–5.8)
SODIUM SERPL-SCNC: 144 MMOL/L (ref 136–145)
WBC # BLD AUTO: 6.48 10*3/MM3 (ref 3.4–10.8)

## 2021-08-06 PROCEDURE — 80048 BASIC METABOLIC PNL TOTAL CA: CPT | Performed by: FAMILY MEDICINE

## 2021-08-06 PROCEDURE — 36415 COLL VENOUS BLD VENIPUNCTURE: CPT | Performed by: FAMILY MEDICINE

## 2021-08-06 PROCEDURE — 99214 OFFICE O/P EST MOD 30 MIN: CPT | Performed by: FAMILY MEDICINE

## 2021-08-06 PROCEDURE — 85652 RBC SED RATE AUTOMATED: CPT | Performed by: FAMILY MEDICINE

## 2021-08-06 PROCEDURE — 86140 C-REACTIVE PROTEIN: CPT | Performed by: FAMILY MEDICINE

## 2021-08-06 PROCEDURE — 85027 COMPLETE CBC AUTOMATED: CPT | Performed by: FAMILY MEDICINE

## 2021-08-06 NOTE — PROGRESS NOTES
Chief Complaint   Patient presents with   • Shortness of Breath     ER FOLLOW UP   • Leg Pain   • Hip Pain     HPI  Hank Ponce is a 81 y.o. male that presents for   Chief Complaint   Patient presents with   • Shortness of Breath     ER FOLLOW UP   • Leg Pain   • Hip Pain     SOB: patient presented to [place] ED on 7/30 w/ complaint of progressive SOB that was worse w/ exertion and lying flat, better w/ sitting up. He also had LE edema. Work-up revealed normal BNP but with pulmonary edema on CXR. Symptoms still felt to be secondary to CHF exacerbation and patient was discharged w/ instruction to increase his lasix from 20 daily to 20 BID.    Patient reports breathing has improved w/ lasix and has now returned to lasix 20 daily. No cough or fever. He reports LE edema is stable and breathing is back to normal.     Leg weakness: patient reports weakness to his leg and weakness/pain in his low back. He reports legs are stiff and weak in the morning but improves as the day goes on. He denies any falls or gait instability.    Review of Systems   Constitutional: Negative for chills, fever and unexpected weight loss.   Respiratory: Positive for shortness of breath.    Cardiovascular: Positive for leg swelling. Negative for chest pain and palpitations.   Musculoskeletal: Positive for arthralgias and gait problem.   Neurological: Positive for weakness.     The following portions of the patient's history were reviewed and updated as appropriate: problem list, past medical history, past surgical history, allergies, current medication    Problem List Tab  Patient History Tab  Immunizations Tab  Medications Tab  Chart Review Tab  Care Everywhere Tab  Synopsis Tab    PE  Vitals:    08/06/21 0837   BP: 130/80   Pulse: 69   Resp: 16   Temp: 97.5 °F (36.4 °C)   SpO2: 98%     Body mass index is 23.5 kg/m².  General: Well nourished, NAD  Head: AT/NC  Eyes: EOMI, anicteric sclera  Resp: Bibasilar crackles, SCR, BS equal  CV:  RRR w/ 3/6 BERTIN at LMCL; 2+ pulses  GI: Soft, NT/ND, +BS  MSK: FROM, no deformity, trace LE edema to ankles  Skin: Warm, dry, intact  Neuro: Alert and oriented. No focal deficits. 5/5 strength in bilateral LEs  Psych: Appropriate mood and affect    Imaging  CT Head Without Contrast    Result Date: 4/18/2021  1.No significant change in appearance of the head from the earlier study. 2.Fractures of the maxillofacial area are again noted. There is density within the paranasal sinuses suggesting underlying blood.  Electronically Signed By-Moises Recinos MD On:4/18/2021 5:01 PM This report was finalized on [id number] by  Moises Recinos MD.    CT Head Without Contrast    Result Date: 4/18/2021  1.Cerebral atrophy. 2.The probably are small matter changes involving the cerebral hemispheres which could reflect more chronic small vessel ischemic change. 3.There are fractures of the nasal area and maxillary sinuses. There is density within the paranasal sinuses that may relate to blood associated with the injury. Please see CT report of the maxillofacial area for more detailed findings.  Electronically Signed By-Moises Recinos MD On:4/18/2021 2:45 PM This report was finalized on [id number] by  Moises Recinos MD.    CT Cervical Spine Without Contrast    Result Date: 4/18/2021  1.There are multilevel degenerative changes involving the cervical spine. 2.Density right apical area that may be secondary to pneumonia. It looks like there probably is a right pleural effusion.  Electronically Signed By-Moises Recinos MD On:4/18/2021 3:08 PM This report was finalized on [id number] by  Moises Recinos MD.    XR Chest 1 View    Result Date: 7/30/2021   1. Moderate to marked cardiac enlargement appears stable since 4/17/2021. 2. Features suggestive of interstitial and alveolar pulmonary edema with more confluent edema, resolving pneumonia in the perihilar left lung and right lower lobe. 3. Trace right basilar pleural effusion.  Electronically  Signed By-Kathie Oshea MD On:7/30/2021 10:30 AM This report was finalized on [id number] by  Kathie Oshea MD.    XR Chest 1 View    Result Date: 4/18/2021  1.Interval change with respect to the chest with findings that may relate to multifocal pneumonia. An underlying pleural effusion at the left base not excluded. Correlation with clinical findings recommended. Depending on clinical findings additional imaging with CT may be warranted.  Electronically Signed By-Moises Recinos MD On:4/18/2021 7:46 AM This report was finalized on [id number] by  Moises Recinos MD.    CT Facial Bones Without Contrast    Result Date: 4/18/2021  1. Fractures of the maxillofacial area as described. This probably fits under the LeFort 2 classification. There is density within the paranasal sinuses which could reflect blood.  Electronically Signed By-Moises Recinos MD On:4/18/2021 3:04 PM This report was finalized on [id number] by  Moises Recinos MD.      Assessment/Plan   Hank Ponce is a 81 y.o. male that presents for   Chief Complaint   Patient presents with   • Shortness of Breath     ER FOLLOW UP   • Leg Pain   • Hip Pain     Diagnoses and all orders for this visit:    1. Dilated cardiomyopathy (CMS/HCC) (Primary)  -     CBC (No Diff)  -     Basic Metabolic Panel  -     C-reactive Protein  -     Sedimentation Rate    2. Acute on chronic systolic congestive heart failure (CMS/HCC): 4/2021 echo reviewed with moderately dilated LV and RV as well as severe tricuspid and mitral regurg.  EF 31 to 35%.  Crackles on exam today concerning for some persistent fluid overload  -     CBC (No Diff)  -     Basic Metabolic Panel  -     C-reactive Protein  -     Sedimentation Rate  - Continue Lasix 20 twice daily for an additional 3 days before reducing back to daily    3. Weakness of both lower extremities: Normal exam  -     CBC (No Diff)  -     Basic Metabolic Panel  -     C-reactive Protein  -     Sedimentation Rate  - Counseled  regarding importance of regular exercise       No follow-ups on file.

## 2021-08-10 NOTE — PROGRESS NOTES
Left detailed voicemail for Hank Ponce as per verbal release. Advised Hank Ponce to call the office with any additional questions.

## 2021-08-17 ENCOUNTER — TELEPHONE (OUTPATIENT)
Dept: FAMILY MEDICINE CLINIC | Facility: CLINIC | Age: 81
End: 2021-08-17

## 2021-08-17 RX ORDER — ALLOPURINOL 300 MG/1
300 TABLET ORAL DAILY
Qty: 90 TABLET | Refills: 1 | Status: SHIPPED | OUTPATIENT
Start: 2021-08-17 | End: 2022-02-14

## 2021-08-25 ENCOUNTER — TELEPHONE (OUTPATIENT)
Dept: FAMILY MEDICINE CLINIC | Facility: CLINIC | Age: 81
End: 2021-08-25

## 2021-08-26 ENCOUNTER — TELEPHONE (OUTPATIENT)
Dept: FAMILY MEDICINE CLINIC | Facility: CLINIC | Age: 81
End: 2021-08-26

## 2021-08-26 NOTE — TELEPHONE ENCOUNTER
Caller: Hank Ponce    Relationship: Self    Best call back number: [phone]    Medication needed:   XARELTO 15MG  ONE TABLET ONE TIME A DAY     When do you need the refill by: 8/30/21    What additional details did the patient provide when requesting the medication: WILL NEED THIS FILLED   Does the patient have less than a 3 day supply:  [x] Yes  [] No    What is the [facility] [place] [address] [phone] [place] [phone]

## 2021-10-13 ENCOUNTER — OFFICE VISIT (OUTPATIENT)
Dept: FAMILY MEDICINE CLINIC | Facility: CLINIC | Age: 81
End: 2021-10-13

## 2021-10-13 VITALS
DIASTOLIC BLOOD PRESSURE: 80 MMHG | SYSTOLIC BLOOD PRESSURE: 132 MMHG | WEIGHT: 183 LBS | TEMPERATURE: 96.8 F | HEIGHT: 74 IN | HEART RATE: 60 BPM | RESPIRATION RATE: 16 BRPM | BODY MASS INDEX: 23.49 KG/M2

## 2021-10-13 DIAGNOSIS — M62.830 LUMBAR PARASPINAL MUSCLE SPASM: Primary | ICD-10-CM

## 2021-10-13 PROCEDURE — 99213 OFFICE O/P EST LOW 20 MIN: CPT | Performed by: HOSPITALIST

## 2021-10-13 RX ORDER — METHYLPREDNISOLONE 4 MG/1
TABLET ORAL
Qty: 1 EACH | Refills: 0 | Status: SHIPPED | OUTPATIENT
Start: 2021-10-13 | End: 2021-11-02

## 2021-10-13 RX ORDER — CYCLOBENZAPRINE HCL 5 MG
10 TABLET ORAL 3 TIMES DAILY PRN
Qty: 20 TABLET | Refills: 0 | Status: SHIPPED | OUTPATIENT
Start: 2021-10-13 | End: 2021-10-20 | Stop reason: SDUPTHER

## 2021-10-13 RX ORDER — MELOXICAM 7.5 MG/1
7.5 TABLET ORAL DAILY
Qty: 5 TABLET | Refills: 0 | Status: SHIPPED | OUTPATIENT
Start: 2021-10-13 | End: 2021-10-18

## 2021-10-13 NOTE — PROGRESS NOTES
"Subjective   Hank Ponce is a 81 y.o. male.     Subjective / HPI  Patient complaining of having intractable back pain for last 10 days, not getting better, no numbness or weakness in leg. Straight leg is negative    Review of Systems    Objective     /80 (BP Location: Right arm)   Pulse 60   Temp 96.8 °F (36 °C) (Infrared)   Resp 16   Ht 188 cm (74\")   Wt 83 kg (183 lb)   BMI 23.50 kg/m²      Physical Exam   Positive for muscle spasm involving the lumbar region on right side.  Straight leg is negative.     Procedures       Assessment/Plan   Diagnoses and all orders for this visit:    1. Lumbar paraspinal muscle spasm (Primary)  -     Ambulatory Referral to Physical Therapy Evaluate and treat, POST OP; Stretching, ROM, Strengthening    Other orders  -     cyclobenzaprine (FLEXERIL) 5 MG tablet; Take 2 tablets by mouth 3 (Three) Times a Day As Needed for Muscle Spasms.  Dispense: 20 tablet; Refill: 0  -     methylPREDNISolone (MEDROL) 4 MG dose pack; Take as directed on package instructions.  Dispense: 1 each; Refill: 0  -     meloxicam (Mobic) 7.5 MG tablet; Take 1 tablet by mouth Daily for 5 days.  Dispense: 5 tablet; Refill: 0                "

## 2021-10-13 NOTE — PATIENT INSTRUCTIONS
Low Back Sprain or Strain Rehab  Ask your health care provider which exercises are safe for you. Do exercises exactly as told by your health care provider and adjust them as directed. It is normal to feel mild stretching, pulling, tightness, or discomfort as you do these exercises. Stop right away if you feel sudden pain or your pain gets worse. Do not begin these exercises until told by your health care provider.  Stretching and range-of-motion exercises  These exercises warm up your muscles and joints and improve the movement and flexibility of your back. These exercises also help to relieve pain, numbness, and tingling.  Lumbar rotation    1. Lie on your back on a firm surface and bend your knees.  2. Straighten your arms out to your sides so each arm forms a 90-degree angle (right angle) with a side of your body.  3. Slowly move (rotate) both of your knees to one side of your body until you feel a stretch in your lower back (lumbar). Try not to let your shoulders lift off the floor.  4. Hold this position for __________ seconds.  5. Tense your abdominal muscles and slowly move your knees back to the starting position.  6. Repeat this exercise on the other side of your body.  Repeat __________ times. Complete this exercise __________ times a day.  Single knee to chest    1. Lie on your back on a firm surface with both legs straight.  2. Bend one of your knees. Use your hands to move your knee up toward your chest until you feel a gentle stretch in your lower back and buttock.  ? Hold your leg in this position by holding on to the front of your knee.  ? Keep your other leg as straight as possible.  3. Hold this position for __________ seconds.  4. Slowly return to the starting position.  5. Repeat with your other leg.  Repeat __________ times. Complete this exercise __________ times a day.  Prone extension on elbows    1. Lie on your abdomen on a firm surface (prone position).  2. Prop yourself up on your  elbows.  3. Use your arms to help lift your chest up until you feel a gentle stretch in your abdomen and your lower back.  ? This will place some of your body weight on your elbows. If this is uncomfortable, try stacking pillows under your chest.  ? Your hips should stay down, against the surface that you are lying on. Keep your hip and back muscles relaxed.  4. Hold this position for __________ seconds.  5. Slowly relax your upper body and return to the starting position.  Repeat __________ times. Complete this exercise __________ times a day.  Strengthening exercises  These exercises build strength and endurance in your back. Endurance is the ability to use your muscles for a long time, even after they get tired.  Pelvic tilt  This exercise strengthens the muscles that lie deep in the abdomen.  1. Lie on your back on a firm surface. Bend your knees and keep your feet flat on the floor.  2. Tense your abdominal muscles. Tip your pelvis up toward the ceiling and flatten your lower back into the floor.  ? To help with this exercise, you may place a small towel under your lower back and try to push your back into the towel.  3. Hold this position for __________ seconds.  4. Let your muscles relax completely before you repeat this exercise.  Repeat __________ times. Complete this exercise __________ times a day.  Alternating arm and leg raises    1. Get on your hands and knees on a firm surface. If you are on a hard floor, you may want to use padding, such as an exercise mat, to cushion your knees.  2. Line up your arms and legs. Your hands should be directly below your shoulders, and your knees should be directly below your hips.  3. Lift your left leg behind you. At the same time, raise your right arm and straighten it in front of you.  ? Do not lift your leg higher than your hip.  ? Do not lift your arm higher than your shoulder.  ? Keep your abdominal and back muscles tight.  ? Keep your hips facing the  ground.  ? Do not arch your back.  ? Keep your balance carefully, and do not hold your breath.  4. Hold this position for __________ seconds.  5. Slowly return to the starting position.  6. Repeat with your right leg and your left arm.  Repeat __________ times. Complete this exercise __________ times a day.  Abdominal set with straight leg raise    1. Lie on your back on a firm surface.  2. Bend one of your knees and keep your other leg straight.  3. Tense your abdominal muscles and lift your straight leg up, 4-6 inches (10-15 cm) off the ground.  4. Keep your abdominal muscles tight and hold this position for __________ seconds.  ? Do not hold your breath.  ? Do not arch your back. Keep it flat against the ground.  5. Keep your abdominal muscles tense as you slowly lower your leg back to the starting position.  6. Repeat with your other leg.  Repeat __________ times. Complete this exercise __________ times a day.  Single leg lower with bent knees  1. Lie on your back on a firm surface.  2. Tense your abdominal muscles and lift your feet off the floor, one foot at a time, so your knees and hips are bent in 90-degree angles (right angles).  ? Your knees should be over your hips and your lower legs should be parallel to the floor.  3. Keeping your abdominal muscles tense and your knee bent, slowly lower one of your legs so your toe touches the ground.  4. Lift your leg back up to return to the starting position.  ? Do not hold your breath.  ? Do not let your back arch. Keep your back flat against the ground.  5. Repeat with your other leg.  Repeat __________ times. Complete this exercise __________ times a day.  Posture and body mechanics  Good posture and healthy body mechanics can help to relieve stress in your body's tissues and joints. Body mechanics refers to the movements and positions of your body while you do your daily activities. Posture is part of body mechanics. Good posture means:  · Your spine is in its  natural S-curve position (neutral).  · Your shoulders are pulled back slightly.  · Your head is not tipped forward.  Follow these guidelines to improve your posture and body mechanics in your everyday activities.  Standing    · When standing, keep your spine neutral and your feet about hip width apart. Keep a slight bend in your knees. Your ears, shoulders, and hips should line up.  · When you do a task in which you  one place for a long time, place one foot up on a stable object that is 2-4 inches (5-10 cm) high, such as a footstool. This helps keep your spine neutral.    Sitting    · When sitting, keep your spine neutral and keep your feet flat on the floor. Use a footrest, if necessary, and keep your thighs parallel to the floor. Avoid rounding your shoulders, and avoid tilting your head forward.  · When working at a desk or a computer, keep your desk at a height where your hands are slightly lower than your elbows. Slide your chair under your desk so you are close enough to maintain good posture.  · When working at a computer, place your monitor at a height where you are looking straight ahead and you do not have to tilt your head forward or downward to look at the screen.    Resting  · When lying down and resting, avoid positions that are most painful for you.  · If you have pain with activities such as sitting, bending, stooping, or squatting, lie in a position in which your body does not bend very much. For example, avoid curling up on your side with your arms and knees near your chest (fetal position).  · If you have pain with activities such as standing for a long time or reaching with your arms, lie with your spine in a neutral position and bend your knees slightly. Try the following positions:  ? Lying on your side with a pillow between your knees.  ? Lying on your back with a pillow under your knees.  Lifting    · When lifting objects, keep your feet at least shoulder width apart and tighten your  abdominal muscles.  · Bend your knees and hips and keep your spine neutral. It is important to lift using the strength of your legs, not your back. Do not lock your knees straight out.  · Always ask for help to lift heavy or awkward objects.    This information is not intended to replace advice given to you by your health care provider. Make sure you discuss any questions you have with your health care provider.  Document Revised: 04/10/2020 Document Reviewed: 01/09/2020  Elsevier Patient Education © 2021 [employer]

## 2021-10-15 PROCEDURE — 93296 REM INTERROG EVL PM/IDS: CPT | Performed by: NURSE PRACTITIONER

## 2021-10-15 PROCEDURE — 93295 DEV INTERROG REMOTE 1/2/MLT: CPT | Performed by: NURSE PRACTITIONER

## 2021-10-20 RX ORDER — CYCLOBENZAPRINE HCL 5 MG
10 TABLET ORAL 3 TIMES DAILY PRN
Qty: 20 TABLET | Refills: 0 | Status: SHIPPED | OUTPATIENT
Start: 2021-10-20 | End: 2021-10-27 | Stop reason: SDUPTHER

## 2021-10-27 ENCOUNTER — OFFICE VISIT (OUTPATIENT)
Dept: FAMILY MEDICINE CLINIC | Facility: CLINIC | Age: 81
End: 2021-10-27

## 2021-10-27 VITALS
RESPIRATION RATE: 18 BRPM | BODY MASS INDEX: 23.74 KG/M2 | WEIGHT: 185 LBS | TEMPERATURE: 97.2 F | HEART RATE: 69 BPM | DIASTOLIC BLOOD PRESSURE: 84 MMHG | HEIGHT: 74 IN | SYSTOLIC BLOOD PRESSURE: 130 MMHG | OXYGEN SATURATION: 91 %

## 2021-10-27 DIAGNOSIS — M54.40 ACUTE BILATERAL LOW BACK PAIN WITH SCIATICA, SCIATICA LATERALITY UNSPECIFIED: Primary | ICD-10-CM

## 2021-10-27 PROCEDURE — 99213 OFFICE O/P EST LOW 20 MIN: CPT | Performed by: HOSPITALIST

## 2021-10-27 RX ORDER — CYCLOBENZAPRINE HCL 5 MG
10 TABLET ORAL 3 TIMES DAILY PRN
Qty: 60 TABLET | Refills: 0 | Status: SHIPPED | OUTPATIENT
Start: 2021-10-27 | End: 2021-11-02 | Stop reason: SDUPTHER

## 2021-10-27 NOTE — PROGRESS NOTES
"Subjective   Hank Ponce is a 81 y.o. male.     Subjective / HPI  Patient feels lot better with back pain, says improved significantly. No new complaint.     Review of Systems    Objective     /84 (BP Location: Right arm, Patient Position: Sitting, Cuff Size: Adult)   Pulse 69   Temp 97.2 °F (36.2 °C)   Resp 18   Ht 188 cm (74\")   Wt 83.9 kg (185 lb)   SpO2 91%   BMI 23.75 kg/m²      Physical Exam  Vitals and nursing note reviewed.   Constitutional:       General: He is not in acute distress.     Appearance: Normal appearance. He is well-developed. He is not ill-appearing, toxic-appearing or diaphoretic.   HENT:      Head: Normocephalic and atraumatic.      Right Ear: Ear canal and external ear normal.      Left Ear: Ear canal and external ear normal.      Nose: Nose normal. No congestion or rhinorrhea.      Mouth/Throat:      Mouth: Mucous membranes are moist.      Pharynx: No oropharyngeal exudate.   Eyes:      General: No scleral icterus.        Right eye: No discharge.         Left eye: No discharge.      Extraocular Movements: Extraocular movements intact.      Conjunctiva/sclera: Conjunctivae normal.      Pupils: Pupils are equal, round, and reactive to light.   Neck:      Thyroid: No thyromegaly.      Vascular: No carotid bruit or JVD.      Trachea: No tracheal deviation.   Cardiovascular:      Rate and Rhythm: Normal rate and regular rhythm.      Pulses: Normal pulses.      Heart sounds: Normal heart sounds. No murmur heard.  No friction rub. No gallop.    Pulmonary:      Effort: Pulmonary effort is normal. No respiratory distress.      Breath sounds: Normal breath sounds. No stridor. No wheezing, rhonchi or rales.   Chest:      Chest wall: No tenderness.   Abdominal:      General: Bowel sounds are normal. There is no distension.      Palpations: Abdomen is soft. There is no mass.      Tenderness: There is no abdominal tenderness. There is no guarding or rebound.      Hernia: No " hernia is present.   Musculoskeletal:         General: No swelling, tenderness, deformity or signs of injury. Normal range of motion.      Cervical back: Normal range of motion and neck supple. No rigidity. No muscular tenderness.      Right lower leg: No edema.      Left lower leg: No edema.   Lymphadenopathy:      Cervical: No cervical adenopathy.   Skin:     General: Skin is warm and dry.      Coloration: Skin is not jaundiced or pale.      Findings: No bruising, erythema or rash.   Neurological:      General: No focal deficit present.      Mental Status: He is alert and oriented to person, place, and time. Mental status is at baseline.      Cranial Nerves: No cranial nerve deficit.      Sensory: No sensory deficit.      Motor: No weakness or abnormal muscle tone.      Coordination: Coordination normal.   Psychiatric:         Mood and Affect: Mood normal.         Behavior: Behavior normal.         Thought Content: Thought content normal.         Judgment: Judgment normal.         Procedures       Assessment/Plan   Diagnoses and all orders for this visit:    1. Acute bilateral low back pain with sciatica, sciatica laterality unspecified (Primary)  Comments:  improved significantly with conservative treatment, advised to continue to do exercise, flexrial as needed.     Other orders  -     cyclobenzaprine (FLEXERIL) 5 MG tablet; Take 2 tablets by mouth 3 (Three) Times a Day As Needed for Muscle Spasms.  Dispense: 60 tablet; Refill: 0

## 2021-11-02 ENCOUNTER — OFFICE VISIT (OUTPATIENT)
Dept: CARDIOLOGY | Facility: CLINIC | Age: 81
End: 2021-11-02

## 2021-11-02 VITALS
HEIGHT: 74 IN | DIASTOLIC BLOOD PRESSURE: 80 MMHG | HEART RATE: 72 BPM | BODY MASS INDEX: 23.69 KG/M2 | WEIGHT: 184.6 LBS | OXYGEN SATURATION: 97 % | SYSTOLIC BLOOD PRESSURE: 180 MMHG

## 2021-11-02 DIAGNOSIS — Z95.810 PRESENCE OF BIVENTRICULAR IMPLANTABLE CARDIOVERTER-DEFIBRILLATOR (ICD): ICD-10-CM

## 2021-11-02 DIAGNOSIS — I10 ESSENTIAL HYPERTENSION: ICD-10-CM

## 2021-11-02 DIAGNOSIS — I48.21 PERMANENT ATRIAL FIBRILLATION (HCC): Primary | ICD-10-CM

## 2021-11-02 DIAGNOSIS — I42.0 DILATED CARDIOMYOPATHY (HCC): ICD-10-CM

## 2021-11-02 DIAGNOSIS — I47.29 NONSUSTAINED VENTRICULAR TACHYCARDIA (HCC): ICD-10-CM

## 2021-11-02 PROCEDURE — 93284 PRGRMG EVAL IMPLANTABLE DFB: CPT | Performed by: NURSE PRACTITIONER

## 2021-11-02 PROCEDURE — 99214 OFFICE O/P EST MOD 30 MIN: CPT | Performed by: NURSE PRACTITIONER

## 2021-11-02 PROCEDURE — 93000 ELECTROCARDIOGRAM COMPLETE: CPT | Performed by: NURSE PRACTITIONER

## 2021-11-02 RX ORDER — CYCLOBENZAPRINE HCL 5 MG
10 TABLET ORAL 3 TIMES DAILY PRN
Qty: 60 TABLET | Refills: 0 | Status: SHIPPED | OUTPATIENT
Start: 2021-11-02 | End: 2021-11-17 | Stop reason: SDUPTHER

## 2021-11-02 RX ORDER — LOSARTAN POTASSIUM 25 MG/1
25 TABLET ORAL 2 TIMES DAILY
Qty: 180 TABLET | Refills: 1
Start: 2021-11-02 | End: 2021-12-16

## 2021-11-02 NOTE — TELEPHONE ENCOUNTER
Caller: Hank Ponce    Relationship: Self      Requested Prescriptions:   Requested Prescriptions     Pending Prescriptions Disp Refills   • cyclobenzaprine (FLEXERIL) 5 MG tablet 60 tablet 0     Sig: Take 2 tablets by mouth 3 (Three) Times a Day As Needed for Muscle Spasms.        Pharmacy where request should be sent: [facility]  [place], [place] - [address] - [phone]  - [phone]   [phone]    Additional details provided by patient: HANK IS OUT OF MEDICATION. HE SAYS [facility] IS NEEDING NEW PRESCRIPTION      Best call back number: [phone]     Does the patient have less than a 3 day supply:  [x] Yes  [] No    Jenny Pierre   11/02/21 09:32 EDT

## 2021-11-02 NOTE — PROGRESS NOTES
Cardiology Office Follow Up Visit      Primary Care Provider:  Bunny Curran MD    Reason for f/u:     Heart failure  Cardiomyopathy  Hypertension  Atrial fibrillation  Biventricular ICD      Subjective     CC:    Denies chest pain or dyspnea    History of Present Illness       Hank Ponce is a 81 y.o. male.  Patient is here today for scheduled follow-up.  He has a history of atrial fibrillation, cardiomyopathy and previous biventricular ICD implant.    Review his cardiac catheterization showed nonobstructive coronary artery disease    Patient has a past medical history of rheumatic fever at age 11.    Patient has been intolerant to amiodarone, Tikosyn and previously beta-blockers that he is taken for atrial fibrillation.  He did go on to have an AV node ablation by Dr. Ochoa.    Patient had a previous spontaneous fall with fracture of his maxillary sinus and the surrounding bony structures and was treated at [facility] and had maxillofacial surgery.    Currently the patient reports he has been slowly improving.  He denies any chest pain or dyspnea at rest.  He has mild dyspnea with exertion that is relieved with rest or slowing of his pace.  He has had no recent syncope or near syncope or feelings of palpitations.  He reports compliance with medical therapy.        Past Medical History:   Diagnosis Date   • Acid reflux disease    • Allergic rhinitis    • Arthritis     gouty   • Atrial fibrillation (HCC)    • BBB (bundle branch block)     right   • Bone spur 2017    neck   • Congestive heart failure (CHF) (HCC)    • Congestive heart failure (HCC) 10/8/2019   • Coronary artery disease     non obstructive   • Essential hypertension 11/29/2011   • Hyperlipidemia, mixed 10/8/2019   • Hypertension    • Mitral regurgitation    • Osteoarthritis    • Presence of biventricular implantable cardioverter-defibrillator (ICD) 7/29/2019   • S/P ablation of atrial fibrillation 01/2017    • Takotsubo cardiomyopathy    • Tricuspid regurgitation    • VT (ventricular tachycardia) (HCC) 2018       Past Surgical History:   Procedure Laterality Date   • AMPUTATION      index and middle finger   • CARDIAC ABLATION  2017    BHF   • CARDIAC CATHETERIZATION  2016    non obst CAD; takotsubo CM   • CARDIAC CATHETERIZATION  2017    treating medically   • CARDIAC DEFIBRILLATOR PLACEMENT      Bs   • CARDIAC ELECTROPHYSIOLOGY PROCEDURE N/A 2021    Procedure: AV node ablation;  Surgeon: Hira Ochoa MD;  Location: [facility] INVASIVE LOCATION;  Service: Cardiovascular;  Laterality: N/A;   • FRACTURE SURGERY  2021    jaw    • INTERNAL CARDIAC DEFIBRILLATOR INSERTION      BiV ICD BS   • OTHER SURGICAL HISTORY  2017    Heart ablation-[facility]   • ROTATOR CUFF REPAIR Right    • SINUS SURGERY  2014         Current Outpatient Medications:   •  allopurinol (ZYLOPRIM) 300 MG tablet, Take 1 tablet by mouth Daily., Disp: 90 tablet, Rfl: 1  •  colchicine 0.6 MG tablet, Take 0.6 mg by mouth 2 (Two) Times a Day As Needed for Muscle / Joint Pain., Disp: , Rfl:   •  cyclobenzaprine (FLEXERIL) 5 MG tablet, Take 2 tablets by mouth 3 (Three) Times a Day As Needed for Muscle Spasms., Disp: 60 tablet, Rfl: 0  •  furosemide (LASIX) 20 MG tablet, Take 20 mg by mouth Daily. Per pt report, Disp: , Rfl:   •  losartan (COZAAR) 25 MG tablet, Take 1 tablet by mouth Daily., Disp: 90 tablet, Rfl: 1  •  rivaroxaban (XARELTO) 15 MG tablet, Take 1 tablet by mouth Daily., Disp: 90 tablet, Rfl: 2  •  vitamin B-12 (CYANOCOBALAMIN) 1000 MCG tablet, Take 1,000 mcg by mouth Daily., Disp: , Rfl:     Social History     Socioeconomic History   • Marital status:    Tobacco Use   • Smoking status: Former Smoker     Quit date: 1990     Years since quittin.8   • Smokeless tobacco: Never Used   Vaping Use   • Vaping Use: Never used   Substance and Sexual Activity   • Alcohol use: Yes     Comment:  "occasionally   • Drug use: Never   • Sexual activity: Defer       Family History   Problem Relation Age of Onset   • Parkinsonism Mother    • Alcohol abuse Father        The following portions of the patient's history were reviewed and updated as appropriate: allergies, current medications, past family history, past medical history, past social history, past surgical history and problem list.    Review of Systems   Constitutional: Positive for malaise/fatigue. Negative for decreased appetite and diaphoresis.   HENT: Negative for congestion, hearing loss and nosebleeds.    Cardiovascular: Negative for chest pain, claudication, dyspnea on exertion, irregular heartbeat, leg swelling, near-syncope, orthopnea, palpitations, paroxysmal nocturnal dyspnea and syncope.   Respiratory: Negative for cough, shortness of breath and sleep disturbances due to breathing.    Endocrine: Negative for polyuria.   Hematologic/Lymphatic: Does not bruise/bleed easily.   Skin: Negative for itching and rash.   Musculoskeletal: Negative for back pain, muscle weakness and myalgias.   Gastrointestinal: Negative for abdominal pain, change in bowel habit and nausea.   Genitourinary: Negative for dysuria, flank pain, frequency and hesitancy.   Neurological: Positive for weakness. Negative for dizziness and tremors.   Psychiatric/Behavioral: Negative for altered mental status. The patient does not have insomnia.        /80   Pulse 72   Ht 188 cm (74\")   Wt 83.7 kg (184 lb 9.6 oz)   SpO2 97%   BMI 23.70 kg/m² .    Objective     Vitals reviewed.   Constitutional:       General: Not in acute distress.     Appearance: Normal appearance. Well-developed.   Eyes:      Pupils: Pupils are equal, round, and reactive to light.   HENT:      Head: Normocephalic and atraumatic.   Neck:      Vascular: No JVD.   Pulmonary:      Effort: Pulmonary effort is normal.      Breath sounds: Normal breath sounds.   Cardiovascular:      Normal rate. Regular " rhythm.   Pulses:     Intact distal pulses.   Edema:     Peripheral edema absent.   Abdominal:      General: There is no distension.      Palpations: Abdomen is soft.      Tenderness: There is no abdominal tenderness.   Musculoskeletal: Normal range of motion.      Cervical back: Normal range of motion and neck supple. Skin:     General: Skin is warm and dry.   Neurological:      Mental Status: Alert and oriented to person, place, and time.           EKG ordered and reviewed by me in office    ECG 12 Lead    Date/Time: 11/2/2021 1:52 PM  Performed by: Sultana Barajas APRN  Authorized by: Sultana Barajas APRN   Comparison: not compared with previous ECG   Rhythm: paced  BPM: 72  Comments: Biventricular paced rhythm isolated PVC                In Office Device Interrogation: Reviewed    DEVICE INTERROGATION:  IN OFFICE    DEVICE TYPE:   Biventricular ICD    :   Ibercheck    BATTERY:  Stable    TIME TO ELECTIVE REPLACEMENT INDICATORS:   6.5 years    CHARGE TIME:   7.1 seconds        LEAD DATA:    Atrial:   0.4 mV, 619 ohms,        Ventricular:     7.2 mV, 434 ohms, 1.1 V@0.4 ms    LV: 7.5, 858 ohms, 1.3 V at 0.4 ms      Atrial pacing percentage: 20%    Ventricular pacing percentage: 67%      Arrhythmia Logbook Reviewed: 1 episode of nonsustained ventricular tachycardia that was in his VF zone but spontaneously terminated before therapy        Summary:    Stable Device Function    1 episode of nonsustained ventricular tachycardia    Battery status is stable.      NEXT IN OFFICE DEVICE CHECK DUE: 6 months    REMOTE DEVICE INTERROGATIONS: Ongoing      Diagnoses and all orders for this visit:    1. Permanent atrial fibrillation (CMS/HCC) (Primary)  Comments:  Ventricular rates are stable.  Patient has had a previous AV node ablation  Orders:  -     Magnesium; Future    2. Dilated cardiomyopathy (HCC)  Comments:  Clinically does not appear to be in overt heart failure at this time    3. Essential  hypertension  Comments:  Blood pressures are not currently well controlled.    4. Presence of biventricular implantable cardioverter-defibrillator (ICD)  Comments:  Biventricular ICD now programmed VVIR with stable device function    5. Nonsustained ventricular tachycardia (HCC)  Comments:  Patient noted to have episode of nonsustained ventricular tachycardia on device interrogation.  Will check labs.  Patient reports previous intolerance to beta-b           MEDICAL DECISION MAKING:           Patient is here for scheduled follow-up and appears to be fairly stable from a cardiac perspective.    He had one episode of nonsustained ventricular tachycardia noted on device interrogation occurring back in October.  The patient had no associated recollection of symptoms at that time.    He is seeing his PCP later this week and already has labs scheduled.  They are checking a CMP and TSH.  I have added a magnesium level.    In addition the patient's blood pressure is not ideally controlled.  Today it is 180/80.  He is on low-dose losartan 25 mg daily.  I have advised him to increase his losartan to 25 mg twice daily.  I have advised him to continue to check his blood pressure at home and notify us if his systolic blood pressure drops and is consistently running less than 110.    The patient has previously been intolerant to beta-blockers, amiodarone, Tikosyn.    I have scheduled the patient back for follow-up with Dr. Figueroa in approximately 8 weeks.  We will continue to follow his device via [employer] for any recurrent arrhythmias.    I have asked the patient to contact her office sooner if he should develop any new problems

## 2021-11-04 ENCOUNTER — LAB (OUTPATIENT)
Dept: FAMILY MEDICINE CLINIC | Facility: CLINIC | Age: 81
End: 2021-11-04

## 2021-11-04 DIAGNOSIS — E55.9 VITAMIN D DEFICIENCY: ICD-10-CM

## 2021-11-04 DIAGNOSIS — Z79.899 ENCOUNTER FOR LONG-TERM CURRENT USE OF MEDICATION: ICD-10-CM

## 2021-11-04 DIAGNOSIS — Z12.5 SCREENING FOR PROSTATE CANCER: Primary | ICD-10-CM

## 2021-11-04 DIAGNOSIS — I48.21 PERMANENT ATRIAL FIBRILLATION (HCC): ICD-10-CM

## 2021-11-04 DIAGNOSIS — E53.8 LOW SERUM VITAMIN B12: ICD-10-CM

## 2021-11-04 DIAGNOSIS — E78.2 HYPERLIPIDEMIA, MIXED: ICD-10-CM

## 2021-11-04 DIAGNOSIS — I10 ESSENTIAL HYPERTENSION: ICD-10-CM

## 2021-11-04 LAB
25(OH)D3 SERPL-MCNC: 34.6 NG/ML
ALBUMIN SERPL-MCNC: 3.8 G/DL (ref 3.5–5.2)
ALBUMIN/GLOB SERPL: 1.1 G/DL
ALP SERPL-CCNC: 135 U/L (ref 39–117)
ALT SERPL W P-5'-P-CCNC: 17 U/L (ref 1–41)
ANION GAP SERPL CALCULATED.3IONS-SCNC: 9.6 MMOL/L (ref 5–15)
AST SERPL-CCNC: 25 U/L (ref 1–40)
BASOPHILS # BLD AUTO: 0.06 10*3/MM3 (ref 0–0.2)
BASOPHILS NFR BLD AUTO: 0.9 % (ref 0–1.5)
BILIRUB SERPL-MCNC: 1.4 MG/DL (ref 0–1.2)
BUN SERPL-MCNC: 24 MG/DL (ref 8–23)
BUN/CREAT SERPL: 19.4 (ref 7–25)
CALCIUM SPEC-SCNC: 9.4 MG/DL (ref 8.6–10.5)
CHLORIDE SERPL-SCNC: 104 MMOL/L (ref 98–107)
CHOLEST SERPL-MCNC: 131 MG/DL (ref 0–200)
CO2 SERPL-SCNC: 27.4 MMOL/L (ref 22–29)
CREAT SERPL-MCNC: 1.24 MG/DL (ref 0.76–1.27)
DEPRECATED RDW RBC AUTO: 55.9 FL (ref 37–54)
EOSINOPHIL # BLD AUTO: 0.12 10*3/MM3 (ref 0–0.4)
EOSINOPHIL NFR BLD AUTO: 1.9 % (ref 0.3–6.2)
ERYTHROCYTE [DISTWIDTH] IN BLOOD BY AUTOMATED COUNT: 16 % (ref 12.3–15.4)
GFR SERPL CREATININE-BSD FRML MDRD: 56 ML/MIN/1.73
GLOBULIN UR ELPH-MCNC: 3.5 GM/DL
GLUCOSE SERPL-MCNC: 96 MG/DL (ref 65–99)
HBA1C MFR BLD: 5.4 % (ref 3.5–5.6)
HCT VFR BLD AUTO: 42.7 % (ref 37.5–51)
HDLC SERPL-MCNC: 58 MG/DL (ref 40–60)
HGB BLD-MCNC: 14.1 G/DL (ref 13–17.7)
IMM GRANULOCYTES # BLD AUTO: 0.03 10*3/MM3 (ref 0–0.05)
IMM GRANULOCYTES NFR BLD AUTO: 0.5 % (ref 0–0.5)
LDLC SERPL CALC-MCNC: 62 MG/DL (ref 0–100)
LDLC/HDLC SERPL: 1.09 {RATIO}
LYMPHOCYTES # BLD AUTO: 1.35 10*3/MM3 (ref 0.7–3.1)
LYMPHOCYTES NFR BLD AUTO: 21.3 % (ref 19.6–45.3)
MAGNESIUM SERPL-MCNC: 2.1 MG/DL (ref 1.6–2.4)
MCH RBC QN AUTO: 31.4 PG (ref 26.6–33)
MCHC RBC AUTO-ENTMCNC: 33 G/DL (ref 31.5–35.7)
MCV RBC AUTO: 95.1 FL (ref 79–97)
MONOCYTES # BLD AUTO: 0.62 10*3/MM3 (ref 0.1–0.9)
MONOCYTES NFR BLD AUTO: 9.8 % (ref 5–12)
NEUTROPHILS NFR BLD AUTO: 4.17 10*3/MM3 (ref 1.7–7)
NEUTROPHILS NFR BLD AUTO: 65.6 % (ref 42.7–76)
NRBC BLD AUTO-RTO: 0 /100 WBC (ref 0–0.2)
PLATELET # BLD AUTO: 215 10*3/MM3 (ref 140–450)
PMV BLD AUTO: 10.8 FL (ref 6–12)
POTASSIUM SERPL-SCNC: 4.3 MMOL/L (ref 3.5–5.2)
PROT SERPL-MCNC: 7.3 G/DL (ref 6–8.5)
PSA SERPL-MCNC: 0.83 NG/ML (ref 0–4)
RBC # BLD AUTO: 4.49 10*6/MM3 (ref 4.14–5.8)
SODIUM SERPL-SCNC: 141 MMOL/L (ref 136–145)
T4 FREE SERPL-MCNC: 1.24 NG/DL (ref 0.93–1.7)
TRIGL SERPL-MCNC: 48 MG/DL (ref 0–150)
TSH SERPL DL<=0.05 MIU/L-ACNC: 3.69 UIU/ML (ref 0.27–4.2)
VIT B12 BLD-MCNC: 1491 PG/ML (ref 211–946)
VLDLC SERPL-MCNC: 11 MG/DL (ref 5–40)
WBC # BLD AUTO: 6.35 10*3/MM3 (ref 3.4–10.8)

## 2021-11-04 PROCEDURE — 36415 COLL VENOUS BLD VENIPUNCTURE: CPT

## 2021-11-04 PROCEDURE — 85025 COMPLETE CBC W/AUTO DIFF WBC: CPT | Performed by: FAMILY MEDICINE

## 2021-11-04 PROCEDURE — 84443 ASSAY THYROID STIM HORMONE: CPT | Performed by: FAMILY MEDICINE

## 2021-11-04 PROCEDURE — 80053 COMPREHEN METABOLIC PANEL: CPT | Performed by: FAMILY MEDICINE

## 2021-11-04 PROCEDURE — 83036 HEMOGLOBIN GLYCOSYLATED A1C: CPT | Performed by: FAMILY MEDICINE

## 2021-11-04 PROCEDURE — 84439 ASSAY OF FREE THYROXINE: CPT | Performed by: FAMILY MEDICINE

## 2021-11-04 PROCEDURE — 80061 LIPID PANEL: CPT | Performed by: FAMILY MEDICINE

## 2021-11-04 PROCEDURE — 82607 VITAMIN B-12: CPT | Performed by: FAMILY MEDICINE

## 2021-11-04 PROCEDURE — 83735 ASSAY OF MAGNESIUM: CPT | Performed by: FAMILY MEDICINE

## 2021-11-04 PROCEDURE — G0103 PSA SCREENING: HCPCS | Performed by: FAMILY MEDICINE

## 2021-11-04 PROCEDURE — 82306 VITAMIN D 25 HYDROXY: CPT | Performed by: FAMILY MEDICINE

## 2021-11-15 RX ORDER — FUROSEMIDE 20 MG/1
TABLET ORAL
Qty: 90 TABLET | Refills: 2 | Status: SHIPPED | OUTPATIENT
Start: 2021-11-15 | End: 2022-01-27 | Stop reason: DRUGHIGH

## 2021-11-17 ENCOUNTER — TELEPHONE (OUTPATIENT)
Dept: FAMILY MEDICINE CLINIC | Facility: CLINIC | Age: 81
End: 2021-11-17

## 2021-11-17 ENCOUNTER — OFFICE VISIT (OUTPATIENT)
Dept: FAMILY MEDICINE CLINIC | Facility: CLINIC | Age: 81
End: 2021-11-17

## 2021-11-17 VITALS
BODY MASS INDEX: 23.74 KG/M2 | RESPIRATION RATE: 16 BRPM | SYSTOLIC BLOOD PRESSURE: 144 MMHG | HEIGHT: 74 IN | HEART RATE: 76 BPM | TEMPERATURE: 97.3 F | DIASTOLIC BLOOD PRESSURE: 72 MMHG | WEIGHT: 185 LBS

## 2021-11-17 DIAGNOSIS — S02.401D CLOSED FRACTURE OF MAXILLA WITH ROUTINE HEALING, UNSPECIFIED LATERALITY, SUBSEQUENT ENCOUNTER: ICD-10-CM

## 2021-11-17 DIAGNOSIS — Z00.00 MEDICARE ANNUAL WELLNESS VISIT, SUBSEQUENT: Primary | ICD-10-CM

## 2021-11-17 DIAGNOSIS — M10.9 GOUT, UNSPECIFIED CAUSE, UNSPECIFIED CHRONICITY, UNSPECIFIED SITE: ICD-10-CM

## 2021-11-17 DIAGNOSIS — I48.21 PERMANENT ATRIAL FIBRILLATION (HCC): ICD-10-CM

## 2021-11-17 DIAGNOSIS — I10 ESSENTIAL HYPERTENSION: ICD-10-CM

## 2021-11-17 DIAGNOSIS — S39.012D STRAIN OF LUMBAR REGION, SUBSEQUENT ENCOUNTER: ICD-10-CM

## 2021-11-17 DIAGNOSIS — R09.89 BIBASILAR CRACKLES: ICD-10-CM

## 2021-11-17 DIAGNOSIS — R93.7 ABNORMAL FINDINGS ON DIAGNOSTIC IMAGING OF OTHER PARTS OF MUSCULOSKELETAL SYSTEM: ICD-10-CM

## 2021-11-17 DIAGNOSIS — R74.8 ELEVATED ALKALINE PHOSPHATASE LEVEL: ICD-10-CM

## 2021-11-17 DIAGNOSIS — I50.20 HEART FAILURE WITH REDUCED EJECTION FRACTION (HCC): ICD-10-CM

## 2021-11-17 PROCEDURE — 1170F FXNL STATUS ASSESSED: CPT | Performed by: FAMILY MEDICINE

## 2021-11-17 PROCEDURE — 99214 OFFICE O/P EST MOD 30 MIN: CPT | Performed by: FAMILY MEDICINE

## 2021-11-17 PROCEDURE — G0439 PPPS, SUBSEQ VISIT: HCPCS | Performed by: FAMILY MEDICINE

## 2021-11-17 PROCEDURE — 1160F RVW MEDS BY RX/DR IN RCRD: CPT | Performed by: FAMILY MEDICINE

## 2021-11-17 RX ORDER — AZELASTINE HYDROCHLORIDE 0.5 MG/ML
1 SOLUTION/ DROPS OPHTHALMIC 2 TIMES DAILY
Qty: 6 ML | Refills: 12 | Status: SHIPPED | OUTPATIENT
Start: 2021-11-17 | End: 2022-07-29

## 2021-11-17 RX ORDER — CYCLOBENZAPRINE HCL 5 MG
10 TABLET ORAL 3 TIMES DAILY PRN
Qty: 60 TABLET | Refills: 0 | Status: SHIPPED | OUTPATIENT
Start: 2021-11-17 | End: 2022-01-24

## 2021-11-17 NOTE — TELEPHONE ENCOUNTER
Caller: Hnak Ponce    Relationship to patient: Self    Best call back number: [phone]    Patient is needing: PATIENT CALLED AND SAID HIS LAST COLONOSCOPY WAS 01/29/19

## 2021-11-17 NOTE — TELEPHONE ENCOUNTER
Patient is needing a refill on Flexeril. When he left room at visit, he went up to Eva and asked her for refill       This is pending if you wish to fill

## 2021-11-17 NOTE — PROGRESS NOTES
The ABCs of the Annual Wellness Visit  Subsequent Medicare Wellness Visit    Chief Complaint   Patient presents with   • Medicare Wellness-subsequent      Subjective    History of Present Illness:  Hank Ponce is a 81 y.o. male who presents for a Subsequent Medicare Wellness Visit.    Maxillary sinus fracture: patient reports falling while in the hospital in April 2021, resulting in maxillary fracture. He reports his jaw does not line-up any longer. Patient claims he has not had f/u w/ ENT    Back pain: started 1 month ago after riding on his mower. Patient was seen by Dr Nagel at that time and given steroid pack and muscle relaxer. This did not result in much change to back pain. He was referred to PT but never went. Pain is slowly improving. Doing regular stretches/exercise.     CHF: s/p defibrillator. 4/2021 echo w/ EF 31-35% w/ severe mitral and tricuspid regurg noted as well as moderately dilated LV. Maintained on lasix 20 daily. No SOB. Leg swelling stable.     Afib: Maintained on Xarelto 15 daily. [id number] score 4 (CHF, HTN, age x2). Unable to tolerate beta-blockers, amiodarone, and Tikosyn in the past. Denies CP or palpitations. Follows w/ [facility]    HTN: 144/72 today. Maintained on losartan 25 BID and lasix 20 daily. Recently increased by [facility]. Mild LH/dizziness w/ standing. No CP or SOB.     Gout: maintained on allopurinol 300 daily. No gout flare in years. Well controlled.    The following portions of the patient's history were reviewed and   updated as appropriate: allergies, current medications, past family history, past medical history, past social history, past surgical history and problem list.    Compared to one year ago, the patient feels his physical   health is worse.    Compared to one year ago, the patient feels his mental   health is the same.    Recent Hospitalizations:  This patient has had a [facility] admission record on file within the last 365 days.  4/2021- PNA and  maxillary sinus fracture    Current Medical Providers:  Patient Care Team:  Bunny Curran MD as PCP - General (Internal Medicine)  AGUSTO Curran MD as Consulting Physician (Cardiology)    Outpatient Medications Prior to Visit   Medication Sig Dispense Refill   • allopurinol (ZYLOPRIM) 300 MG tablet Take 1 tablet by mouth Daily. 90 tablet 1   • colchicine 0.6 MG tablet Take 0.6 mg by mouth 2 (Two) Times a Day As Needed for Muscle / Joint Pain.     • furosemide (LASIX) 20 MG tablet TAKE TWO TABLETS BY MOUTH DAILY FOR FIRST THREE DAYS, THEN TAKE ONE TABLET BY MOUTH DAILY THEREAFTER 90 tablet 2   • losartan (COZAAR) 25 MG tablet Take 1 tablet by mouth 2 (Two) Times a Day. 180 tablet 1   • rivaroxaban (XARELTO) 15 MG tablet Take 1 tablet by mouth Daily. 90 tablet 2   • vitamin B-12 (CYANOCOBALAMIN) 1000 MCG tablet Take 1,000 mcg by mouth Daily.     • cyclobenzaprine (FLEXERIL) 5 MG tablet Take 2 tablets by mouth 3 (Three) Times a Day As Needed for Muscle Spasms. 60 tablet 0     No facility-administered medications prior to visit.     No opioid medication identified on active medication list. I have reviewed chart for other potential  high risk medication/s and harmful drug interactions in the elderly.        Aspirin is not on active medication list.  Aspirin use is not indicated based on review of current medical condition/s. Risk of harm outweighs potential benefits.  .    Patient Active Problem List   Diagnosis   • Presence of biventricular implantable cardioverter-defibrillator (ICD)   • Permanent atrial fibrillation (CMS/HCC)   • Hyperlipidemia, mixed   • Mitral regurgitation   • Tricuspid regurgitation   • Essential hypertension   • Takotsubo cardiomyopathy   • Dilated cardiomyopathy (HCC)   • Acute on chronic systolic congestive heart failure (HCC)   • Atrial flutter with rapid ventricular response (HCC)   • Low serum vitamin B12   • Heart failure with reduced ejection fraction (HCC)   • Elevated serum  "creatinine   • Elevated troponin   • Pneumonia   • Stage 3 chronic kidney disease (HCC)   • Nonsustained ventricular tachycardia (HCC)   • Gout     Advance Care Planning  Advance Directive is on file.  ACP discussion was held with the patient during this visit. Patient has an advance directive in EMR which is still valid.   Review of Systems   Constitutional: Negative for chills and fever.   HENT: Negative for congestion and rhinorrhea.    Eyes: Negative for visual disturbance.   Respiratory: Negative for cough and shortness of breath.    Cardiovascular: Positive for leg swelling. Negative for chest pain and palpitations.   Gastrointestinal: Negative for abdominal pain and vomiting.   Genitourinary: Negative for difficulty urinating and dysuria.   Musculoskeletal: Positive for back pain. Negative for arthralgias.   Skin: Negative for rash.   Neurological: Positive for dizziness and light-headedness. Negative for numbness.   Psychiatric/Behavioral: Negative for dysphoric mood and sleep disturbance. The patient is not nervous/anxious.         Objective    Vitals:    11/17/21 1346   BP: 144/72   BP Location: Left arm   Patient Position: Sitting   Cuff Size: Adult   Pulse: 76   Resp: 16   Temp: 97.3 °F (36.3 °C)   TempSrc: Temporal   Weight: 83.9 kg (185 lb)   Height: 188 cm (74\")     BMI Readings from Last 1 Encounters:   11/17/21 23.75 kg/m²   BMI is within normal parameters. No follow-up required.    Does the patient have evidence of cognitive impairment? No    Physical Exam  Constitutional:       General: He is not in acute distress.     Appearance: He is well-developed.   HENT:      Head: Normocephalic and atraumatic.      Right Ear: Tympanic membrane and external ear normal. There is no impacted cerumen.      Left Ear: Tympanic membrane and external ear normal. There is no impacted cerumen.      Nose: Nose normal.      Mouth/Throat:      Mouth: Mucous membranes are moist.      Pharynx: No oropharyngeal exudate or " posterior oropharyngeal erythema.   Eyes:      General: No scleral icterus.        Right eye: No discharge.         Left eye: No discharge.      Extraocular Movements: Extraocular movements intact.      Conjunctiva/sclera: Conjunctivae normal.      Pupils: Pupils are equal, round, and reactive to light.   Neck:      Thyroid: No thyromegaly.      Vascular: No carotid bruit.   Cardiovascular:      Rate and Rhythm: Normal rate. Rhythm irregular.      Heart sounds: Normal heart sounds. No murmur heard.      Pulmonary:      Effort: Pulmonary effort is normal. No respiratory distress.      Breath sounds: Rales (Bibasilar) present. No wheezing.   Abdominal:      General: Bowel sounds are normal. There is no distension.      Palpations: Abdomen is soft.      Tenderness: There is no abdominal tenderness.   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 Normal range of motion.      Cervical back: Normal range of motion and neck supple.      Right lower leg: Edema (Trace) present.      Left lower leg: Edema (Trace) present.   Lymphadenopathy:      Cervical: No cervical adenopathy.   Skin:     General: Skin is warm and dry.      Findings: No rash.   Neurological:      General: No focal deficit present.      Mental Status: He is alert and oriented to person, place, and time. Mental status is at baseline.      Cranial Nerves: No cranial nerve deficit.      Sensory: No sensory deficit.   Psychiatric:         Behavior: Behavior normal.         Thought Content: Thought content normal.        Lab Results   Component Value Date    TRIG 48 2021    HDL 58 2021    LDL 62 2021    VLDL 11 2021    HGBA1C 5.4 2021        HEALTH RISK ASSESSMENT    Smoking Status:  Social History     Tobacco Use   Smoking Status Former Smoker   • Quit date:    • Years since quittin.9    Smokeless Tobacco Never Used     Alcohol Consumption:  Social History     Substance and Sexual Activity   Alcohol Use Yes    Comment: occasionally   Rare    Fall Risk Screen:  JESUS Fall Risk Assessment was completed, and patient is at HIGH risk for falls. Assessment completed on:11/17/2021  Single fall in the hospital, nothing further.     Depression Screening:  PHQ-2/PHQ-9 Depression Screening 11/17/2021   Little interest or pleasure in doing things 0   Feeling down, depressed, or hopeless 3   Feeling tired or having little energy 0   Poor appetite or overeating 0   Feeling bad about yourself - or that you are a failure or have let yourself or your family down 0   Trouble concentrating on things, such as reading the newspaper or watching television 0   Moving or speaking so slowly that other people could have noticed. Or the opposite - being so fidgety or restless that you have been moving around a lot more than usual 0   Thoughts that you would be better off dead, or of hurting yourself in some way 0   Total Score 3   If you checked off any problems, how difficult have these problems made it for you to do your work, take care of things at home, or get along with other people? Not difficult at all   Occasionally feeling down about being unable to do tasks    Health Habits and Functional and Cognitive Screening:  Functional & Cognitive Status 11/17/2021   Do you have difficulty preparing food and eating? No   Do you have difficulty bathing yourself, getting dressed or grooming yourself? No   Do you have difficulty using the toilet? No   Do you have difficulty moving around from place to place? No   Do you have trouble with steps or getting out of a bed or a chair? No   Current Diet Well Balanced Diet   Dental Exam Up to date   Eye Exam Up to date   Exercise (times per week) 0 times per week   Current Exercises Include Yard Work;House Cleaning   Current Exercise Activities Include -   Do you need help using the  phone?  No   Are you deaf or do you have serious difficulty hearing?  No   Do you need help with transportation? No   Do you need help shopping? No   Do you need help preparing meals?  No   Do you need help with housework?  No   Do you need help with laundry? No   Do you need help taking your medications? No   Do you need help managing money? No   Do you ever drive or ride in a car without wearing a seat belt? No   Have you felt unusual stress, anger or loneliness in the last month? Yes   Who do you live with? Alone   If you need help, do you have trouble finding someone available to you? No   Have you been bothered in the last four weeks by sexual problems? No   Do you have difficulty concentrating, remembering or making decisions? Yes     Age-appropriate Screening Schedule:  Refer to the list below for future screening recommendations based on patient's age, sex and/or medical conditions. Orders for these recommended tests are listed in the plan section. The patient has been provided with a written plan.    Health Maintenance   Topic Date Due   • TDAP/TD VACCINES (1 - Tdap) Never done   • ZOSTER VACCINE (1 of 2) 02/17/2022 (Originally 1/28/1990)   • LIPID PANEL  11/04/2022   • INFLUENZA VACCINE  Completed        Assessment/Plan   CMS Preventative Services Quick Reference  Risk Factors Identified During Encounter  Cardiovascular Disease  Immunizations Discussed/Encouraged (specific Immunizations; Tdap and Shingrix  The above risks/problems have been discussed with the patient.  Follow up actions/plans if indicated are seen below in the Assessment/Plan Section.  Pertinent information has been shared with the patient in the After Visit Summary.    Diagnoses and all orders for this visit:    1. Medicare annual wellness visit, subsequent (Primary)  -     Cancel: Tdap Vaccine Greater Than or Equal To 6yo IM  -     DEXA Bone Density Axial; Future  - Recent labs reviewed.  No need to repeat today  -  Counseled regarding  exercise and preventative health maintenance items/immunizations below    2. Closed fracture of maxilla with routine healing, unspecified laterality, subsequent encounter: Patient reports a fall while he was in the hospital resulting in maxillary fracture.  He states that his jaw no longer aligns appropriately.  He reportedly has not had ENT follow-up.  Will refer today [facility]  -     Ambulatory Referral to ENT (Otolaryngology)    3. Strain of lumbar region, subsequent encounter   -Recommend heat and stretches    4. Heart failure with reduced ejection fraction (HCC): s/p defibrillator. 4/2021 echo w/ EF 31-35% w/ severe mitral and tricuspid regurg noted as well as moderately dilated LV   -Continue Lasix 20 mg daily    5. Permanent atrial fibrillation (CMS/HCC): In A. fib today but rate controlled.  [id number] score of 4 (CHF, HTN, age x2)   -Continue home Xarelto 15 daily   -Continue cardiology follow-up    6. Essential hypertension: 144/72 today   -Continue home Lasix 20 daily and losartan 25 twice daily    7. Gout, unspecified cause, unspecified chronicity, unspecified site   -Continue home allopurinol 300 mg daily    8. Elevated alkaline phosphatase level: As noted on annual labs with mildly elevated bilirubin.  Will obtain ultrasound to further evaluate  -     US Liver; Future    9. Bibasilar crackles: Per exam today  -     XR Chest PA & Lateral    10. Abnormal findings on diagnostic imaging of other parts of musculoskeletal system   -     DEXA Bone Density Axial; Future    Other orders  -     azelastine (OPTIVAR) 0.05 % ophthalmic solution; Administer 1 drop to both eyes 2 (Two) Times a Day.  Dispense: 6 mL; Refill: 12    Preventative  Colonoscopy: report requested  PSA: 0.826 (11/2021)  DEXA: Ordered today  Shingles: Recommended  Pneumonia: Prevnar 10/2019, Pneumovax 10/2020  Tdap: Ordered today  Influenza: 2021  COVID: Completed 3 shots Moderna (10/2021)    Follow Up:   Return in about 4 months (around  3/17/2022) for Recheck- 30min- DEXA, liver, ENT, HTN.     An After Visit Summary and PPPS were made available to the patient.

## 2021-11-30 ENCOUNTER — HOSPITAL ENCOUNTER (OUTPATIENT)
Dept: ULTRASOUND IMAGING | Facility: HOSPITAL | Age: 81
Discharge: HOME | End: 2021-11-30

## 2021-11-30 ENCOUNTER — HOSPITAL ENCOUNTER (OUTPATIENT)
Dept: GENERAL RADIOLOGY | Facility: HOSPITAL | Age: 81
Discharge: HOME | End: 2021-11-30

## 2021-11-30 DIAGNOSIS — R74.8 ELEVATED ALKALINE PHOSPHATASE LEVEL: ICD-10-CM

## 2021-11-30 PROCEDURE — 71046 X-RAY EXAM CHEST 2 VIEWS: CPT

## 2021-11-30 PROCEDURE — 76705 ECHO EXAM OF ABDOMEN: CPT

## 2021-12-15 ENCOUNTER — TELEPHONE (OUTPATIENT)
Dept: CARDIOLOGY | Facility: CLINIC | Age: 81
End: 2021-12-15

## 2021-12-15 DIAGNOSIS — R06.09 DOE (DYSPNEA ON EXERTION): ICD-10-CM

## 2021-12-15 DIAGNOSIS — I47.20 VENTRICULAR TACHYCARDIA (HCC): Primary | ICD-10-CM

## 2021-12-15 NOTE — TELEPHONE ENCOUNTER
Spoke with patient he is ok with having tests. Will try to get scheduled prior to his follow up appointment in January.       Carlitos, can we get these scheduled for him prior to 1/4/22 please?

## 2021-12-15 NOTE — TELEPHONE ENCOUNTER
ICD transmission reviewed by dr galaviz, he would like patient to have a stress test and echo due to episodes of VT on transmission.     I left a vm to discuss this with the patient.

## 2021-12-16 RX ORDER — LOSARTAN POTASSIUM 25 MG/1
TABLET ORAL
Qty: 90 TABLET | Refills: 1 | Status: SHIPPED | OUTPATIENT
Start: 2021-12-16 | End: 2021-12-22 | Stop reason: SDUPTHER

## 2021-12-22 ENCOUNTER — TELEPHONE (OUTPATIENT)
Dept: FAMILY MEDICINE CLINIC | Facility: CLINIC | Age: 81
End: 2021-12-22

## 2021-12-22 RX ORDER — LOSARTAN POTASSIUM 25 MG/1
25 TABLET ORAL 2 TIMES DAILY
Qty: 180 TABLET | Refills: 1 | Status: ON HOLD | OUTPATIENT
Start: 2021-12-22 | End: 2022-01-13 | Stop reason: SDUPTHER

## 2021-12-22 NOTE — TELEPHONE ENCOUNTER
PATIENT IS NEEDING A REFILL FOR LOSARTAN RITA    PATIENT STATES AT HIS LAST APPT MED WAS CHANGED TO TWICE DAILY  CAN WE PLEASE UPDATE MEDICATION AND SEND TO [facility] ROSADO

## 2021-12-23 ENCOUNTER — HOSPITAL ENCOUNTER (OUTPATIENT)
Dept: BONE DENSITY | Facility: HOSPITAL | Age: 81
Discharge: HOME | End: 2021-12-23
Admitting: FAMILY MEDICINE

## 2021-12-23 DIAGNOSIS — R93.7 ABNORMAL FINDINGS ON DIAGNOSTIC IMAGING OF OTHER PARTS OF MUSCULOSKELETAL SYSTEM: ICD-10-CM

## 2021-12-23 DIAGNOSIS — Z00.00 MEDICARE ANNUAL WELLNESS VISIT, SUBSEQUENT: ICD-10-CM

## 2021-12-23 PROCEDURE — 77080 DXA BONE DENSITY AXIAL: CPT

## 2021-12-28 ENCOUNTER — TELEPHONE (OUTPATIENT)
Dept: FAMILY MEDICINE CLINIC | Facility: CLINIC | Age: 81
End: 2021-12-28

## 2021-12-28 DIAGNOSIS — I10 ESSENTIAL HYPERTENSION: Primary | ICD-10-CM

## 2021-12-28 NOTE — TELEPHONE ENCOUNTER
Hub staff attempted to follow warm transfer process and was unsuccessful     Caller: Hank Ponce    Relationship to patient: Self    Best call back number:[phone]   Patient is needing:     HANK MISSED A CALL FROM OFFICE. PLEASE ADVISE

## 2022-01-03 ENCOUNTER — HOSPITAL ENCOUNTER (OUTPATIENT)
Dept: NUCLEAR MEDICINE | Facility: HOSPITAL | Age: 82
Discharge: HOME | End: 2022-01-03

## 2022-01-03 ENCOUNTER — LAB (OUTPATIENT)
Dept: LAB | Facility: HOSPITAL | Age: 82
End: 2022-01-03

## 2022-01-03 ENCOUNTER — HOSPITAL ENCOUNTER (OUTPATIENT)
Dept: CARDIOLOGY | Facility: HOSPITAL | Age: 82
Discharge: HOME | End: 2022-01-03

## 2022-01-03 VITALS
HEIGHT: 74 IN | SYSTOLIC BLOOD PRESSURE: 134 MMHG | WEIGHT: 183 LBS | DIASTOLIC BLOOD PRESSURE: 83 MMHG | BODY MASS INDEX: 23.49 KG/M2

## 2022-01-03 DIAGNOSIS — R06.09 DOE (DYSPNEA ON EXERTION): ICD-10-CM

## 2022-01-03 DIAGNOSIS — I47.20 VENTRICULAR TACHYCARDIA: ICD-10-CM

## 2022-01-03 DIAGNOSIS — I10 ESSENTIAL HYPERTENSION: ICD-10-CM

## 2022-01-03 LAB
ALBUMIN SERPL-MCNC: 3.8 G/DL (ref 3.5–5.2)
ALBUMIN/GLOB SERPL: 1.1 G/DL
ALP SERPL-CCNC: 127 U/L (ref 39–117)
ALT SERPL W P-5'-P-CCNC: 21 U/L (ref 1–41)
ANION GAP SERPL CALCULATED.3IONS-SCNC: 7.4 MMOL/L (ref 5–15)
AST SERPL-CCNC: 35 U/L (ref 1–40)
BILIRUB SERPL-MCNC: 2.3 MG/DL (ref 0–1.2)
BUN SERPL-MCNC: 31 MG/DL (ref 8–23)
BUN/CREAT SERPL: 23.8 (ref 7–25)
CALCIUM SPEC-SCNC: 9.5 MG/DL (ref 8.6–10.5)
CHLORIDE SERPL-SCNC: 102 MMOL/L (ref 98–107)
CO2 SERPL-SCNC: 28.6 MMOL/L (ref 22–29)
CREAT SERPL-MCNC: 1.3 MG/DL (ref 0.76–1.27)
GFR SERPL CREATININE-BSD FRML MDRD: 53 ML/MIN/1.73
GLOBULIN UR ELPH-MCNC: 3.4 GM/DL
GLUCOSE SERPL-MCNC: 111 MG/DL (ref 65–99)
POTASSIUM SERPL-SCNC: 4.3 MMOL/L (ref 3.5–5.2)
PROT SERPL-MCNC: 7.2 G/DL (ref 6–8.5)
SODIUM SERPL-SCNC: 138 MMOL/L (ref 136–145)

## 2022-01-03 PROCEDURE — 93306 TTE W/DOPPLER COMPLETE: CPT | Performed by: INTERNAL MEDICINE

## 2022-01-03 PROCEDURE — 93306 TTE W/DOPPLER COMPLETE: CPT

## 2022-01-03 PROCEDURE — 80053 COMPREHEN METABOLIC PANEL: CPT

## 2022-01-03 PROCEDURE — 93016 CV STRESS TEST SUPVJ ONLY: CPT | Performed by: INTERNAL MEDICINE

## 2022-01-03 PROCEDURE — 0 TECHNETIUM TETROFOSMIN KIT: Performed by: INTERNAL MEDICINE

## 2022-01-03 PROCEDURE — A9502 TC99M TETROFOSMIN: HCPCS | Performed by: INTERNAL MEDICINE

## 2022-01-03 PROCEDURE — 78452 HT MUSCLE IMAGE SPECT MULT: CPT

## 2022-01-03 PROCEDURE — 78452 HT MUSCLE IMAGE SPECT MULT: CPT | Performed by: INTERNAL MEDICINE

## 2022-01-03 PROCEDURE — 93018 CV STRESS TEST I&R ONLY: CPT | Performed by: INTERNAL MEDICINE

## 2022-01-03 PROCEDURE — 36415 COLL VENOUS BLD VENIPUNCTURE: CPT

## 2022-01-03 PROCEDURE — 93017 CV STRESS TEST TRACING ONLY: CPT

## 2022-01-03 RX ADMIN — TETROFOSMIN 1 DOSE: 1.38 INJECTION, POWDER, LYOPHILIZED, FOR SOLUTION INTRAVENOUS at 08:33

## 2022-01-03 RX ADMIN — TETROFOSMIN 1 DOSE: 1.38 INJECTION, POWDER, LYOPHILIZED, FOR SOLUTION INTRAVENOUS at 09:50

## 2022-01-04 ENCOUNTER — OFFICE VISIT (OUTPATIENT)
Dept: CARDIOLOGY | Facility: CLINIC | Age: 82
End: 2022-01-04

## 2022-01-04 VITALS
RESPIRATION RATE: 18 BRPM | SYSTOLIC BLOOD PRESSURE: 128 MMHG | WEIGHT: 190.6 LBS | BODY MASS INDEX: 24.46 KG/M2 | DIASTOLIC BLOOD PRESSURE: 86 MMHG | HEIGHT: 74 IN | HEART RATE: 73 BPM

## 2022-01-04 DIAGNOSIS — R94.39 ABNORMAL STRESS TEST: Primary | ICD-10-CM

## 2022-01-04 DIAGNOSIS — I50.23 ACUTE ON CHRONIC SYSTOLIC CONGESTIVE HEART FAILURE: ICD-10-CM

## 2022-01-04 DIAGNOSIS — I47.29 NONSUSTAINED VENTRICULAR TACHYCARDIA: ICD-10-CM

## 2022-01-04 DIAGNOSIS — R77.8 ELEVATED TROPONIN: ICD-10-CM

## 2022-01-04 DIAGNOSIS — Z01.818 PRE-OP TESTING: ICD-10-CM

## 2022-01-04 DIAGNOSIS — R07.89 CHEST PAIN, ATYPICAL: ICD-10-CM

## 2022-01-04 DIAGNOSIS — I42.0 DILATED CARDIOMYOPATHY: ICD-10-CM

## 2022-01-04 LAB
BH CV ECHO MEAS - AO MAX PG (FULL): 1.6 MMHG
BH CV ECHO MEAS - AO MAX PG: 3 MMHG
BH CV ECHO MEAS - AO MEAN PG (FULL): 0.9 MMHG
BH CV ECHO MEAS - AO MEAN PG: 1.6 MMHG
BH CV ECHO MEAS - AO ROOT AREA (BSA CORRECTED): 1.3
BH CV ECHO MEAS - AO ROOT AREA: 5.5 CM^2
BH CV ECHO MEAS - AO ROOT DIAM: 2.6 CM
BH CV ECHO MEAS - AO V2 MAX: 86.2 CM/SEC
BH CV ECHO MEAS - AO V2 MEAN: 58.1 CM/SEC
BH CV ECHO MEAS - AO V2 VTI: 13.9 CM
BH CV ECHO MEAS - ASC AORTA: 2.9 CM
BH CV ECHO MEAS - AVA(I,A): 2 CM^2
BH CV ECHO MEAS - AVA(I,D): 2 CM^2
BH CV ECHO MEAS - AVA(V,A): 2 CM^2
BH CV ECHO MEAS - AVA(V,D): 2 CM^2
BH CV ECHO MEAS - BSA(HAYCOCK): 2.1 M^2
BH CV ECHO MEAS - BSA: 2.1 M^2
BH CV ECHO MEAS - BZI_BMI: 23.6 KILOGRAMS/M^2
BH CV ECHO MEAS - BZI_METRIC_HEIGHT: 188 CM
BH CV ECHO MEAS - BZI_METRIC_WEIGHT: 83.5 KG
BH CV ECHO MEAS - EDV(CUBED): 216.5 ML
BH CV ECHO MEAS - EDV(MOD-SP4): 114.6 ML
BH CV ECHO MEAS - EDV(TEICH): 180.3 ML
BH CV ECHO MEAS - EF(CUBED): 30.4 %
BH CV ECHO MEAS - EF(MOD-BP): 37 %
BH CV ECHO MEAS - EF(MOD-SP4): 37 %
BH CV ECHO MEAS - EF(TEICH): 24.2 %
BH CV ECHO MEAS - ESV(CUBED): 150.7 ML
BH CV ECHO MEAS - ESV(MOD-SP4): 72.2 ML
BH CV ECHO MEAS - ESV(TEICH): 136.6 ML
BH CV ECHO MEAS - FS: 11.4 %
BH CV ECHO MEAS - IVS/LVPW: 1.1
BH CV ECHO MEAS - IVSD: 1.5 CM
BH CV ECHO MEAS - LA DIMENSION(2D): 4.9 CM
BH CV ECHO MEAS - LV DIASTOLIC VOL/BSA (35-75): 54.6 ML/M^2
BH CV ECHO MEAS - LV MASS(C)D: 411.5 GRAMS
BH CV ECHO MEAS - LV MASS(C)DI: 196.2 GRAMS/M^2
BH CV ECHO MEAS - LV MAX PG: 1.4 MMHG
BH CV ECHO MEAS - LV MEAN PG: 0.71 MMHG
BH CV ECHO MEAS - LV SYSTOLIC VOL/BSA (12-30): 34.4 ML/M^2
BH CV ECHO MEAS - LV V1 MAX: 58.2 CM/SEC
BH CV ECHO MEAS - LV V1 MEAN: 38.2 CM/SEC
BH CV ECHO MEAS - LV V1 VTI: 9.3 CM
BH CV ECHO MEAS - LVIDD: 6 CM
BH CV ECHO MEAS - LVIDS: 5.3 CM
BH CV ECHO MEAS - LVOT AREA: 2.9 CM^2
BH CV ECHO MEAS - LVOT DIAM: 1.9 CM
BH CV ECHO MEAS - LVPWD: 1.4 CM
BH CV ECHO MEAS - MR MAX PG: 126.6 MMHG
BH CV ECHO MEAS - MR MAX VEL: 562.6 CM/SEC
BH CV ECHO MEAS - MV MAX PG: 8.3 MMHG
BH CV ECHO MEAS - MV MEAN PG: 3.8 MMHG
BH CV ECHO MEAS - MV V2 MAX: 143.7 CM/SEC
BH CV ECHO MEAS - MV V2 MEAN: 87.5 CM/SEC
BH CV ECHO MEAS - MV V2 VTI: 23 CM
BH CV ECHO MEAS - MVA(VTI): 1.2 CM^2
BH CV ECHO MEAS - PA ACC TIME: 0.12 SEC
BH CV ECHO MEAS - PA MAX PG (FULL): -1 MMHG
BH CV ECHO MEAS - PA MAX PG: 4.3 MMHG
BH CV ECHO MEAS - PA MEAN PG (FULL): -0.18 MMHG
BH CV ECHO MEAS - PA MEAN PG: 2.5 MMHG
BH CV ECHO MEAS - PA PR(ACCEL): 25 MMHG
BH CV ECHO MEAS - PA V2 MAX: 104 CM/SEC
BH CV ECHO MEAS - PA V2 MEAN: 73.3 CM/SEC
BH CV ECHO MEAS - PA V2 VTI: 26.1 CM
BH CV ECHO MEAS - RAP SYSTOLE: 3 MMHG
BH CV ECHO MEAS - RV MAX PG: 5.4 MMHG
BH CV ECHO MEAS - RV MEAN PG: 2.7 MMHG
BH CV ECHO MEAS - RV V1 MAX: 115.8 CM/SEC
BH CV ECHO MEAS - RV V1 MEAN: 76.2 CM/SEC
BH CV ECHO MEAS - RV V1 VTI: 23.9 CM
BH CV ECHO MEAS - RVDD: 5 CM
BH CV ECHO MEAS - RVSP: 23.9 MMHG
BH CV ECHO MEAS - SI(AO): 36.6 ML/M^2
BH CV ECHO MEAS - SI(CUBED): 31.4 ML/M^2
BH CV ECHO MEAS - SI(LVOT): 13 ML/M^2
BH CV ECHO MEAS - SI(MOD-SP4): 20.2 ML/M^2
BH CV ECHO MEAS - SI(TEICH): 20.8 ML/M^2
BH CV ECHO MEAS - SV(AO): 76.7 ML
BH CV ECHO MEAS - SV(CUBED): 65.8 ML
BH CV ECHO MEAS - SV(LVOT): 27.3 ML
BH CV ECHO MEAS - SV(MOD-SP4): 42.4 ML
BH CV ECHO MEAS - SV(TEICH): 43.7 ML
BH CV ECHO MEAS - TR MAX VEL: 228.1 CM/SEC
BH CV NUCLEAR PRIOR STUDY: 3
BH CV REST NUCLEAR ISOTOPE DOSE: 6.9 MCI
BH CV STRESS BP STAGE 2: NORMAL
BH CV STRESS BP STAGE 3: NORMAL
BH CV STRESS BP STAGE 4: NORMAL
BH CV STRESS COMMENTS STAGE 1: NORMAL
BH CV STRESS COMMENTS STAGE 2: NORMAL
BH CV STRESS DOSE REGADENOSON STAGE 1: 0.4
BH CV STRESS DURATION MIN STAGE 1: 0
BH CV STRESS DURATION MIN STAGE 2: 4
BH CV STRESS DURATION SEC STAGE 1: 10
BH CV STRESS DURATION SEC STAGE 2: 0
BH CV STRESS HR STAGE 1: 80
BH CV STRESS HR STAGE 2: 86
BH CV STRESS HR STAGE 3: 85
BH CV STRESS HR STAGE 4: 73
BH CV STRESS NUCLEAR ISOTOPE DOSE: 21.9 MCI
BH CV STRESS PROTOCOL 1: NORMAL
BH CV STRESS RECOVERY BP: NORMAL MMHG
BH CV STRESS RECOVERY HR: 72 BPM
BH CV STRESS STAGE 1: 1
BH CV STRESS STAGE 2: 2
BH CV STRESS STAGE 3: 3
BH CV STRESS STAGE 4: 4
MAXIMAL PREDICTED HEART RATE: 138 BPM
PERCENT MAX PREDICTED HR: 62.32 %
STRESS BASELINE BP: NORMAL MMHG
STRESS BASELINE HR: 82 BPM
STRESS PERCENT HR: 73 %
STRESS POST PEAK BP: NORMAL MMHG
STRESS POST PEAK HR: 86 BPM
STRESS TARGET HR: 117 BPM

## 2022-01-04 PROCEDURE — 99214 OFFICE O/P EST MOD 30 MIN: CPT | Performed by: INTERNAL MEDICINE

## 2022-01-11 ENCOUNTER — LAB (OUTPATIENT)
Dept: LAB | Facility: HOSPITAL | Age: 82
End: 2022-01-11

## 2022-01-11 DIAGNOSIS — I47.29 NONSUSTAINED VENTRICULAR TACHYCARDIA: ICD-10-CM

## 2022-01-11 DIAGNOSIS — Z01.818 PRE-OP TESTING: Primary | ICD-10-CM

## 2022-01-11 DIAGNOSIS — R94.39 ABNORMAL STRESS TEST: ICD-10-CM

## 2022-01-11 DIAGNOSIS — R79.1 ABNORMAL PARTIAL THROMBOPLASTIN TIME (PTT): ICD-10-CM

## 2022-01-11 LAB
ALBUMIN SERPL-MCNC: 3.8 G/DL (ref 3.5–5.2)
ALBUMIN/GLOB SERPL: 1.3 G/DL
ALP SERPL-CCNC: 115 U/L (ref 39–117)
ALT SERPL W P-5'-P-CCNC: 17 U/L (ref 1–41)
ANION GAP SERPL CALCULATED.3IONS-SCNC: 7.5 MMOL/L (ref 5–15)
APTT PPP: 30.5 SECONDS (ref 24–31)
AST SERPL-CCNC: 35 U/L (ref 1–40)
BASOPHILS # BLD AUTO: 0.05 10*3/MM3 (ref 0–0.2)
BASOPHILS NFR BLD AUTO: 0.8 % (ref 0–1.5)
BILIRUB SERPL-MCNC: 2.4 MG/DL (ref 0–1.2)
BUN SERPL-MCNC: 27 MG/DL (ref 8–23)
BUN/CREAT SERPL: 19 (ref 7–25)
CALCIUM SPEC-SCNC: 9.4 MG/DL (ref 8.6–10.5)
CHLORIDE SERPL-SCNC: 103 MMOL/L (ref 98–107)
CO2 SERPL-SCNC: 27.5 MMOL/L (ref 22–29)
CREAT SERPL-MCNC: 1.42 MG/DL (ref 0.76–1.27)
DEPRECATED RDW RBC AUTO: 51.7 FL (ref 37–54)
EOSINOPHIL # BLD AUTO: 0.05 10*3/MM3 (ref 0–0.4)
EOSINOPHIL NFR BLD AUTO: 0.8 % (ref 0.3–6.2)
ERYTHROCYTE [DISTWIDTH] IN BLOOD BY AUTOMATED COUNT: 14.7 % (ref 12.3–15.4)
GFR SERPL CREATININE-BSD FRML MDRD: 48 ML/MIN/1.73
GLOBULIN UR ELPH-MCNC: 3 GM/DL
GLUCOSE SERPL-MCNC: 160 MG/DL (ref 65–99)
HCT VFR BLD AUTO: 46 % (ref 37.5–51)
HGB BLD-MCNC: 14.5 G/DL (ref 13–17.7)
IMM GRANULOCYTES # BLD AUTO: 0.04 10*3/MM3 (ref 0–0.05)
IMM GRANULOCYTES NFR BLD AUTO: 0.6 % (ref 0–0.5)
INR PPP: 1.29 (ref 0.93–1.1)
LYMPHOCYTES # BLD AUTO: 0.9 10*3/MM3 (ref 0.7–3.1)
LYMPHOCYTES NFR BLD AUTO: 13.9 % (ref 19.6–45.3)
MCH RBC QN AUTO: 30.4 PG (ref 26.6–33)
MCHC RBC AUTO-ENTMCNC: 31.5 G/DL (ref 31.5–35.7)
MCV RBC AUTO: 96.4 FL (ref 79–97)
MONOCYTES # BLD AUTO: 0.58 10*3/MM3 (ref 0.1–0.9)
MONOCYTES NFR BLD AUTO: 9 % (ref 5–12)
NEUTROPHILS NFR BLD AUTO: 4.86 10*3/MM3 (ref 1.7–7)
NEUTROPHILS NFR BLD AUTO: 74.9 % (ref 42.7–76)
NRBC BLD AUTO-RTO: 0 /100 WBC (ref 0–0.2)
PLATELET # BLD AUTO: 226 10*3/MM3 (ref 140–450)
PMV BLD AUTO: 11.7 FL (ref 6–12)
POTASSIUM SERPL-SCNC: 4.8 MMOL/L (ref 3.5–5.2)
PROT SERPL-MCNC: 6.8 G/DL (ref 6–8.5)
PROTHROMBIN TIME: 14.1 SECONDS (ref 9.6–11.7)
RBC # BLD AUTO: 4.77 10*6/MM3 (ref 4.14–5.8)
SARS-COV-2 ORF1AB RESP QL NAA+PROBE: NOT DETECTED
SODIUM SERPL-SCNC: 138 MMOL/L (ref 136–145)
WBC NRBC COR # BLD: 6.48 10*3/MM3 (ref 3.4–10.8)

## 2022-01-11 PROCEDURE — C9803 HOPD COVID-19 SPEC COLLECT: HCPCS

## 2022-01-11 PROCEDURE — U0004 COV-19 TEST NON-CDC HGH THRU: HCPCS

## 2022-01-11 PROCEDURE — 85025 COMPLETE CBC W/AUTO DIFF WBC: CPT | Performed by: INTERNAL MEDICINE

## 2022-01-11 PROCEDURE — 85610 PROTHROMBIN TIME: CPT | Performed by: INTERNAL MEDICINE

## 2022-01-11 PROCEDURE — 85730 THROMBOPLASTIN TIME PARTIAL: CPT

## 2022-01-11 PROCEDURE — 80053 COMPREHEN METABOLIC PANEL: CPT | Performed by: INTERNAL MEDICINE

## 2022-01-11 PROCEDURE — 36415 COLL VENOUS BLD VENIPUNCTURE: CPT | Performed by: INTERNAL MEDICINE

## 2022-01-13 ENCOUNTER — TELEPHONE (OUTPATIENT)
Dept: CARDIOLOGY | Facility: CLINIC | Age: 82
End: 2022-01-13

## 2022-01-13 ENCOUNTER — HOSPITAL ENCOUNTER (OUTPATIENT)
Facility: HOSPITAL | Age: 82
Setting detail: HOSPITAL OUTPATIENT SURGERY
Discharge: HOME | End: 2022-01-13
Attending: INTERNAL MEDICINE | Admitting: INTERNAL MEDICINE

## 2022-01-13 VITALS
OXYGEN SATURATION: 97 % | SYSTOLIC BLOOD PRESSURE: 149 MMHG | DIASTOLIC BLOOD PRESSURE: 91 MMHG | RESPIRATION RATE: 15 BRPM | HEART RATE: 79 BPM | TEMPERATURE: 97.8 F | BODY MASS INDEX: 25.57 KG/M2 | WEIGHT: 192.9 LBS | HEIGHT: 73 IN

## 2022-01-13 DIAGNOSIS — I47.29 NONSUSTAINED VENTRICULAR TACHYCARDIA: ICD-10-CM

## 2022-01-13 DIAGNOSIS — R94.39 ABNORMAL STRESS TEST: ICD-10-CM

## 2022-01-13 DIAGNOSIS — Z01.818 PRE-OP TESTING: Primary | ICD-10-CM

## 2022-01-13 RX ORDER — SODIUM CHLORIDE 9 MG/ML
30 INJECTION, SOLUTION INTRAVENOUS CONTINUOUS
Status: DISCONTINUED | OUTPATIENT
Start: 2022-01-13 | End: 2022-01-13 | Stop reason: HOSPADM

## 2022-01-13 RX ORDER — LOSARTAN POTASSIUM 25 MG/1
50 TABLET ORAL DAILY
COMMUNITY
End: 2022-06-13

## 2022-01-13 NOTE — SIGNIFICANT NOTE
Pt took blood thinner this am. Instructions given to pt by Dr Pires office to hold Xarelto x2 days prior to procedure. Dr Figueroa informed and cath canceled. Discussed with pt and son to hold Xarelto for 2 days prior to procedure.  Clarified that his procedure is the second day several times and not to take Xarleto the morning of his Cath. Pt instructed to call office to reschedule procedure and given number to [facility] for any confusion with meds on procedure day.

## 2022-01-14 PROCEDURE — 93295 DEV INTERROG REMOTE 1/2/MLT: CPT | Performed by: NURSE PRACTITIONER

## 2022-01-14 PROCEDURE — 93296 REM INTERROG EVL PM/IDS: CPT | Performed by: NURSE PRACTITIONER

## 2022-01-17 ENCOUNTER — TRANSCRIBE ORDERS (OUTPATIENT)
Dept: ADMINISTRATIVE | Facility: HOSPITAL | Age: 82
End: 2022-01-17

## 2022-01-17 ENCOUNTER — LAB (OUTPATIENT)
Dept: LAB | Facility: HOSPITAL | Age: 82
End: 2022-01-17

## 2022-01-17 DIAGNOSIS — R07.89 CHEST PAIN, ATYPICAL: Primary | ICD-10-CM

## 2022-01-17 DIAGNOSIS — Z01.818 PREOP TESTING: ICD-10-CM

## 2022-01-17 DIAGNOSIS — Z01.818 PREOP TESTING: Primary | ICD-10-CM

## 2022-01-17 DIAGNOSIS — R07.89 CHEST PAIN, ATYPICAL: ICD-10-CM

## 2022-01-17 LAB
ALBUMIN SERPL-MCNC: 3.5 G/DL (ref 3.5–5.2)
ALBUMIN/GLOB SERPL: 1.1 G/DL
ALP SERPL-CCNC: 110 U/L (ref 39–117)
ALT SERPL W P-5'-P-CCNC: 21 U/L (ref 1–41)
ANION GAP SERPL CALCULATED.3IONS-SCNC: 7.7 MMOL/L (ref 5–15)
APTT PPP: 29.9 SECONDS (ref 61–76.5)
AST SERPL-CCNC: 34 U/L (ref 1–40)
BASOPHILS # BLD AUTO: 0.05 10*3/MM3 (ref 0–0.2)
BASOPHILS NFR BLD AUTO: 1 % (ref 0–1.5)
BILIRUB SERPL-MCNC: 2.4 MG/DL (ref 0–1.2)
BUN SERPL-MCNC: 32 MG/DL (ref 8–23)
BUN/CREAT SERPL: 21.2 (ref 7–25)
CALCIUM SPEC-SCNC: 9.5 MG/DL (ref 8.6–10.5)
CHLORIDE SERPL-SCNC: 104 MMOL/L (ref 98–107)
CO2 SERPL-SCNC: 30.3 MMOL/L (ref 22–29)
CREAT SERPL-MCNC: 1.51 MG/DL (ref 0.76–1.27)
DEPRECATED RDW RBC AUTO: 48.8 FL (ref 37–54)
EOSINOPHIL # BLD AUTO: 0.05 10*3/MM3 (ref 0–0.4)
EOSINOPHIL NFR BLD AUTO: 1 % (ref 0.3–6.2)
ERYTHROCYTE [DISTWIDTH] IN BLOOD BY AUTOMATED COUNT: 14.6 % (ref 12.3–15.4)
GFR SERPL CREATININE-BSD FRML MDRD: 44 ML/MIN/1.73
GLOBULIN UR ELPH-MCNC: 3.2 GM/DL
GLUCOSE SERPL-MCNC: 166 MG/DL (ref 65–99)
HCT VFR BLD AUTO: 41.7 % (ref 37.5–51)
HGB BLD-MCNC: 13.9 G/DL (ref 13–17.7)
IMM GRANULOCYTES # BLD AUTO: 0.02 10*3/MM3 (ref 0–0.05)
IMM GRANULOCYTES NFR BLD AUTO: 0.4 % (ref 0–0.5)
INR PPP: 1.32 (ref 2–3)
LYMPHOCYTES # BLD AUTO: 0.86 10*3/MM3 (ref 0.7–3.1)
LYMPHOCYTES NFR BLD AUTO: 16.5 % (ref 19.6–45.3)
MAGNESIUM SERPL-MCNC: 2.2 MG/DL (ref 1.6–2.4)
MCH RBC QN AUTO: 30.8 PG (ref 26.6–33)
MCHC RBC AUTO-ENTMCNC: 33.3 G/DL (ref 31.5–35.7)
MCV RBC AUTO: 92.3 FL (ref 79–97)
MONOCYTES # BLD AUTO: 0.42 10*3/MM3 (ref 0.1–0.9)
MONOCYTES NFR BLD AUTO: 8.1 % (ref 5–12)
NEUTROPHILS NFR BLD AUTO: 3.81 10*3/MM3 (ref 1.7–7)
NEUTROPHILS NFR BLD AUTO: 73 % (ref 42.7–76)
NRBC BLD AUTO-RTO: 0 /100 WBC (ref 0–0.2)
PLATELET # BLD AUTO: 205 10*3/MM3 (ref 140–450)
PMV BLD AUTO: 11.5 FL (ref 6–12)
POTASSIUM SERPL-SCNC: 4.2 MMOL/L (ref 3.5–5.2)
PROT SERPL-MCNC: 6.7 G/DL (ref 6–8.5)
PROTHROMBIN TIME: 14.4 SECONDS (ref 19.4–28.5)
RBC # BLD AUTO: 4.52 10*6/MM3 (ref 4.14–5.8)
SARS-COV-2 ORF1AB RESP QL NAA+PROBE: NOT DETECTED
SODIUM SERPL-SCNC: 142 MMOL/L (ref 136–145)
WBC NRBC COR # BLD: 5.21 10*3/MM3 (ref 3.4–10.8)

## 2022-01-17 PROCEDURE — C9803 HOPD COVID-19 SPEC COLLECT: HCPCS

## 2022-01-17 PROCEDURE — 83735 ASSAY OF MAGNESIUM: CPT

## 2022-01-17 PROCEDURE — 85610 PROTHROMBIN TIME: CPT

## 2022-01-17 PROCEDURE — U0004 COV-19 TEST NON-CDC HGH THRU: HCPCS

## 2022-01-17 PROCEDURE — 80053 COMPREHEN METABOLIC PANEL: CPT

## 2022-01-17 PROCEDURE — 36415 COLL VENOUS BLD VENIPUNCTURE: CPT

## 2022-01-17 PROCEDURE — 85025 COMPLETE CBC W/AUTO DIFF WBC: CPT

## 2022-01-17 PROCEDURE — 85730 THROMBOPLASTIN TIME PARTIAL: CPT

## 2022-01-19 ENCOUNTER — HOSPITAL ENCOUNTER (OUTPATIENT)
Facility: HOSPITAL | Age: 82
Setting detail: HOSPITAL OUTPATIENT SURGERY
Discharge: HOME | End: 2022-01-19
Attending: INTERNAL MEDICINE | Admitting: INTERNAL MEDICINE

## 2022-01-19 VITALS
BODY MASS INDEX: 24.98 KG/M2 | SYSTOLIC BLOOD PRESSURE: 132 MMHG | DIASTOLIC BLOOD PRESSURE: 82 MMHG | WEIGHT: 188.49 LBS | HEIGHT: 73 IN | HEART RATE: 72 BPM | RESPIRATION RATE: 14 BRPM | OXYGEN SATURATION: 99 % | TEMPERATURE: 97.9 F

## 2022-01-19 DIAGNOSIS — R07.89 CHEST PAIN, ATYPICAL: ICD-10-CM

## 2022-01-19 PROCEDURE — 93458 L HRT ARTERY/VENTRICLE ANGIO: CPT | Performed by: INTERNAL MEDICINE

## 2022-01-19 PROCEDURE — 99152 MOD SED SAME PHYS/QHP 5/>YRS: CPT

## 2022-01-19 PROCEDURE — 25010000002 FENTANYL CITRATE (PF) 100 MCG/2ML SOLUTION: Performed by: INTERNAL MEDICINE

## 2022-01-19 PROCEDURE — 0 IOPAMIDOL PER 1 ML: Performed by: INTERNAL MEDICINE

## 2022-01-19 PROCEDURE — C1894 INTRO/SHEATH, NON-LASER: HCPCS | Performed by: INTERNAL MEDICINE

## 2022-01-19 PROCEDURE — C1769 GUIDE WIRE: HCPCS | Performed by: INTERNAL MEDICINE

## 2022-01-19 PROCEDURE — 25010000002 MIDAZOLAM PER 1 MG: Performed by: INTERNAL MEDICINE

## 2022-01-19 RX ORDER — LIDOCAINE HYDROCHLORIDE 20 MG/ML
INJECTION, SOLUTION INFILTRATION; PERINEURAL AS NEEDED
Status: DISCONTINUED | OUTPATIENT
Start: 2022-01-19 | End: 2022-01-19 | Stop reason: HOSPADM

## 2022-01-19 RX ORDER — SODIUM CHLORIDE 9 MG/ML
250 INJECTION, SOLUTION INTRAVENOUS ONCE AS NEEDED
Status: DISCONTINUED | OUTPATIENT
Start: 2022-01-19 | End: 2022-01-19 | Stop reason: HOSPADM

## 2022-01-19 RX ORDER — SODIUM CHLORIDE 9 MG/ML
50 INJECTION, SOLUTION INTRAVENOUS CONTINUOUS
Status: DISCONTINUED | OUTPATIENT
Start: 2022-01-19 | End: 2022-01-19 | Stop reason: HOSPADM

## 2022-01-19 RX ORDER — MIDAZOLAM HYDROCHLORIDE 1 MG/ML
INJECTION INTRAMUSCULAR; INTRAVENOUS AS NEEDED
Status: DISCONTINUED | OUTPATIENT
Start: 2022-01-19 | End: 2022-01-19 | Stop reason: HOSPADM

## 2022-01-19 RX ORDER — ACETAMINOPHEN 325 MG/1
650 TABLET ORAL EVERY 4 HOURS PRN
Status: DISCONTINUED | OUTPATIENT
Start: 2022-01-19 | End: 2022-01-19 | Stop reason: HOSPADM

## 2022-01-19 RX ORDER — FENTANYL CITRATE 50 UG/ML
INJECTION, SOLUTION INTRAMUSCULAR; INTRAVENOUS AS NEEDED
Status: DISCONTINUED | OUTPATIENT
Start: 2022-01-19 | End: 2022-01-19 | Stop reason: HOSPADM

## 2022-01-19 RX ADMIN — SODIUM CHLORIDE 50 ML/HR: 9 INJECTION, SOLUTION INTRAVENOUS at 08:53

## 2022-01-19 NOTE — PROGRESS NOTES
Cardiology Clinic Note  Chidi Figueroa MD, PhD    Subjective:     Encounter Date:01/04/2022      Patient ID: Hank Ponce is a 81 y.o. male.    Chief Complaint:  Chief Complaint   Patient presents with   • Follow-up       HPI:    I the pleasure to see this 81-year-old gentleman in clinic today.  He has a history of cardiomyopathy with biventricular ICD in place, permanent atrial fibrillation hypertension hyperlipidemia, mitral vegetation, history of stress-induced cardiomyopathy dilated cardiomyopathy with acute on chronic systolic heart failure, he has had some atypical chest discomfort ultimately undergoing ischemic evaluation which was abnormal.  Risk and benefits of cardiac catheterization discussed along with chest discomfort shortness of breath and apical lateral reversibility with reduced EF 21%.  During his stress test he had frequent PVCs and nonsustained VT    Stress test reviewed and interpreted by me demonstrates the following  Interpretation Summary       · Left ventricular ejection fraction is severely reduced. .  · There is no prior study available for comparison. Abnormal nuclear Apical lateral and lateral wall reversibility seen Summed difference score of 6 Dilated ventricle EF 21%.  · Findings consistent with an equivocal ECG stress test.     Abnormal study  Paced rhythm biventricular  Frequent PVCs and nonsustained VT with triplets and quadruplets in stress  No specific ST changes and paced rhythm  EF severely reduced 20%  Severely dilated ventricle  Reversibility seen in the apical lateral lateral wall compared to baseline moderate size, mild to moderate moderate severity     Abnormal study cannot rule out ischemia of the apical lateral wall and mid lateral wall  Severely reduced EF         Historical data copied forward from previous encounters in EMR including the history, exam, and assessment/plan has been reviewed and is unchanged unless noted otherwise.    Cardiac medicines  "reviewed with risk, benefits, and necessity of each discussed.    Risk and benefit of cardiac testing/catheterization reviewed including death heart attack stroke pain bleeding infection need for vascular /cardiovascular surgery were discussed and the patient     Objective:         /86 (BP Location: Left arm, Patient Position: Sitting)   Pulse 73   Resp 18   Ht 188 cm (74\")   Wt 86.5 kg (190 lb 9.6 oz)   BMI 24.47 kg/m²     Physical Exam  Regular, 2 out of 6 systolic murmur low sternal border  No heave no lift  No clubbing cyanosis or edema  Radial pulses 1+ equal bilaterally  Normal cap refill  Normocephalic atraumatic pupils equal round  Pacemaker site clean and dry healed  Soft nontender nondistended  Clear to auscultation  Assessment:         Diagnoses and all orders for this visit:    1. Abnormal stress test (Primary)  Overview:  Added automatically from request for surgery [id number]    Orders:  -     Cancel: Case Request Cath Lab: Left Heart Cath  -     CBC & Differential  -     Comprehensive Metabolic Panel  -     Protime-INR    2. Pre-op testing  -     CBC & Differential  -     Comprehensive Metabolic Panel  -     Protime-INR    3. 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  Please call with any questions or concerns  Chidi Figueroa MD, PhD    Most recent EKG as reviewed and interpreted by me:  Procedures "     Most recent echo as reviewed and interpreted by me:  Results for orders placed during the hospital encounter of 01/03/22    Adult Transthoracic Echo Complete W/ Cont if Necessary Per Protocol    Interpretation Summary  · The left ventricular cavity is moderately dilated.  · Left ventricular ejection fraction appears to be 31 - 35%. Left ventricular systolic function is severely decreased.  · Left ventricular diastolic function is consistent with (grade II w/high LAP) pseudonormalization.  · The right ventricular cavity is moderate to severely dilated.  · Moderately reduced right ventricular systolic function noted.  · Left atrial volume is severely increased.  · The right atrial cavity is severely dilated.  · There is bileaflet mitral valve thickening present.  · Moderate mitral valve regurgitation is present with a posteriorly-directed jet noted.  · Moderate to severe tricuspid valve regurgitation is present.  · Estimated right ventricular systolic pressure from tricuspid regurgitation is moderately elevated (45-55 mmHg).  · Mild to moderate pulmonary hypertension is present.      Most recent stress test as reviewed and interpreted by me:  Results for orders placed during the hospital encounter of 01/03/22    Stress Test With Myocardial Perfusion One Day    Interpretation Summary  · Left ventricular ejection fraction is severely reduced. .  · There is no prior study available for comparison. Abnormal nuclear Apical lateral and lateral wall reversibility seen Summed difference score of 6 Dilated ventricle EF 21%.  · Findings consistent with an equivocal ECG stress test.    Abnormal study  Paced rhythm biventricular  Frequent PVCs and nonsustained VT with triplets and quadruplets in stress  No specific ST changes and paced rhythm  EF severely reduced 20%  Severely dilated ventricle  Reversibility seen in the apical lateral lateral wall compared to baseline moderate size, mild to moderate moderate  severity    Abnormal study cannot rule out ischemia of the apical lateral wall and mid lateral wall  Severely reduced EF      Most recent cardiac catheterization as reviewed interpreted by me:  No results found for this or any previous visit.    The following portions of the patient's history were reviewed and updated as appropriate: allergies, current medications, past family history, past medical history, past social history, past surgical history and problem list.      ROS:  14 point review of systems negative except as mentioned above  No current facility-administered medications for this visit.  No current outpatient medications on file.    Facility-Administered Medications Ordered in Other Visits:   •  sodium chloride 0.9 % infusion, 50 mL/hr, Intravenous, Continuous, Chidi Figueroa MD, Last Rate: 50 mL/hr at 01/19/22 0853, 50 mL/hr at 01/19/22 0853    Problem List:  Patient Active Problem List   Diagnosis   • Presence of biventricular implantable cardioverter-defibrillator (ICD)   • Permanent atrial fibrillation (CMS/HCC)   • Hyperlipidemia, mixed   • Mitral regurgitation   • Tricuspid regurgitation   • Essential hypertension   • Takotsubo cardiomyopathy   • Dilated cardiomyopathy (HCC)   • Acute on chronic systolic congestive heart failure (HCC)   • Atrial flutter with rapid ventricular response (HCC)   • Low serum vitamin B12   • Heart failure with reduced ejection fraction (HCC)   • Elevated serum creatinine   • Elevated troponin   • Pneumonia   • Stage 3 chronic kidney disease (HCC)   • Nonsustained ventricular tachycardia (HCC)   • Gout   • Abnormal stress test   • Chest pain, atypical     Past Medical History:  Past Medical History:   Diagnosis Date   • Acid reflux disease    • Allergic rhinitis    • Arthritis     gouty   • Atrial fibrillation (HCC)    • BBB (bundle branch block)     right   • Bone spur 2017    neck   • Congestive heart failure (CHF) (HCC)    • Congestive heart failure (HCC)  10/8/2019   • Coronary artery disease     non obstructive   • Essential hypertension 2011   • Hyperlipidemia, mixed 10/8/2019   • Hypertension    • Mitral regurgitation    • Osteoarthritis    • Presence of biventricular implantable cardioverter-defibrillator (ICD) 2019   • S/P ablation of atrial fibrillation 2017   • Takotsubo cardiomyopathy    • Tricuspid regurgitation    • VT (ventricular tachycardia) (HCC) 2018     Past Surgical History:  Past Surgical History:   Procedure Laterality Date   • AMPUTATION      index and middle finger   • CARDIAC ABLATION  2017    BHF   • CARDIAC CATHETERIZATION  2016    non obst CAD; takotsubo CM   • CARDIAC CATHETERIZATION  2017    treating medically   • CARDIAC DEFIBRILLATOR PLACEMENT      Bs   • CARDIAC ELECTROPHYSIOLOGY PROCEDURE N/A 2021    Procedure: AV node ablation;  Surgeon: Hira Ochoa MD;  Location: [facility] INVASIVE LOCATION;  Service: Cardiovascular;  Laterality: N/A;   • FRACTURE SURGERY  2021    jaw    • INTERNAL CARDIAC DEFIBRILLATOR INSERTION      BiV ICD BS   • OTHER SURGICAL HISTORY  2017    [facility]   • ROTATOR CUFF REPAIR Right    • SINUS SURGERY  2014     Social History:  Social History     Socioeconomic History   • Marital status:    Tobacco Use   • Smoking status: Former Smoker     Quit date: 1990     Years since quittin.0   • Smokeless tobacco: Never Used   Vaping Use   • Vaping Use: Never used   Substance and Sexual Activity   • Alcohol use: Yes     Comment: occasionally   • Drug use: Never   • Sexual activity: Defer     Allergies:  Allergies   Allergen Reactions   • Hydrocodone Urinary Retention     Immunizations:  Immunization History   Administered Date(s) Administered   • COVID-19 (MODERNA) 1st, 2nd, 3rd Dose Only 2021, 2021, 10/26/2021   • Flu Vaccine Intradermal Quad 18-64YR 2011, 2012, 2013   • Fluad Quad 65+ 10/07/2019,  09/10/2020   • Fluzone High Dose =>65 Years ([employer] ONLY) 10/03/2014, 10/09/2015, 09/28/2016, 10/02/2017, 09/25/2018   • Fluzone High-Dose 65+yrs 10/17/2021   • Pneumococcal Conjugate 13-Valent (PCV13) 10/25/2019   • Pneumococcal Polysaccharide (PPSV23) 10/27/2020            In-Office Procedure(s):  No orders to display        ASCVD RIsk Score::  The ASCVD Risk score (Roopa LIEN Henson, et al., 2013) failed to calculate for the following reasons:    The 2013 ASCVD risk score is only valid for ages 40 to 79    Imaging:    Results for orders placed in visit on 11/17/21    XR Chest PA & Lateral    Narrative  DATE OF EXAM:  11/30/2021 10:26 AM    PROCEDURE:  XR CHEST PA AND LATERAL-    INDICATIONS:  bibasilar crackles; R09.89-Other specified symptoms and signs involving  the circulatory and respiratory systems    COMPARISON:  AP portable chest 7/30/2021.    TECHNIQUE:  Two radiologic views of the chest.    FINDINGS:  Heart size appears moderately enlarged but improved since the prior  examination. Central pulmonary vasculature remains enlarged suggesting  congestive change. The interstitial and alveolar densities described on  the previous examination appear resolved. No acute airspace disease. No  pneumothorax. Questionable trace right basilar pleural fluid.    Impression  1. Moderate cardiomegaly which appears improved since the prior exam.  2. Mild central vascular congestion without overt pulmonary edema.  3. Question trace right basilar pleural fluid.    Electronically Signed By-Kathie Oshea MD On:11/30/2021 10:33 AM  This report was finalized on [id number] by  Kathie Oshea MD.       Results for orders placed during the hospital encounter of 11/30/21    US Liver    Narrative  US LIVER-    Date of Exam: 11/30/2021 10:12 AM    Indication: Elevated alk phos and bilirubin; R74.8-Abnormal levels of  other serum enzymes.    Comparison: None available.    Technique: Transverse and sagittal ultrasound images of the  right upper  quadrant were obtained. Doppler evaluation was also conducted.    FINDINGS:  Liver appears within normal limits throughout. Echogenicity within  normal limits. No evidence of a focal lesion. No evidence of biliary  dilatation/obstruction, common bile duct is normal diameter at 3 mm.    Impression  Negative ultrasound of the liver.    Electronically Signed By-Brain Noonan On:11/30/2021 10:40 AM  This report was finalized on [id number] by  Brain Noonan, .      Results for orders placed during the hospital encounter of 04/17/21    CT Head Without Contrast    Narrative  DATE OF EXAM:  4/18/2021 4:49 PM    PROCEDURE:  CT HEAD WO CONTRAST-    INDICATIONS:  Brain cancer screening, Cowden Syndrome/PHTS (Age < 19y);  J18.9-Pneumonia, unspecified organism; R09.02-Hypoxemia;  I48.11-Longstanding persistent atrial fibrillation; R77.8-Other  specified abnormalities of plasma proteins    COMPARISON:  04/18/2021 2:22 PM    TECHNIQUE:  Routine transaxial cuts were obtained through the head without the  administration of contrast. Automated exposure control and iterative  reconstruction methods were used.    FINDINGS:  There is cerebral atrophy. There is no intracranial hemorrhage. There is  no midline shift. The appearance to the head is similar to the prior  study.    There are fractures of the maxillofacial area. There is some  subcutaneous air around the nasal bridge area. There is density within  the paranasal sinuses which could relate to blood.    Impression  1.No significant change in appearance of the head from the earlier  study.  2.Fractures of the maxillofacial area are again noted. There is density  within the paranasal sinuses suggesting underlying blood.    Electronically Signed By-Moises Recinos MD On:4/18/2021 5:01 PM  This report was finalized on [id number] by  Moises Recinos MD.      Lab Review:   Office Visit on 01/04/2022   Component Date Value   • Glucose 01/11/2022 160*   • BUN 01/11/2022 27*    • Creatinine 01/11/2022 1.42*   • Sodium 01/11/2022 138    • Potassium 01/11/2022 4.8    • Chloride 01/11/2022 103    • CO2 01/11/2022 27.5    • Calcium 01/11/2022 9.4    • Total Protein 01/11/2022 6.8    • Albumin 01/11/2022 3.80    • ALT (SGPT) 01/11/2022 17    • AST (SGOT) 01/11/2022 35    • Alkaline Phosphatase 01/11/2022 115    • Total Bilirubin 01/11/2022 2.4*   • eGFR Non  Amer 01/11/2022 48*   • Globulin 01/11/2022 3.0    • A/G Ratio 01/11/2022 1.3    • BUN/Creatinine Ratio 01/11/2022 19.0    • Anion Gap 01/11/2022 7.5    • Protime 01/11/2022 14.1*   • INR 01/11/2022 1.29*   • WBC 01/11/2022 6.48    • RBC 01/11/2022 4.77    • Hemoglobin 01/11/2022 14.5    • Hematocrit 01/11/2022 46.0    • MCV 01/11/2022 96.4    • MCH 01/11/2022 30.4    • MCHC 01/11/2022 31.5    • RDW 01/11/2022 14.7    • RDW-SD 01/11/2022 51.7    • MPV 01/11/2022 11.7    • Platelets 01/11/2022 226    • Neutrophil % 01/11/2022 74.9    • Lymphocyte % 01/11/2022 13.9*   • Monocyte % 01/11/2022 9.0    • Eosinophil % 01/11/2022 0.8    • Basophil % 01/11/2022 0.8    • Immature Grans % 01/11/2022 0.6*   • Neutrophils, Absolute 01/11/2022 4.86    • Lymphocytes, Absolute 01/11/2022 0.90    • Monocytes, Absolute 01/11/2022 0.58    • Eosinophils, Absolute 01/11/2022 0.05    • Basophils, Absolute 01/11/2022 0.05    • Immature Grans, Absolute 01/11/2022 0.04    • nRBC 01/11/2022 0.0    Hospital Outpatient Visit on 01/03/2022   Component Date Value   • BSA 01/03/2022 2.1    • RVIDd 01/03/2022 5.0    • IVSd 01/03/2022 1.5    • LVIDd 01/03/2022 6.0    • LVIDs 01/03/2022 5.3    • LVPWd 01/03/2022 1.4    • IVS/LVPW 01/03/2022 1.1    • FS 01/03/2022 11.4    • EDV(Teich) 01/03/2022 180.3    • ESV(Teich) 01/03/2022 136.6    • EF(Teich) 01/03/2022 24.2    • EDV(cubed) 01/03/2022 216.5    • ESV(cubed) 01/03/2022 150.7    • EF(cubed) 01/03/2022 30.4    • LV mass(C)d 01/03/2022 411.5    • LV mass(C)dI 01/03/2022 196.2    • SV(Teich) 01/03/2022 43.7    •  SI(Teich) 01/03/2022 20.8    • SV(cubed) 01/03/2022 65.8    • SI(cubed) 01/03/2022 31.4    • Ao root diam 01/03/2022 2.6    • Ao root area 01/03/2022 5.5    • asc Aorta Diam 01/03/2022 2.9    • LVOT diam 01/03/2022 1.9    • LVOT area 01/03/2022 2.9    • EDV(MOD-sp4) 01/03/2022 114.6    • ESV(MOD-sp4) 01/03/2022 72.2    • EF(MOD-sp4) 01/03/2022 37.0    • SV(MOD-sp4) 01/03/2022 42.4    • SI(MOD-sp4) 01/03/2022 20.2    • Ao root area (BSA correc* 01/03/2022 1.3    • LV Zabala Vol (BSA correct* 01/03/2022 54.6    • LV Sys Vol (BSA correcte* 01/03/2022 34.4    • MV V2 max 01/03/2022 143.7    • MV max PG 01/03/2022 8.3    • MV V2 mean 01/03/2022 87.5    • MV mean PG 01/03/2022 3.8    • MV V2 VTI 01/03/2022 23.0    • MVA(VTI) 01/03/2022 1.2    • Ao pk renea 01/03/2022 86.2    • Ao max PG 01/03/2022 3.0    • Ao max PG (full) 01/03/2022 1.6    • Ao V2 mean 01/03/2022 58.1    • Ao mean PG 01/03/2022 1.6    • Ao mean PG (full) 01/03/2022 0.9    • Ao V2 VTI 01/03/2022 13.9    • MARIA DE JESUS(I,A) 01/03/2022 2.0    • MARIA DE JESUS(I,D) 01/03/2022 2.0    • MARIA DE JESUS(V,A) 01/03/2022 2.0    • MARIA DE JESUS(V,D) 01/03/2022 2.0    • LV V1 max PG 01/03/2022 1.4    • LV V1 mean PG 01/03/2022 0.71    • LV V1 max 01/03/2022 58.2    • LV V1 mean 01/03/2022 38.2    • LV V1 VTI 01/03/2022 9.3    • MR max renea 01/03/2022 562.6    • MR max PG 01/03/2022 126.6    • SV(Ao) 01/03/2022 76.7    • SI(Ao) 01/03/2022 36.6    • SV(LVOT) 01/03/2022 27.3    • SI(LVOT) 01/03/2022 13.0    • PA V2 max 01/03/2022 104.0    • PA max PG 01/03/2022 4.3    • PA max PG (full) 01/03/2022 -1.0    • PA V2 mean 01/03/2022 73.3    • PA mean PG 01/03/2022 2.5    • PA mean PG (full) 01/03/2022 -0.18    • PA V2 VTI 01/03/2022 26.1    • PA acc time 01/03/2022 0.12    • RV V1 max PG 01/03/2022 5.4    • RV V1 mean PG 01/03/2022 2.7    • RV V1 max 01/03/2022 115.8    • RV V1 mean 01/03/2022 76.2    • RV V1 VTI 01/03/2022 23.9    • TR max renea 01/03/2022 228.1    • RVSP(TR) 01/03/2022 23.9    • RAP systole 01/03/2022  3.0    • PA pr(Accel) 01/03/2022 25.0    • BH CV ECHO LONG - BZI_BMI 01/03/2022 23.6    • BH CV ECHO LONG - BSA(HA* 01/03/2022 2.1    • BH CV ECHO LONG - BZI_ME* 01/03/2022 83.5    • BH CV ECHO LONG - BZI_ME* 01/03/2022 188.0    • EF(MOD-bp) 01/03/2022 37.0    • LA dimension(2D) 01/03/2022 4.9    Hospital Outpatient Visit on 01/03/2022   Component Date Value   • Target HR (85%) 01/03/2022 117    • Max. Pred. HR (100%) 01/03/2022 138    • BH CV STRESS PROTOCOL 1 01/03/2022 Pharmacologic    • Stage 1 01/03/2022 1    • HR Stage 1 01/03/2022 80    • Duration Min Stage 1 01/03/2022 0    • Duration Sec Stage 1 01/03/2022 10    • Stress Dose Regadenoson * 01/03/2022 0.4    • Stress Comments Stage 1 01/03/2022 10 sec bolus injection    • Stage 2 01/03/2022 2    • HR Stage 2 01/03/2022 86    • BP Stage 2 01/03/2022 144/78    • Duration Min Stage 2 01/03/2022 4    • Duration Sec Stage 2 01/03/2022 0    • Stress Comments Stage 2 01/03/2022 recovery    • Stage 3 01/03/2022 3    • HR Stage 3 01/03/2022 85    • BP Stage 3 01/03/2022 120/65    • Stage 4 01/03/2022 4    • HR Stage 4 01/03/2022 73    • BP Stage 4 01/03/2022 140/78    • Baseline HR 01/03/2022 82    • Baseline BP 01/03/2022 139/85    • Peak HR 01/03/2022 86    • Percent Max Pred HR 01/03/2022 62.32    • Percent Target HR 01/03/2022 73    • Peak BP 01/03/2022 144/78    • Recovery HR 01/03/2022 72    • Recovery BP 01/03/2022 141/73    • Nuclear Prior Study 01/03/2022 3    • BH CV REST NUCLEAR ISOTO* 01/03/2022 6.9    • BH CV STRESS NUCLEAR ISO* 01/03/2022 21.9    Lab on 01/03/2022   Component Date Value   • Glucose 01/03/2022 111*   • BUN 01/03/2022 31*   • Creatinine 01/03/2022 1.30*   • Sodium 01/03/2022 138    • Potassium 01/03/2022 4.3    • Chloride 01/03/2022 102    • CO2 01/03/2022 28.6    • Calcium 01/03/2022 9.5    • Total Protein 01/03/2022 7.2    • Albumin 01/03/2022 3.80    • ALT (SGPT) 01/03/2022 21    • AST (SGOT) 01/03/2022 35    • Alkaline Phosphatase  01/03/2022 127*   • Total Bilirubin 01/03/2022 2.3*   • eGFR Non  Amer 01/03/2022 53*   • Globulin 01/03/2022 3.4    • A/G Ratio 01/03/2022 1.1    • BUN/Creatinine Ratio 01/03/2022 23.8    • Anion Gap 01/03/2022 7.4    Lab on 11/04/2021   Component Date Value   • Glucose 11/04/2021 96    • BUN 11/04/2021 24*   • Creatinine 11/04/2021 1.24    • Sodium 11/04/2021 141    • Potassium 11/04/2021 4.3    • Chloride 11/04/2021 104    • CO2 11/04/2021 27.4    • Calcium 11/04/2021 9.4    • Total Protein 11/04/2021 7.3    • Albumin 11/04/2021 3.80    • ALT (SGPT) 11/04/2021 17    • AST (SGOT) 11/04/2021 25    • Alkaline Phosphatase 11/04/2021 135*   • Total Bilirubin 11/04/2021 1.4*   • eGFR Non African Amer 11/04/2021 56*   • Globulin 11/04/2021 3.5    • A/G Ratio 11/04/2021 1.1    • BUN/Creatinine Ratio 11/04/2021 19.4    • Anion Gap 11/04/2021 9.6    • Hemoglobin A1C 11/04/2021 5.4    • Total Cholesterol 11/04/2021 131    • Triglycerides 11/04/2021 48    • HDL Cholesterol 11/04/2021 58    • LDL Cholesterol  11/04/2021 62    • VLDL Cholesterol 11/04/2021 11    • LDL/HDL Ratio 11/04/2021 1.09    • TSH 11/04/2021 3.690    • Free T4 11/04/2021 1.24    • 25 Hydroxy, Vitamin D 11/04/2021 34.6    • Vitamin B-12 11/04/2021 1,491*   • PSA 11/04/2021 0.826    • Magnesium 11/04/2021 2.1    • WBC 11/04/2021 6.35    • RBC 11/04/2021 4.49    • Hemoglobin 11/04/2021 14.1    • Hematocrit 11/04/2021 42.7    • MCV 11/04/2021 95.1    • MCH 11/04/2021 31.4    • MCHC 11/04/2021 33.0    • RDW 11/04/2021 16.0*   • RDW-SD 11/04/2021 55.9*   • MPV 11/04/2021 10.8    • Platelets 11/04/2021 215    • Neutrophil % 11/04/2021 65.6    • Lymphocyte % 11/04/2021 21.3    • Monocyte % 11/04/2021 9.8    • Eosinophil % 11/04/2021 1.9    • Basophil % 11/04/2021 0.9    • Immature Grans % 11/04/2021 0.5    • Neutrophils, Absolute 11/04/2021 4.17    • Lymphocytes, Absolute 11/04/2021 1.35    • Monocytes, Absolute 11/04/2021 0.62    • Eosinophils, Absolute  11/04/2021 0.12    • Basophils, Absolute 11/04/2021 0.06    • Immature Grans, Absolute 11/04/2021 0.03    • nRBC 11/04/2021 0.0    Office Visit on 08/06/2021   Component Date Value   • WBC 08/06/2021 6.48    • RBC 08/06/2021 4.56    • Hemoglobin 08/06/2021 14.0    • Hematocrit 08/06/2021 43.7    • MCV 08/06/2021 95.8    • MCH 08/06/2021 30.7    • MCHC 08/06/2021 32.0    • RDW 08/06/2021 13.4    • RDW-SD 08/06/2021 46.8    • MPV 08/06/2021 10.9    • Platelets 08/06/2021 201    • Glucose 08/06/2021 124*   • BUN 08/06/2021 24*   • Creatinine 08/06/2021 1.11    • Sodium 08/06/2021 144    • Potassium 08/06/2021 3.9    • Chloride 08/06/2021 105    • CO2 08/06/2021 28.2    • Calcium 08/06/2021 9.3    • eGFR Non  Amer 08/06/2021 63    • BUN/Creatinine Ratio 08/06/2021 21.6    • Anion Gap 08/06/2021 10.8    • C-Reactive Protein 08/06/2021 <0.30    • Sed Rate 08/06/2021 3    Admission on 07/30/2021, Discharged on 07/30/2021   Component Date Value   • QT Interval 07/30/2021 492    • Glucose 07/30/2021 156*   • BUN 07/30/2021 20    • Creatinine 07/30/2021 1.17    • Sodium 07/30/2021 138    • Potassium 07/30/2021 4.2    • Chloride 07/30/2021 102    • CO2 07/30/2021 26.0    • Calcium 07/30/2021 8.9    • Total Protein 07/30/2021 6.3    • Albumin 07/30/2021 3.50    • ALT (SGPT) 07/30/2021 16    • AST (SGOT) 07/30/2021 25    • Alkaline Phosphatase 07/30/2021 90    • Total Bilirubin 07/30/2021 1.5*   • eGFR Non African Amer 07/30/2021 60*   • Globulin 07/30/2021 2.8    • A/G Ratio 07/30/2021 1.3    • BUN/Creatinine Ratio 07/30/2021 17.1    • Anion Gap 07/30/2021 10.0    • proBNP 07/30/2021 1,461.0    • Troponin T 07/30/2021 <0.010    • ADENOVIRUS, PCR 07/30/2021 Not Detected    • Coronavirus 229E 07/30/2021 Not Detected    • Coronavirus HKU1 07/30/2021 Not Detected    • Coronavirus NL63 07/30/2021 Not Detected    • Coronavirus OC43 07/30/2021 Not Detected    • COVID19 07/30/2021 Not Detected    • Human Metapneumovirus  07/30/2021 Not Detected    • Human Rhinovirus/Enterov* 07/30/2021 Not Detected    • Influenza A PCR 07/30/2021 Not Detected    • Influenza B PCR 07/30/2021 Not Detected    • Parainfluenza Virus 1 07/30/2021 Not Detected    • Parainfluenza Virus 2 07/30/2021 Not Detected    • Parainfluenza Virus 3 07/30/2021 Not Detected    • Parainfluenza Virus 4 07/30/2021 Not Detected    • RSV, PCR 07/30/2021 Not Detected    • Bordetella pertussis pcr 07/30/2021 Not Detected    • Bordetella parapertussis* 07/30/2021 Not Detected    • Chlamydophila pneumoniae* 07/30/2021 Not Detected    • Mycoplasma pneumo by PCR 07/30/2021 Not Detected    • WBC 07/30/2021 5.80    • RBC 07/30/2021 4.44    • Hemoglobin 07/30/2021 13.7    • Hematocrit 07/30/2021 42.5    • MCV 07/30/2021 95.8    • MCH 07/30/2021 31.0    • MCHC 07/30/2021 32.4    • RDW 07/30/2021 15.4    • RDW-SD 07/30/2021 51.2    • MPV 07/30/2021 9.1    • Platelets 07/30/2021 198    • Neutrophil % 07/30/2021 76.6*   • Lymphocyte % 07/30/2021 12.4*   • Monocyte % 07/30/2021 8.4    • Eosinophil % 07/30/2021 0.6    • Basophil % 07/30/2021 2.0*   • Neutrophils, Absolute 07/30/2021 4.40    • Lymphocytes, Absolute 07/30/2021 0.70    • Monocytes, Absolute 07/30/2021 0.50    • Eosinophils, Absolute 07/30/2021 0.00    • Basophils, Absolute 07/30/2021 0.10    • nRBC 07/30/2021 0.1      Recent labs reviewed and interpreted for clinical significance and application            Level of Care:           Chidi Figueroa MD  01/19/22  .

## 2022-01-19 NOTE — H&P (VIEW-ONLY)
Cardiology Clinic Note  Chidi Figueroa MD, PhD    Subjective:     Encounter Date:01/04/2022      Patient ID: Hank Ponce is a 81 y.o. male.    Chief Complaint:  Chief Complaint   Patient presents with   • Follow-up       HPI:    I the pleasure to see this 81-year-old gentleman in clinic today.  He has a history of cardiomyopathy with biventricular ICD in place, permanent atrial fibrillation hypertension hyperlipidemia, mitral vegetation, history of stress-induced cardiomyopathy dilated cardiomyopathy with acute on chronic systolic heart failure, he has had some atypical chest discomfort ultimately undergoing ischemic evaluation which was abnormal.  Risk and benefits of cardiac catheterization discussed along with chest discomfort shortness of breath and apical lateral reversibility with reduced EF 21%.  During his stress test he had frequent PVCs and nonsustained VT    Stress test reviewed and interpreted by me demonstrates the following  Interpretation Summary       · Left ventricular ejection fraction is severely reduced. .  · There is no prior study available for comparison. Abnormal nuclear Apical lateral and lateral wall reversibility seen Summed difference score of 6 Dilated ventricle EF 21%.  · Findings consistent with an equivocal ECG stress test.     Abnormal study  Paced rhythm biventricular  Frequent PVCs and nonsustained VT with triplets and quadruplets in stress  No specific ST changes and paced rhythm  EF severely reduced 20%  Severely dilated ventricle  Reversibility seen in the apical lateral lateral wall compared to baseline moderate size, mild to moderate moderate severity     Abnormal study cannot rule out ischemia of the apical lateral wall and mid lateral wall  Severely reduced EF         Historical data copied forward from previous encounters in EMR including the history, exam, and assessment/plan has been reviewed and is unchanged unless noted otherwise.    Cardiac medicines  "reviewed with risk, benefits, and necessity of each discussed.    Risk and benefit of cardiac testing/catheterization reviewed including death heart attack stroke pain bleeding infection need for vascular /cardiovascular surgery were discussed and the patient     Objective:         /86 (BP Location: Left arm, Patient Position: Sitting)   Pulse 73   Resp 18   Ht 188 cm (74\")   Wt 86.5 kg (190 lb 9.6 oz)   BMI 24.47 kg/m²     Physical Exam  Regular, 2 out of 6 systolic murmur low sternal border  No heave no lift  No clubbing cyanosis or edema  Radial pulses 1+ equal bilaterally  Normal cap refill  Normocephalic atraumatic pupils equal round  Pacemaker site clean and dry healed  Soft nontender nondistended  Clear to auscultation  Assessment:         Diagnoses and all orders for this visit:    1. Abnormal stress test (Primary)  Overview:  Added automatically from request for surgery [id number]    Orders:  -     Cancel: Case Request Cath Lab: Left Heart Cath  -     CBC & Differential  -     Comprehensive Metabolic Panel  -     Protime-INR    2. Pre-op testing  -     CBC & Differential  -     Comprehensive Metabolic Panel  -     Protime-INR    3. 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  Please call with any questions or concerns  Chidi Figueroa MD, PhD    Most recent EKG as reviewed and interpreted by me:  Procedures "     Most recent echo as reviewed and interpreted by me:  Results for orders placed during the hospital encounter of 01/03/22    Adult Transthoracic Echo Complete W/ Cont if Necessary Per Protocol    Interpretation Summary  · The left ventricular cavity is moderately dilated.  · Left ventricular ejection fraction appears to be 31 - 35%. Left ventricular systolic function is severely decreased.  · Left ventricular diastolic function is consistent with (grade II w/high LAP) pseudonormalization.  · The right ventricular cavity is moderate to severely dilated.  · Moderately reduced right ventricular systolic function noted.  · Left atrial volume is severely increased.  · The right atrial cavity is severely dilated.  · There is bileaflet mitral valve thickening present.  · Moderate mitral valve regurgitation is present with a posteriorly-directed jet noted.  · Moderate to severe tricuspid valve regurgitation is present.  · Estimated right ventricular systolic pressure from tricuspid regurgitation is moderately elevated (45-55 mmHg).  · Mild to moderate pulmonary hypertension is present.      Most recent stress test as reviewed and interpreted by me:  Results for orders placed during the hospital encounter of 01/03/22    Stress Test With Myocardial Perfusion One Day    Interpretation Summary  · Left ventricular ejection fraction is severely reduced. .  · There is no prior study available for comparison. Abnormal nuclear Apical lateral and lateral wall reversibility seen Summed difference score of 6 Dilated ventricle EF 21%.  · Findings consistent with an equivocal ECG stress test.    Abnormal study  Paced rhythm biventricular  Frequent PVCs and nonsustained VT with triplets and quadruplets in stress  No specific ST changes and paced rhythm  EF severely reduced 20%  Severely dilated ventricle  Reversibility seen in the apical lateral lateral wall compared to baseline moderate size, mild to moderate moderate  severity    Abnormal study cannot rule out ischemia of the apical lateral wall and mid lateral wall  Severely reduced EF      Most recent cardiac catheterization as reviewed interpreted by me:  No results found for this or any previous visit.    The following portions of the patient's history were reviewed and updated as appropriate: allergies, current medications, past family history, past medical history, past social history, past surgical history and problem list.      ROS:  14 point review of systems negative except as mentioned above  No current facility-administered medications for this visit.  No current outpatient medications on file.    Facility-Administered Medications Ordered in Other Visits:   •  sodium chloride 0.9 % infusion, 50 mL/hr, Intravenous, Continuous, Chidi Figueroa MD, Last Rate: 50 mL/hr at 01/19/22 0853, 50 mL/hr at 01/19/22 0853    Problem List:  Patient Active Problem List   Diagnosis   • Presence of biventricular implantable cardioverter-defibrillator (ICD)   • Permanent atrial fibrillation (CMS/HCC)   • Hyperlipidemia, mixed   • Mitral regurgitation   • Tricuspid regurgitation   • Essential hypertension   • Takotsubo cardiomyopathy   • Dilated cardiomyopathy (HCC)   • Acute on chronic systolic congestive heart failure (HCC)   • Atrial flutter with rapid ventricular response (HCC)   • Low serum vitamin B12   • Heart failure with reduced ejection fraction (HCC)   • Elevated serum creatinine   • Elevated troponin   • Pneumonia   • Stage 3 chronic kidney disease (HCC)   • Nonsustained ventricular tachycardia (HCC)   • Gout   • Abnormal stress test   • Chest pain, atypical     Past Medical History:  Past Medical History:   Diagnosis Date   • Acid reflux disease    • Allergic rhinitis    • Arthritis     gouty   • Atrial fibrillation (HCC)    • BBB (bundle branch block)     right   • Bone spur 2017    neck   • Congestive heart failure (CHF) (HCC)    • Congestive heart failure (HCC)  10/8/2019   • Coronary artery disease     non obstructive   • Essential hypertension 2011   • Hyperlipidemia, mixed 10/8/2019   • Hypertension    • Mitral regurgitation    • Osteoarthritis    • Presence of biventricular implantable cardioverter-defibrillator (ICD) 2019   • S/P ablation of atrial fibrillation 2017   • Takotsubo cardiomyopathy    • Tricuspid regurgitation    • VT (ventricular tachycardia) (HCC) 2018     Past Surgical History:  Past Surgical History:   Procedure Laterality Date   • AMPUTATION      index and middle finger   • CARDIAC ABLATION  2017    BHF   • CARDIAC CATHETERIZATION  2016    non obst CAD; takotsubo CM   • CARDIAC CATHETERIZATION  2017    treating medically   • CARDIAC DEFIBRILLATOR PLACEMENT      Bs   • CARDIAC ELECTROPHYSIOLOGY PROCEDURE N/A 2021    Procedure: AV node ablation;  Surgeon: Hira Ochoa MD;  Location: [facility] INVASIVE LOCATION;  Service: Cardiovascular;  Laterality: N/A;   • FRACTURE SURGERY  2021    jaw    • INTERNAL CARDIAC DEFIBRILLATOR INSERTION      BiV ICD BS   • OTHER SURGICAL HISTORY  2017    Heart ablation-[facility]   • ROTATOR CUFF REPAIR Right    • SINUS SURGERY  2014     Social History:  Social History     Socioeconomic History   • Marital status:    Tobacco Use   • Smoking status: Former Smoker     Quit date: 1990     Years since quittin.0   • Smokeless tobacco: Never Used   Vaping Use   • Vaping Use: Never used   Substance and Sexual Activity   • Alcohol use: Yes     Comment: occasionally   • Drug use: Never   • Sexual activity: Defer     Allergies:  Allergies   Allergen Reactions   • Hydrocodone Urinary Retention     Immunizations:  Immunization History   Administered Date(s) Administered   • COVID-19 (MODERNA) 1st, 2nd, 3rd Dose Only 2021, 2021, 10/26/2021   • Flu Vaccine Intradermal Quad 18-64YR 2011, 2012, 2013   • Fluad Quad 65+ 10/07/2019,  09/10/2020   • Fluzone High Dose =>65 Years ([employer] ONLY) 10/03/2014, 10/09/2015, 09/28/2016, 10/02/2017, 09/25/2018   • Fluzone High-Dose 65+yrs 10/17/2021   • Pneumococcal Conjugate 13-Valent (PCV13) 10/25/2019   • Pneumococcal Polysaccharide (PPSV23) 10/27/2020            In-Office Procedure(s):  No orders to display        ASCVD RIsk Score::  The ASCVD Risk score (Roopa LIEN Henson, et al., 2013) failed to calculate for the following reasons:    The 2013 ASCVD risk score is only valid for ages 40 to 79    Imaging:    Results for orders placed in visit on 11/17/21    XR Chest PA & Lateral    Narrative  DATE OF EXAM:  11/30/2021 10:26 AM    PROCEDURE:  XR CHEST PA AND LATERAL-    INDICATIONS:  bibasilar crackles; R09.89-Other specified symptoms and signs involving  the circulatory and respiratory systems    COMPARISON:  AP portable chest 7/30/2021.    TECHNIQUE:  Two radiologic views of the chest.    FINDINGS:  Heart size appears moderately enlarged but improved since the prior  examination. Central pulmonary vasculature remains enlarged suggesting  congestive change. The interstitial and alveolar densities described on  the previous examination appear resolved. No acute airspace disease. No  pneumothorax. Questionable trace right basilar pleural fluid.    Impression  1. Moderate cardiomegaly which appears improved since the prior exam.  2. Mild central vascular congestion without overt pulmonary edema.  3. Question trace right basilar pleural fluid.    Electronically Signed By-Kathie Oshea MD On:11/30/2021 10:33 AM  This report was finalized on [id number] by  Kathie Oshea MD.       Results for orders placed during the hospital encounter of 11/30/21    US Liver    Narrative  US LIVER-    Date of Exam: 11/30/2021 10:12 AM    Indication: Elevated alk phos and bilirubin; R74.8-Abnormal levels of  other serum enzymes.    Comparison: None available.    Technique: Transverse and sagittal ultrasound images of the  right upper  quadrant were obtained. Doppler evaluation was also conducted.    FINDINGS:  Liver appears within normal limits throughout. Echogenicity within  normal limits. No evidence of a focal lesion. No evidence of biliary  dilatation/obstruction, common bile duct is normal diameter at 3 mm.    Impression  Negative ultrasound of the liver.    Electronically Signed By-Brain Noonan On:11/30/2021 10:40 AM  This report was finalized on [id number] by  Brain Noonan, .      Results for orders placed during the hospital encounter of 04/17/21    CT Head Without Contrast    Narrative  DATE OF EXAM:  4/18/2021 4:49 PM    PROCEDURE:  CT HEAD WO CONTRAST-    INDICATIONS:  Brain cancer screening, Cowden Syndrome/PHTS (Age < 19y);  J18.9-Pneumonia, unspecified organism; R09.02-Hypoxemia;  I48.11-Longstanding persistent atrial fibrillation; R77.8-Other  specified abnormalities of plasma proteins    COMPARISON:  04/18/2021 2:22 PM    TECHNIQUE:  Routine transaxial cuts were obtained through the head without the  administration of contrast. Automated exposure control and iterative  reconstruction methods were used.    FINDINGS:  There is cerebral atrophy. There is no intracranial hemorrhage. There is  no midline shift. The appearance to the head is similar to the prior  study.    There are fractures of the maxillofacial area. There is some  subcutaneous air around the nasal bridge area. There is density within  the paranasal sinuses which could relate to blood.    Impression  1.No significant change in appearance of the head from the earlier  study.  2.Fractures of the maxillofacial area are again noted. There is density  within the paranasal sinuses suggesting underlying blood.    Electronically Signed By-Moises Recinos MD On:4/18/2021 5:01 PM  This report was finalized on [id number] by  Moises Recinos MD.      Lab Review:   Office Visit on 01/04/2022   Component Date Value   • Glucose 01/11/2022 160*   • BUN 01/11/2022 27*    • Creatinine 01/11/2022 1.42*   • Sodium 01/11/2022 138    • Potassium 01/11/2022 4.8    • Chloride 01/11/2022 103    • CO2 01/11/2022 27.5    • Calcium 01/11/2022 9.4    • Total Protein 01/11/2022 6.8    • Albumin 01/11/2022 3.80    • ALT (SGPT) 01/11/2022 17    • AST (SGOT) 01/11/2022 35    • Alkaline Phosphatase 01/11/2022 115    • Total Bilirubin 01/11/2022 2.4*   • eGFR Non  Amer 01/11/2022 48*   • Globulin 01/11/2022 3.0    • A/G Ratio 01/11/2022 1.3    • BUN/Creatinine Ratio 01/11/2022 19.0    • Anion Gap 01/11/2022 7.5    • Protime 01/11/2022 14.1*   • INR 01/11/2022 1.29*   • WBC 01/11/2022 6.48    • RBC 01/11/2022 4.77    • Hemoglobin 01/11/2022 14.5    • Hematocrit 01/11/2022 46.0    • MCV 01/11/2022 96.4    • MCH 01/11/2022 30.4    • MCHC 01/11/2022 31.5    • RDW 01/11/2022 14.7    • RDW-SD 01/11/2022 51.7    • MPV 01/11/2022 11.7    • Platelets 01/11/2022 226    • Neutrophil % 01/11/2022 74.9    • Lymphocyte % 01/11/2022 13.9*   • Monocyte % 01/11/2022 9.0    • Eosinophil % 01/11/2022 0.8    • Basophil % 01/11/2022 0.8    • Immature Grans % 01/11/2022 0.6*   • Neutrophils, Absolute 01/11/2022 4.86    • Lymphocytes, Absolute 01/11/2022 0.90    • Monocytes, Absolute 01/11/2022 0.58    • Eosinophils, Absolute 01/11/2022 0.05    • Basophils, Absolute 01/11/2022 0.05    • Immature Grans, Absolute 01/11/2022 0.04    • nRBC 01/11/2022 0.0    Hospital Outpatient Visit on 01/03/2022   Component Date Value   • BSA 01/03/2022 2.1    • RVIDd 01/03/2022 5.0    • IVSd 01/03/2022 1.5    • LVIDd 01/03/2022 6.0    • LVIDs 01/03/2022 5.3    • LVPWd 01/03/2022 1.4    • IVS/LVPW 01/03/2022 1.1    • FS 01/03/2022 11.4    • EDV(Teich) 01/03/2022 180.3    • ESV(Teich) 01/03/2022 136.6    • EF(Teich) 01/03/2022 24.2    • EDV(cubed) 01/03/2022 216.5    • ESV(cubed) 01/03/2022 150.7    • EF(cubed) 01/03/2022 30.4    • LV mass(C)d 01/03/2022 411.5    • LV mass(C)dI 01/03/2022 196.2    • SV(Teich) 01/03/2022 43.7    •  SI(Teich) 01/03/2022 20.8    • SV(cubed) 01/03/2022 65.8    • SI(cubed) 01/03/2022 31.4    • Ao root diam 01/03/2022 2.6    • Ao root area 01/03/2022 5.5    • asc Aorta Diam 01/03/2022 2.9    • LVOT diam 01/03/2022 1.9    • LVOT area 01/03/2022 2.9    • EDV(MOD-sp4) 01/03/2022 114.6    • ESV(MOD-sp4) 01/03/2022 72.2    • EF(MOD-sp4) 01/03/2022 37.0    • SV(MOD-sp4) 01/03/2022 42.4    • SI(MOD-sp4) 01/03/2022 20.2    • Ao root area (BSA correc* 01/03/2022 1.3    • LV Zabala Vol (BSA correct* 01/03/2022 54.6    • LV Sys Vol (BSA correcte* 01/03/2022 34.4    • MV V2 max 01/03/2022 143.7    • MV max PG 01/03/2022 8.3    • MV V2 mean 01/03/2022 87.5    • MV mean PG 01/03/2022 3.8    • MV V2 VTI 01/03/2022 23.0    • MVA(VTI) 01/03/2022 1.2    • Ao pk renea 01/03/2022 86.2    • Ao max PG 01/03/2022 3.0    • Ao max PG (full) 01/03/2022 1.6    • Ao V2 mean 01/03/2022 58.1    • Ao mean PG 01/03/2022 1.6    • Ao mean PG (full) 01/03/2022 0.9    • Ao V2 VTI 01/03/2022 13.9    • MARIA DE JESUS(I,A) 01/03/2022 2.0    • MARIA DE JESUS(I,D) 01/03/2022 2.0    • MARIA DE JESUS(V,A) 01/03/2022 2.0    • MARIA DE JESUS(V,D) 01/03/2022 2.0    • LV V1 max PG 01/03/2022 1.4    • LV V1 mean PG 01/03/2022 0.71    • LV V1 max 01/03/2022 58.2    • LV V1 mean 01/03/2022 38.2    • LV V1 VTI 01/03/2022 9.3    • MR max renea 01/03/2022 562.6    • MR max PG 01/03/2022 126.6    • SV(Ao) 01/03/2022 76.7    • SI(Ao) 01/03/2022 36.6    • SV(LVOT) 01/03/2022 27.3    • SI(LVOT) 01/03/2022 13.0    • PA V2 max 01/03/2022 104.0    • PA max PG 01/03/2022 4.3    • PA max PG (full) 01/03/2022 -1.0    • PA V2 mean 01/03/2022 73.3    • PA mean PG 01/03/2022 2.5    • PA mean PG (full) 01/03/2022 -0.18    • PA V2 VTI 01/03/2022 26.1    • PA acc time 01/03/2022 0.12    • RV V1 max PG 01/03/2022 5.4    • RV V1 mean PG 01/03/2022 2.7    • RV V1 max 01/03/2022 115.8    • RV V1 mean 01/03/2022 76.2    • RV V1 VTI 01/03/2022 23.9    • TR max renea 01/03/2022 228.1    • RVSP(TR) 01/03/2022 23.9    • RAP systole 01/03/2022  3.0    • PA pr(Accel) 01/03/2022 25.0    • BH CV ECHO LONG - BZI_BMI 01/03/2022 23.6    • BH CV ECHO LONG - BSA(HA* 01/03/2022 2.1    • BH CV ECHO LONG - BZI_ME* 01/03/2022 83.5    • BH CV ECHO LONG - BZI_ME* 01/03/2022 188.0    • EF(MOD-bp) 01/03/2022 37.0    • LA dimension(2D) 01/03/2022 4.9    Hospital Outpatient Visit on 01/03/2022   Component Date Value   • Target HR (85%) 01/03/2022 117    • Max. Pred. HR (100%) 01/03/2022 138    • BH CV STRESS PROTOCOL 1 01/03/2022 Pharmacologic    • Stage 1 01/03/2022 1    • HR Stage 1 01/03/2022 80    • Duration Min Stage 1 01/03/2022 0    • Duration Sec Stage 1 01/03/2022 10    • Stress Dose Regadenoson * 01/03/2022 0.4    • Stress Comments Stage 1 01/03/2022 10 sec bolus injection    • Stage 2 01/03/2022 2    • HR Stage 2 01/03/2022 86    • BP Stage 2 01/03/2022 144/78    • Duration Min Stage 2 01/03/2022 4    • Duration Sec Stage 2 01/03/2022 0    • Stress Comments Stage 2 01/03/2022 recovery    • Stage 3 01/03/2022 3    • HR Stage 3 01/03/2022 85    • BP Stage 3 01/03/2022 120/65    • Stage 4 01/03/2022 4    • HR Stage 4 01/03/2022 73    • BP Stage 4 01/03/2022 140/78    • Baseline HR 01/03/2022 82    • Baseline BP 01/03/2022 139/85    • Peak HR 01/03/2022 86    • Percent Max Pred HR 01/03/2022 62.32    • Percent Target HR 01/03/2022 73    • Peak BP 01/03/2022 144/78    • Recovery HR 01/03/2022 72    • Recovery BP 01/03/2022 141/73    • Nuclear Prior Study 01/03/2022 3    • BH CV REST NUCLEAR ISOTO* 01/03/2022 6.9    • BH CV STRESS NUCLEAR ISO* 01/03/2022 21.9    Lab on 01/03/2022   Component Date Value   • Glucose 01/03/2022 111*   • BUN 01/03/2022 31*   • Creatinine 01/03/2022 1.30*   • Sodium 01/03/2022 138    • Potassium 01/03/2022 4.3    • Chloride 01/03/2022 102    • CO2 01/03/2022 28.6    • Calcium 01/03/2022 9.5    • Total Protein 01/03/2022 7.2    • Albumin 01/03/2022 3.80    • ALT (SGPT) 01/03/2022 21    • AST (SGOT) 01/03/2022 35    • Alkaline Phosphatase  01/03/2022 127*   • Total Bilirubin 01/03/2022 2.3*   • eGFR Non  Amer 01/03/2022 53*   • Globulin 01/03/2022 3.4    • A/G Ratio 01/03/2022 1.1    • BUN/Creatinine Ratio 01/03/2022 23.8    • Anion Gap 01/03/2022 7.4    Lab on 11/04/2021   Component Date Value   • Glucose 11/04/2021 96    • BUN 11/04/2021 24*   • Creatinine 11/04/2021 1.24    • Sodium 11/04/2021 141    • Potassium 11/04/2021 4.3    • Chloride 11/04/2021 104    • CO2 11/04/2021 27.4    • Calcium 11/04/2021 9.4    • Total Protein 11/04/2021 7.3    • Albumin 11/04/2021 3.80    • ALT (SGPT) 11/04/2021 17    • AST (SGOT) 11/04/2021 25    • Alkaline Phosphatase 11/04/2021 135*   • Total Bilirubin 11/04/2021 1.4*   • eGFR Non African Amer 11/04/2021 56*   • Globulin 11/04/2021 3.5    • A/G Ratio 11/04/2021 1.1    • BUN/Creatinine Ratio 11/04/2021 19.4    • Anion Gap 11/04/2021 9.6    • Hemoglobin A1C 11/04/2021 5.4    • Total Cholesterol 11/04/2021 131    • Triglycerides 11/04/2021 48    • HDL Cholesterol 11/04/2021 58    • LDL Cholesterol  11/04/2021 62    • VLDL Cholesterol 11/04/2021 11    • LDL/HDL Ratio 11/04/2021 1.09    • TSH 11/04/2021 3.690    • Free T4 11/04/2021 1.24    • 25 Hydroxy, Vitamin D 11/04/2021 34.6    • Vitamin B-12 11/04/2021 1,491*   • PSA 11/04/2021 0.826    • Magnesium 11/04/2021 2.1    • WBC 11/04/2021 6.35    • RBC 11/04/2021 4.49    • Hemoglobin 11/04/2021 14.1    • Hematocrit 11/04/2021 42.7    • MCV 11/04/2021 95.1    • MCH 11/04/2021 31.4    • MCHC 11/04/2021 33.0    • RDW 11/04/2021 16.0*   • RDW-SD 11/04/2021 55.9*   • MPV 11/04/2021 10.8    • Platelets 11/04/2021 215    • Neutrophil % 11/04/2021 65.6    • Lymphocyte % 11/04/2021 21.3    • Monocyte % 11/04/2021 9.8    • Eosinophil % 11/04/2021 1.9    • Basophil % 11/04/2021 0.9    • Immature Grans % 11/04/2021 0.5    • Neutrophils, Absolute 11/04/2021 4.17    • Lymphocytes, Absolute 11/04/2021 1.35    • Monocytes, Absolute 11/04/2021 0.62    • Eosinophils, Absolute  11/04/2021 0.12    • Basophils, Absolute 11/04/2021 0.06    • Immature Grans, Absolute 11/04/2021 0.03    • nRBC 11/04/2021 0.0    Office Visit on 08/06/2021   Component Date Value   • WBC 08/06/2021 6.48    • RBC 08/06/2021 4.56    • Hemoglobin 08/06/2021 14.0    • Hematocrit 08/06/2021 43.7    • MCV 08/06/2021 95.8    • MCH 08/06/2021 30.7    • MCHC 08/06/2021 32.0    • RDW 08/06/2021 13.4    • RDW-SD 08/06/2021 46.8    • MPV 08/06/2021 10.9    • Platelets 08/06/2021 201    • Glucose 08/06/2021 124*   • BUN 08/06/2021 24*   • Creatinine 08/06/2021 1.11    • Sodium 08/06/2021 144    • Potassium 08/06/2021 3.9    • Chloride 08/06/2021 105    • CO2 08/06/2021 28.2    • Calcium 08/06/2021 9.3    • eGFR Non  Amer 08/06/2021 63    • BUN/Creatinine Ratio 08/06/2021 21.6    • Anion Gap 08/06/2021 10.8    • C-Reactive Protein 08/06/2021 <0.30    • Sed Rate 08/06/2021 3    Admission on 07/30/2021, Discharged on 07/30/2021   Component Date Value   • QT Interval 07/30/2021 492    • Glucose 07/30/2021 156*   • BUN 07/30/2021 20    • Creatinine 07/30/2021 1.17    • Sodium 07/30/2021 138    • Potassium 07/30/2021 4.2    • Chloride 07/30/2021 102    • CO2 07/30/2021 26.0    • Calcium 07/30/2021 8.9    • Total Protein 07/30/2021 6.3    • Albumin 07/30/2021 3.50    • ALT (SGPT) 07/30/2021 16    • AST (SGOT) 07/30/2021 25    • Alkaline Phosphatase 07/30/2021 90    • Total Bilirubin 07/30/2021 1.5*   • eGFR Non African Amer 07/30/2021 60*   • Globulin 07/30/2021 2.8    • A/G Ratio 07/30/2021 1.3    • BUN/Creatinine Ratio 07/30/2021 17.1    • Anion Gap 07/30/2021 10.0    • proBNP 07/30/2021 1,461.0    • Troponin T 07/30/2021 <0.010    • ADENOVIRUS, PCR 07/30/2021 Not Detected    • Coronavirus 229E 07/30/2021 Not Detected    • Coronavirus HKU1 07/30/2021 Not Detected    • Coronavirus NL63 07/30/2021 Not Detected    • Coronavirus OC43 07/30/2021 Not Detected    • COVID19 07/30/2021 Not Detected    • Human Metapneumovirus  07/30/2021 Not Detected    • Human Rhinovirus/Enterov* 07/30/2021 Not Detected    • Influenza A PCR 07/30/2021 Not Detected    • Influenza B PCR 07/30/2021 Not Detected    • Parainfluenza Virus 1 07/30/2021 Not Detected    • Parainfluenza Virus 2 07/30/2021 Not Detected    • Parainfluenza Virus 3 07/30/2021 Not Detected    • Parainfluenza Virus 4 07/30/2021 Not Detected    • RSV, PCR 07/30/2021 Not Detected    • Bordetella pertussis pcr 07/30/2021 Not Detected    • Bordetella parapertussis* 07/30/2021 Not Detected    • Chlamydophila pneumoniae* 07/30/2021 Not Detected    • Mycoplasma pneumo by PCR 07/30/2021 Not Detected    • WBC 07/30/2021 5.80    • RBC 07/30/2021 4.44    • Hemoglobin 07/30/2021 13.7    • Hematocrit 07/30/2021 42.5    • MCV 07/30/2021 95.8    • MCH 07/30/2021 31.0    • MCHC 07/30/2021 32.4    • RDW 07/30/2021 15.4    • RDW-SD 07/30/2021 51.2    • MPV 07/30/2021 9.1    • Platelets 07/30/2021 198    • Neutrophil % 07/30/2021 76.6*   • Lymphocyte % 07/30/2021 12.4*   • Monocyte % 07/30/2021 8.4    • Eosinophil % 07/30/2021 0.6    • Basophil % 07/30/2021 2.0*   • Neutrophils, Absolute 07/30/2021 4.40    • Lymphocytes, Absolute 07/30/2021 0.70    • Monocytes, Absolute 07/30/2021 0.50    • Eosinophils, Absolute 07/30/2021 0.00    • Basophils, Absolute 07/30/2021 0.10    • nRBC 07/30/2021 0.1      Recent labs reviewed and interpreted for clinical significance and application            Level of Care:           Chidi Figueroa MD  01/19/22  .

## 2022-01-19 NOTE — DISCHARGE INSTRUCTIONS
Post Cath Instructions        Call Dr. Figueroa’s office to schedule a follow up appointment in 2-4 weeks at [phone].  Specific Physician Instructions: ***    1) Drink plenty of fluids for the next 24 hours.  This helps to eliminate the dye used in your procedure through urination.  You may resume a normal diet; however, try to avoid foods that would cause gas or constipation.    2) Sedative medication given to you during your catheterization may decrease your judgement and reaction time for up to 24-48 hours.  Therefore:  a. DO NOT drive or operate hazardous machinery (48 hours)  b. DO NOT consume alcoholic beverages  c. DO NOT make any important/legal decisions  d. Have someone stay with you for at least 24 hours    3) To allow proper healing and prevent bleeding, the following activities are to be strictly avoided for the next 24-48 hours:  a. Excessive bending at wound site  b. Straining (anything that would tense up muscles around the affected puncture site)  c. Lifting objects greater than 5 pounds, pushing, or pulling for 5 days    i. For Groin Cases:  1. Refrain from sexual activity  2. Refrain from running or vigorous walking  3. No prolonged sitting or standing  4. Limit stair climbing as much as possible    4) Keep the puncture site clean and dry.  You may remove the dressing tomorrow and replace it with a band-aid for at least one additional day.  Gently clean the site with mild soap and water.  No scrubbing/rubbing and lightly pat the area dry.  Showers are acceptable; however, avoid submerging in water (tub baths, hot tubs, swimming pools, dishwater, etc…) for at least one week.  The site should be completely healed before resuming these activities to reduce the risk of infection.  Check the site often.  Watch for signs and symptoms of infection and notify your physician if any of the following occur:  a. Bleeding or an increase in swelling at the puncture site  b. Fever  c. Increased soreness around  puncture site  d. Foul odor or significant drainage from the puncture site  e. Swelling, redness, or warmth at the puncture site    **A bruise or small “pea sized” lump under the skin at the puncture site is not unusual.  This should disappear within 3-4 weeks.**  5) CONTACT YOUR PHYSICIAN OR CALL 911 IF YOU EXPERIENCE ANY OF THE FOLLOWING:  a. Increased angina (chest pain) or frequent sensations of pressure, burning, pain, or other discomfort in the chest, arm, jaws, or stomach  b. Lightheadedness, dizziness, faint feeling, sweating, or difficulty breathing  c. Odd sensation changes like numbness, tingling, coldness, or pain in the arm or leg in which the catheter was inserted  d. Limb in which the catheter was inserted becomes pale/bluish in color    IMPORTANT:  Although this occurs very rarely, if you should develop bright red or excessive bleeding, feel a “pop” inside at the insertion site, or notice a sudden increase in swelling larger than a walnut, you should call 911.  Hold continuous firm pressure to the access site until emergency personnel arrive.  It is best if someone else can do this for you.

## 2022-01-24 RX ORDER — CYCLOBENZAPRINE HCL 5 MG
TABLET ORAL
Qty: 60 TABLET | Refills: 3 | Status: SHIPPED | OUTPATIENT
Start: 2022-01-24 | End: 2022-07-29

## 2022-01-27 ENCOUNTER — OFFICE VISIT (OUTPATIENT)
Dept: CARDIOLOGY | Facility: CLINIC | Age: 82
End: 2022-01-27

## 2022-01-27 VITALS
HEIGHT: 73 IN | HEART RATE: 68 BPM | BODY MASS INDEX: 25.92 KG/M2 | SYSTOLIC BLOOD PRESSURE: 158 MMHG | DIASTOLIC BLOOD PRESSURE: 86 MMHG | RESPIRATION RATE: 18 BRPM | WEIGHT: 195.6 LBS

## 2022-01-27 DIAGNOSIS — I47.29 NONSUSTAINED VENTRICULAR TACHYCARDIA: ICD-10-CM

## 2022-01-27 DIAGNOSIS — R07.89 CHEST PAIN, ATYPICAL: Primary | ICD-10-CM

## 2022-01-27 DIAGNOSIS — I48.21 PERMANENT ATRIAL FIBRILLATION: ICD-10-CM

## 2022-01-27 DIAGNOSIS — I42.0 DILATED CARDIOMYOPATHY: ICD-10-CM

## 2022-01-27 DIAGNOSIS — Z95.810 PRESENCE OF BIVENTRICULAR IMPLANTABLE CARDIOVERTER-DEFIBRILLATOR (ICD): ICD-10-CM

## 2022-01-27 PROCEDURE — 99214 OFFICE O/P EST MOD 30 MIN: CPT | Performed by: INTERNAL MEDICINE

## 2022-01-27 RX ORDER — FUROSEMIDE 20 MG/1
20 TABLET ORAL 2 TIMES DAILY
Qty: 60 TABLET | Refills: 11 | Status: SHIPPED | OUTPATIENT
Start: 2022-01-27 | End: 2022-10-31 | Stop reason: SDUPTHER

## 2022-01-27 NOTE — PROGRESS NOTES
Cardiology Clinic Note  Chidi Figueroa MD, PhD    Subjective:     Encounter Date:01/27/2022      Patient ID: Hank Ponce is a 81 y.o. male.    Chief Complaint:  Chief Complaint   Patient presents with   • Follow-up       HPI:      I the pleasure to see this 81-year-old gentleman in clinic today.  He has a history of cardiomyopathy with biventricular ICD in place, permanent atrial fibrillation hypertension hyperlipidemia, mitral vegetation, history of stress-induced cardiomyopathy dilated cardiomyopathy with acute on chronic systolic heart failure, EF 35%, he has had some atypical chest discomfort ultimately undergoing ischemic evaluation which was abnormal.   Left heart catheterization revealed patent vessels with nonobstructive disease in the LAD, small diagonal branch with focal 90% but too small for intervention, patent but mildly diseased RCA, circumflex also mild disease but nothing flow-limiting.   He presents back chest pain-free but has increased dyspnea exertion as well as peripheral edema, he was dry on exam at the time of his left heart catheterization with very low filling pressures and his Lasix was decreased however now he has had 2+ pitting edema peripherally and he did have some RV involvement with RV enlargement and hypokinesis likely with biventricular heart failure ultimately.  He is having some orthopnea and PND.     Stress test reviewed and interpreted by me demonstrates the following  Interpretation Summary        · Left ventricular ejection fraction is severely reduced. .  · There is no prior study available for comparison. Abnormal nuclear Apical lateral and lateral wall reversibility seen Summed difference score of 6 Dilated ventricle EF 21%.  · Findings consistent with an equivocal ECG stress test.     Abnormal study  Paced rhythm biventricular  Frequent PVCs and nonsustained VT with triplets and quadruplets in stress  No specific ST changes and paced rhythm  EF severely reduced  "20%  Severely dilated ventricle  Reversibility seen in the apical lateral lateral wall compared to baseline moderate size, mild to moderate moderate severity     Abnormal study cannot rule out ischemia of the apical lateral wall and mid lateral wall, this would correlate possibly with disease in the diagonal branch which is very limited and too small for intervention.  Severely reduced EF  ==========================================    Left heart catheterization results reviewed with the patient today along with all images  Results below             Historical data copied forward from previous encounters in EMR including the history, exam, and assessment/plan has been reviewed and is unchanged unless noted otherwise.     Cardiac medicines reviewed with risk, benefits, and necessity of each discussed.     Risk and benefit of cardiac testing/catheterization reviewed including death heart attack stroke pain bleeding infection need for vascular /cardiovascular surgery were discussed and the patient      Objective:         Objective          /86 (BP Location: Left arm, Patient Position: Sitting)   Pulse 73   Resp 18   Ht 188 cm (74\")   Wt 86.5 kg (190 lb 9.6 oz)   BMI 24.47 kg/m²      Physical Exam  Regular, 2 out of 6 systolic murmur low sternal border unchanged  No heave no lift  No clubbing cyanosis   Radial pulses 1+ equal bilaterally  Normal cap refill  Normocephalic atraumatic pupils equal round  Pacemaker site clean and dry healed  Soft nontender nondistended  Clear to auscultation  2+ pitting edema  Assessment:         Assessment          Diagnoses and all orders for this visit:     1. Abnormal stress test (Primary) with known coronary disease  Left heart catheterization with patent vessels other than a small diagonal branch disease  Medical treatment recommended     3. Nonsustained ventricular tachycardia (HCC)  -     Cancel: Case Request Cath Lab: Left Heart Cath  -     Protime-INR     Continue device " "monitoring, ICD, no targets for revascularization           Plan:    Acute on chronic systolic and diastolic CHF  Increase Lasix to 40 twice daily for the next 3 days then back to 20 twice daily  See him back in 30 days for follow-up, sooner if needed  Continue losartan with systolic heart failure in addition to Xarelto with stroke risk reduction with paroxysmal atrial fibrillation  Dilated cardiomyopathy, maximize medical therapy allowed by hemodynamics blood pressure    Further titration based on clinical course and response to therapy    Device interrogation in 3 months    Return to clinic in 30 days for follow-up and assessment of his volume status       Further recommendation to follow findings of invasive work-up     Chidi Figueroa MD, PhD         Historical data copied forward from previous encounters in EMR including the history, exam, and assessment/plan has been reviewed and is unchanged unless noted otherwise.    Cardiac medicines reviewed with risk, benefits, and necessity of each discussed.    Risk and benefit of cardiac testing reviewed including death heart attack stroke pain bleeding infection need for vascular /cardiovascular surgery were discussed and the patient     Objective:         /86 (BP Location: Left arm, Patient Position: Sitting)   Pulse 68   Resp 18   Ht 185.4 cm (73\")   Wt 88.7 kg (195 lb 9.6 oz)   BMI 25.81 kg/m²     Physical Exam    Assessment:         There are no diagnoses linked to this encounter.       Plan:              The pleasure to be involved in this patient's cardiovascular care.  Please call with any questions or concerns  Chidi Figueroa MD, PhD    Most recent EKG as reviewed and interpreted by me:  Procedures     Most recent echo as reviewed and interpreted by me:  Results for orders placed during the hospital encounter of 01/03/22    Adult Transthoracic Echo Complete W/ Cont if Necessary Per Protocol    Interpretation Summary  · The left ventricular cavity is " moderately dilated.  · Left ventricular ejection fraction appears to be 31 - 35%. Left ventricular systolic function is severely decreased.  · Left ventricular diastolic function is consistent with (grade II w/high LAP) pseudonormalization.  · The right ventricular cavity is moderate to severely dilated.  · Moderately reduced right ventricular systolic function noted.  · Left atrial volume is severely increased.  · The right atrial cavity is severely dilated.  · There is bileaflet mitral valve thickening present.  · Moderate mitral valve regurgitation is present with a posteriorly-directed jet noted.  · Moderate to severe tricuspid valve regurgitation is present.  · Estimated right ventricular systolic pressure from tricuspid regurgitation is moderately elevated (45-55 mmHg).  · Mild to moderate pulmonary hypertension is present.      Most recent stress test as reviewed and interpreted by me:  Results for orders placed during the hospital encounter of 01/03/22    Stress Test With Myocardial Perfusion One Day    Interpretation Summary  · Left ventricular ejection fraction is severely reduced. .  · There is no prior study available for comparison. Abnormal nuclear Apical lateral and lateral wall reversibility seen Summed difference score of 6 Dilated ventricle EF 21%.  · Findings consistent with an equivocal ECG stress test.    Abnormal study  Paced rhythm biventricular  Frequent PVCs and nonsustained VT with triplets and quadruplets in stress  No specific ST changes and paced rhythm  EF severely reduced 20%  Severely dilated ventricle  Reversibility seen in the apical lateral lateral wall compared to baseline moderate size, mild to moderate moderate severity    Abnormal study cannot rule out ischemia of the apical lateral wall and mid lateral wall  Severely reduced EF      Most recent cardiac catheterization as reviewed interpreted by me:  Results for orders placed during the hospital encounter of 01/19/22    Cardiac  Catheterization/Vascular Study    Narrative   Chidi Figueroa MD, PhD  [facility] cardiology  Date of service 1-    Procedure  1.  Left heart catheterization coronary angiography left ventriculography in ZHAO position    Indication  Cardiomyopathy, low EF, nonsustained VT, abnormal stress test    Performed consent patient was brought to the Cath Lab sterilely prepped and draped in usual fashion expose the right groin for right common femoral arterial access via micropuncture modified Seldinger technique with placement of a 6 [place] sheath.  035 guidewire was advanced to the aortic valve followed by JL 4 and JR4 catheters for selective left and right coronary angiography.  The JR4 was used across aortic valve easily, hand-injection for LV gram, EDP assessment and pullback assessment of the transaortic valve gradient.  There was small vessel disease in the diagonal 80 to 90% but vessel well less than 2 mm in diameter with limited nonbifurcating course, we will proceed with medical therapy of this, there is nonobstructive disease in the LAD 50% in the midportion most significantly with long segment of eccentric plaque but SORIN-3 flow throughout and no critical stenoses as it courses to and around the apex, 20 to 30% proximally, 30% distally, circumflex also had 30 to 40% in the mid but nothing critical, SORIN-3 flow throughout, RCA diffuse luminary low 40 to 30% nothing flow-limiting.    Diagnosis ultimately is nonischemic cardiomyopathy  BiV ICD is in place  Small vessel obstructive CAD in the diagonal branch which is too small for intervention should be treated medically  Nonobstructive disease in the LAD 50% in the midportion with significantly but nothing critical with SORIN-3 flow to around the apex which would not contribute to his underlying cardiomyopathy and PCI of this is unlikely to improve his EF over medical therapy which is on board    Findings  1.  128/86 opening pressure  2.   Closing pressure was unchanged  3.  LVEDP 10-12  4.  LV function LVEF 35% with dyssynchrony present  5.  No transaortic valve or LVOT gradient seen    Complications none  Blood loss less than 5 cc  Contrast used 65 cc  Moderate on sedation time of 30 minutes with IV Versed and fentanyl administered by registered nurse with complete ECG pulse oximetry and hemodynamic monitor throughout the entirety the case observed by me    Angiography  1 left main large-caliber vessel with no angiographic disease  2.  LAD is a long vessel coursing to and around the apex with 2 diagonal branches and septal perforators.  Proximal has diffuse irregularities 20 to 30% eccentric plaque, 50% in the mid but nothing flow-limiting, takeoff of the more distal diagonal branches without any flow-limiting stenosis, distal LAD diffusely irregularities 20 to 30% at most, proximal diagonal branch has an 80 to 90% focal narrowing and nothing distally, this vessel is well below 2 mm in diameter with nonbifurcating limited course along the anterolateral wall not in the area identified by the stress test at the apex or apical lateral portion which is supplied by distal LAD and more distal second diagonal branch.  This will be treated medically  3 circumflex nondominant with 2 obtuse marginal branches, 40 % mid at most nonflow limiting  4.  RCA 20 to 30% in the midportion at most, nothing flow-limiting, SORIN-3 flow throughout    Recommendations and conclusions  1.  Nonischemic cardiomyopathy  2.  Nonobstructive CAD of large epicardial vessels 50% at most in the mid LAD, 20 to 30% RCA, 40% circumflex, 80 to 90% disease in small diagonal branch which is inconsequential and should be treated medically not contributory to his cardiomyopathy  3.  Continue secondary prevention goals for systolic heart failure, go down on Lasix to daily given prerenal azotemia and elevated creatinine on labs today on twice daily Lasix with normal filling pressures  For  follow-up cardiology 2 to 4 weeks for further optimization and review of cath films    Chidi Figueroa MD, PhD    Home today    The following portions of the patient's history were reviewed and updated as appropriate: allergies, current medications, past family history, past medical history, past social history, past surgical history and problem list.      ROS:  14 point review of systems negative except as mentioned above    Current Outpatient Medications:   •  allopurinol (ZYLOPRIM) 300 MG tablet, Take 1 tablet by mouth Daily., Disp: 90 tablet, Rfl: 1  •  azelastine (OPTIVAR) 0.05 % ophthalmic solution, Administer 1 drop to both eyes 2 (Two) Times a Day., Disp: 6 mL, Rfl: 12  •  colchicine 0.6 MG tablet, Take 0.6 mg by mouth 2 (Two) Times a Day As Needed for Muscle / Joint Pain., Disp: , Rfl:   •  cyclobenzaprine (FLEXERIL) 5 MG tablet, TAKE TWO TABLETS BY MOUTH THREE TIMES A DAY AS NEEDED FOR MUSCLE SPASMS, Disp: 60 tablet, Rfl: 3  •  losartan (COZAAR) 25 MG tablet, Take 50 mg by mouth Daily., Disp: , Rfl:   •  rivaroxaban (XARELTO) 15 MG tablet, Take 1 tablet by mouth Daily., Disp: 90 tablet, Rfl: 2  •  vitamin B-12 (CYANOCOBALAMIN) 1000 MCG tablet, Take 1,000 mcg by mouth Daily., Disp: , Rfl:   •  furosemide (LASIX) 20 MG tablet, Take 1 tablet by mouth 2 (Two) Times a Day., Disp: 60 tablet, Rfl: 11    Problem List:  Patient Active Problem List   Diagnosis   • Presence of biventricular implantable cardioverter-defibrillator (ICD)   • Permanent atrial fibrillation (CMS/HCC)   • Hyperlipidemia, mixed   • Mitral regurgitation   • Tricuspid regurgitation   • Essential hypertension   • Takotsubo cardiomyopathy   • Dilated cardiomyopathy (HCC)   • Acute on chronic systolic congestive heart failure (HCC)   • Atrial flutter with rapid ventricular response (HCC)   • Low serum vitamin B12   • Heart failure with reduced ejection fraction (HCC)   • Elevated serum creatinine   • Elevated troponin   • Pneumonia   • Stage 3  chronic kidney disease (HCC)   • Nonsustained ventricular tachycardia (HCC)   • Gout   • Abnormal stress test   • Chest pain, atypical     Past Medical History:  Past Medical History:   Diagnosis Date   • Acid reflux disease    • Allergic rhinitis    • Arthritis     gouty   • Atrial fibrillation (HCC)    • BBB (bundle branch block)     right   • Bone spur 2017    neck   • Congestive heart failure (CHF) (HCC)    • Congestive heart failure (HCC) 10/8/2019   • Coronary artery disease     non obstructive   • Essential hypertension 11/29/2011   • Hyperlipidemia, mixed 10/8/2019   • Hypertension    • Mitral regurgitation    • Osteoarthritis    • Presence of biventricular implantable cardioverter-defibrillator (ICD) 7/29/2019   • S/P ablation of atrial fibrillation 01/2017   • Takotsubo cardiomyopathy    • Tricuspid regurgitation    • [facility]ricular tachycardia[facility] 02/2018     Past Surgical History:  Past Surgical History:   Procedure Laterality Date   • AMPUTATION      index and middle finger   • CARDIAC ABLATION  08/22/2017    BHF   • CARDIAC CATHETERIZATION  12/08/2016    non obst CAD; takotsubo CM   • CARDIAC CATHETERIZATION  07/24/2017    treating medically   • CARDIAC CATHETERIZATION N/A 1/19/2022    Procedure: Left Heart Cath;  Surgeon: Chidi Figueroa MD;  Location:  [facility];  Service: Cardiology;  Laterality: N/A;   • CARDIAC DEFIBRILLATOR PLACEMENT  2017    Bs   • CARDIAC ELECTROPHYSIOLOGY PROCEDURE N/A 4/14/2021    Procedure: AV node ablation;  Surgeon: Hira Ochoa MD;  Location:  [facility];  Service: Cardiovascular;  Laterality: N/A;   • FRACTURE SURGERY  04/18/2021    jaw    • INTERNAL CARDIAC DEFIBRILLATOR INSERTION  2017    BiV ICD BS   • OTHER SURGICAL HISTORY  01/2017    Heart ablation-[facility]   • ROTATOR CUFF REPAIR Right    • SINUS SURGERY  02/2014     Social History:  Social History     Socioeconomic History   • Marital status:    Tobacco  Use   • Smoking status: Former Smoker     Quit date:      Years since quittin.0   • Smokeless tobacco: Never Used   Vaping Use   • Vaping Use: Never used   Substance and Sexual Activity   • Alcohol use: Yes     Comment: occasionally   • Drug use: Never   • Sexual activity: Defer     Allergies:  Allergies   Allergen Reactions   • Hydrocodone Urinary Retention     Immunizations:  Immunization History   Administered Date(s) Administered   • COVID-19 (MODERNA) 1st, 2nd, 3rd Dose Only 2021, 2021, 10/26/2021   • Flu Vaccine Intradermal Quad 18-64YR 2011, 2012, 2013   • Fluad Quad 65+ 10/07/2019, 09/10/2020   • Fluzone High Dose =>65 Years ([employer] ONLY) 10/03/2014, 10/09/2015, 2016, 10/02/2017, 2018   • Fluzone High-Dose 65+yrs 10/17/2021   • Pneumococcal Conjugate 13-Valent (PCV13) 10/25/2019   • Pneumococcal Polysaccharide (PPSV23) 10/27/2020            In-Office Procedure(s):  No orders to display        ASCVD RIsk Score::  The ASCVD Risk score (Roopa DC Jr., et al., 2013) failed to calculate for the following reasons:    The 2013 ASCVD risk score is only valid for ages 40 to 79    Imaging:    Results for orders placed in visit on 21    XR Chest PA & Lateral    Narrative  DATE OF EXAM:  2021 10:26 AM    PROCEDURE:  XR CHEST PA AND LATERAL-    INDICATIONS:  bibasilar crackles; R09.89-Other specified symptoms and signs involving  the circulatory and respiratory systems    COMPARISON:  AP portable chest 2021.    TECHNIQUE:  Two radiologic views of the chest.    FINDINGS:  Heart size appears moderately enlarged but improved since the prior  examination. Central pulmonary vasculature remains enlarged suggesting  congestive change. The interstitial and alveolar densities described on  the previous examination appear resolved. No acute airspace disease. No  pneumothorax. Questionable trace right basilar pleural fluid.    Impression  1. Moderate cardiomegaly  which appears improved since the prior exam.  2. Mild central vascular congestion without overt pulmonary edema.  3. Question trace right basilar pleural fluid.    Electronically Signed By-Kathie Oshea MD On:11/30/2021 10:33 AM  This report was finalized on [id number] by  Kathie Oshea MD.       Results for orders placed during the hospital encounter of 11/30/21    US Liver    Narrative  US LIVER-    Date of Exam: 11/30/2021 10:12 AM    Indication: Elevated alk phos and bilirubin; R74.8-Abnormal levels of  other serum enzymes.    Comparison: None available.    Technique: Transverse and sagittal ultrasound images of the right upper  quadrant were obtained. Doppler evaluation was also conducted.    FINDINGS:  Liver appears within normal limits throughout. Echogenicity within  normal limits. No evidence of a focal lesion. No evidence of biliary  dilatation/obstruction, common bile duct is normal diameter at 3 mm.    Impression  Negative ultrasound of the liver.    Electronically Signed By-Brain Noonan On:11/30/2021 10:40 AM  This report was finalized on [id number] by  Brain Noonan, .      Results for orders placed during the hospital encounter of 04/17/21    CT Head Without Contrast    Narrative  DATE OF EXAM:  4/18/2021 4:49 PM    PROCEDURE:  CT HEAD WO CONTRAST-    INDICATIONS:  Brain cancer screening, Cowden Syndrome/PHTS (Age < 19y);  J18.9-Pneumonia, unspecified organism; R09.02-Hypoxemia;  I48.11-Longstanding persistent atrial fibrillation; R77.8-Other  specified abnormalities of plasma proteins    COMPARISON:  04/18/2021 2:22 PM    TECHNIQUE:  Routine transaxial cuts were obtained through the head without the  administration of contrast. Automated exposure control and iterative  reconstruction methods were used.    FINDINGS:  There is cerebral atrophy. There is no intracranial hemorrhage. There is  no midline shift. The appearance to the head is similar to the prior  study.    There are fractures of the  maxillofacial area. There is some  subcutaneous air around the nasal bridge area. There is density within  the paranasal sinuses which could relate to blood.    Impression  1.No significant change in appearance of the head from the earlier  study.  2.Fractures of the maxillofacial area are again noted. There is density  within the paranasal sinuses suggesting underlying blood.    Electronically Signed By-Moises Recinos MD On:4/18/2021 5:01 PM  This report was finalized on [id number] by  Moises Recinos MD.      Lab Review:   Lab on 01/17/2022   Component Date Value   • Protime 01/17/2022 14.4*   • INR 01/17/2022 1.32*   • PTT 01/17/2022 29.9*   • Glucose 01/17/2022 166*   • BUN 01/17/2022 32*   • Creatinine 01/17/2022 1.51*   • Sodium 01/17/2022 142    • Potassium 01/17/2022 4.2    • Chloride 01/17/2022 104    • CO2 01/17/2022 30.3*   • Calcium 01/17/2022 9.5    • Total Protein 01/17/2022 6.7    • Albumin 01/17/2022 3.50    • ALT (SGPT) 01/17/2022 21    • AST (SGOT) 01/17/2022 34    • Alkaline Phosphatase 01/17/2022 110    • Total Bilirubin 01/17/2022 2.4*   • eGFR Non  Amer 01/17/2022 44*   • Globulin 01/17/2022 3.2    • A/G Ratio 01/17/2022 1.1    • BUN/Creatinine Ratio 01/17/2022 21.2    • Anion Gap 01/17/2022 7.7    • Magnesium 01/17/2022 2.2    • WBC 01/17/2022 5.21    • RBC 01/17/2022 4.52    • Hemoglobin 01/17/2022 13.9    • Hematocrit 01/17/2022 41.7    • MCV 01/17/2022 92.3    • MCH 01/17/2022 30.8    • MCHC 01/17/2022 33.3    • RDW 01/17/2022 14.6    • RDW-SD 01/17/2022 48.8    • MPV 01/17/2022 11.5    • Platelets 01/17/2022 205    • Neutrophil % 01/17/2022 73.0    • Lymphocyte % 01/17/2022 16.5*   • Monocyte % 01/17/2022 8.1    • Eosinophil % 01/17/2022 1.0    • Basophil % 01/17/2022 1.0    • Immature Grans % 01/17/2022 0.4    • Neutrophils, Absolute 01/17/2022 3.81    • Lymphocytes, Absolute 01/17/2022 0.86    • Monocytes, Absolute 01/17/2022 0.42    • Eosinophils, Absolute 01/17/2022 0.05    •  Basophils, Absolute 01/17/2022 0.05    • Immature Grans, Absolute 01/17/2022 0.02    • nRBC 01/17/2022 0.0    • COVID19 01/17/2022 Not Detected    Lab on 01/11/2022   Component Date Value   • PTT 01/11/2022 30.5    • COVID19 01/11/2022 Not Detected    Office Visit on 01/04/2022   Component Date Value   • Glucose 01/11/2022 160*   • BUN 01/11/2022 27*   • Creatinine 01/11/2022 1.42*   • Sodium 01/11/2022 138    • Potassium 01/11/2022 4.8    • Chloride 01/11/2022 103    • CO2 01/11/2022 27.5    • Calcium 01/11/2022 9.4    • Total Protein 01/11/2022 6.8    • Albumin 01/11/2022 3.80    • ALT (SGPT) 01/11/2022 17    • AST (SGOT) 01/11/2022 35    • Alkaline Phosphatase 01/11/2022 115    • Total Bilirubin 01/11/2022 2.4*   • eGFR Non  Amer 01/11/2022 48*   • Globulin 01/11/2022 3.0    • A/G Ratio 01/11/2022 1.3    • BUN/Creatinine Ratio 01/11/2022 19.0    • Anion Gap 01/11/2022 7.5    • Protime 01/11/2022 14.1*   • INR 01/11/2022 1.29*   • WBC 01/11/2022 6.48    • RBC 01/11/2022 4.77    • Hemoglobin 01/11/2022 14.5    • Hematocrit 01/11/2022 46.0    • MCV 01/11/2022 96.4    • MCH 01/11/2022 30.4    • MCHC 01/11/2022 31.5    • RDW 01/11/2022 14.7    • RDW-SD 01/11/2022 51.7    • MPV 01/11/2022 11.7    • Platelets 01/11/2022 226    • Neutrophil % 01/11/2022 74.9    • Lymphocyte % 01/11/2022 13.9*   • Monocyte % 01/11/2022 9.0    • Eosinophil % 01/11/2022 0.8    • Basophil % 01/11/2022 0.8    • Immature Grans % 01/11/2022 0.6*   • Neutrophils, Absolute 01/11/2022 4.86    • Lymphocytes, Absolute 01/11/2022 0.90    • Monocytes, Absolute 01/11/2022 0.58    • Eosinophils, Absolute 01/11/2022 0.05    • Basophils, Absolute 01/11/2022 0.05    • Immature Grans, Absolute 01/11/2022 0.04    • nRBC 01/11/2022 0.0    Hospital Outpatient Visit on 01/03/2022   Component Date Value   • BSA 01/03/2022 2.1    • RVIDd 01/03/2022 5.0    • IVSd 01/03/2022 1.5    • LVIDd 01/03/2022 6.0    • LVIDs 01/03/2022 5.3    • LVPWd 01/03/2022 1.4     • IVS/LVPW 01/03/2022 1.1    • FS 01/03/2022 11.4    • EDV(Teich) 01/03/2022 180.3    • ESV(Teich) 01/03/2022 136.6    • EF(Teich) 01/03/2022 24.2    • EDV(cubed) 01/03/2022 216.5    • ESV(cubed) 01/03/2022 150.7    • EF(cubed) 01/03/2022 30.4    • LV mass(C)d 01/03/2022 411.5    • LV mass(C)dI 01/03/2022 196.2    • SV(Teich) 01/03/2022 43.7    • SI(Teich) 01/03/2022 20.8    • SV(cubed) 01/03/2022 65.8    • SI(cubed) 01/03/2022 31.4    • Ao root diam 01/03/2022 2.6    • Ao root area 01/03/2022 5.5    • asc Aorta Diam 01/03/2022 2.9    • LVOT diam 01/03/2022 1.9    • LVOT area 01/03/2022 2.9    • EDV(MOD-sp4) 01/03/2022 114.6    • ESV(MOD-sp4) 01/03/2022 72.2    • EF(MOD-sp4) 01/03/2022 37.0    • SV(MOD-sp4) 01/03/2022 42.4    • SI(MOD-sp4) 01/03/2022 20.2    • Ao root area (BSA correc* 01/03/2022 1.3    • LV Zabala Vol (BSA correct* 01/03/2022 54.6    • LV Sys Vol (BSA correcte* 01/03/2022 34.4    • MV V2 max 01/03/2022 143.7    • MV max PG 01/03/2022 8.3    • MV V2 mean 01/03/2022 87.5    • MV mean PG 01/03/2022 3.8    • MV V2 VTI 01/03/2022 23.0    • MVA(VTI) 01/03/2022 1.2    • Ao pk renea 01/03/2022 86.2    • Ao max PG 01/03/2022 3.0    • Ao max PG (full) 01/03/2022 1.6    • Ao V2 mean 01/03/2022 58.1    • Ao mean PG 01/03/2022 1.6    • Ao mean PG (full) 01/03/2022 0.9    • Ao V2 VTI 01/03/2022 13.9    • MARIA DE EJSUS(I,A) 01/03/2022 2.0    • MARIA DE JESUS(I,D) 01/03/2022 2.0    • MARIA DE JESUS(V,A) 01/03/2022 2.0    • MARIA DE JESUS(V,D) 01/03/2022 2.0    • LV V1 max PG 01/03/2022 1.4    • LV V1 mean PG 01/03/2022 0.71    • LV V1 max 01/03/2022 58.2    • LV V1 mean 01/03/2022 38.2    • LV V1 VTI 01/03/2022 9.3    • MR max renea 01/03/2022 562.6    • MR max PG 01/03/2022 126.6    • SV(Ao) 01/03/2022 76.7    • SI(Ao) 01/03/2022 36.6    • SV(LVOT) 01/03/2022 27.3    • SI(LVOT) 01/03/2022 13.0    • PA V2 max 01/03/2022 104.0    • PA max PG 01/03/2022 4.3    • PA max PG (full) 01/03/2022 -1.0    • PA V2 mean 01/03/2022 73.3    • PA mean PG 01/03/2022 2.5    •  PA mean PG (full) 01/03/2022 -0.18    • PA V2 VTI 01/03/2022 26.1    • PA acc time 01/03/2022 0.12    • RV V1 max PG 01/03/2022 5.4    • RV V1 mean PG 01/03/2022 2.7    • RV V1 max 01/03/2022 115.8    • RV V1 mean 01/03/2022 76.2    • RV V1 VTI 01/03/2022 23.9    • TR max renea 01/03/2022 228.1    • RVSP(TR) 01/03/2022 23.9    • RAP systole 01/03/2022 3.0    • PA pr(Accel) 01/03/2022 25.0    •  CV ECHO LONG - BZI_BMI 01/03/2022 23.6    •  CV ECHO LONG - BSA(HA* 01/03/2022 2.1    •  CV ECHO LONG - BZI_ME* 01/03/2022 83.5    •  CV ECHO LONG - BZI_ME* 01/03/2022 188.0    • EF(MOD-bp) 01/03/2022 37.0    • LA dimension(2D) 01/03/2022 4.9    Hospital Outpatient Visit on 01/03/2022   Component Date Value   • Target HR (85%) 01/03/2022 117    • Max. Pred. HR (100%) 01/03/2022 138    •  CV STRESS PROTOCOL 1 01/03/2022 Pharmacologic    • Stage 1 01/03/2022 1    • HR Stage 1 01/03/2022 80    • Duration Min Stage 1 01/03/2022 0    • Duration Sec Stage 1 01/03/2022 10    • Stress Dose Regadenoson * 01/03/2022 0.4    • Stress Comments Stage 1 01/03/2022 10 sec bolus injection    • Stage 2 01/03/2022 2    • HR Stage 2 01/03/2022 86    • BP Stage 2 01/03/2022 144/78    • Duration Min Stage 2 01/03/2022 4    • Duration Sec Stage 2 01/03/2022 0    • Stress Comments Stage 2 01/03/2022 recovery    • Stage 3 01/03/2022 3    • HR Stage 3 01/03/2022 85    • BP Stage 3 01/03/2022 120/65    • Stage 4 01/03/2022 4    • HR Stage 4 01/03/2022 73    • BP Stage 4 01/03/2022 140/78    • Baseline HR 01/03/2022 82    • Baseline BP 01/03/2022 139/85    • Peak HR 01/03/2022 86    • Percent Max Pred HR 01/03/2022 62.32    • Percent Target HR 01/03/2022 73    • Peak BP 01/03/2022 144/78    • Recovery HR 01/03/2022 72    • Recovery BP 01/03/2022 141/73    • Nuclear Prior Study 01/03/2022 3    • BH CV REST NUCLEAR ISOTO* 01/03/2022 6.9    • BH CV STRESS NUCLEAR ISO* 01/03/2022 21.9    Lab on 01/03/2022   Component Date Value   • Glucose  01/03/2022 111*   • BUN 01/03/2022 31*   • Creatinine 01/03/2022 1.30*   • Sodium 01/03/2022 138    • Potassium 01/03/2022 4.3    • Chloride 01/03/2022 102    • CO2 01/03/2022 28.6    • Calcium 01/03/2022 9.5    • Total Protein 01/03/2022 7.2    • Albumin 01/03/2022 3.80    • ALT (SGPT) 01/03/2022 21    • AST (SGOT) 01/03/2022 35    • Alkaline Phosphatase 01/03/2022 127*   • Total Bilirubin 01/03/2022 2.3*   • eGFR Non  Amer 01/03/2022 53*   • Globulin 01/03/2022 3.4    • A/G Ratio 01/03/2022 1.1    • BUN/Creatinine Ratio 01/03/2022 23.8    • Anion Gap 01/03/2022 7.4    Lab on 11/04/2021   Component Date Value   • Glucose 11/04/2021 96    • BUN 11/04/2021 24*   • Creatinine 11/04/2021 1.24    • Sodium 11/04/2021 141    • Potassium 11/04/2021 4.3    • Chloride 11/04/2021 104    • CO2 11/04/2021 27.4    • Calcium 11/04/2021 9.4    • Total Protein 11/04/2021 7.3    • Albumin 11/04/2021 3.80    • ALT (SGPT) 11/04/2021 17    • AST (SGOT) 11/04/2021 25    • Alkaline Phosphatase 11/04/2021 135*   • Total Bilirubin 11/04/2021 1.4*   • eGFR Non African Amer 11/04/2021 56*   • Globulin 11/04/2021 3.5    • A/G Ratio 11/04/2021 1.1    • BUN/Creatinine Ratio 11/04/2021 19.4    • Anion Gap 11/04/2021 9.6    • Hemoglobin A1C 11/04/2021 5.4    • Total Cholesterol 11/04/2021 131    • Triglycerides 11/04/2021 48    • HDL Cholesterol 11/04/2021 58    • LDL Cholesterol  11/04/2021 62    • VLDL Cholesterol 11/04/2021 11    • LDL/HDL Ratio 11/04/2021 1.09    • TSH 11/04/2021 3.690    • Free T4 11/04/2021 1.24    • 25 Hydroxy, Vitamin D 11/04/2021 34.6    • Vitamin B-12 11/04/2021 1,491*   • PSA 11/04/2021 0.826    • Magnesium 11/04/2021 2.1    • WBC 11/04/2021 6.35    • RBC 11/04/2021 4.49    • Hemoglobin 11/04/2021 14.1    • Hematocrit 11/04/2021 42.7    • MCV 11/04/2021 95.1    • MCH 11/04/2021 31.4    • MCHC 11/04/2021 33.0    • RDW 11/04/2021 16.0*   • RDW-SD 11/04/2021 55.9*   • MPV 11/04/2021 10.8    • Platelets 11/04/2021  215    • Neutrophil % 11/04/2021 65.6    • Lymphocyte % 11/04/2021 21.3    • Monocyte % 11/04/2021 9.8    • Eosinophil % 11/04/2021 1.9    • Basophil % 11/04/2021 0.9    • Immature Grans % 11/04/2021 0.5    • Neutrophils, Absolute 11/04/2021 4.17    • Lymphocytes, Absolute 11/04/2021 1.35    • Monocytes, Absolute 11/04/2021 0.62    • Eosinophils, Absolute 11/04/2021 0.12    • Basophils, Absolute 11/04/2021 0.06    • Immature Grans, Absolute 11/04/2021 0.03    • nRBC 11/04/2021 0.0    Office Visit on 08/06/2021   Component Date Value   • WBC 08/06/2021 6.48    • RBC 08/06/2021 4.56    • Hemoglobin 08/06/2021 14.0    • Hematocrit 08/06/2021 43.7    • MCV 08/06/2021 95.8    • MCH 08/06/2021 30.7    • MCHC 08/06/2021 32.0    • RDW 08/06/2021 13.4    • RDW-SD 08/06/2021 46.8    • MPV 08/06/2021 10.9    • Platelets 08/06/2021 201    • Glucose 08/06/2021 124*   • BUN 08/06/2021 24*   • Creatinine 08/06/2021 1.11    • Sodium 08/06/2021 144    • Potassium 08/06/2021 3.9    • Chloride 08/06/2021 105    • CO2 08/06/2021 28.2    • Calcium 08/06/2021 9.3    • eGFR Non  Amer 08/06/2021 63    • BUN/Creatinine Ratio 08/06/2021 21.6    • Anion Gap 08/06/2021 10.8    • C-Reactive Protein 08/06/2021 <0.30    • Sed Rate 08/06/2021 3    Admission on 07/30/2021, Discharged on 07/30/2021   Component Date Value   • QT Interval 07/30/2021 492    • Glucose 07/30/2021 156*   • BUN 07/30/2021 20    • Creatinine 07/30/2021 1.17    • Sodium 07/30/2021 138    • Potassium 07/30/2021 4.2    • Chloride 07/30/2021 102    • CO2 07/30/2021 26.0    • Calcium 07/30/2021 8.9    • Total Protein 07/30/2021 6.3    • Albumin 07/30/2021 3.50    • ALT (SGPT) 07/30/2021 16    • AST (SGOT) 07/30/2021 25    • Alkaline Phosphatase 07/30/2021 90    • Total Bilirubin 07/30/2021 1.5*   • eGFR Non African Amer 07/30/2021 60*   • Globulin 07/30/2021 2.8    • A/G Ratio 07/30/2021 1.3    • BUN/Creatinine Ratio 07/30/2021 17.1    • Anion Gap 07/30/2021 10.0    •  proBNP 07/30/2021 1,461.0    • Troponin T 07/30/2021 <0.010    • ADENOVIRUS, PCR 07/30/2021 Not Detected    • Coronavirus 229E 07/30/2021 Not Detected    • Coronavirus HKU1 07/30/2021 Not Detected    • Coronavirus NL63 07/30/2021 Not Detected    • Coronavirus OC43 07/30/2021 Not Detected    • COVID19 07/30/2021 Not Detected    • Human Metapneumovirus 07/30/2021 Not Detected    • Human Rhinovirus/Enterov* 07/30/2021 Not Detected    • Influenza A PCR 07/30/2021 Not Detected    • Influenza B PCR 07/30/2021 Not Detected    • Parainfluenza Virus 1 07/30/2021 Not Detected    • Parainfluenza Virus 2 07/30/2021 Not Detected    • Parainfluenza Virus 3 07/30/2021 Not Detected    • Parainfluenza Virus 4 07/30/2021 Not Detected    • RSV, PCR 07/30/2021 Not Detected    • Bordetella pertussis pcr 07/30/2021 Not Detected    • Bordetella parapertussis* 07/30/2021 Not Detected    • Chlamydophila pneumoniae* 07/30/2021 Not Detected    • Mycoplasma pneumo by PCR 07/30/2021 Not Detected    • WBC 07/30/2021 5.80    • RBC 07/30/2021 4.44    • Hemoglobin 07/30/2021 13.7    • Hematocrit 07/30/2021 42.5    • MCV 07/30/2021 95.8    • MCH 07/30/2021 31.0    • MCHC 07/30/2021 32.4    • RDW 07/30/2021 15.4    • RDW-SD 07/30/2021 51.2    • MPV 07/30/2021 9.1    • Platelets 07/30/2021 198    • Neutrophil % 07/30/2021 76.6*   • Lymphocyte % 07/30/2021 12.4*   • Monocyte % 07/30/2021 8.4    • Eosinophil % 07/30/2021 0.6    • Basophil % 07/30/2021 2.0*   • Neutrophils, Absolute 07/30/2021 4.40    • Lymphocytes, Absolute 07/30/2021 0.70    • Monocytes, Absolute 07/30/2021 0.50    • Eosinophils, Absolute 07/30/2021 0.00    • Basophils, Absolute 07/30/2021 0.10    • nRBC 07/30/2021 0.1      Recent labs reviewed and interpreted for clinical significance and application            Level of Care:           Chidi Figueroa MD  01/27/22  .

## 2022-02-14 RX ORDER — ALLOPURINOL 300 MG/1
TABLET ORAL
Qty: 90 TABLET | Refills: 1 | Status: SHIPPED | OUTPATIENT
Start: 2022-02-14 | End: 2022-08-15

## 2022-03-01 ENCOUNTER — OFFICE VISIT (OUTPATIENT)
Dept: CARDIOLOGY | Facility: CLINIC | Age: 82
End: 2022-03-01

## 2022-03-01 VITALS
BODY MASS INDEX: 23.35 KG/M2 | HEART RATE: 64 BPM | DIASTOLIC BLOOD PRESSURE: 66 MMHG | RESPIRATION RATE: 18 BRPM | HEIGHT: 73 IN | WEIGHT: 176.2 LBS | SYSTOLIC BLOOD PRESSURE: 102 MMHG

## 2022-03-01 DIAGNOSIS — R94.39 ABNORMAL STRESS TEST: ICD-10-CM

## 2022-03-01 DIAGNOSIS — I48.21 PERMANENT ATRIAL FIBRILLATION: ICD-10-CM

## 2022-03-01 DIAGNOSIS — I50.20 HEART FAILURE WITH REDUCED EJECTION FRACTION: ICD-10-CM

## 2022-03-01 DIAGNOSIS — R07.89 CHEST PAIN, ATYPICAL: Primary | ICD-10-CM

## 2022-03-01 DIAGNOSIS — I42.0 DILATED CARDIOMYOPATHY: ICD-10-CM

## 2022-03-01 DIAGNOSIS — Z95.810 PRESENCE OF BIVENTRICULAR IMPLANTABLE CARDIOVERTER-DEFIBRILLATOR (ICD): ICD-10-CM

## 2022-03-01 PROCEDURE — 99214 OFFICE O/P EST MOD 30 MIN: CPT | Performed by: INTERNAL MEDICINE

## 2022-03-01 NOTE — PROGRESS NOTES
Cardiology Clinic Note  Chidi Figueroa MD, PhD    Subjective:     Encounter Date:03/01/2022      Patient ID: Hank Ponce is a 82 y.o. male.    Chief Complaint:  Chief Complaint   Patient presents with   • Follow-up       HPI:         I the pleasure to see this 81-year-old gentleman in clinic today.  He has a history of cardiomyopathy with biventricular ICD in place, permanent atrial fibrillation hypertension hyperlipidemia, mitral vegetation, history of stress-induced cardiomyopathy dilated cardiomyopathy with acute on chronic systolic heart failure, EF 35%, he has had some atypical chest discomfort ultimately undergoing ischemic evaluation which was abnormal.   Left heart catheterization revealed patent vessels with nonobstructive disease in the LAD, small diagonal branch with focal 90% but too small for intervention, patent but mildly diseased RCA, circumflex also mild disease but nothing flow-limiting.   He presents back chest pain-free but has increased dyspnea exertion as well as peripheral edema, he was dry on exam at the time of his left heart catheterization with very low filling pressures and his Lasix was decreased however now he has had 2+ pitting edema peripherally and he did have some RV involvement with RV enlargement and hypokinesis likely with biventricular heart failure ultimately.  He is having some orthopnea and PND on prior encounter    He presents back to clinic today after augmentation of his diuretics has lost approximately 19 pounds of fluid he says he feels much improved with mild less dyspnea on exertion.  He is having no anginal chest pain.  Letter catheterization revealed proximal diagonal disease only with limited myocardial supply long across the anterolateral wall, LAD with nonobstructive disease with no reversibility seen at the apex by stress testing, angiographically 40 to 50% only with sizable vessel with SORIN-3 flow, not responsible for his underlying cardiomyopathy,  "there is dyssynchrony with BiV resynchronization and pacing with underlying atrial fibrillation.  His legs have no edema his JVD is much improved along with the shortness of breath.     Stress test reviewed and interpreted by me demonstrates the following  Interpretation Summary        · Left ventricular ejection fraction is severely reduced. .  · There is no prior study available for comparison. Abnormal nuclear Apical lateral and lateral wall reversibility seen Summed difference score of 6 Dilated ventricle EF 21%.  · Findings consistent with an equivocal ECG stress test.     Abnormal study  Paced rhythm biventricular  Frequent PVCs and nonsustained VT with triplets and quadruplets in stress  No specific ST changes and paced rhythm  EF severely reduced 20%  Severely dilated ventricle  Reversibility seen in the apical lateral lateral wall compared to baseline moderate size, mild to moderate moderate severity     Abnormal study cannot rule out ischemia of the apical lateral wall and mid lateral wall, this would correlate possibly with disease in the diagonal branch which is very limited and too small for intervention.  Severely reduced EF  ==========================================     Left heart catheterization results reviewed with the patient today along with all images  Results below          Historical data copied forward from previous encounters in EMR including the history, exam, and assessment/plan has been reviewed and is unchanged unless noted otherwise.    Cardiac medicines reviewed with risk, benefits, and necessity of each discussed.    Risk and benefit of cardiac testing reviewed including death heart attack stroke pain bleeding infection need for vascular /cardiovascular surgery were discussed and the patient     Objective:         /66 (BP Location: Left arm, Patient Position: Sitting)   Pulse 64   Resp 18   Ht 185.4 cm (73\")   Wt 79.9 kg (176 lb 3.2 oz)   BMI 23.25 kg/m²     Physical " Exam    Assessment:       Coronary artery disease  Hyperlipidemia  Essential hypertension  Acute on chronic systolic and diastolic CHF  Dilated cardiomyopathy  Presence of BiV ICD  Underlying permanent atrial fibrillation     Plan:          1. Abnormal stress test (Primary) with known coronary disease  Left heart catheterization with patent vessels other than a small diagonal branch disease  Medical treatment recommended     3. Nonsustained ventricular tachycardia (HCC)  -     Cancel: Case Request Cath Lab: Left Heart Cath  -     Protime-INR     Continue device monitoring, ICD, no targets for revascularization           Plan:    Acute on chronic systolic and diastolic CHF  Continue Lasix 20 twice daily, volume status much better with 20 pound weight loss  Back clinic in 6 months  Diagonal disease, not responsible for global cardiomyopathy, no chest pain, continue medical management, 80% lesion and small vessel, nonobstructive 40 to 50% disease in the mid LAD, RCA is patent  Continue losartan with systolic heart failure in addition to Xarelto with stroke risk reduction with paroxysmal atrial fibrillation at the limits of goal-directed medical therapy blood pressure 102/66 heart rates in the 60s  Dilated cardiomyopathy, maximize medical therapy allowed by hemodynamics blood pressure  No ICD shocks  No unexplained syncope  No anginal chest pain  Overall feels better with volume off further titration based on clinical course and response to therapy     Atrial fibrillation permanent, controlled  MR, stable  Essential hypertension controlled  Hyperlipidemia controlled    Device interrogation in 3 to 6 months routinely     Return to clinic in 6 months        Further recommendation to follow findings of invasive work-up     Chidi Figueroa MD, PhD           The pleasure to be involved in this patient's cardiovascular care.  Please call with any questions or concerns  Chidi Figueroa MD, PhD    Most recent EKG as reviewed and  interpreted by me:  Procedures     Most recent echo as reviewed and interpreted by me:  Results for orders placed during the hospital encounter of 01/03/22    Adult Transthoracic Echo Complete W/ Cont if Necessary Per Protocol    Interpretation Summary  · The left ventricular cavity is moderately dilated.  · Left ventricular ejection fraction appears to be 31 - 35%. Left ventricular systolic function is severely decreased.  · Left ventricular diastolic function is consistent with (grade II w/high LAP) pseudonormalization.  · The right ventricular cavity is moderate to severely dilated.  · Moderately reduced right ventricular systolic function noted.  · Left atrial volume is severely increased.  · The right atrial cavity is severely dilated.  · There is bileaflet mitral valve thickening present.  · Moderate mitral valve regurgitation is present with a posteriorly-directed jet noted.  · Moderate to severe tricuspid valve regurgitation is present.  · Estimated right ventricular systolic pressure from tricuspid regurgitation is moderately elevated (45-55 mmHg).  · Mild to moderate pulmonary hypertension is present.      Most recent stress test as reviewed and interpreted by me:  Results for orders placed during the hospital encounter of 01/03/22    Stress Test With Myocardial Perfusion One Day    Interpretation Summary  · Left ventricular ejection fraction is severely reduced. .  · There is no prior study available for comparison. Abnormal nuclear Apical lateral and lateral wall reversibility seen Summed difference score of 6 Dilated ventricle EF 21%.  · Findings consistent with an equivocal ECG stress test.    Abnormal study  Paced rhythm biventricular  Frequent PVCs and nonsustained VT with triplets and quadruplets in stress  No specific ST changes and paced rhythm  EF severely reduced 20%  Severely dilated ventricle  Reversibility seen in the apical lateral lateral wall compared to baseline moderate size, mild to  moderate moderate severity    Abnormal study cannot rule out ischemia of the apical lateral wall and mid lateral wall  Severely reduced EF      Most recent cardiac catheterization as reviewed interpreted by me:  Results for orders placed during the hospital encounter of 01/19/22    Cardiac Catheterization/Vascular Study    Narrative   Chidi Figueroa MD, PhD  [facility] cardiology  Date of service 1-    Procedure  1.  Left heart catheterization coronary angiography left ventriculography in ZHAO position    Indication  Cardiomyopathy, low EF, nonsustained VT, abnormal stress test    Performed consent patient was brought to the Cath Lab sterilely prepped and draped in usual fashion expose the right groin for right common femoral arterial access via micropuncture modified Seldinger technique with placement of a 6 [place] sheath.  035 guidewire was advanced to the aortic valve followed by JL 4 and JR4 catheters for selective left and right coronary angiography.  The JR4 was used across aortic valve easily, hand-injection for LV gram, EDP assessment and pullback assessment of the transaortic valve gradient.  There was small vessel disease in the diagonal 80 to 90% but vessel well less than 2 mm in diameter with limited nonbifurcating course, we will proceed with medical therapy of this, there is nonobstructive disease in the LAD 50% in the midportion most significantly with long segment of eccentric plaque but SORIN-3 flow throughout and no critical stenoses as it courses to and around the apex, 20 to 30% proximally, 30% distally, circumflex also had 30 to 40% in the mid but nothing critical, SORIN-3 flow throughout, RCA diffuse luminary low 40 to 30% nothing flow-limiting.    Diagnosis ultimately is nonischemic cardiomyopathy  BiV ICD is in place  Small vessel obstructive CAD in the diagonal branch which is too small for intervention should be treated medically  Nonobstructive disease in the LAD  50% in the midportion with significantly but nothing critical with SORIN-3 flow to around the apex which would not contribute to his underlying cardiomyopathy and PCI of this is unlikely to improve his EF over medical therapy which is on board    Findings  1.  128/86 opening pressure  2.  Closing pressure was unchanged  3.  LVEDP 10-12  4.  LV function LVEF 35% with dyssynchrony present  5.  No transaortic valve or LVOT gradient seen    Complications none  Blood loss less than 5 cc  Contrast used 65 cc  Moderate on sedation time of 30 minutes with IV Versed and fentanyl administered by registered nurse with complete ECG pulse oximetry and hemodynamic monitor throughout the entirety the case observed by me    Angiography  1 left main large-caliber vessel with no angiographic disease  2.  LAD is a long vessel coursing to and around the apex with 2 diagonal branches and septal perforators.  Proximal has diffuse irregularities 20 to 30% eccentric plaque, 50% in the mid but nothing flow-limiting, takeoff of the more distal diagonal branches without any flow-limiting stenosis, distal LAD diffusely irregularities 20 to 30% at most, proximal diagonal branch has an 80 to 90% focal narrowing and nothing distally, this vessel is well below 2 mm in diameter with nonbifurcating limited course along the anterolateral wall not in the area identified by the stress test at the apex or apical lateral portion which is supplied by distal LAD and more distal second diagonal branch.  This will be treated medically  3 circumflex nondominant with 2 obtuse marginal branches, 40 % mid at most nonflow limiting  4.  RCA 20 to 30% in the midportion at most, nothing flow-limiting, SORIN-3 flow throughout    Recommendations and conclusions  1.  Nonischemic cardiomyopathy  2.  Nonobstructive CAD of large epicardial vessels 50% at most in the mid LAD, 20 to 30% RCA, 40% circumflex, 80 to 90% disease in small diagonal branch which is inconsequential  and should be treated medically not contributory to his cardiomyopathy  3.  Continue secondary prevention goals for systolic heart failure, go down on Lasix to daily given prerenal azotemia and elevated creatinine on labs today on twice daily Lasix with normal filling pressures  For follow-up cardiology 2 to 4 weeks for further optimization and review of cath films    Chidi Figueroa MD, PhD    Home today    The following portions of the patient's history were reviewed and updated as appropriate: allergies, current medications, past family history, past medical history, past social history, past surgical history and problem list.      ROS:  14 point review of systems negative except as mentioned above    Current Outpatient Medications:   •  allopurinol (ZYLOPRIM) 300 MG tablet, TAKE ONE TABLET BY MOUTH DAILY, Disp: 90 tablet, Rfl: 1  •  azelastine (OPTIVAR) 0.05 % ophthalmic solution, Administer 1 drop to both eyes 2 (Two) Times a Day., Disp: 6 mL, Rfl: 12  •  colchicine 0.6 MG tablet, Take 0.6 mg by mouth 2 (Two) Times a Day As Needed for Muscle / Joint Pain., Disp: , Rfl:   •  cyclobenzaprine (FLEXERIL) 5 MG tablet, TAKE TWO TABLETS BY MOUTH THREE TIMES A DAY AS NEEDED FOR MUSCLE SPASMS, Disp: 60 tablet, Rfl: 3  •  furosemide (LASIX) 20 MG tablet, Take 1 tablet by mouth 2 (Two) Times a Day., Disp: 60 tablet, Rfl: 11  •  losartan (COZAAR) 25 MG tablet, Take 50 mg by mouth Daily., Disp: , Rfl:   •  rivaroxaban (XARELTO) 15 MG tablet, Take 1 tablet by mouth Daily., Disp: 90 tablet, Rfl: 2  •  vitamin B-12 (CYANOCOBALAMIN) 1000 MCG tablet, Take 1,000 mcg by mouth Daily., Disp: , Rfl:     Problem List:  Patient Active Problem List   Diagnosis   • Presence of biventricular implantable cardioverter-defibrillator (ICD)   • Permanent atrial fibrillation (CMS/HCC)   • Hyperlipidemia, mixed   • Mitral regurgitation   • Tricuspid regurgitation   • Essential hypertension   • Takotsubo cardiomyopathy   • Dilated cardiomyopathy  ([facility])   • Acute on chronic systolic congestive heart failure (HCC)   • Atrial flutter with rapid ventricular response (HCC)   • Low serum vitamin B12   • Heart failure with reduced ejection fraction (HCC)   • Elevated serum creatinine   • Elevated troponin   • Pneumonia   • Stage 3 chronic kidney disease (HCC)   • Nonsustained ventricular tachycardia (HCC)   • Gout   • Abnormal stress test   • Chest pain, atypical     Past Medical History:  Past Medical History:   Diagnosis Date   • Acid reflux disease    • Allergic rhinitis    • Arthritis     gouty   • Atrial fibrillation (HCC)    • BBB (bundle branch block)     right   • Bone spur 2017    neck   • Congestive heart failure (CHF) ([facility])    • Congestive heart failure (HCC) 10/8/2019   • Coronary artery disease     non obstructive   • Essential hypertension 11/29/2011   • Hyperlipidemia, mixed 10/8/2019   • Hypertension    • Mitral regurgitation    • Osteoarthritis    • Presence of biventricular implantable cardioverter-defibrillator (ICD) 7/29/2019   • S/P ablation of atrial fibrillation 01/2017   • Takotsubo cardiomyopathy    • Tricuspid regurgitation    • VT (ventricular tachycardia) ([facility]) 02/2018     Past Surgical History:  Past Surgical History:   Procedure Laterality Date   • AMPUTATION      index and middle finger   • CARDIAC ABLATION  08/22/2017    [facility]   • CARDIAC CATHETERIZATION  12/08/2016    non obst CAD; takotsubo CM   • CARDIAC CATHETERIZATION  07/24/2017    treating medically   • CARDIAC CATHETERIZATION N/A 1/19/2022    Procedure: Left Heart Cath;  Surgeon: Chidi Figueroa MD;  Location:  [facility];  Service: Cardiology;  Laterality: N/A;   • CARDIAC DEFIBRILLATOR PLACEMENT  2017    Bs   • CARDIAC ELECTROPHYSIOLOGY PROCEDURE N/A 4/14/2021    Procedure: AV node ablation;  Surgeon: Hira Ochoa MD;  Location:  [facility];  Service: Cardiovascular;  Laterality: N/A;   • FRACTURE SURGERY  04/18/2021    jaw     • INTERNAL CARDIAC DEFIBRILLATOR INSERTION  2017    BiV ICD BS   • OTHER SURGICAL HISTORY  2017    Heart ablation-[facility]   • ROTATOR CUFF REPAIR Right    • SINUS SURGERY  2014     Social History:  Social History     Socioeconomic History   • Marital status:    Tobacco Use   • Smoking status: Former Smoker     Quit date:      Years since quittin.1   • Smokeless tobacco: Never Used   Vaping Use   • Vaping Use: Never used   Substance and Sexual Activity   • Alcohol use: Yes     Comment: occasionally   • Drug use: Never   • Sexual activity: Defer     Allergies:  Allergies   Allergen Reactions   • Hydrocodone Urinary Retention     Immunizations:  Immunization History   Administered Date(s) Administered   • COVID-19 (MODERNA) 1st, 2nd, 3rd Dose Only 2021, 2021, 10/26/2021   • Flu Vaccine Intradermal Quad 18-64YR 2011, 2012, 2013   • Fluad Quad 65+ 10/07/2019, 09/10/2020   • Fluzone High Dose =>65 Years ([employer] ONLY) 10/03/2014, 10/09/2015, 2016, 10/02/2017, 2018   • Fluzone High-Dose 65+yrs 10/17/2021   • Pneumococcal Conjugate 13-Valent (PCV13) 10/25/2019   • Pneumococcal Polysaccharide (PPSV23) 10/27/2020            In-Office Procedure(s):  No orders to display        ASCVD RIsk Score::  The ASCVD Risk score (Roopaadye EMMANUEL Jr., et al., 2013) failed to calculate for the following reasons:    The 2013 ASCVD risk score is only valid for ages 40 to 79    Imaging:    Results for orders placed in visit on 21    XR Chest PA & Lateral    Narrative  DATE OF EXAM:  2021 10:26 AM    PROCEDURE:  XR CHEST PA AND LATERAL-    INDICATIONS:  bibasilar crackles; R09.89-Other specified symptoms and signs involving  the circulatory and respiratory systems    COMPARISON:  AP portable chest 2021.    TECHNIQUE:  Two radiologic views of the chest.    FINDINGS:  Heart size appears moderately enlarged but improved since the prior  examination. Central pulmonary  vasculature remains enlarged suggesting  congestive change. The interstitial and alveolar densities described on  the previous examination appear resolved. No acute airspace disease. No  pneumothorax. Questionable trace right basilar pleural fluid.    Impression  1. Moderate cardiomegaly which appears improved since the prior exam.  2. Mild central vascular congestion without overt pulmonary edema.  3. Question trace right basilar pleural fluid.    Electronically Signed By-Kathie Oshea MD On:11/30/2021 10:33 AM  This report was finalized on [id number] by  Kathie Oshea MD.       Results for orders placed during the hospital encounter of 11/30/21    US Liver    Narrative  US LIVER-    Date of Exam: 11/30/2021 10:12 AM    Indication: Elevated alk phos and bilirubin; R74.8-Abnormal levels of  other serum enzymes.    Comparison: None available.    Technique: Transverse and sagittal ultrasound images of the right upper  quadrant were obtained. Doppler evaluation was also conducted.    FINDINGS:  Liver appears within normal limits throughout. Echogenicity within  normal limits. No evidence of a focal lesion. No evidence of biliary  dilatation/obstruction, common bile duct is normal diameter at 3 mm.    Impression  Negative ultrasound of the liver.    Electronically Signed By-Brain Noonan On:11/30/2021 10:40 AM  This report was finalized on [id number] by  Brain Noonan, .      Results for orders placed during the hospital encounter of 04/17/21    CT Head Without Contrast    Narrative  DATE OF EXAM:  4/18/2021 4:49 PM    PROCEDURE:  CT HEAD WO CONTRAST-    INDICATIONS:  Brain cancer screening, Cowden Syndrome/PHTS (Age < 19y);  J18.9-Pneumonia, unspecified organism; R09.02-Hypoxemia;  I48.11-Longstanding persistent atrial fibrillation; R77.8-Other  specified abnormalities of plasma proteins    COMPARISON:  04/18/2021 2:22 PM    TECHNIQUE:  Routine transaxial cuts were obtained through the head without  the  administration of contrast. Automated exposure control and iterative  reconstruction methods were used.    FINDINGS:  There is cerebral atrophy. There is no intracranial hemorrhage. There is  no midline shift. The appearance to the head is similar to the prior  study.    There are fractures of the maxillofacial area. There is some  subcutaneous air around the nasal bridge area. There is density within  the paranasal sinuses which could relate to blood.    Impression  1.No significant change in appearance of the head from the earlier  study.  2.Fractures of the maxillofacial area are again noted. There is density  within the paranasal sinuses suggesting underlying blood.    Electronically Signed By-Moises Recinos MD On:4/18/2021 5:01 PM  This report was finalized on [id number] by  Moises Recinos MD.      Lab Review:   Lab on 01/17/2022   Component Date Value   • Protime 01/17/2022 14.4*   • INR 01/17/2022 1.32*   • PTT 01/17/2022 29.9*   • Glucose 01/17/2022 166*   • BUN 01/17/2022 32*   • Creatinine 01/17/2022 1.51*   • Sodium 01/17/2022 142    • Potassium 01/17/2022 4.2    • Chloride 01/17/2022 104    • CO2 01/17/2022 30.3*   • Calcium 01/17/2022 9.5    • Total Protein 01/17/2022 6.7    • Albumin 01/17/2022 3.50    • ALT (SGPT) 01/17/2022 21    • AST (SGOT) 01/17/2022 34    • Alkaline Phosphatase 01/17/2022 110    • Total Bilirubin 01/17/2022 2.4*   • eGFR Non  Amer 01/17/2022 44*   • Globulin 01/17/2022 3.2    • A/G Ratio 01/17/2022 1.1    • BUN/Creatinine Ratio 01/17/2022 21.2    • Anion Gap 01/17/2022 7.7    • Magnesium 01/17/2022 2.2    • WBC 01/17/2022 5.21    • RBC 01/17/2022 4.52    • Hemoglobin 01/17/2022 13.9    • Hematocrit 01/17/2022 41.7    • MCV 01/17/2022 92.3    • MCH 01/17/2022 30.8    • MCHC 01/17/2022 33.3    • RDW 01/17/2022 14.6    • RDW-SD 01/17/2022 48.8    • MPV 01/17/2022 11.5    • Platelets 01/17/2022 205    • Neutrophil % 01/17/2022 73.0    • Lymphocyte % 01/17/2022 16.5*   •  Monocyte % 01/17/2022 8.1    • Eosinophil % 01/17/2022 1.0    • Basophil % 01/17/2022 1.0    • Immature Grans % 01/17/2022 0.4    • Neutrophils, Absolute 01/17/2022 3.81    • Lymphocytes, Absolute 01/17/2022 0.86    • Monocytes, Absolute 01/17/2022 0.42    • Eosinophils, Absolute 01/17/2022 0.05    • Basophils, Absolute 01/17/2022 0.05    • Immature Grans, Absolute 01/17/2022 0.02    • nRBC 01/17/2022 0.0    • COVID19 01/17/2022 Not Detected    Lab on 01/11/2022   Component Date Value   • PTT 01/11/2022 30.5    • COVID19 01/11/2022 Not Detected    Office Visit on 01/04/2022   Component Date Value   • Glucose 01/11/2022 160*   • BUN 01/11/2022 27*   • Creatinine 01/11/2022 1.42*   • Sodium 01/11/2022 138    • Potassium 01/11/2022 4.8    • Chloride 01/11/2022 103    • CO2 01/11/2022 27.5    • Calcium 01/11/2022 9.4    • Total Protein 01/11/2022 6.8    • Albumin 01/11/2022 3.80    • ALT (SGPT) 01/11/2022 17    • AST (SGOT) 01/11/2022 35    • Alkaline Phosphatase 01/11/2022 115    • Total Bilirubin 01/11/2022 2.4*   • eGFR Non  Amer 01/11/2022 48*   • Globulin 01/11/2022 3.0    • A/G Ratio 01/11/2022 1.3    • BUN/Creatinine Ratio 01/11/2022 19.0    • Anion Gap 01/11/2022 7.5    • Protime 01/11/2022 14.1*   • INR 01/11/2022 1.29*   • WBC 01/11/2022 6.48    • RBC 01/11/2022 4.77    • Hemoglobin 01/11/2022 14.5    • Hematocrit 01/11/2022 46.0    • MCV 01/11/2022 96.4    • MCH 01/11/2022 30.4    • MCHC 01/11/2022 31.5    • RDW 01/11/2022 14.7    • RDW-SD 01/11/2022 51.7    • MPV 01/11/2022 11.7    • Platelets 01/11/2022 226    • Neutrophil % 01/11/2022 74.9    • Lymphocyte % 01/11/2022 13.9*   • Monocyte % 01/11/2022 9.0    • Eosinophil % 01/11/2022 0.8    • Basophil % 01/11/2022 0.8    • Immature Grans % 01/11/2022 0.6*   • Neutrophils, Absolute 01/11/2022 4.86    • Lymphocytes, Absolute 01/11/2022 0.90    • Monocytes, Absolute 01/11/2022 0.58    • Eosinophils, Absolute 01/11/2022 0.05    • Basophils, Absolute  01/11/2022 0.05    • Immature Grans, Absolute 01/11/2022 0.04    • nRBC 01/11/2022 0.0    Hospital Outpatient Visit on 01/03/2022   Component Date Value   • BSA 01/03/2022 2.1    • RVIDd 01/03/2022 5.0    • IVSd 01/03/2022 1.5    • LVIDd 01/03/2022 6.0    • LVIDs 01/03/2022 5.3    • LVPWd 01/03/2022 1.4    • IVS/LVPW 01/03/2022 1.1    • FS 01/03/2022 11.4    • EDV(Teich) 01/03/2022 180.3    • ESV(Teich) 01/03/2022 136.6    • EF(Teich) 01/03/2022 24.2    • EDV(cubed) 01/03/2022 216.5    • ESV(cubed) 01/03/2022 150.7    • EF(cubed) 01/03/2022 30.4    • LV mass(C)d 01/03/2022 411.5    • LV mass(C)dI 01/03/2022 196.2    • SV(Teich) 01/03/2022 43.7    • SI(Teich) 01/03/2022 20.8    • SV(cubed) 01/03/2022 65.8    • SI(cubed) 01/03/2022 31.4    • Ao root diam 01/03/2022 2.6    • Ao root area 01/03/2022 5.5    • asc Aorta Diam 01/03/2022 2.9    • LVOT diam 01/03/2022 1.9    • LVOT area 01/03/2022 2.9    • EDV(MOD-sp4) 01/03/2022 114.6    • ESV(MOD-sp4) 01/03/2022 72.2    • EF(MOD-sp4) 01/03/2022 37.0    • SV(MOD-sp4) 01/03/2022 42.4    • SI(MOD-sp4) 01/03/2022 20.2    • Ao root area (BSA correc* 01/03/2022 1.3    • LV Zabala Vol (BSA correct* 01/03/2022 54.6    • LV Sys Vol (BSA correcte* 01/03/2022 34.4    • MV V2 max 01/03/2022 143.7    • MV max PG 01/03/2022 8.3    • MV V2 mean 01/03/2022 87.5    • MV mean PG 01/03/2022 3.8    • MV V2 VTI 01/03/2022 23.0    • MVA(VTI) 01/03/2022 1.2    • Ao pk renea 01/03/2022 86.2    • Ao max PG 01/03/2022 3.0    • Ao max PG (full) 01/03/2022 1.6    • Ao V2 mean 01/03/2022 58.1    • Ao mean PG 01/03/2022 1.6    • Ao mean PG (full) 01/03/2022 0.9    • Ao V2 VTI 01/03/2022 13.9    • MARIA DE JESUS(I,A) 01/03/2022 2.0    • MARIA DE JESUS(I,D) 01/03/2022 2.0    • MARIA DE JESUS(V,A) 01/03/2022 2.0    • MARIA DE JESUS(V,D) 01/03/2022 2.0    • LV V1 max PG 01/03/2022 1.4    • LV V1 mean PG 01/03/2022 0.71    • LV V1 max 01/03/2022 58.2    • LV V1 mean 01/03/2022 38.2    • LV V1 VTI 01/03/2022 9.3    • MR max renea 01/03/2022 562.6    • MR  max PG 01/03/2022 126.6    • SV(Ao) 01/03/2022 76.7    • SI(Ao) 01/03/2022 36.6    • SV(LVOT) 01/03/2022 27.3    • SI(LVOT) 01/03/2022 13.0    • PA V2 max 01/03/2022 104.0    • PA max PG 01/03/2022 4.3    • PA max PG (full) 01/03/2022 -1.0    • PA V2 mean 01/03/2022 73.3    • PA mean PG 01/03/2022 2.5    • PA mean PG (full) 01/03/2022 -0.18    • PA V2 VTI 01/03/2022 26.1    • PA acc time 01/03/2022 0.12    • RV V1 max PG 01/03/2022 5.4    • RV V1 mean PG 01/03/2022 2.7    • RV V1 max 01/03/2022 115.8    • RV V1 mean 01/03/2022 76.2    • RV V1 VTI 01/03/2022 23.9    • TR max renea 01/03/2022 228.1    • RVSP(TR) 01/03/2022 23.9    • RAP systole 01/03/2022 3.0    • PA pr(Accel) 01/03/2022 25.0    •  CV ECHO LONG - BZI_BMI 01/03/2022 23.6    •  CV ECHO LONG - BSA(HA* 01/03/2022 2.1    •  CV ECHO LONG - BZI_ME* 01/03/2022 83.5    •  CV ECHO LONG - BZI_ME* 01/03/2022 188.0    • EF(MOD-bp) 01/03/2022 37.0    • LA dimension(2D) 01/03/2022 4.9    Hospital Outpatient Visit on 01/03/2022   Component Date Value   • Target HR (85%) 01/03/2022 117    • Max. Pred. HR (100%) 01/03/2022 138    •  CV STRESS PROTOCOL 1 01/03/2022 Pharmacologic    • Stage 1 01/03/2022 1    • HR Stage 1 01/03/2022 80    • Duration Min Stage 1 01/03/2022 0    • Duration Sec Stage 1 01/03/2022 10    • Stress Dose Regadenoson * 01/03/2022 0.4    • Stress Comments Stage 1 01/03/2022 10 sec bolus injection    • Stage 2 01/03/2022 2    • HR Stage 2 01/03/2022 86    • BP Stage 2 01/03/2022 144/78    • Duration Min Stage 2 01/03/2022 4    • Duration Sec Stage 2 01/03/2022 0    • Stress Comments Stage 2 01/03/2022 recovery    • Stage 3 01/03/2022 3    • HR Stage 3 01/03/2022 85    • BP Stage 3 01/03/2022 120/65    • Stage 4 01/03/2022 4    • HR Stage 4 01/03/2022 73    • BP Stage 4 01/03/2022 140/78    • Baseline HR 01/03/2022 82    • Baseline BP 01/03/2022 139/85    • Peak HR 01/03/2022 86    • Percent Max Pred HR 01/03/2022 62.32    • Percent Target HR  01/03/2022 73    • Peak BP 01/03/2022 144/78    • Recovery HR 01/03/2022 72    • Recovery BP 01/03/2022 141/73    • Nuclear Prior Study 01/03/2022 3    • BH CV REST NUCLEAR ISOTO* 01/03/2022 6.9    • BH CV STRESS NUCLEAR ISO* 01/03/2022 21.9    Lab on 01/03/2022   Component Date Value   • Glucose 01/03/2022 111*   • BUN 01/03/2022 31*   • Creatinine 01/03/2022 1.30*   • Sodium 01/03/2022 138    • Potassium 01/03/2022 4.3    • Chloride 01/03/2022 102    • CO2 01/03/2022 28.6    • Calcium 01/03/2022 9.5    • Total Protein 01/03/2022 7.2    • Albumin 01/03/2022 3.80    • ALT (SGPT) 01/03/2022 21    • AST (SGOT) 01/03/2022 35    • Alkaline Phosphatase 01/03/2022 127*   • Total Bilirubin 01/03/2022 2.3*   • eGFR Non  Amer 01/03/2022 53*   • Globulin 01/03/2022 3.4    • A/G Ratio 01/03/2022 1.1    • BUN/Creatinine Ratio 01/03/2022 23.8    • Anion Gap 01/03/2022 7.4    Lab on 11/04/2021   Component Date Value   • Glucose 11/04/2021 96    • BUN 11/04/2021 24*   • Creatinine 11/04/2021 1.24    • Sodium 11/04/2021 141    • Potassium 11/04/2021 4.3    • Chloride 11/04/2021 104    • CO2 11/04/2021 27.4    • Calcium 11/04/2021 9.4    • Total Protein 11/04/2021 7.3    • Albumin 11/04/2021 3.80    • ALT (SGPT) 11/04/2021 17    • AST (SGOT) 11/04/2021 25    • Alkaline Phosphatase 11/04/2021 135*   • Total Bilirubin 11/04/2021 1.4*   • eGFR Non African Amer 11/04/2021 56*   • Globulin 11/04/2021 3.5    • A/G Ratio 11/04/2021 1.1    • BUN/Creatinine Ratio 11/04/2021 19.4    • Anion Gap 11/04/2021 9.6    • Hemoglobin A1C 11/04/2021 5.4    • Total Cholesterol 11/04/2021 131    • Triglycerides 11/04/2021 48    • HDL Cholesterol 11/04/2021 58    • LDL Cholesterol  11/04/2021 62    • VLDL Cholesterol 11/04/2021 11    • LDL/HDL Ratio 11/04/2021 1.09    • TSH 11/04/2021 3.690    • Free T4 11/04/2021 1.24    • 25 Hydroxy, Vitamin D 11/04/2021 34.6    • Vitamin B-12 11/04/2021 1,491*   • PSA 11/04/2021 0.826    • Magnesium 11/04/2021  2.1    • WBC 11/04/2021 6.35    • RBC 11/04/2021 4.49    • Hemoglobin 11/04/2021 14.1    • Hematocrit 11/04/2021 42.7    • MCV 11/04/2021 95.1    • MCH 11/04/2021 31.4    • MCHC 11/04/2021 33.0    • RDW 11/04/2021 16.0*   • RDW-SD 11/04/2021 55.9*   • MPV 11/04/2021 10.8    • Platelets 11/04/2021 215    • Neutrophil % 11/04/2021 65.6    • Lymphocyte % 11/04/2021 21.3    • Monocyte % 11/04/2021 9.8    • Eosinophil % 11/04/2021 1.9    • Basophil % 11/04/2021 0.9    • Immature Grans % 11/04/2021 0.5    • Neutrophils, Absolute 11/04/2021 4.17    • Lymphocytes, Absolute 11/04/2021 1.35    • Monocytes, Absolute 11/04/2021 0.62    • Eosinophils, Absolute 11/04/2021 0.12    • Basophils, Absolute 11/04/2021 0.06    • Immature Grans, Absolute 11/04/2021 0.03    • nRBC 11/04/2021 0.0      Recent labs reviewed and interpreted for clinical significance and application            Level of Care:           Chidi Figueroa MD  03/01/22  .

## 2022-03-18 ENCOUNTER — OFFICE VISIT (OUTPATIENT)
Dept: FAMILY MEDICINE CLINIC | Facility: CLINIC | Age: 82
End: 2022-03-18

## 2022-03-18 ENCOUNTER — LAB (OUTPATIENT)
Dept: FAMILY MEDICINE CLINIC | Facility: CLINIC | Age: 82
End: 2022-03-18

## 2022-03-18 VITALS
OXYGEN SATURATION: 98 % | DIASTOLIC BLOOD PRESSURE: 78 MMHG | RESPIRATION RATE: 16 BRPM | HEIGHT: 73 IN | BODY MASS INDEX: 23.62 KG/M2 | SYSTOLIC BLOOD PRESSURE: 134 MMHG | TEMPERATURE: 96.9 F | WEIGHT: 178.2 LBS | HEART RATE: 70 BPM

## 2022-03-18 DIAGNOSIS — R74.8 ELEVATED ALKALINE PHOSPHATASE LEVEL: ICD-10-CM

## 2022-03-18 DIAGNOSIS — N18.30 STAGE 3 CHRONIC KIDNEY DISEASE, UNSPECIFIED WHETHER STAGE 3A OR 3B CKD: ICD-10-CM

## 2022-03-18 DIAGNOSIS — I42.0 DILATED CARDIOMYOPATHY: ICD-10-CM

## 2022-03-18 DIAGNOSIS — I10 ESSENTIAL HYPERTENSION: ICD-10-CM

## 2022-03-18 DIAGNOSIS — S02.401D CLOSED FRACTURE OF MAXILLA WITH ROUTINE HEALING, UNSPECIFIED LATERALITY, SUBSEQUENT ENCOUNTER: Primary | ICD-10-CM

## 2022-03-18 PROBLEM — R79.89 ELEVATED TROPONIN: Status: RESOLVED | Noted: 2021-04-17 | Resolved: 2022-03-18

## 2022-03-18 PROBLEM — R77.8 ELEVATED TROPONIN: Status: RESOLVED | Noted: 2021-04-17 | Resolved: 2022-03-18

## 2022-03-18 PROBLEM — R94.39 ABNORMAL STRESS TEST: Status: RESOLVED | Noted: 2022-01-04 | Resolved: 2022-03-18

## 2022-03-18 PROBLEM — R79.89 ELEVATED SERUM CREATININE: Chronic | Status: RESOLVED | Noted: 2021-04-17 | Resolved: 2022-03-18

## 2022-03-18 LAB
ALBUMIN SERPL-MCNC: 3.8 G/DL (ref 3.5–5.2)
ALBUMIN/GLOB SERPL: 1.2 G/DL
ALP SERPL-CCNC: 122 U/L (ref 39–117)
ALT SERPL W P-5'-P-CCNC: 14 U/L (ref 1–41)
ANION GAP SERPL CALCULATED.3IONS-SCNC: 8.5 MMOL/L (ref 5–15)
AST SERPL-CCNC: 23 U/L (ref 1–40)
BILIRUB SERPL-MCNC: 1.7 MG/DL (ref 0–1.2)
BUN SERPL-MCNC: 27 MG/DL (ref 8–23)
BUN/CREAT SERPL: 20 (ref 7–25)
CALCIUM SPEC-SCNC: 9.5 MG/DL (ref 8.6–10.5)
CHLORIDE SERPL-SCNC: 104 MMOL/L (ref 98–107)
CO2 SERPL-SCNC: 29.5 MMOL/L (ref 22–29)
CREAT SERPL-MCNC: 1.35 MG/DL (ref 0.76–1.27)
EGFRCR SERPLBLD CKD-EPI 2021: 52.1 ML/MIN/1.73
GLOBULIN UR ELPH-MCNC: 3.2 GM/DL
GLUCOSE SERPL-MCNC: 118 MG/DL (ref 65–99)
POTASSIUM SERPL-SCNC: 3.9 MMOL/L (ref 3.5–5.2)
PROT SERPL-MCNC: 7 G/DL (ref 6–8.5)
SODIUM SERPL-SCNC: 142 MMOL/L (ref 136–145)

## 2022-03-18 PROCEDURE — 36415 COLL VENOUS BLD VENIPUNCTURE: CPT | Performed by: FAMILY MEDICINE

## 2022-03-18 PROCEDURE — 80053 COMPREHEN METABOLIC PANEL: CPT | Performed by: FAMILY MEDICINE

## 2022-03-18 PROCEDURE — 99214 OFFICE O/P EST MOD 30 MIN: CPT | Performed by: FAMILY MEDICINE

## 2022-03-18 NOTE — PROGRESS NOTES
Chief Complaint   Patient presents with   • Hypertension     HPI  Hank Ponce is a 82 y.o. male that presents for   Chief Complaint   Patient presents with   • Hypertension     Maxilla fracture: patient was seen 4 months ago following hospitalization for maxillary fracture. He reports that his jaw still doesn't align the way it once did. Patient was referred to ENT at [facility] for f/u but this never happened    HTN: 134/78 today. Maintained on lasix 20 BID and losartan 25 BID. Denies LH/dizziness, SOB. Reports recent R-sided chest pain but not otherwise. He did have heart cath 1/2022 w/ nonobstructive CAD.     Dilated cardiomyopathy: complicated by afib s/p ablation 8/2017. S/p pacemaker/defibrllator 10/2017. Denies palpitations, SOB. Recent echo (1/2022) w/ EF 30-35% and moderately dilated LV and RV, severely dilated LA and mild pulm HTN w/ RVSP 45-55. Denies leg swelling. Maintained on lasix 20 BID. Not taking BB due to fatigue. Follows w/ [facility]- [facility]    Review of Systems   Constitutional: Negative for fever and unexpected weight loss.   HENT: Positive for dental problem.    Respiratory: Negative for cough and shortness of breath.    Cardiovascular: Positive for chest pain. Negative for palpitations and leg swelling.   Neurological: Positive for numbness. Negative for dizziness and light-headedness.     The following portions of the patient's history were reviewed and updated as appropriate: problem list, past medical history, past surgical history, allergies, current medication    Problem List Tab  Patient History Tab  Immunizations Tab  Medications Tab  Chart Review Tab  Care Everywhere Tab  Synopsis Tab    PE  Vitals:    03/18/22 0847   BP: 134/78   Pulse: 70   Resp: 16   Temp: 96.9 °F (36.1 °C)   SpO2: 98%     Body mass index is 23.52 kg/m².  General: Well nourished, NAD  Head: AT/NC  Eyes: EOMI, anicteric sclera  Resp: Bibasilar crackles, SCR, BS equal  CV: RRR w/ 2/6 BERTIN at LMCL; 2+ pulses  GI: Soft,  NT/ND, +BS  MSK: FROM, no deformity, no edema  Skin: Warm, dry, intact  Neuro: Alert and oriented. No focal deficits  Psych: Appropriate mood and affect    Imaging  US Liver    Result Date: 11/30/2021  Negative ultrasound of the liver.  Electronically Signed By-Brain Noonan On:11/30/2021 10:40 AM This report was finalized on [id number] by  Brain Noonan, .    DEXA Bone Density Axial    Result Date: 12/23/2021  Normal hip and lumbar spine bone density  Copies of the computerized summary reports can be obtained from [place] via the health information department of [facility].  Electronically Signed By-Matty Paz MD On:12/23/2021 10:44 AM This report was finalized on [id number] by  Matty Paz MD.    XR Chest PA & Lateral    Result Date: 11/30/2021   1. Moderate cardiomegaly which appears improved since the prior exam. 2. Mild central vascular congestion without overt pulmonary edema. 3. Question trace right basilar pleural fluid.  Electronically Signed By-Kathie Oshea MD On:11/30/2021 10:33 AM This report was finalized on [id number] by  Kathie Oshea MD.      Assessment/Plan   Hank Ponce is a 82 y.o. male that presents for   Chief Complaint   Patient presents with   • Hypertension     Diagnoses and all orders for this visit:    1. Closed fracture of maxilla with routine healing, unspecified laterality, subsequent encounter (Primary): Patient with maxillary fracture several months ago.  He reports that his bite is still not the same raising concern for malalignment.  He continues to have numbness across the top of his upper lip.  Patient was referred to ENT at visit 4 months ago but never heard from ENT.  Will replace referral today to discuss potential malalignment of the jaw  -     Ambulatory Referral to ENT (Otolaryngology)    2. Essential hypertension: 134/78 today  -     Comprehensive Metabolic Panel  - Continue home Lasix 20 twice daily and losartan 50 daily    3. Dilated  cardiomyopathy (HCC): Recent echo reviewed with markedly dilated chambers and EF 30 to 35%.  Status post ICD  -     Comprehensive Metabolic Panel  - Continue home Lasix 20 twice daily  - Unable to tolerate beta-blocker secondary to fatigue  - Continue cardiology [id number]    4. Stage 3 chronic kidney disease, unspecified whether stage 3a or 3b CKD (HCC)  -     Comprehensive Metabolic Panel    5. Elevated alkaline phosphatase level  -     Comprehensive Metabolic Panel     Return in about 8 months (around 11/18/2022) for Medicare Wellness.

## 2022-03-21 NOTE — PROGRESS NOTES
Left patient voicemail with results. Told him if he had any other questions to give the office a call.

## 2022-04-15 PROCEDURE — 93295 DEV INTERROG REMOTE 1/2/MLT: CPT | Performed by: NURSE PRACTITIONER

## 2022-04-15 PROCEDURE — 93296 REM INTERROG EVL PM/IDS: CPT | Performed by: NURSE PRACTITIONER

## 2022-06-06 RX ORDER — RIVAROXABAN 15 MG/1
TABLET, FILM COATED ORAL
Qty: 90 TABLET | Refills: 2 | Status: SHIPPED | OUTPATIENT
Start: 2022-06-06 | End: 2023-03-27

## 2022-06-13 RX ORDER — LOSARTAN POTASSIUM 25 MG/1
TABLET ORAL
Qty: 180 TABLET | Refills: 1 | Status: SHIPPED | OUTPATIENT
Start: 2022-06-13 | End: 2022-12-15

## 2022-06-28 ENCOUNTER — ANESTHESIA EVENT (OUTPATIENT)
Dept: GASTROENTEROLOGY | Facility: HOSPITAL | Age: 82
End: 2022-06-28

## 2022-06-28 RX ORDER — SODIUM CHLORIDE 9 MG/ML
9 INJECTION, SOLUTION INTRAVENOUS CONTINUOUS PRN
Status: CANCELLED | OUTPATIENT
Start: 2022-06-28

## 2022-06-28 RX ORDER — MIDAZOLAM HYDROCHLORIDE 1 MG/ML
0.5 INJECTION INTRAMUSCULAR; INTRAVENOUS
Status: CANCELLED | OUTPATIENT
Start: 2022-06-28

## 2022-06-28 RX ORDER — SODIUM CHLORIDE 0.9 % (FLUSH) 0.9 %
10 SYRINGE (ML) INJECTION EVERY 12 HOURS SCHEDULED
Status: CANCELLED | OUTPATIENT
Start: 2022-06-28

## 2022-06-28 RX ORDER — SODIUM CHLORIDE 0.9 % (FLUSH) 0.9 %
10 SYRINGE (ML) INJECTION AS NEEDED
Status: CANCELLED | OUTPATIENT
Start: 2022-06-28

## 2022-06-29 ENCOUNTER — HOSPITAL ENCOUNTER (OUTPATIENT)
Facility: HOSPITAL | Age: 82
Setting detail: HOSPITAL OUTPATIENT SURGERY
Discharge: HOME | End: 2022-06-29
Attending: INTERNAL MEDICINE | Admitting: INTERNAL MEDICINE

## 2022-06-29 ENCOUNTER — ON CAMPUS - OUTPATIENT (AMBULATORY)
Dept: URBAN - METROPOLITAN AREA HOSPITAL 85 | Facility: HOSPITAL | Age: 82
End: 2022-06-29
Payer: COMMERCIAL

## 2022-06-29 ENCOUNTER — ANESTHESIA (OUTPATIENT)
Dept: GASTROENTEROLOGY | Facility: HOSPITAL | Age: 82
End: 2022-06-29

## 2022-06-29 VITALS
BODY MASS INDEX: 22.18 KG/M2 | DIASTOLIC BLOOD PRESSURE: 63 MMHG | OXYGEN SATURATION: 99 % | HEART RATE: 78 BPM | RESPIRATION RATE: 16 BRPM | TEMPERATURE: 98.1 F | SYSTOLIC BLOOD PRESSURE: 107 MMHG | HEIGHT: 74 IN | WEIGHT: 172.84 LBS

## 2022-06-29 DIAGNOSIS — Z80.0 FAMILY HISTORY OF COLON CANCER: ICD-10-CM

## 2022-06-29 DIAGNOSIS — D12.0 BENIGN NEOPLASM OF CECUM: ICD-10-CM

## 2022-06-29 DIAGNOSIS — Z83.71 FAMILY HISTORY OF COLONIC POLYPS: ICD-10-CM

## 2022-06-29 DIAGNOSIS — Z86.010 PERSONAL HISTORY OF COLONIC POLYPS: ICD-10-CM

## 2022-06-29 DIAGNOSIS — D12.3 BENIGN NEOPLASM OF TRANSVERSE COLON: ICD-10-CM

## 2022-06-29 DIAGNOSIS — K57.30 DIVERTICULOSIS OF LARGE INTESTINE WITHOUT PERFORATION OR ABS: ICD-10-CM

## 2022-06-29 DIAGNOSIS — Z80.0 FAMILY HISTORY OF MALIGNANT NEOPLASM OF DIGESTIVE ORGANS: ICD-10-CM

## 2022-06-29 DIAGNOSIS — K64.8 OTHER HEMORRHOIDS: ICD-10-CM

## 2022-06-29 PROCEDURE — 25010000002 PROPOFOL 200 MG/20ML EMULSION: Performed by: ANESTHESIOLOGIST ASSISTANT

## 2022-06-29 PROCEDURE — 45385 COLONOSCOPY W/LESION REMOVAL: CPT | Mod: PT | Performed by: INTERNAL MEDICINE

## 2022-06-29 PROCEDURE — 88305 TISSUE EXAM BY PATHOLOGIST: CPT | Performed by: INTERNAL MEDICINE

## 2022-06-29 RX ORDER — PROPOFOL 10 MG/ML
INJECTION, EMULSION INTRAVENOUS AS NEEDED
Status: DISCONTINUED | OUTPATIENT
Start: 2022-06-29 | End: 2022-06-29 | Stop reason: SURG

## 2022-06-29 RX ORDER — SODIUM CHLORIDE 9 MG/ML
INJECTION, SOLUTION INTRAVENOUS CONTINUOUS PRN
Status: DISCONTINUED | OUTPATIENT
Start: 2022-06-29 | End: 2022-06-29 | Stop reason: SURG

## 2022-06-29 RX ORDER — LIDOCAINE HYDROCHLORIDE 20 MG/ML
INJECTION, SOLUTION EPIDURAL; INFILTRATION; INTRACAUDAL; PERINEURAL AS NEEDED
Status: DISCONTINUED | OUTPATIENT
Start: 2022-06-29 | End: 2022-06-29 | Stop reason: SURG

## 2022-06-29 RX ADMIN — SODIUM CHLORIDE: 0.9 INJECTION, SOLUTION INTRAVENOUS at 09:17

## 2022-06-29 RX ADMIN — PROPOFOL 200 MG: 10 INJECTION, EMULSION INTRAVENOUS at 09:25

## 2022-06-29 RX ADMIN — LIDOCAINE HYDROCHLORIDE 40 MG: 20 INJECTION, SOLUTION EPIDURAL; INFILTRATION; INTRACAUDAL; PERINEURAL at 09:25

## 2022-06-29 NOTE — ANESTHESIA POSTPROCEDURE EVALUATION
Patient: Hank Ponce    Procedure Summary     Date: 06/29/22 Room / Location: [facility] ENDOSCOPY 1 / [facility] ENDOSCOPY    Anesthesia Start: 0917 Anesthesia Stop: 0953    Procedure: COLONOSCOPY with polypectomy x5 (N/A ) Diagnosis:       Personal history of colonic polyps      Family history of colon cancer      Family history of colonic polyps      (Personal history of colonic polyps [Z86.010])      (Family history of colon cancer [Z80.0])      (Family history of colonic polyps [Z83.71])    Surgeons: KAYLIN Ruiz MD Provider: Jose Causey MD    Anesthesia Type: MAC ASA Status: 4          Anesthesia Type: MAC    Vitals  Vitals Value Taken Time   /63 06/29/22 1011   Temp     Pulse 78 06/29/22 1011   Resp 16 06/29/22 1011   SpO2 99 % 06/29/22 1011           Post Anesthesia Care and Evaluation    Patient location during evaluation: PACU  Patient participation: complete - patient participated  Level of consciousness: awake  Pain scale: See nurse's notes for pain score.  Pain management: adequate    Airway patency: patent  Anesthetic complications: No anesthetic complications  PONV Status: none  Cardiovascular status: acceptable  Respiratory status: acceptable  Hydration status: acceptable    Comments: Patient seen and examined postoperatively; vital signs stable; SpO2 greater than or equal to 90%; cardiopulmonary status stable; nausea/vomiting adequately controlled; pain adequately controlled; no apparent anesthesia complications; patient discharged from anesthesia care when discharge criteria were met

## 2022-06-29 NOTE — ANESTHESIA PREPROCEDURE EVALUATION
Anesthesia Evaluation     Patient summary reviewed and Nursing notes reviewed   NPO Solid Status: > 8 hours  NPO Liquid Status: > 8 hours           Airway   Mallampati: II  TM distance: >3 FB  Neck ROM: full  No difficulty expected  Dental - normal exam     Pulmonary    Cardiovascular     (+) pacemaker ICD, hypertension, valvular problems/murmurs MR and TI, CAD, dysrhythmias Atrial Fib, CHF , hyperlipidemia,       Neuro/Psych  GI/Hepatic/Renal/Endo    (+)  GERD,  renal disease,     Musculoskeletal     Abdominal    Substance History      OB/GYN          Other        ROS/Med Hx Other: Nonischemic cardiomyopathy  2.  Nonobstructive CAD of large epicardial vessels 50% at most in the mid LAD, 20 to 30% RCA, 40% circumflex, 80 to 90% disease in small diagonal branch which is inconsequential and should be treated medically not contributory to his cardiomyopathy  3.  Continue secondary prevention goals for systolic heart failure, go down on Lasix to daily given prerenal azotemia and elevated creatinine on labs today on twice daily Lasix with normal filling pressures  For follow-up cardiology 2 to 4 weeks for further optimization and review of cath films       EF 35%                    Anesthesia Plan    ASA 4     MAC     intravenous induction     Anesthetic plan, risks, benefits, and alternatives have been provided, discussed and informed consent has been obtained with: patient.    Plan discussed with CAA.        CODE STATUS:

## 2022-06-30 LAB
LAB AP CASE REPORT: NORMAL
LAB AP DIAGNOSIS COMMENT: NORMAL
PATH REPORT.FINAL DX SPEC: NORMAL
PATH REPORT.GROSS SPEC: NORMAL

## 2022-07-29 ENCOUNTER — OFFICE VISIT (OUTPATIENT)
Dept: CARDIOLOGY | Facility: CLINIC | Age: 82
End: 2022-07-29

## 2022-07-29 VITALS
BODY MASS INDEX: 22.33 KG/M2 | DIASTOLIC BLOOD PRESSURE: 74 MMHG | WEIGHT: 174 LBS | OXYGEN SATURATION: 100 % | HEIGHT: 74 IN | HEART RATE: 77 BPM | SYSTOLIC BLOOD PRESSURE: 118 MMHG

## 2022-07-29 DIAGNOSIS — I48.21 PERMANENT ATRIAL FIBRILLATION: Primary | ICD-10-CM

## 2022-07-29 DIAGNOSIS — I42.0 DILATED CARDIOMYOPATHY: ICD-10-CM

## 2022-07-29 DIAGNOSIS — I48.21 PERMANENT ATRIAL FIBRILLATION: ICD-10-CM

## 2022-07-29 DIAGNOSIS — I50.22 CHRONIC SYSTOLIC CONGESTIVE HEART FAILURE: ICD-10-CM

## 2022-07-29 DIAGNOSIS — I47.20 VENTRICULAR TACHYCARDIA: ICD-10-CM

## 2022-07-29 DIAGNOSIS — Z95.810 PRESENCE OF BIVENTRICULAR IMPLANTABLE CARDIOVERTER-DEFIBRILLATOR (ICD): ICD-10-CM

## 2022-07-29 PROBLEM — R07.89 CHEST PAIN, ATYPICAL: Status: RESOLVED | Noted: 2022-01-17 | Resolved: 2022-07-29

## 2022-07-29 PROBLEM — J18.9 PNEUMONIA: Status: RESOLVED | Noted: 2021-04-17 | Resolved: 2022-07-29

## 2022-07-29 PROCEDURE — 93284 PRGRMG EVAL IMPLANTABLE DFB: CPT | Performed by: INTERNAL MEDICINE

## 2022-07-29 PROCEDURE — 99214 OFFICE O/P EST MOD 30 MIN: CPT | Performed by: INTERNAL MEDICINE

## 2022-07-29 PROCEDURE — 93000 ELECTROCARDIOGRAM COMPLETE: CPT | Performed by: INTERNAL MEDICINE

## 2022-07-29 RX ORDER — UBIDECARENONE 100 MG
200 CAPSULE ORAL DAILY
COMMUNITY

## 2022-07-29 NOTE — PROGRESS NOTES
CC---Cardiomyopathy, VT, atrial fibrillation     Sub--Mr. Hank Zarate is a very pleasant and functionally active 82-year-old white male patient came for follow-up after several months.  Few months ago patient had pneumonia with hypoxia and after giving beta-blocker had hypotension with syncope with severe facial injury needing multiple surgeries at an [facility] and comes in for follow-up.  He denies any rapid heart rate or any chest pain.  Patient has biventricular ICD in situ with prior AV node ablation and has history of ventricular arrhythmias.  Patient's beta-blockers have been stopped in the past because he was feeling poorly.      He has significant complex medical history which is attached below for reference from my previous dictations.      Patient with history of hypertensive heart disease, MR, TR, and right bundle branch block on ECG.  Patient has history of rheumatic fever since age of 11 and has been noticed to have increasing symptoms of shortness of breath and fatigue and recently evaluated in the hospital needing intravenous diuretics and a repeat cardiac catheterization showing significant LV dysfunction and  also had a hematuria and was evaluated by urologist and continues to be on anti coagulation without any further problems with hematuria and had benign workup for hematuria.  Patient did improve his ejection fraction and his heart failure status when he was in sinus rhythm when he underwent ablation several months ago.  Recurrent LV dysfunction and heart failure symptoms with class 3 symptomatology with recurrent persistent atrial fibrillation noted. Patient could not tolerate Tikosyn in the past.  Redo ablation was done with extensive ablation and significant left atrial scarring and enlargement noted and patient was placed on amiodarone briefly. Patient started having recurrent atrial arrhythmias despite extensive ablation and compensated functional class 2--  persists to  have severely reduced EF-- Biventricular ICD inserted and since then patient is having minimal arrhythmias with remarkable improvement of symptoms and out of hospital for more than  2 years--patient has noticed recurrent palpitations with elevated heart rate with  symptoms and was noted to have recurrent atrial flutter and AV node ablation few weeks ago   Post AV node ablation patient was discharged home without any complications.          Past Medical History:     Reviewed history from 03/12/2018 and no changes required:        mitral regurg        tricuspid regurg        rt BBB        Atrial Fibrillation        Cardiomyopathy, takotsubo        Coronary Artery Disease, non obstructive        Hypertension        Gouty Arthritis         Acid reflux disease        Allergies        Osteoarthritis        AF ablation January 2017        bone spur neck- 2017        Congestive heart failure         VT--- February 2018    Past Surgical History:     Reviewed history from 09/01/2017 and no changes required:        Rotator Cuff Repair right        Amputation right  index and middle finger        Sinus surgery February 2014        Heart Catherization - 12/8/2016 - Non obstr CAD; Takotsubo CM        Heart Ablation- 1/2017 [facility]        Heart Catherization: 7/24/17 (treating medically)        Cardiac Ablation 8/22/17 [facility]          Physical Exam    General:      well developed, well nourished, in no acute distress.    Head:      normocephalic and atraumatic.    Eyes:      PERRL/EOM intact, conjunctiva and sclera clear  Neck:      no  thyromegaly, trachea central with normal respiratory effort  Lungs:      clear bilaterally to auscultation.    Heart:  Paced rhythm with occasional PVCs without murmurs, rubs, or gallops  ICD site is clean.  No peripheral edema.  Gross neurological exam without any deficits.        Assessment and plan  Recurrent atrial flutter--persistent atrial arrhythmias --post AV node ablation   Post AV node ablation  syncope with injury needing maxillary sinus surgery and also had pneumonia--secondary to hypotension and is off beta-blockers because of intolerance  Ongoing class III systolic heart failure and feels poorly with beta-blockers--off beta-blockers and currently in functional class II  Biventricular ICD in situ--appropriately functioning --patient had VT therapy with ATP without any recent recurrence and patient was educated    History of rheumatic fever with chronic right bundle-branch block  Hypertension--currently on losartan and Lasix  Bilateral leg edema mild in nature    Medications reviewed   Follow-up after few months          ECG 12 Lead    Date/Time: 7/29/2022 4:38 PM  Performed by: Hira Ochoa MD  Authorized by: Hira Ochoa MD   Comparison: compared with previous ECG   Similar to previous ECG  Rhythm: atrial fibrillation and paced  Ectopy: unifocal PVCs            Electronically signed by Hira Ochoa MD, 07/29/22, 8:13 PM EDT.

## 2022-08-15 RX ORDER — ALLOPURINOL 300 MG/1
TABLET ORAL
Qty: 90 TABLET | Refills: 1 | Status: SHIPPED | OUTPATIENT
Start: 2022-08-15 | End: 2023-02-13

## 2022-08-19 ENCOUNTER — TELEPHONE (OUTPATIENT)
Dept: FAMILY MEDICINE CLINIC | Facility: CLINIC | Age: 82
End: 2022-08-19

## 2022-08-19 RX ORDER — GUAIFENESIN 600 MG/1
TABLET, EXTENDED RELEASE ORAL
Qty: 40 TABLET | Refills: 1 | Status: SHIPPED | OUTPATIENT
Start: 2022-08-19 | End: 2022-10-18

## 2022-08-19 RX ORDER — PREDNISONE 20 MG/1
40 TABLET ORAL DAILY
Qty: 10 TABLET | Refills: 0 | Status: SHIPPED | OUTPATIENT
Start: 2022-08-19 | End: 2022-09-01

## 2022-08-19 RX ORDER — AZITHROMYCIN 500 MG/1
500 TABLET, FILM COATED ORAL DAILY
Qty: 3 TABLET | Refills: 0 | Status: SHIPPED | OUTPATIENT
Start: 2022-08-19 | End: 2022-09-01

## 2022-08-19 NOTE — TELEPHONE ENCOUNTER
Caller: Hank Ponce    Relationship to patient: Self    Best call back number:[phone]    Date of positive COVID19 test: 8/19/2022 HOME TEST    COVID19 symptoms: SCRATCHY THROAT-3 DAYS    Additional information or concerns:PATIENT WOULD LIKE A CALL WITH MEDICAL ADVICE    What is the patients preferred pharmacy:   [facility] [address] [phone] [place] [phone] [phone]

## 2022-09-01 ENCOUNTER — CLINICAL SUPPORT NO REQUIREMENTS (OUTPATIENT)
Dept: CARDIOLOGY | Facility: CLINIC | Age: 82
End: 2022-09-01

## 2022-09-01 ENCOUNTER — OFFICE VISIT (OUTPATIENT)
Dept: CARDIOLOGY | Facility: CLINIC | Age: 82
End: 2022-09-01

## 2022-09-01 VITALS
BODY MASS INDEX: 21.43 KG/M2 | HEIGHT: 74 IN | WEIGHT: 167 LBS | OXYGEN SATURATION: 94 % | HEART RATE: 74 BPM | SYSTOLIC BLOOD PRESSURE: 120 MMHG | DIASTOLIC BLOOD PRESSURE: 62 MMHG

## 2022-09-01 DIAGNOSIS — I10 ESSENTIAL HYPERTENSION: ICD-10-CM

## 2022-09-01 DIAGNOSIS — I50.20 HEART FAILURE WITH REDUCED EJECTION FRACTION: ICD-10-CM

## 2022-09-01 DIAGNOSIS — I42.0 DILATED CARDIOMYOPATHY: Primary | ICD-10-CM

## 2022-09-01 DIAGNOSIS — I48.21 PERMANENT ATRIAL FIBRILLATION: ICD-10-CM

## 2022-09-01 DIAGNOSIS — Z95.810 PRESENCE OF BIVENTRICULAR IMPLANTABLE CARDIOVERTER-DEFIBRILLATOR (ICD): ICD-10-CM

## 2022-09-01 PROCEDURE — 93284 PRGRMG EVAL IMPLANTABLE DFB: CPT | Performed by: NURSE PRACTITIONER

## 2022-09-01 PROCEDURE — 99214 OFFICE O/P EST MOD 30 MIN: CPT | Performed by: NURSE PRACTITIONER

## 2022-09-01 PROCEDURE — 93000 ELECTROCARDIOGRAM COMPLETE: CPT | Performed by: NURSE PRACTITIONER

## 2022-09-02 NOTE — PROGRESS NOTES
Cardiology Office Follow Up Visit      Primary Care Provider:  Bunny Curran MD    Reason for f/u:     Dilated cardiomyopathy  Hypertension  Heart failure with reduced ejection fraction next permanent atrial fibrillation  Previous AV node ablation  [employer] biventricular ICD      Subjective     CC:    Denies chest pain or worsening dyspnea    History of Present Illness       Hank Ponce is a 82 y.o. male.  Patient is a very pleasant 82-year-old male who is known to have atrial fibrillation that is permanent, cardiomyopathy and previous biventricular ICD implantation.  Prior cardiac catheterization showed nonobstructive coronary artery disease.    Patient is previously been treated with dual antiarrhythmic therapy.  He was intolerant to amiodarone, Tikosyn and previously did not take or tolerate beta-blockers.  He had a previous AV node ablation by Dr. Ochoa for rate control.    Patient had a previous spontaneous fall with fracture of his maxillary sinus and surrounding bony structures and was treated at [facility] by maxillofacial surgery.    In January 2022 patient had repeat cardiac catheterization which showed nonobstructive coronary artery disease.  There is 50% in the mid LAD, 20 to 30% in the RCA, 40% circumflex and 80 to 90% disease in a very small diagonal branch which was thought to be inconsequential and not contributory to his cardiomyopathy.    Ejection fraction at the time of his cardiac catheterization was 35%.  Previous echo showed moderate to severely dilated RV cavity biatrial enlargement but severe moderate MR moderate to severe TR RVSP 45 to 55 mmHg        ASSESSMENT/PLAN:        Diagnoses and all orders for this visit:    1. Dilated cardiomyopathy (HCC) (Primary)  Comments:  Currently with no clinical signs of heart failure on exam    2. Essential hypertension  Comments:  Stable on current medical therapy    3. Heart failure with reduced ejection fraction  ([facility])  Comments:  Clinically appears euvolemic    4. Permanent atrial fibrillation (CMS/HCC)  Comments:  Previous AV node ablation for rate control    5. Presence of biventricular implantable cardioverter-defibrillator (ICD)  Comments:  [employer] biventricular ICD with stable device function by an office interrogation today           MEDICAL DECISION MAKING:    Patient is here for follow-up regarding his nonischemic cardiomyopathy and previous biventricular ICD.  He is also known to have permanent atrial fibrillation with previous AV node ablation.    His [employer] ICD was interrogated in the office today and showing stable device function.  He is pacemaker dependent.    Patient clinically does not appear to be in overt heart failure on exam. He has been intolerant to beta blockers due to making him feel poorly    His blood pressure is stable.  He reports compliance with medical therapy.  He is anticoagulated with Xarelto.    I made no changes in his medication.  We will continue the current treatment.  We will plan on seeing him back for scheduled follow-up in 6 months I will continue to monitor his ICD remotely via [employer]      Past Medical History:   Diagnosis Date   • Acid reflux disease    • Allergic rhinitis    • Arthritis     gouty   • Atrial fibrillation ([facility])    • BBB (bundle branch block)     right   • Bone spur 2017    neck   • Congestive heart failure (CHF) ([facility])    • Congestive heart failure ([facility]) 10/08/2019   • Coronary artery disease     non obstructive   • Hypertension    • Mitral regurgitation    • Osteoarthritis    • Presence of biventricular implantable cardioverter-defibrillator (ICD) 07/29/2019   • S/P ablation of atrial fibrillation 01/2017   • Takotsubo cardiomyopathy    • Tricuspid regurgitation    • VT (ventricular tachycardia) ([facility]) 02/2018       Past Surgical History:   Procedure Laterality Date   • AMPUTATION      index and middle finger   • CARDIAC ABLATION  08/22/2017     [facility]   • CARDIAC CATHETERIZATION  12/08/2016    non obst CAD; takotsubo CM   • CARDIAC CATHETERIZATION  07/24/2017    treating medically   • CARDIAC CATHETERIZATION N/A 1/19/2022    Procedure: Left Heart Cath;  Surgeon: Chidi Figueroa MD;  Location: [facility] CATH INVASIVE LOCATION;  Service: Cardiology;  Laterality: N/A;   • CARDIAC DEFIBRILLATOR PLACEMENT  2017    Bs   • CARDIAC ELECTROPHYSIOLOGY PROCEDURE N/A 4/14/2021    Procedure: AV node ablation;  Surgeon: Hira Ochoa MD;  Location: [facility] CATH INVASIVE LOCATION;  Service: Cardiovascular;  Laterality: N/A;   • COLONOSCOPY N/A 6/29/2022    Procedure: COLONOSCOPY with polypectomy x5;  Surgeon: KAYLIN Ruiz MD;  Location: [facility] ENDOSCOPY;  Service: Gastroenterology;  Laterality: N/A;  post: diverticulosis, hemorrhoids, polyps   • FRACTURE SURGERY  04/18/2021    jaw    • INTERNAL CARDIAC DEFIBRILLATOR INSERTION  2017    BiV ICD BS   • OTHER SURGICAL HISTORY  01/2017    Heart ablation-[facility]   • ROTATOR CUFF REPAIR Right    • SINUS SURGERY  02/2014         Current Outpatient Medications:   •  allopurinol (ZYLOPRIM) 300 MG tablet, TAKE ONE TABLET BY MOUTH DAILY, Disp: 90 tablet, Rfl: 1  •  coenzyme Q10 100 MG capsule, Take 200 mg by mouth Daily., Disp: , Rfl:   •  furosemide (LASIX) 20 MG tablet, Take 1 tablet by mouth 2 (Two) Times a Day., Disp: 60 tablet, Rfl: 11  •  guaiFENesin (Mucinex) 600 MG 12 hr tablet, Take 1-2 tablets twice daily for congestion, Disp: 40 tablet, Rfl: 1  •  losartan (COZAAR) 25 MG tablet, TAKE ONE TABLET BY MOUTH TWICE A DAY, Disp: 180 tablet, Rfl: 1  •  vitamin B-12 (CYANOCOBALAMIN) 1000 MCG tablet, Take 1,000 mcg by mouth Daily., Disp: , Rfl:   •  vitamin D3 125 MCG (5000 UT) capsule capsule, Take  by mouth Daily., Disp: , Rfl:   •  Xarelto 15 MG tablet, TAKE ONE TABLET BY MOUTH DAILY, Disp: 90 tablet, Rfl: 2    Social History     Socioeconomic History   • Marital status:    Tobacco Use   • Smoking status:  "Former Smoker     Quit date:      Years since quittin.6   • Smokeless tobacco: Never Used   Vaping Use   • Vaping Use: Never used   Substance and Sexual Activity   • Alcohol use: Yes     Comment: occasionally   • Drug use: Never   • Sexual activity: Defer       Family History   Problem Relation Age of Onset   • Parkinsonism Mother    • Alcohol abuse Father        The following portions of the patient's history were reviewed and updated as appropriate: allergies, current medications, past family history, past medical history, past social history, past surgical history and problem list.    Review of Systems   Constitutional: Negative for decreased appetite and diaphoresis.   HENT: Negative for congestion, hearing loss and nosebleeds.    Cardiovascular: Negative for chest pain, claudication, dyspnea on exertion, irregular heartbeat, leg swelling, near-syncope, orthopnea, palpitations, paroxysmal nocturnal dyspnea and syncope.   Respiratory: Negative for cough, shortness of breath and sleep disturbances due to breathing.    Endocrine: Negative for polyuria.   Hematologic/Lymphatic: Does not bruise/bleed easily.   Skin: Negative for itching and rash.   Musculoskeletal: Negative for back pain, muscle weakness and myalgias.   Gastrointestinal: Negative for abdominal pain, change in bowel habit and nausea.   Genitourinary: Negative for dysuria, flank pain, frequency and hesitancy.   Neurological: Negative for dizziness, tremors and weakness.   Psychiatric/Behavioral: Negative for altered mental status. The patient does not have insomnia.        /62   Pulse 74   Ht 188 cm (74.02\")   Wt 75.8 kg (167 lb)   SpO2 94%   BMI 21.43 kg/m² .    Objective     Vitals reviewed.   Constitutional:       General: Not in acute distress.     Appearance: Normal appearance. Well-developed.   Eyes:      Pupils: Pupils are equal, round, and reactive to light.   HENT:      Head: Normocephalic and atraumatic.   Neck:      " Vascular: No JVD.   Pulmonary:      Effort: Pulmonary effort is normal.      Breath sounds: Normal breath sounds.   Cardiovascular:      Normal rate. Regular rhythm.   Pulses:     Intact distal pulses.   Edema:     Peripheral edema absent.   Abdominal:      General: There is no distension.      Palpations: Abdomen is soft.      Tenderness: There is no abdominal tenderness.   Musculoskeletal: Normal range of motion.      Cervical back: Normal range of motion and neck supple. Skin:     General: Skin is warm and dry.   Neurological:      Mental Status: Alert and oriented to person, place, and time.           EKG ordered and reviewed by me in office    ECG 12 Lead    Date/Time: 9/1/2022 1:36 PM  Performed by: Sultana Barajas APRN  Authorized by: Sultana Barajas APRN   Rhythm: paced  BPM: 74  Comments: Biventricular paced rhythm with intermittent PVCs                In Office Device Interrogation: Reviewed    DEVICE INTERROGATION:  IN OFFICE    DEVICE TYPE:   Biventricular ICD    :   [employer]ial    BATTERY:  Stable    TIME TO ELECTIVE REPLACEMENT INDICATORS:   5.5 years    CHARGE TIME:   11.4 seconds        LEAD DATA:   LEADS Reprogrammed for testing purposes    Atrial:         Ventricular:     6.5 mV, 446 ohms, 1.2 V@0.4 ms    LV: 22.9: 861 ohms, 1.4 V at 0.4 ms      Pacemaker Dependent: Yes      Atrial pacing percentage: Not applicable %    Ventricular pacing percentage: 77%      Arrhythmia Logbook Reviewed: Frequent PVCs    BiV pacing percentage reduced due to frequent PVCs    Summary:    Stable Device Function      Battery status is stable.      NEXT IN OFFICE DEVICE CHECK DUE: 6-month    REMOTE DEVICE INTERROGATIONS: Ongoing

## 2022-10-14 PROCEDURE — 93296 REM INTERROG EVL PM/IDS: CPT | Performed by: NURSE PRACTITIONER

## 2022-10-14 PROCEDURE — 93295 DEV INTERROG REMOTE 1/2/MLT: CPT | Performed by: NURSE PRACTITIONER

## 2022-10-18 ENCOUNTER — OFFICE VISIT (OUTPATIENT)
Dept: FAMILY MEDICINE CLINIC | Facility: CLINIC | Age: 82
End: 2022-10-18

## 2022-10-18 ENCOUNTER — LAB (OUTPATIENT)
Dept: FAMILY MEDICINE CLINIC | Facility: CLINIC | Age: 82
End: 2022-10-18

## 2022-10-18 ENCOUNTER — HOSPITAL ENCOUNTER (OUTPATIENT)
Dept: GENERAL RADIOLOGY | Facility: HOSPITAL | Age: 82
Discharge: HOME | End: 2022-10-18
Admitting: FAMILY MEDICINE

## 2022-10-18 VITALS
DIASTOLIC BLOOD PRESSURE: 74 MMHG | SYSTOLIC BLOOD PRESSURE: 121 MMHG | HEART RATE: 67 BPM | TEMPERATURE: 97.5 F | RESPIRATION RATE: 15 BRPM | BODY MASS INDEX: 22.72 KG/M2 | HEIGHT: 74 IN | OXYGEN SATURATION: 100 % | WEIGHT: 177 LBS

## 2022-10-18 DIAGNOSIS — E55.9 VITAMIN D DEFICIENCY, UNSPECIFIED: ICD-10-CM

## 2022-10-18 DIAGNOSIS — R09.89 BIBASILAR CRACKLES: ICD-10-CM

## 2022-10-18 DIAGNOSIS — R41.3 MEMORY DIFFICULTY: Primary | ICD-10-CM

## 2022-10-18 DIAGNOSIS — I51.9 HEART DISEASE, UNSPECIFIED: ICD-10-CM

## 2022-10-18 PROCEDURE — 85027 COMPLETE CBC AUTOMATED: CPT | Performed by: FAMILY MEDICINE

## 2022-10-18 PROCEDURE — 81001 URINALYSIS AUTO W/SCOPE: CPT | Performed by: FAMILY MEDICINE

## 2022-10-18 PROCEDURE — 82306 VITAMIN D 25 HYDROXY: CPT | Performed by: FAMILY MEDICINE

## 2022-10-18 PROCEDURE — 83880 ASSAY OF NATRIURETIC PEPTIDE: CPT | Performed by: FAMILY MEDICINE

## 2022-10-18 PROCEDURE — 80053 COMPREHEN METABOLIC PANEL: CPT | Performed by: FAMILY MEDICINE

## 2022-10-18 PROCEDURE — 71046 X-RAY EXAM CHEST 2 VIEWS: CPT

## 2022-10-18 PROCEDURE — 84439 ASSAY OF FREE THYROXINE: CPT | Performed by: FAMILY MEDICINE

## 2022-10-18 PROCEDURE — 84443 ASSAY THYROID STIM HORMONE: CPT | Performed by: FAMILY MEDICINE

## 2022-10-18 PROCEDURE — 36415 COLL VENOUS BLD VENIPUNCTURE: CPT | Performed by: FAMILY MEDICINE

## 2022-10-18 PROCEDURE — 99214 OFFICE O/P EST MOD 30 MIN: CPT | Performed by: FAMILY MEDICINE

## 2022-10-18 PROCEDURE — 82607 VITAMIN B-12: CPT | Performed by: FAMILY MEDICINE

## 2022-10-18 NOTE — PROGRESS NOTES
Chief Complaint   Patient presents with   • Hypertension     HPI  Hank Ponce is a 82 y.o. male that presents for   Chief Complaint   Patient presents with   • Hypertension     Memory problem: son reports that patient recently asked him to start doing his bills. Patient had COVID a few months ago and memory seems to be worse since that time. Still able to do activities but having a few issues w/ memory. Patient is still driving and no issues getting lost. Mini mental 28 (only 1 of 3 recall). Concern for mild depressive symptoms    Review of Systems   Neurological: Positive for memory problem.   Psychiatric/Behavioral: Positive for depressed mood.     The following portions of the patient's history were reviewed and updated as appropriate: problem list, past medical history, past surgical history, allergies, current medication    Problem List Tab  Patient History Tab  Immunizations Tab  Medications Tab  Chart Review Tab  Care Everywhere Tab  Synopsis Tab    PE  Vitals:    10/18/22 1415   BP: 121/74   Pulse: 67   Resp: 15   Temp: 97.5 °F (36.4 °C)   SpO2: 100%     Body mass index is 22.73 kg/m².  General: Well nourished, NAD  Head: AT/NC  Eyes: EOMI, anicteric sclera  Resp: Bibasilar crackles, SCR, BS equal  CV: RRR w/o m/r/g; 2+ pulses  GI: Soft, NT/ND, +BS  MSK: FROM, no deformity, no edema  Skin: Warm, dry, intact  Neuro: Alert and oriented. No focal deficits  Psych: Appropriate mood and affect    Imaging  No Images in the past 120 days found..    Assessment & Plan   Hank Ponce is a 82 y.o. male that presents for   Chief Complaint   Patient presents with   • Hypertension     Diagnoses and all orders for this visit:    1. Memory difficulty (Primary): Mini mental status exam performed today with a score of 28 of 30.  Not concerned for overt dementia based on the score but borderline mild cognitive disability.  Will search for reversible causes with work-up below.  Plan to follow-up in 6 weeks  for wellness visit.  If symptoms are persistent or progressed, we will consider initiating donepezil versus Namenda  -     CBC (No Diff)  -     TSH  -     T4, Free  -     Vitamin D 25 Hydroxy  -     Vitamin B12  -     Urinalysis With Culture If Indicated - Urine, Clean Catch  -     Comprehensive Metabolic Panel  -     XR Chest PA & Lateral  -     BNP  - Recommend regular exercise, socializing, reading, crossword puzzles, sudoku, etc.    2. Bibasilar crackles: Noted on exam today.  This has been found on physical exam in the past.  Patient does have a history of CHF with reduced ejection fraction and x-ray from 1 year ago concerning for vascular congestion  -     XR Chest PA & Lateral  -     BNP  - Continue Lasix 20 mg twice daily for now    3. Vitamin D deficiency, unspecified  -     Vitamin D 25 Hydroxy    4. Heart disease, unspecified  -     BNP     Return if symptoms worsen or fail to improve.

## 2022-10-19 LAB
25(OH)D3 SERPL-MCNC: 84.8 NG/ML (ref 30–100)
ALBUMIN SERPL-MCNC: 4 G/DL (ref 3.5–5.2)
ALBUMIN/GLOB SERPL: 1.3 G/DL
ALP SERPL-CCNC: 103 U/L (ref 39–117)
ALT SERPL W P-5'-P-CCNC: 21 U/L (ref 1–41)
ANION GAP SERPL CALCULATED.3IONS-SCNC: 14 MMOL/L (ref 5–15)
AST SERPL-CCNC: 38 U/L (ref 1–40)
BACTERIA UR QL AUTO: ABNORMAL /HPF
BILIRUB SERPL-MCNC: 2 MG/DL (ref 0–1.2)
BILIRUB UR QL STRIP: NEGATIVE
BUN SERPL-MCNC: 24 MG/DL (ref 8–23)
BUN/CREAT SERPL: 20.2 (ref 7–25)
CALCIUM SPEC-SCNC: 9.7 MG/DL (ref 8.6–10.5)
CHLORIDE SERPL-SCNC: 102 MMOL/L (ref 98–107)
CLARITY UR: CLEAR
CO2 SERPL-SCNC: 27 MMOL/L (ref 22–29)
COLOR UR: YELLOW
CREAT SERPL-MCNC: 1.19 MG/DL (ref 0.76–1.27)
DEPRECATED RDW RBC AUTO: 55.2 FL (ref 37–54)
EGFRCR SERPLBLD CKD-EPI 2021: 60.6 ML/MIN/1.73
ERYTHROCYTE [DISTWIDTH] IN BLOOD BY AUTOMATED COUNT: 14.9 % (ref 12.3–15.4)
GLOBULIN UR ELPH-MCNC: 3.2 GM/DL
GLUCOSE SERPL-MCNC: 74 MG/DL (ref 65–99)
GLUCOSE UR STRIP-MCNC: NEGATIVE MG/DL
HCT VFR BLD AUTO: 45.9 % (ref 37.5–51)
HGB BLD-MCNC: 15.6 G/DL (ref 13–17.7)
HGB UR QL STRIP.AUTO: ABNORMAL
HYALINE CASTS UR QL AUTO: ABNORMAL /LPF
KETONES UR QL STRIP: NEGATIVE
LEUKOCYTE ESTERASE UR QL STRIP.AUTO: NEGATIVE
MCH RBC QN AUTO: 34 PG (ref 26.6–33)
MCHC RBC AUTO-ENTMCNC: 34 G/DL (ref 31.5–35.7)
MCV RBC AUTO: 100 FL (ref 79–97)
NITRITE UR QL STRIP: NEGATIVE
NT-PROBNP SERPL-MCNC: 2039 PG/ML (ref 0–1800)
PH UR STRIP.AUTO: 6 [PH] (ref 5–8)
PLATELET # BLD AUTO: 180 10*3/MM3 (ref 140–450)
PMV BLD AUTO: 11.2 FL (ref 6–12)
POTASSIUM SERPL-SCNC: 4.2 MMOL/L (ref 3.5–5.2)
PROT SERPL-MCNC: 7.2 G/DL (ref 6–8.5)
PROT UR QL STRIP: ABNORMAL
RBC # BLD AUTO: 4.59 10*6/MM3 (ref 4.14–5.8)
RBC # UR STRIP: ABNORMAL /HPF
REF LAB TEST METHOD: ABNORMAL
SODIUM SERPL-SCNC: 143 MMOL/L (ref 136–145)
SP GR UR STRIP: 1.01 (ref 1–1.03)
SQUAMOUS #/AREA URNS HPF: ABNORMAL /HPF
T4 FREE SERPL-MCNC: 1.18 NG/DL (ref 0.93–1.7)
TSH SERPL DL<=0.05 MIU/L-ACNC: 2.14 UIU/ML (ref 0.27–4.2)
UROBILINOGEN UR QL STRIP: ABNORMAL
VIT B12 BLD-MCNC: 1761 PG/ML (ref 211–946)
WBC # UR STRIP: ABNORMAL /HPF
WBC NRBC COR # BLD: 7.71 10*3/MM3 (ref 3.4–10.8)

## 2022-10-19 RX ORDER — SULFAMETHOXAZOLE AND TRIMETHOPRIM 800; 160 MG/1; MG/1
1 TABLET ORAL 2 TIMES DAILY
Qty: 14 TABLET | Refills: 0 | Status: SHIPPED | OUTPATIENT
Start: 2022-10-19 | End: 2022-10-26

## 2022-10-31 RX ORDER — FUROSEMIDE 20 MG/1
20 TABLET ORAL 2 TIMES DAILY
Qty: 60 TABLET | Refills: 5 | Status: SHIPPED | OUTPATIENT
Start: 2022-10-31

## 2022-11-03 ENCOUNTER — LAB (OUTPATIENT)
Dept: FAMILY MEDICINE CLINIC | Facility: CLINIC | Age: 82
End: 2022-11-03

## 2022-11-03 DIAGNOSIS — R31.9 HEMATURIA, UNSPECIFIED TYPE: Primary | ICD-10-CM

## 2022-11-03 LAB
BACTERIA UR QL AUTO: NORMAL /HPF
BILIRUB UR QL STRIP: NEGATIVE
CLARITY UR: CLEAR
COLOR UR: YELLOW
GLUCOSE UR STRIP-MCNC: NEGATIVE MG/DL
HGB UR QL STRIP.AUTO: ABNORMAL
HYALINE CASTS UR QL AUTO: NORMAL /LPF
KETONES UR QL STRIP: NEGATIVE
LEUKOCYTE ESTERASE UR QL STRIP.AUTO: NEGATIVE
NITRITE UR QL STRIP: NEGATIVE
PH UR STRIP.AUTO: 6 [PH] (ref 5–8)
PROT UR QL STRIP: NEGATIVE
RBC # UR STRIP: NORMAL /HPF
REF LAB TEST METHOD: NORMAL
SP GR UR STRIP: 1.01 (ref 1–1.03)
SQUAMOUS #/AREA URNS HPF: NORMAL /HPF
UROBILINOGEN UR QL STRIP: ABNORMAL
WBC # UR STRIP: NORMAL /HPF

## 2022-11-03 PROCEDURE — 81001 URINALYSIS AUTO W/SCOPE: CPT | Performed by: FAMILY MEDICINE

## 2022-11-08 ENCOUNTER — TELEPHONE (OUTPATIENT)
Dept: FAMILY MEDICINE CLINIC | Facility: CLINIC | Age: 82
End: 2022-11-08

## 2022-11-08 DIAGNOSIS — R31.9 HEMATURIA, UNSPECIFIED TYPE: Primary | ICD-10-CM

## 2022-11-08 NOTE — TELEPHONE ENCOUNTER
Caller: Hank Ponce    Relationship: Self    Best call back number: [phone]     Caller requesting test results: PATIENT    What test was performed: URINE     When was the test performed: 11/3/22    Where was the test performed: IN OFFICE    Additional notes:

## 2022-11-17 ENCOUNTER — LAB (OUTPATIENT)
Dept: FAMILY MEDICINE CLINIC | Facility: CLINIC | Age: 82
End: 2022-11-17

## 2022-11-17 DIAGNOSIS — R31.9 HEMATURIA, UNSPECIFIED TYPE: ICD-10-CM

## 2022-11-17 LAB
BACTERIA UR QL AUTO: ABNORMAL /HPF
BILIRUB UR QL STRIP: NEGATIVE
CLARITY UR: CLEAR
COLOR UR: YELLOW
GLUCOSE UR STRIP-MCNC: NEGATIVE MG/DL
HGB UR QL STRIP.AUTO: ABNORMAL
HYALINE CASTS UR QL AUTO: ABNORMAL /LPF
KETONES UR QL STRIP: NEGATIVE
LEUKOCYTE ESTERASE UR QL STRIP.AUTO: NEGATIVE
NITRITE UR QL STRIP: NEGATIVE
PH UR STRIP.AUTO: 6 [PH] (ref 5–8)
PROT UR QL STRIP: ABNORMAL
RBC # UR STRIP: ABNORMAL /HPF
REF LAB TEST METHOD: ABNORMAL
SP GR UR STRIP: 1.01 (ref 1–1.03)
SQUAMOUS #/AREA URNS HPF: ABNORMAL /HPF
UROBILINOGEN UR QL STRIP: ABNORMAL
WBC # UR STRIP: ABNORMAL /HPF

## 2022-11-17 PROCEDURE — 81001 URINALYSIS AUTO W/SCOPE: CPT | Performed by: FAMILY MEDICINE

## 2022-11-18 DIAGNOSIS — R31.9 HEMATURIA, UNSPECIFIED TYPE: Primary | ICD-10-CM

## 2022-11-28 ENCOUNTER — OFFICE VISIT (OUTPATIENT)
Dept: FAMILY MEDICINE CLINIC | Facility: CLINIC | Age: 82
End: 2022-11-28

## 2022-11-28 ENCOUNTER — LAB (OUTPATIENT)
Dept: FAMILY MEDICINE CLINIC | Facility: CLINIC | Age: 82
End: 2022-11-28

## 2022-11-28 VITALS
HEIGHT: 74 IN | OXYGEN SATURATION: 99 % | TEMPERATURE: 97.8 F | SYSTOLIC BLOOD PRESSURE: 120 MMHG | WEIGHT: 179 LBS | DIASTOLIC BLOOD PRESSURE: 62 MMHG | BODY MASS INDEX: 22.97 KG/M2 | HEART RATE: 70 BPM | RESPIRATION RATE: 16 BRPM

## 2022-11-28 DIAGNOSIS — M10.9 GOUT, UNSPECIFIED CAUSE, UNSPECIFIED CHRONICITY, UNSPECIFIED SITE: ICD-10-CM

## 2022-11-28 DIAGNOSIS — I42.0 DILATED CARDIOMYOPATHY: ICD-10-CM

## 2022-11-28 DIAGNOSIS — R31.9 HEMATURIA, UNSPECIFIED TYPE: ICD-10-CM

## 2022-11-28 DIAGNOSIS — I10 ESSENTIAL HYPERTENSION: ICD-10-CM

## 2022-11-28 DIAGNOSIS — C44.91 BASAL CELL CARCINOMA (BCC), UNSPECIFIED SITE: ICD-10-CM

## 2022-11-28 DIAGNOSIS — E55.9 VITAMIN D DEFICIENCY, UNSPECIFIED: ICD-10-CM

## 2022-11-28 DIAGNOSIS — Z00.00 MEDICARE ANNUAL WELLNESS VISIT, SUBSEQUENT: Primary | ICD-10-CM

## 2022-11-28 DIAGNOSIS — I48.21 PERMANENT ATRIAL FIBRILLATION: ICD-10-CM

## 2022-11-28 DIAGNOSIS — R41.3 MEMORY DIFFICULTY: ICD-10-CM

## 2022-11-28 DIAGNOSIS — Z95.810 PRESENCE OF BIVENTRICULAR IMPLANTABLE CARDIOVERTER-DEFIBRILLATOR (ICD): ICD-10-CM

## 2022-11-28 DIAGNOSIS — Z12.5 ENCOUNTER FOR SCREENING FOR MALIGNANT NEOPLASM OF PROSTATE: ICD-10-CM

## 2022-11-28 LAB
25(OH)D3 SERPL-MCNC: 71.4 NG/ML (ref 30–100)
ALBUMIN SERPL-MCNC: 3.9 G/DL (ref 3.5–5.2)
ALBUMIN/GLOB SERPL: 1.2 G/DL
ALP SERPL-CCNC: 90 U/L (ref 39–117)
ALT SERPL W P-5'-P-CCNC: 16 U/L (ref 1–41)
ANION GAP SERPL CALCULATED.3IONS-SCNC: 9.7 MMOL/L (ref 5–15)
AST SERPL-CCNC: 27 U/L (ref 1–40)
BASOPHILS # BLD AUTO: 0.07 10*3/MM3 (ref 0–0.2)
BASOPHILS NFR BLD AUTO: 0.9 % (ref 0–1.5)
BILIRUB SERPL-MCNC: 2 MG/DL (ref 0–1.2)
BUN SERPL-MCNC: 22 MG/DL (ref 8–23)
BUN/CREAT SERPL: 19.3 (ref 7–25)
CALCIUM SPEC-SCNC: 10 MG/DL (ref 8.6–10.5)
CHLORIDE SERPL-SCNC: 103 MMOL/L (ref 98–107)
CHOLEST SERPL-MCNC: 157 MG/DL (ref 0–200)
CO2 SERPL-SCNC: 29.3 MMOL/L (ref 22–29)
CREAT SERPL-MCNC: 1.14 MG/DL (ref 0.76–1.27)
DEPRECATED RDW RBC AUTO: 51.5 FL (ref 37–54)
EGFRCR SERPLBLD CKD-EPI 2021: 63.8 ML/MIN/1.73
EOSINOPHIL # BLD AUTO: 0.08 10*3/MM3 (ref 0–0.4)
EOSINOPHIL NFR BLD AUTO: 1.1 % (ref 0.3–6.2)
ERYTHROCYTE [DISTWIDTH] IN BLOOD BY AUTOMATED COUNT: 14.3 % (ref 12.3–15.4)
GLOBULIN UR ELPH-MCNC: 3.2 GM/DL
GLUCOSE SERPL-MCNC: 90 MG/DL (ref 65–99)
HBA1C MFR BLD: 5.2 % (ref 3.5–5.6)
HCT VFR BLD AUTO: 47.4 % (ref 37.5–51)
HDLC SERPL-MCNC: 68 MG/DL (ref 40–60)
HGB BLD-MCNC: 16.1 G/DL (ref 13–17.7)
IMM GRANULOCYTES # BLD AUTO: 0.04 10*3/MM3 (ref 0–0.05)
IMM GRANULOCYTES NFR BLD AUTO: 0.5 % (ref 0–0.5)
LDLC SERPL CALC-MCNC: 73 MG/DL (ref 0–100)
LDLC/HDLC SERPL: 1.06 {RATIO}
LYMPHOCYTES # BLD AUTO: 1.42 10*3/MM3 (ref 0.7–3.1)
LYMPHOCYTES NFR BLD AUTO: 19.1 % (ref 19.6–45.3)
MCH RBC QN AUTO: 33.5 PG (ref 26.6–33)
MCHC RBC AUTO-ENTMCNC: 34 G/DL (ref 31.5–35.7)
MCV RBC AUTO: 98.8 FL (ref 79–97)
MONOCYTES # BLD AUTO: 0.62 10*3/MM3 (ref 0.1–0.9)
MONOCYTES NFR BLD AUTO: 8.4 % (ref 5–12)
NEUTROPHILS NFR BLD AUTO: 5.19 10*3/MM3 (ref 1.7–7)
NEUTROPHILS NFR BLD AUTO: 70 % (ref 42.7–76)
NRBC BLD AUTO-RTO: 0 /100 WBC (ref 0–0.2)
PLATELET # BLD AUTO: 167 10*3/MM3 (ref 140–450)
PMV BLD AUTO: 10.9 FL (ref 6–12)
POTASSIUM SERPL-SCNC: 4.2 MMOL/L (ref 3.5–5.2)
PROT SERPL-MCNC: 7.1 G/DL (ref 6–8.5)
PSA SERPL-MCNC: 0.73 NG/ML (ref 0–4)
RBC # BLD AUTO: 4.8 10*6/MM3 (ref 4.14–5.8)
SODIUM SERPL-SCNC: 142 MMOL/L (ref 136–145)
T4 FREE SERPL-MCNC: 1.2 NG/DL (ref 0.93–1.7)
TRIGL SERPL-MCNC: 84 MG/DL (ref 0–150)
TSH SERPL DL<=0.05 MIU/L-ACNC: 2.38 UIU/ML (ref 0.27–4.2)
URATE SERPL-MCNC: 4.7 MG/DL (ref 3.4–7)
VIT B12 BLD-MCNC: 1768 PG/ML (ref 211–946)
VLDLC SERPL-MCNC: 16 MG/DL (ref 5–40)
WBC NRBC COR # BLD: 7.42 10*3/MM3 (ref 3.4–10.8)

## 2022-11-28 PROCEDURE — G0103 PSA SCREENING: HCPCS | Performed by: FAMILY MEDICINE

## 2022-11-28 PROCEDURE — 82607 VITAMIN B-12: CPT | Performed by: FAMILY MEDICINE

## 2022-11-28 PROCEDURE — 80061 LIPID PANEL: CPT | Performed by: FAMILY MEDICINE

## 2022-11-28 PROCEDURE — 84443 ASSAY THYROID STIM HORMONE: CPT | Performed by: FAMILY MEDICINE

## 2022-11-28 PROCEDURE — 83036 HEMOGLOBIN GLYCOSYLATED A1C: CPT | Performed by: FAMILY MEDICINE

## 2022-11-28 PROCEDURE — 1160F RVW MEDS BY RX/DR IN RCRD: CPT | Performed by: FAMILY MEDICINE

## 2022-11-28 PROCEDURE — 85025 COMPLETE CBC W/AUTO DIFF WBC: CPT | Performed by: FAMILY MEDICINE

## 2022-11-28 PROCEDURE — 80053 COMPREHEN METABOLIC PANEL: CPT | Performed by: FAMILY MEDICINE

## 2022-11-28 PROCEDURE — 84439 ASSAY OF FREE THYROXINE: CPT | Performed by: FAMILY MEDICINE

## 2022-11-28 PROCEDURE — G0439 PPPS, SUBSEQ VISIT: HCPCS | Performed by: FAMILY MEDICINE

## 2022-11-28 PROCEDURE — 82306 VITAMIN D 25 HYDROXY: CPT | Performed by: FAMILY MEDICINE

## 2022-11-28 PROCEDURE — 36415 COLL VENOUS BLD VENIPUNCTURE: CPT | Performed by: FAMILY MEDICINE

## 2022-11-28 PROCEDURE — 84550 ASSAY OF BLOOD/URIC ACID: CPT | Performed by: FAMILY MEDICINE

## 2022-11-28 PROCEDURE — 1170F FXNL STATUS ASSESSED: CPT | Performed by: FAMILY MEDICINE

## 2022-11-28 PROCEDURE — 99214 OFFICE O/P EST MOD 30 MIN: CPT | Performed by: FAMILY MEDICINE

## 2022-11-28 RX ORDER — PHENOL 1.4 %
600 AEROSOL, SPRAY (ML) MUCOUS MEMBRANE DAILY
COMMUNITY

## 2022-11-28 NOTE — PROGRESS NOTES
The ABCs of the Annual Wellness Visit  Subsequent Medicare Wellness Visit    Chief Complaint   Patient presents with   • Medicare Wellness-subsequent   • Memory Loss      Subjective    History of Present Illness:  Hank Ponce is a 82 y.o. male who presents for a Subsequent Medicare Wellness Visit.    CHF: dilated cardiomyopathy. s/p defibrillator. 1/2022 echo w/ EF 31-35% w/ severe mitral and tricuspid regurg noted as well as moderately dilated LV and pulm HTN (RVSP 45-55). Maintained on lasix 40 daily. No SOB. Leg swelling stable.      Afib: Maintained on Xarelto 15 daily. [id number] score 4 (CHF, HTN, age x2). Unable to tolerate beta-blockers, amiodarone, and Tikosyn in the past. Denies CP or palpitations. Follows w/ CRISTHIAN Figueroa     HTN: 120/62 today. Maintained on losartan 25 BID and lasix 40 daily. Recently increased by CRISTHIAN Figueroa. No LH/dizziness, CP or SOB.      Gout: maintained on allopurinol 300 daily. No gout flare in years. Well controlled.    Hematuria: persistent following round of abx. Maintained on Xarelto. Has been referred to urology but does not have appt yet. No recent gross hematuria or dysuria.    BCC: follows w/ Nitesh. Has follow-up with dermatology this week (12/1)    Patient continues to have mild memory concerns.     The following portions of the patient's history were reviewed and   updated as appropriate: allergies, current medications, past family history, past medical history, past social history, past surgical history and problem list.    Compared to one year ago, the patient feels his physical   health is the same.    Compared to one year ago, the patient feels his mental   health is worse.    Recent Hospitalizations:  He was not admitted to the hospital during the last year.     Current Medical Providers:  Patient Care Team:  Bunny Curran MD as PCP - General (Internal Medicine)  Chema Curran MD as Consulting Physician (Cardiology)    Outpatient  Medications Prior to Visit   Medication Sig Dispense Refill   • allopurinol (ZYLOPRIM) 300 MG tablet TAKE ONE TABLET BY MOUTH DAILY 90 tablet 1   • calcium carbonate (OS-BRENDA) 600 MG tablet Take 600 mg by mouth Daily.     • coenzyme Q10 100 MG capsule Take 200 mg by mouth Daily.     • furosemide (LASIX) 20 MG tablet Take 1 tablet by mouth 2 (Two) Times a Day. (Patient taking differently: Take 40 mg by mouth Daily.) 60 tablet 5   • losartan (COZAAR) 25 MG tablet TAKE ONE TABLET BY MOUTH TWICE A  tablet 1   • vitamin B-12 (CYANOCOBALAMIN) 1000 MCG tablet Take 1,000 mcg by mouth Daily.     • vitamin D3 125 MCG (5000 UT) capsule capsule Take  by mouth Daily.     • Xarelto 15 MG tablet TAKE ONE TABLET BY MOUTH DAILY 90 tablet 2     No facility-administered medications prior to visit.     No opioid medication identified on active medication list. I have reviewed chart for other potential  high risk medication/s and harmful drug interactions in the elderly.        Aspirin is not on active medication list.  Aspirin use is not indicated based on review of current medical condition/s. Risk of harm outweighs potential benefits.  .    Patient Active Problem List   Diagnosis   • Presence of biventricular implantable cardioverter-defibrillator (ICD)   • Permanent atrial fibrillation (CMS/HCC)   • Hyperlipidemia, mixed   • Mitral regurgitation   • Tricuspid regurgitation   • Essential hypertension   • Takotsubo cardiomyopathy   • Dilated cardiomyopathy (HCC)   • Acute on chronic systolic congestive heart failure (HCC)   • Atrial flutter with rapid ventricular response (HCC)   • Low serum vitamin B12   • Heart failure with reduced ejection fraction (HCC)   • Stage 3 chronic kidney disease (HCC)   • Nonsustained ventricular tachycardia   • Gout     Advance Care Planning  Advance Directive is on file.  ACP discussion was held with the patient during this visit. Patient has an advance directive in EMR which is still valid.  "  Review of Systems   Constitutional: Negative for chills and fever.   HENT: Negative for congestion and rhinorrhea.    Eyes: Negative for visual disturbance.   Respiratory: Negative for cough and shortness of breath.    Cardiovascular: Positive for leg swelling. Negative for chest pain and palpitations.   Gastrointestinal: Negative for abdominal pain and vomiting.   Genitourinary: Positive for hematuria. Negative for difficulty urinating and dysuria.   Musculoskeletal: Negative for arthralgias and back pain.   Skin: Negative for rash.   Neurological: Negative for dizziness, light-headedness and numbness.        + Memory problem   Psychiatric/Behavioral: Negative for dysphoric mood and sleep disturbance. The patient is not nervous/anxious.         Objective    Vitals:    11/28/22 1037   BP: 120/62   Pulse: 70   Resp: 16   Temp: 97.8 °F (36.6 °C)   SpO2: 99%   Weight: 81.2 kg (179 lb)   Height: 188 cm (74\")     Estimated body mass index is 22.98 kg/m² as calculated from the following:    Height as of this encounter: 188 cm (74\").    Weight as of this encounter: 81.2 kg (179 lb).    BMI is within normal parameters. No other follow-up for BMI required.    Does the patient have evidence of cognitive impairment? Yes    Physical Exam  Constitutional:       General: He is not in acute distress.     Appearance: He is well-developed.   HENT:      Head: Normocephalic and atraumatic.      Right Ear: Tympanic membrane and external ear normal. There is no impacted cerumen.      Left Ear: Tympanic membrane and external ear normal. There is no impacted cerumen.      Nose: Nose normal.      Mouth/Throat:      Mouth: Mucous membranes are moist.      Pharynx: No oropharyngeal exudate or posterior oropharyngeal erythema.   Eyes:      General: No scleral icterus.        Right eye: No discharge.         Left eye: No discharge.      Extraocular Movements: Extraocular movements intact.      Pupils: Pupils are equal, round, and reactive to " light.      Comments: Subconjunctival hemorrhage to the right eye   Neck:      Thyroid: No thyromegaly.      Vascular: No carotid bruit.   Cardiovascular:      Rate and Rhythm: Normal rate. Rhythm irregular.      Heart sounds: Murmur (2/6 BERTIN at [facility]) heard.      Comments: Distant heart sounds  Pulmonary:      Effort: Pulmonary effort is normal. No respiratory distress.      Breath sounds: Normal breath sounds. No wheezing or rales.   Abdominal:      General: Bowel sounds are normal. There is no distension.      Palpations: Abdomen is soft.      Tenderness: There is no abdominal tenderness.   Musculoskeletal:         General: No deformity. Normal range of motion.      Cervical back: Normal range of motion and neck supple.   Lymphadenopathy:      Cervical: No cervical adenopathy.   Skin:     General: Skin is warm and dry.      Findings: No rash.   Neurological:      General: No focal deficit present.      Mental Status: He is alert and oriented to person, place, and time. Mental status is at baseline.      Cranial Nerves: No cranial nerve deficit.      Sensory: No sensory deficit.   Psychiatric:         Behavior: Behavior normal.         Thought Content: Thought content normal.       Lab Results   Component Value Date    TRIG 84 2022    HDL 68 (H) 2022    LDL 73 2022    VLDL 16 2022    HGBA1C 5.2 2022        HEALTH RISK ASSESSMENT    Smoking Status:  Social History     Tobacco Use   Smoking Status Former   • Types: Cigarettes   • Quit date:    • Years since quittin.9   Smokeless Tobacco Never     Alcohol Consumption:  Social History     Substance and Sexual Activity   Alcohol Use Yes    Comment: occasionally     Fall Risk Screen:  JESUS Fall Risk Assessment was completed, and patient is at LOW risk for falls.Assessment completed on:2022    Depression Screening:  PHQ-2/PHQ-9 Depression Screening 2022   Retired PHQ-9 Total Score -   Retired Total Score -   Elizabeth  Interest or Pleasure in Doing Things 1-->several days   Feeling Down, Depressed or Hopeless 1-->several days   Trouble Falling or Staying Asleep, or Sleeping Too Much 0-->not at all   Feeling Tired or Having Little Energy 1-->several days   Poor Appetite or Overeating 1-->several days   Feeling Bad about Yourself - or that You are a Failure or Have Let Yourself or Your Family Down 0-->not at all   Trouble Concentrating on Things, Such as Reading the Newspaper or Watching Television 0-->not at all   Moving or Speaking So Slowly that Other People Could Have Noticed? Or the Opposite - Being So Fidgety 0-->not at all   Thoughts that You Would be Better Off Dead or of Hurting Yourself in Some Way 0-->not at all   PHQ-9: Brief Depression Severity Measure Score 4   Attributes a fair amount of this to being lonely.     Health Habits and Functional and Cognitive Screening:  Functional & Cognitive Status 11/28/2022   Do you have difficulty preparing food and eating? No   Do you have difficulty bathing yourself, getting dressed or grooming yourself? No   Do you have difficulty using the toilet? No   Do you have difficulty moving around from place to place? No   Do you have trouble with steps or getting out of a bed or a chair? No   Current Diet Well Balanced Diet   Dental Exam Up to date   Eye Exam Not up to date   Exercise (times per week) 0 times per week   Current Exercises Include No Regular Exercise   Current Exercise Activities Include -   Do you need help using the phone?  No   Are you deaf or do you have serious difficulty hearing?  No   Do you need help with transportation? No   Do you need help shopping? No   Do you need help preparing meals?  No   Do you need help with housework?  No   Do you need help with laundry? No   Do you need help taking your medications? No   Do you need help managing money? No   Do you ever drive or ride in a car without wearing a seat belt? No   Have you felt unusual stress, anger or  loneliness in the last month? No   Who do you live with? Alone   If you need help, do you have trouble finding someone available to you? No   Have you been bothered in the last four weeks by sexual problems? -   Do you have difficulty concentrating, remembering or making decisions? Yes   Son has recently started helping w/ finances    Age-appropriate Screening Schedule:  Refer to the list below for future screening recommendations based on patient's age, sex and/or medical conditions. Orders for these recommended tests are listed in the plan section. The patient has been provided with a written plan.    Health Maintenance   Topic Date Due   • TDAP/TD VACCINES (1 - Tdap) Never done   • ZOSTER VACCINE (1 of 2) Never done   • LIPID PANEL  11/28/2023   • INFLUENZA VACCINE  Completed        Assessment & Plan   CMS Preventative Services Quick Reference  Risk Factors Identified During Encounter  Cardiovascular Disease  Immunizations Discussed/Encouraged (specific Immunizations; Tdap and Shingrix  The above risks/problems have been discussed with the patient.  Follow up actions/plans if indicated are seen below in the Assessment/Plan Section.  Pertinent information has been shared with the patient in the After Visit Summary.    Diagnoses and all orders for this visit:    1. Medicare annual wellness visit, subsequent (Primary)  -     CBC & Differential  -     Comprehensive Metabolic Panel  -     Hemoglobin A1c  -     Lipid Panel  -     TSH  -     T4, Free  -     Vitamin D,25-Hydroxy  -     Vitamin B12  -     PSA Screen  -  Counseled regarding diet, exercise, weight loss, and preventative health maintenance items/immunizations below    2. Dilated cardiomyopathy (HCC)  3. Presence of biventricular implantable cardioverter-defibrillator (ICD): s/p defibrillator. 1/2022 echo w/ EF 31-35% w/ severe mitral and tricuspid regurg noted as well as moderately dilated LV and pulm HTN (RVSP 45-55).   -Continue home Lasix 40 mg  daily   -Continue cardiology [id number]    4. Permanent atrial fibrillation (CMS/HCC): [id number] score 4   -Continue home Xarelto 15 mg daily    5. Essential hypertension: 120/62 today  -     Comprehensive Metabolic Panel  - Continue home losartan 25 mg daily    6. Gout, unspecified cause, unspecified chronicity, unspecified site  -     Uric Acid  - Continue home allopurinol 300 mg daily    7. Hematuria, unspecified type: Maintained on Xarelto.  No recent gross hematuria but microscopic hematuria persistent   -Recommend urology follow-up    8. Basal cell carcinoma (BCC), unspecified site   -Continue dermatology follow-up    9. Memory difficulty: Recently evaluated for memory difficulty with MMSE score 28/30.  Family continues to report memory concerns   -Consider addition of donepezil in the future    10. Vitamin D deficiency, unspecified  -     Vitamin D,25-Hydroxy    11. Encounter for screening for malignant neoplasm of prostate  -     PSA Screen    Preventative  Colonoscopy: 6/2022, no recall  PSA: 0.826 (11/2021), ordered today  DEXA: 12/2021- normal  Shingles: Recommended  Pneumonia: Prevnar 10/2019, Pneumovax 10/2020  Tdap: Recommended, deferred  Influenza: 10/2022  COVID: Completed 4 shots Moderna (4/2022)    Follow Up:   Return in about 1 year (around 11/28/2023) for Medicare Wellness.     An After Visit Summary and PPPS were made available to the patient.        I spent 52 minutes caring for Hank on this date of service. This time includes time spent by me in the following activities:reviewing tests, obtaining and/or reviewing a separately obtained history, performing a medically appropriate examination and/or evaluation , counseling and educating the patient/family/caregiver, ordering medications, tests, or procedures and documenting information in the medical record

## 2022-12-15 RX ORDER — LOSARTAN POTASSIUM 25 MG/1
TABLET ORAL
Qty: 180 TABLET | Refills: 1 | Status: SHIPPED | OUTPATIENT
Start: 2022-12-15

## 2023-01-13 PROCEDURE — 93296 REM INTERROG EVL PM/IDS: CPT | Performed by: NURSE PRACTITIONER

## 2023-01-13 PROCEDURE — 93295 DEV INTERROG REMOTE 1/2/MLT: CPT | Performed by: NURSE PRACTITIONER

## 2023-01-22 ENCOUNTER — APPOINTMENT (OUTPATIENT)
Dept: GENERAL RADIOLOGY | Facility: HOSPITAL | Age: 83
End: 2023-01-22
Payer: MEDICARE

## 2023-01-22 ENCOUNTER — HOSPITAL ENCOUNTER (EMERGENCY)
Facility: HOSPITAL | Age: 83
Discharge: HOME | End: 2023-01-22
Attending: EMERGENCY MEDICINE | Admitting: EMERGENCY MEDICINE
Payer: MEDICARE

## 2023-01-22 VITALS
HEART RATE: 72 BPM | BODY MASS INDEX: 23.2 KG/M2 | HEIGHT: 74 IN | TEMPERATURE: 98 F | SYSTOLIC BLOOD PRESSURE: 127 MMHG | WEIGHT: 180.78 LBS | DIASTOLIC BLOOD PRESSURE: 77 MMHG | RESPIRATION RATE: 15 BRPM | OXYGEN SATURATION: 99 %

## 2023-01-22 DIAGNOSIS — M25.552 LEFT HIP PAIN: Primary | ICD-10-CM

## 2023-01-22 LAB
BACTERIA UR QL AUTO: ABNORMAL /HPF
BILIRUB UR QL STRIP: NEGATIVE
CLARITY UR: CLEAR
COLOR UR: YELLOW
GLUCOSE UR STRIP-MCNC: NEGATIVE MG/DL
HGB UR QL STRIP.AUTO: NEGATIVE
HYALINE CASTS UR QL AUTO: ABNORMAL /LPF
KETONES UR QL STRIP: NEGATIVE
LEUKOCYTE ESTERASE UR QL STRIP.AUTO: NEGATIVE
NITRITE UR QL STRIP: NEGATIVE
PH UR STRIP.AUTO: 6 [PH] (ref 5–8)
PROT UR QL STRIP: ABNORMAL
RBC # UR STRIP: ABNORMAL /HPF
REF LAB TEST METHOD: ABNORMAL
SP GR UR STRIP: 1.02 (ref 1–1.03)
SQUAMOUS #/AREA URNS HPF: ABNORMAL /HPF
UROBILINOGEN UR QL STRIP: ABNORMAL
WBC # UR STRIP: ABNORMAL /HPF

## 2023-01-22 PROCEDURE — 72110 X-RAY EXAM L-2 SPINE 4/>VWS: CPT

## 2023-01-22 PROCEDURE — 99283 EMERGENCY DEPT VISIT LOW MDM: CPT

## 2023-01-22 PROCEDURE — 81001 URINALYSIS AUTO W/SCOPE: CPT | Performed by: PHYSICIAN ASSISTANT

## 2023-01-22 PROCEDURE — 73502 X-RAY EXAM HIP UNI 2-3 VIEWS: CPT

## 2023-01-22 RX ORDER — TIZANIDINE HYDROCHLORIDE 4 MG/1
4 CAPSULE, GELATIN COATED ORAL 3 TIMES DAILY
Qty: 10 CAPSULE | Refills: 0 | Status: SHIPPED | OUTPATIENT
Start: 2023-01-22 | End: 2023-03-08

## 2023-01-22 RX ORDER — TRAMADOL HYDROCHLORIDE 50 MG/1
50 TABLET ORAL ONCE
Status: COMPLETED | OUTPATIENT
Start: 2023-01-22 | End: 2023-01-22

## 2023-01-22 RX ORDER — LIDOCAINE 50 MG/G
1 PATCH TOPICAL EVERY 24 HOURS
Qty: 6 PATCH | Refills: 0 | Status: SHIPPED | OUTPATIENT
Start: 2023-01-22

## 2023-01-22 RX ORDER — ACETAMINOPHEN AND CODEINE PHOSPHATE 300; 30 MG/1; MG/1
1 TABLET ORAL EVERY 6 HOURS PRN
Qty: 10 TABLET | Refills: 0 | Status: SHIPPED | OUTPATIENT
Start: 2023-01-22

## 2023-01-22 RX ADMIN — TRAMADOL HYDROCHLORIDE 50 MG: 50 TABLET, COATED ORAL at 17:42

## 2023-01-22 NOTE — ED NOTES
Pt points to lower right back instead of hip for pain. States he has had to crawl around. Hx of kidney stones.

## 2023-01-22 NOTE — ED PROVIDER NOTES
Subjective   History of Present Illness  Chief Complaint:    Patient is a 82-year-old  male with a past medical history significant for osteoarthritis, mitral regurg, hypertension, CHF, arthritis who presents today with a chief complaint of left hip pain for 2 days.  Patient states that the hip pain began in the morning 2 days ago when he swung his legs to get out of bed.  Patient describes the pain as a constant throbbing and rates the pain as 6 out of 10 at its worst.  He currently rates the pain a 2/10.  Patient also reports occasional lower back pain mostly on the left side radiating into his hip.  Patient states most of his pain is on the lateral aspect of the left hip.  Patient states the pain is improved when he is resting in bed, seems to be worse with some positional changes.  He denies any chest pain, shortness of breath, abdominal pain, nausea vomiting or diarrhea.  He denies any dysuria hematuria urgency or frequency.  No saddle anesthesia or bladder or bowel dysfunction.  No lower extremity swelling.  No rash.  No recent fall or trauma.  No headache or fever.  No history of PE or DVT.    Location: Left hip    Quality: Throbbing    Duration: 2 days    Timing: Constant    Severity: Mild to moderate    Associated Symptoms: None    PCP: Magdy    History provided by:  Patient      Review of Systems   Constitutional: Negative for chills and fever.   HENT: Negative for sore throat and trouble swallowing.    Eyes: Negative.    Respiratory: Negative for shortness of breath and wheezing.    Cardiovascular: Negative for chest pain.   Gastrointestinal: Negative for abdominal pain, diarrhea, nausea and vomiting.   Endocrine: Negative.    Genitourinary: Negative for dysuria, flank pain, hematuria, penile discharge, penile pain and testicular pain.   Musculoskeletal: Positive for arthralgias and back pain. Negative for myalgias.        Left hip pain   Skin: Negative for rash.   Allergic/Immunologic:  Negative.    Neurological: Negative for light-headedness and headaches.   Psychiatric/Behavioral: Negative for behavioral problems.   All other systems reviewed and are negative.      Past Medical History:   Diagnosis Date   • Acid reflux disease    • Allergic rhinitis    • Arthritis     gouty   • Atrial fibrillation (HCC)    • BBB (bundle branch block)     right   • Bone spur 2017    neck   • Congestive heart failure (CHF) (HCC)    • Congestive heart failure (HCC) 10/08/2019   • Coronary artery disease     non obstructive   • Hypertension    • Mitral regurgitation    • Osteoarthritis    • Presence of biventricular implantable cardioverter-defibrillator (ICD) 07/29/2019   • S/P ablation of atrial fibrillation 01/2017   • Takotsubo cardiomyopathy    • Tricuspid regurgitation    • VT (ventricular tachycardia) 02/2018       Allergies   Allergen Reactions   • Hydrocodone Urinary Retention       Past Surgical History:   Procedure Laterality Date   • AMPUTATION      index and middle finger   • CARDIAC ABLATION  08/22/2017    BHF   • CARDIAC CATHETERIZATION  12/08/2016    non obst CAD; takotsubo CM   • CARDIAC CATHETERIZATION  07/24/2017    treating medically   • CARDIAC CATHETERIZATION N/A 1/19/2022    Procedure: Left Heart Cath;  Surgeon: Chidi Figueroa MD;  Location: [facility] CATH INVASIVE LOCATION;  Service: Cardiology;  Laterality: N/A;   • CARDIAC DEFIBRILLATOR PLACEMENT  2017    Bs   • CARDIAC ELECTROPHYSIOLOGY PROCEDURE N/A 4/14/2021    Procedure: AV node ablation;  Surgeon: Hira Ochoa MD;  Location: [facility] CATH INVASIVE LOCATION;  Service: Cardiovascular;  Laterality: N/A;   • COLONOSCOPY N/A 6/29/2022    Procedure: COLONOSCOPY with polypectomy x5;  Surgeon: KAYLIN Ruiz MD;  Location: [facility] ENDOSCOPY;  Service: Gastroenterology;  Laterality: N/A;  post: diverticulosis, hemorrhoids, polyps   • FRACTURE SURGERY  04/18/2021    jaw    • INTERNAL CARDIAC DEFIBRILLATOR INSERTION  2017    BiV ICD  BS   • OTHER SURGICAL HISTORY  2017    Heart ablation-BHF   • ROTATOR CUFF REPAIR Right    • SINUS SURGERY  2014       Family History   Problem Relation Age of Onset   • Parkinsonism Mother    • Alcohol abuse Father        Social History     Socioeconomic History   • Marital status:    Tobacco Use   • Smoking status: Former     Types: Cigarettes     Quit date:      Years since quittin.0   • Smokeless tobacco: Never   Vaping Use   • Vaping Use: Never used   Substance and Sexual Activity   • Alcohol use: Yes     Comment: occasionally   • Drug use: Never   • Sexual activity: Defer           Objective   Physical Exam  Vitals and nursing note reviewed.   Constitutional:       Appearance: Normal appearance.   HENT:      Head: Normocephalic and atraumatic.   Eyes:      Extraocular Movements: Extraocular movements intact.      Pupils: Pupils are equal, round, and reactive to light.   Cardiovascular:      Rate and Rhythm: Normal rate and regular rhythm.      Pulses: Normal pulses.      Heart sounds: Normal heart sounds. No murmur heard.  Pulmonary:      Effort: Pulmonary effort is normal.      Breath sounds: Normal breath sounds.   Abdominal:      General: Abdomen is flat.      Palpations: Abdomen is soft.      Tenderness: There is no abdominal tenderness. There is no right CVA tenderness or left CVA tenderness.   Musculoskeletal:         General: Tenderness present. No swelling or deformity. Normal range of motion.      Cervical back: Normal range of motion and neck supple.      Comments: Lumbar spine: No step-offs or deformities, no midline tenderness to palpation.  Bilateral lower extremities: Positive left straight leg raise.  5 out of 5 strength.  Sensation intact to light touch.  2+ reflexes.  No clonus.  Negative Babinski sign.    Tenderness to palpation of the lateral aspect of the left hip and into the left lower back.  No erythema or rash.    Skin is not erythematous or warm.   Skin:      "General: Skin is warm.      Capillary Refill: Capillary refill takes less than 2 seconds.      Findings: No bruising or erythema.   Neurological:      General: No focal deficit present.      Mental Status: He is alert and oriented to person, place, and time.      Cranial Nerves: No cranial nerve deficit.      Motor: No weakness.   Psychiatric:         Mood and Affect: Mood normal.         Behavior: Behavior normal.         Procedures           ED Course  ED Course as of 01/22/23 2056   Sun Jan 22, 2023 1815 Waiting for hip x-ray to be read, spoke with ER x-ray tech, stating that he is looking into it. []   1857 Waiting for hip x-ray to be read. []   1912 Spoke with radiology tech, Sylvester, states that he has been in contact with the PACS manager regarding this issue. []   1950 On reevaluation patient reports his pain is feeling better.  Patient now tells me that he has a history of kidney stones, denies any dysuria or hematuria at this time.  Patient initially pointed to his left lateral hip as a source of his pain.  He denies abdominal pain.  Will check urine prior to discharge. []      ED Course User Index  [MC] Salina Bartholomew PA    /78   Pulse 70   Temp 97.2 °F (36.2 °C) (Temporal)   Resp 18   Ht 188 cm (74\")   Wt 82 kg (180 lb 12.4 oz)   SpO2 96%   BMI 23.21 kg/m²   Labs Reviewed   URINALYSIS W/ CULTURE IF INDICATED - Abnormal; Notable for the following components:       Result Value    Protein, UA 30 mg/dL (1+) (*)     All other components within normal limits    Narrative:     In absence of clinical symptoms, the presence of pyuria, bacteria, and/or nitrites on the urinalysis result does not correlate with infection.   URINALYSIS, MICROSCOPIC ONLY - Abnormal; Notable for the following components:    RBC, UA 0-2 (*)     WBC, UA 0-2 (*)     All other components within normal limits     Medications   traMADol (ULTRAM) tablet 50 mg (50 mg Oral Given 1/22/23 1742)     XR Spine Lumbar Complete " 4+VW    Result Date: 1/22/2023  Impression: No acute findings lumbar spine by radiograph. Osteopenia with moderate dextro, is curvature lumbar spine, severe degenerative disc disease and moderate to severe facet degenerative change of the lower lumbar spine. Electronically Signed: Latanya Charlton  1/22/2023 5:29 PM EST  Workstation ID: [id number]    XR Hip With or Without Pelvis 2 - 3 View Left    Result Date: 1/22/2023  Impression: Normal radiographic appearance of the left hip. Electronically Signed: Latanya Stollkylie  1/22/2023 7:29 PM EST  Workstation ID: [id number]                                           Medical Decision Making  MEDICAL DECISION  Epic Chart Review:  No recent admissions  Patient had colonoscopy 6/29/2022 with polypectomy  Comorbidities: Reflux, A. fib, right bundle branch block, CHF, hypertension, arthritis  Differentials: Arthritis, bursitis, avascular necrosis, sciatica, degenerative disc disease, kidney stone, UTI; this list is not all inclusive and does not constitute the entirety of considered causes  Radiology interpretation:  Images reviewed by me and interpreted by radiologist, as above  Lab interpretation:  Labs viewed by me significant for, as above    While in the ED labs were obtained appropriate PPE was worn during exam and throughout all encounters with the patient.  Patient had the above evaluation.  He initially declined pain medication, then was given tramadol for his pain.  He reports improvement.  X-ray lower back and left hip shows no acute osseous abnormalities.  Urinalysis negative for UTI, 0-2 RBCs, patient reports this is normal for him.  There is n excessive blood in the urine.  There is no CVA tenderness on exam, denies any abdominal pain, flank pain dysuria or hematuria to suggest kidney stone at this time.  Suspect bursitis versus strain versus exacerbation of patient's arthritis.  Inspect reviewed.  Patient discharged with prescription for Tylenol 3 and tizanidine and he was  encouraged follow-up with primary care and Ortho for recheck and further evaluation if symptoms persist.    Discharge plan and instructions were discussed with the patient who verbalized understanding and is in agreement with the plan, all questions were answered at this time.  Patient is aware of signs symptoms that would require immediate return to the emergency room.  Patient understands importance of following up with primary care provider for further evaluation and worsening concerns as well as blood pressure recheck in the next 4 weeks.    Patient was discharged in improved stable condition with an upright steady gait.    Patient is aware that discharge does not mean that nothing is wrong but indicates no emergencies present and they must continue care with follow-up as given below or physician of her choice.      Left hip pain: acute illness or injury  Amount and/or Complexity of Data Reviewed  Labs: ordered. Decision-making details documented in ED Course.  Radiology: ordered. Decision-making details documented in ED Course.      Risk  Prescription drug management.          Final diagnoses:   Left hip pain       ED Disposition  ED Disposition     ED Disposition   Discharge    Condition   Stable    Comment   --             Bunny Curran MD  [address]  [address]  [place] [facility] [place] [address] [phone]    Schedule an appointment as soon as possible for a visit in 2 days  As needed, If symptoms worsen    Thomas Garcia II, MD  [address]  [address]  [place] [id number] [phone]    Schedule an appointment as soon as possible for a visit in 2 days  As needed, If symptoms worsen         Medication List      New Prescriptions    acetaminophen-codeine 300-30 MG per tablet  Commonly known as: TYLENOL #3  Take 1 tablet by mouth Every 6 (Six) Hours As Needed for Moderate Pain.     lidocaine 5 %  Commonly known as: LIDODERM  Place 1 patch on the skin as directed by provider Daily.  Remove & Discard patch within 12 hours or as directed by MD     TiZANidine 4 MG capsule  Commonly known as: ZANAFLEX  Take 1 capsule by mouth 3 (Three) Times a Day.        Changed    furosemide 20 MG tablet  Commonly known as: LASIX  Take 1 tablet by mouth 2 (Two) Times a Day.  What changed:   · how much to take  · when to take this           Where to Get Your Medications      These medications were sent to [facility]/pharmacy #[id number] - [facility] [place] - [address] [place] [address] [phone] [place] [phone] [address] [place] [address]    Hours: 24-hours Phone: [phone]   · acetaminophen-codeine 300-30 MG per tablet  · lidocaine 5 %  · TiZANidine 4 MG capsule          Salina Bartholomew PA  01/22/23 2056

## 2023-01-23 ENCOUNTER — PATIENT OUTREACH (OUTPATIENT)
Dept: CASE MANAGEMENT | Facility: OTHER | Age: 83
End: 2023-01-23
Payer: MEDICARE

## 2023-01-23 NOTE — DISCHARGE INSTRUCTIONS
Take Tylenol with codeine as needed for pain.  Do not mix with regular Tylenol.  Take tizanidine as needed for muscle stiffness or spasms.  Use lidocaine patch for additional pain relief.    May apply heating pad to the hip for additional pain relief  Follow-up with primary care and orthopedic specialist for recheck return to the ER for new or worsening symptoms

## 2023-01-23 NOTE — OUTREACH NOTE
AMBULATORY CASE MANAGEMENT NOTE    Name and Relationship of Patient/Support Person: Hank Ponce J - Self    Patient Outreach    Pt discharged from [facility] ED on 1/22/23, seen for left hip pain. RN-ACM outreach call made to pt. Explained role of RN-ACM and reason for call. Pt reports minimal improvement in hip pain. Reviewed ED AVS with pt. Education provided. He reports to be taking pain medication and muscle relaxer rx's as directed, states he did not get the lidocaine patches. Discussed follow up. Pt states he has ortho follow up appt scheduled for 1/25/23. Reviewed SDOH. He denies any needs, reports to have good family support. No questions per pt. Advised him to call with any needs. Follow up outreach prn.     Adult Patient Profile  Questions/Answers    Flowsheet Row Most Recent Value   Primary Source of Support/Comfort child(lonny)   People in Home alone   Current Living Arrangements home        Send Education  Questions/Answers    Flowsheet Row Most Recent Value   Annual Wellness Visit:  Patient Has Completed   Other Patient Education/Resources  24/7 [facility] Nurse Call Line   24/7 Nurse Call Line Education Method Verbal   Advanced Directives: Patient Has        SDOH updated and reviewed with the patient during this program:  Financial Resource Strain: Low Risk    • Difficulty of Paying Living Expenses: Not very hard      Food Insecurity: No Food Insecurity   • Worried About Running Out of Food in the Last Year: Never true   • Ran Out of Food in the Last Year: Never true      Transportation Needs: No Transportation Needs   • Lack of Transportation (Medical): No   • Lack of Transportation (Non-Medical): No       STEFANI SY  Ambulatory Case Management    1/23/2023, 15:07 EST

## 2023-02-03 ENCOUNTER — OFFICE VISIT (OUTPATIENT)
Dept: CARDIOLOGY | Facility: CLINIC | Age: 83
End: 2023-02-03
Payer: MEDICARE

## 2023-02-03 VITALS
DIASTOLIC BLOOD PRESSURE: 81 MMHG | WEIGHT: 186 LBS | HEART RATE: 76 BPM | BODY MASS INDEX: 23.87 KG/M2 | OXYGEN SATURATION: 100 % | HEIGHT: 74 IN | SYSTOLIC BLOOD PRESSURE: 133 MMHG

## 2023-02-03 DIAGNOSIS — I50.20 HEART FAILURE WITH REDUCED EJECTION FRACTION: ICD-10-CM

## 2023-02-03 DIAGNOSIS — I50.22 CHRONIC SYSTOLIC CONGESTIVE HEART FAILURE: ICD-10-CM

## 2023-02-03 DIAGNOSIS — Z95.810 PRESENCE OF BIVENTRICULAR IMPLANTABLE CARDIOVERTER-DEFIBRILLATOR (ICD): ICD-10-CM

## 2023-02-03 DIAGNOSIS — I48.21 PERMANENT ATRIAL FIBRILLATION: ICD-10-CM

## 2023-02-03 DIAGNOSIS — I47.20 VENTRICULAR TACHYCARDIA: ICD-10-CM

## 2023-02-03 DIAGNOSIS — I42.0 DILATED CARDIOMYOPATHY: Primary | ICD-10-CM

## 2023-02-03 PROCEDURE — 93000 ELECTROCARDIOGRAM COMPLETE: CPT | Performed by: INTERNAL MEDICINE

## 2023-02-03 PROCEDURE — 93284 PRGRMG EVAL IMPLANTABLE DFB: CPT | Performed by: INTERNAL MEDICINE

## 2023-02-03 PROCEDURE — 99214 OFFICE O/P EST MOD 30 MIN: CPT | Performed by: INTERNAL MEDICINE

## 2023-02-03 NOTE — PROGRESS NOTES
CC---Cardiomyopathy, VT, atrial fibrillation     Sub--83-year-old male patient has cardiomyopathy and ventricular tachycardia and atrial fibrillation as a biventricular ICD in situ with prior AV node ablation and comes in for follow-up  Patient had prior syncope with hypotension with facial injury needing multiple surgeries after having pneumonia with hypoxia.  Patient's beta-blockers have been stopped in the past because he was feeling poorly with intake of medication.  Patient had cardiac  catheterization January 2022  showing nonobstructive disease with EF of 35%  Patient is well compensated and denies any orthopnea or PND or leg edema or any dyspnea when he is tying his shoes.          He has significant complex medical history which is attached below for reference from my previous dictations.      Patient with history of hypertensive heart disease, MR, TR, and right bundle branch block on ECG.  Patient has history of rheumatic fever since age of 11 and has been noticed to have increasing symptoms of shortness of breath and fatigue and recently evaluated in the hospital needing intravenous diuretics and a repeat cardiac catheterization showing significant LV dysfunction and  also had a hematuria and was evaluated by urologist and continues to be on anti coagulation without any further problems with hematuria and had benign workup for hematuria.  Patient did improve his ejection fraction and his heart failure status when he was in sinus rhythm when he underwent ablation several months ago.  Recurrent LV dysfunction and heart failure symptoms with class 3 symptomatology with recurrent persistent atrial fibrillation noted. Patient could not tolerate Tikosyn in the past.  Redo ablation was done with extensive ablation and significant left atrial scarring and enlargement noted and patient was placed on amiodarone briefly. Patient started having recurrent atrial arrhythmias despite extensive ablation and compensated  functional class 2--  persists to have severely reduced EF-- Biventricular ICD inserted and since then patient is having minimal arrhythmias with remarkable improvement of symptoms and out of hospital for more than  2 years--patient has noticed recurrent palpitations with elevated heart rate with  symptoms and was noted to have recurrent atrial flutter and AV node ablation few weeks ago   Post AV node ablation patient was discharged home without any complications.          Past Medical History:     Reviewed history from 03/12/2018 and no changes required:        mitral regurg        tricuspid regurg        rt BBB        Atrial Fibrillation        Cardiomyopathy, takotsubo        Coronary Artery Disease, non obstructive        Hypertension        Gouty Arthritis         Acid reflux disease        Allergies        Osteoarthritis        AF ablation January 2017        bone spur neck- 2017        Congestive heart failure         VT--- February 2018    Past Surgical History:     Reviewed history from 09/01/2017 and no changes required:        Rotator Cuff Repair right        Amputation right  index and middle finger        Sinus surgery February 2014        Heart Catherization - 12/8/2016 - Non obstr CAD; Takotsubo CM        Heart Ablation- 1/2017 [facility]        Heart Catherization: 7/24/17 (treating medically)        Cardiac Ablation 8/22/17 [facility]          Physical Exam    General:      well developed, well nourished, in no acute distress.    Head:      normocephalic and atraumatic.    Eyes:      PERRL/EOM intact, conjunctivae and sclerae clear without nystagmus.    Neck:      no  thyromegaly, trachea central with normal respiratory effort  Lungs:      clear bilaterally to auscultation.    Heart:       regular rate and rhythm, S1, S2 without murmurs, rubs, or gallops  Skin:      intact without lesions or rashes.    Psych:      alert and cooperative; normal mood and affect; normal attention span and concentration.             Assessment and plan    Persistent and permanent atrial arrhythmias rate controlled after AV node ablation  Prior syncope with hypoxia pneumonia and hypotension and now beta-blockers with resolution  Compensated systolic heart failure class II--- well compensated without edema or PND or any dyspnea on bending  Biventricular ICD in situ with prior VT needing ATP without recurrence--- nonsustained ventricular arrhythmias noted on ICD interrogation which was personally done.  No sustained VT.  Biventricular pacing has been reduced to 75% with 25% burden of PVCs.  PVCs appear epicardial on EKG and patient is not having significant symptoms of sustained VT and I would recommend only surveillance at this point.  Reevaluate this patient in 6 months.  If the patient has sustained ventricular arrhythmias or starts having increasing heart failure symptoms with increasing PVC burden PVC mapping and ablation can be reasonable in future.  Hypertension well-controlled  Medications reviewed and follow-up appointments made    Most recent labs include normal TSH, creatinine is normal and potassium of 4.2 with normal hemoglobin and platelet        ECG 12 Lead    Date/Time: 2/3/2023 10:27 AM  Performed by: Hira Ochoa MD  Authorized by: Hira Ochoa MD   Comparison: compared with previous ECG   Similar to previous ECG  Rhythm: atrial fibrillation and paced  Ectopy: unifocal PVCs              Electronically signed by Hira Ochoa MD, 02/03/23, 10:27 AM EST.

## 2023-02-13 RX ORDER — ALLOPURINOL 300 MG/1
TABLET ORAL
Qty: 90 TABLET | Refills: 1 | Status: SHIPPED | OUTPATIENT
Start: 2023-02-13

## 2023-03-08 ENCOUNTER — OFFICE VISIT (OUTPATIENT)
Dept: CARDIOLOGY | Facility: CLINIC | Age: 83
End: 2023-03-08
Payer: MEDICARE

## 2023-03-08 VITALS
SYSTOLIC BLOOD PRESSURE: 92 MMHG | BODY MASS INDEX: 22.07 KG/M2 | HEART RATE: 65 BPM | HEIGHT: 74 IN | RESPIRATION RATE: 18 BRPM | WEIGHT: 172 LBS | DIASTOLIC BLOOD PRESSURE: 51 MMHG

## 2023-03-08 DIAGNOSIS — I42.0 DILATED CARDIOMYOPATHY: Primary | ICD-10-CM

## 2023-03-08 DIAGNOSIS — I10 ESSENTIAL HYPERTENSION: ICD-10-CM

## 2023-03-08 DIAGNOSIS — I34.0 NONRHEUMATIC MITRAL VALVE REGURGITATION: ICD-10-CM

## 2023-03-08 DIAGNOSIS — I50.20 HEART FAILURE WITH REDUCED EJECTION FRACTION: ICD-10-CM

## 2023-03-08 DIAGNOSIS — I47.29 NONSUSTAINED VENTRICULAR TACHYCARDIA: ICD-10-CM

## 2023-03-08 DIAGNOSIS — Z95.810 PRESENCE OF BIVENTRICULAR IMPLANTABLE CARDIOVERTER-DEFIBRILLATOR (ICD): ICD-10-CM

## 2023-03-08 DIAGNOSIS — I48.21 PERMANENT ATRIAL FIBRILLATION: ICD-10-CM

## 2023-03-08 PROCEDURE — 3078F DIAST BP <80 MM HG: CPT | Performed by: INTERNAL MEDICINE

## 2023-03-08 PROCEDURE — 3074F SYST BP LT 130 MM HG: CPT | Performed by: INTERNAL MEDICINE

## 2023-03-08 PROCEDURE — 99214 OFFICE O/P EST MOD 30 MIN: CPT | Performed by: INTERNAL MEDICINE

## 2023-03-20 NOTE — PROGRESS NOTES
Cardiology Clinic Note  Chidi Figueroa MD, PhD    Subjective:     Encounter Date:03/08/2023      Patient ID: Hank Ponce is a 83 y.o. male.    Chief Complaint:  Chief Complaint   Patient presents with   • Follow-up       HPI:     I the pleasure to see this 83-year-old gentleman in clinic today.  He has a history of cardiomyopathy with biventricular ICD in place, permanent atrial fibrillation hypertension hyperlipidemia, mitral vegetation, history of stress-induced cardiomyopathy dilated cardiomyopathy with acute on chronic systolic heart failure, EF 35%, he has had some atypical chest discomfort ultimately undergoing ischemic evaluation which was abnormal.   Left heart catheterization revealed patent vessels with nonobstructive disease in the LAD, small diagonal branch with focal 90% but too small for intervention, patent but mildly diseased RCA, circumflex also mild disease but nothing flow-limiting.   He presents back to clinic today after previous augmentation of his diuretics, he feels much improved with mild less dyspnea on exertion.  He is having no anginal chest pain.  Letter catheterization revealed proximal diagonal disease only with limited myocardial supply long across the anterolateral wall, LAD with nonobstructive disease with no reversibility seen at the apex by stress testing, angiographically 40 to 50% only with sizable vessel with SORIN-3 flow, not responsible for his underlying cardiomyopathy, there is dyssynchrony with BiV resynchronization and pacing with underlying atrial fibrillation.  His legs have no edema his JVD is much improved along with the shortness of breath.  He is otherwise doing well today, blood pressure somewhat low 92/51 with heart rates in the 60s.  We did discuss if he has dizziness to go down to once a day on his losartan, is not on beta-blocker given heart rates in the low 60s, has not on Aldactone     Stress test reviewed and interpreted by me demonstrates the  following  Interpretation Summary        • Left ventricular ejection fraction is severely reduced. .  • There is no prior study available for comparison. Abnormal nuclear Apical lateral and lateral wall reversibility seen Summed difference score of 6 Dilated ventricle EF 21%.  • Findings consistent with an equivocal ECG stress test.     Abnormal study  Paced rhythm biventricular  Frequent PVCs and nonsustained VT with triplets and quadruplets in stress  No specific ST changes and paced rhythm  EF severely reduced 20% now improved  Severely dilated ventricle  Reversibility seen in the apical lateral lateral wall compared to baseline moderate size, mild to moderate moderate severity     Abnormal study cannot rule out ischemia of the apical lateral wall and mid lateral wall, this would correlate possibly with disease in the diagonal branch which is very limited and too small for intervention.  Severely reduced EF  ==========================================     Left heart catheterization results reviewed with the patient today along with all images  Results below     Exam  Blood pressure 92/51 heart rate of 65, weight 172  Regular, paced biventricular rate 65 with no rubs gallops heave or lift, 1 out of 6 systolic ejection murmur lower sternal border  No clubbing cyanosis with trace lower extremity edema  Normal pulses normal cap refill  Warm and dry  Intact grossly  Lungs are clear to auscultation  Soft nontender nondistended  Assessment:         Assessment       Coronary artery disease  Hyperlipidemia  Essential hypertension  Acute on chronic systolic and diastolic CHF  Dilated cardiomyopathy  Presence of BiV ICD, paced rhythm  Underlying permanent atrial fibrillation  Abnormal stress  Nonsustained VT      Plan:    Acute on chronic systolic and diastolic CHF  Continue Lasix 20 twice daily, volume status much better with 20 pound weight loss  Back clinic in 6 months  Diagonal disease, not responsible for global  "cardiomyopathy, no chest pain, continue medical management, 80% lesion and small vessel, nonobstructive 40 to 50% disease in the mid LAD, RCA is patent  Continue losartan with systolic heart failure in addition to Xarelto with stroke risk reduction with paroxysmal atrial fibrillation at the limits of goal-directed medical therapy blood pressure 102/66 heart rates in the 60s  Dilated cardiomyopathy, maximize medical therapy allowed by hemodynamics blood pressure  Nonsustained VT  Abnormal stress  No ICD shocks  No unexplained syncope  No anginal chest pain  Overall feels better with volume off further titration based on clinical course and response to therapy     Atrial fibrillation permanent, controlled, biventricular ICD with pacing for resynchronization  MR, stable  Essential hypertension controlled  Hyperlipidemia controlled    EF improved with resynchronization 35% from 20% along with medical therapy     Device interrogation in 3 to 6 months routinely     Return to clinic in 6 months        Further recommendation to follow findings of invasive work-up     Chidi Figueroa MD, PhD             Historical data copied forward from previous encounters in EMR including the history, exam, and assessment/plan has been reviewed and is unchanged unless noted otherwise.    Cardiac medicines reviewed with risk, benefits, and necessity of each discussed.    Risk and benefit of cardiac testing reviewed including death heart attack stroke pain bleeding infection need for vascular /cardiovascular surgery were discussed and the patient     Objective:         BP 92/51 (BP Location: Right arm, Patient Position: Sitting)   Pulse 65   Resp 18   Ht 188 cm (74\")   Wt 78 kg (172 lb)   BMI 22.08 kg/m²         The pleasure to be involved in this patient's cardiovascular care.  Please call with any questions or concerns  Chidi Figueroa MD, PhD    Most recent EKG as reviewed and interpreted by me:  Procedures     Most recent echo as " reviewed and interpreted by me:  Results for orders placed during the hospital encounter of 01/03/22    Adult Transthoracic Echo Complete W/ Cont if Necessary Per Protocol    Interpretation Summary  · The left ventricular cavity is moderately dilated.  · Left ventricular ejection fraction appears to be 31 - 35%. Left ventricular systolic function is severely decreased.  · Left ventricular diastolic function is consistent with (grade II w/high LAP) pseudonormalization.  · The right ventricular cavity is moderate to severely dilated.  · Moderately reduced right ventricular systolic function noted.  · Left atrial volume is severely increased.  · The right atrial cavity is severely dilated.  · There is bileaflet mitral valve thickening present.  · Moderate mitral valve regurgitation is present with a posteriorly-directed jet noted.  · Moderate to severe tricuspid valve regurgitation is present.  · Estimated right ventricular systolic pressure from tricuspid regurgitation is moderately elevated (45-55 mmHg).  · Mild to moderate pulmonary hypertension is present.      Most recent stress test as reviewed and interpreted by me:  Results for orders placed during the hospital encounter of 01/03/22    Stress Test With Myocardial Perfusion One Day    Interpretation Summary  · Left ventricular ejection fraction is severely reduced. .  · There is no prior study available for comparison. Abnormal nuclear Apical lateral and lateral wall reversibility seen Summed difference score of 6 Dilated ventricle EF 21%.  · Findings consistent with an equivocal ECG stress test.    Abnormal study  Paced rhythm biventricular  Frequent PVCs and nonsustained VT with triplets and quadruplets in stress  No specific ST changes and paced rhythm  EF severely reduced 20%  Severely dilated ventricle  Reversibility seen in the apical lateral lateral wall compared to baseline moderate size, mild to moderate moderate severity    Abnormal study cannot rule  out ischemia of the apical lateral wall and mid lateral wall  Severely reduced EF      Most recent cardiac catheterization as reviewed interpreted by me:  Results for orders placed during the hospital encounter of 01/19/22    Cardiac Catheterization/Vascular Study    Narrative   Chidi Figueroa MD, PhD  [facility] cardiology  Date of service 1-    Procedure  1.  Left heart catheterization coronary angiography left ventriculography in ZHAO position    Indication  Cardiomyopathy, low EF, nonsustained VT, abnormal stress test    Performed consent patient was brought to the Cath Lab sterilely prepped and draped in usual fashion expose the right groin for right common femoral arterial access via micropuncture modified Seldinger technique with placement of a 6 Albanian sheath.  035 guidewire was advanced to the aortic valve followed by JL 4 and JR4 catheters for selective left and right coronary angiography.  The JR4 was used across aortic valve easily, hand-injection for LV gram, EDP assessment and pullback assessment of the transaortic valve gradient.  There was small vessel disease in the diagonal 80 to 90% but vessel well less than 2 mm in diameter with limited nonbifurcating course, we will proceed with medical therapy of this, there is nonobstructive disease in the LAD 50% in the midportion most significantly with long segment of eccentric plaque but SORIN-3 flow throughout and no critical stenoses as it courses to and around the apex, 20 to 30% proximally, 30% distally, circumflex also had 30 to 40% in the mid but nothing critical, SORIN-3 flow throughout, RCA diffuse luminary low 40 to 30% nothing flow-limiting.    Diagnosis ultimately is nonischemic cardiomyopathy  BiV ICD is in place  Small vessel obstructive CAD in the diagonal branch which is too small for intervention should be treated medically  Nonobstructive disease in the LAD 50% in the midportion with significantly but nothing  critical with SORIN-3 flow to around the apex which would not contribute to his underlying cardiomyopathy and PCI of this is unlikely to improve his EF over medical therapy which is on board    Findings  1.  128/86 opening pressure  2.  Closing pressure was unchanged  3.  LVEDP 10-12  4.  LV function LVEF 35% with dyssynchrony present  5.  No transaortic valve or LVOT gradient seen    Complications none  Blood loss less than 5 cc  Contrast used 65 cc  Moderate on sedation time of 30 minutes with IV Versed and fentanyl administered by registered nurse with complete ECG pulse oximetry and hemodynamic monitor throughout the entirety the case observed by me    Angiography  1 left main large-caliber vessel with no angiographic disease  2.  LAD is a long vessel coursing to and around the apex with 2 diagonal branches and septal perforators.  Proximal has diffuse irregularities 20 to 30% eccentric plaque, 50% in the mid but nothing flow-limiting, takeoff of the more distal diagonal branches without any flow-limiting stenosis, distal LAD diffusely irregularities 20 to 30% at most, proximal diagonal branch has an 80 to 90% focal narrowing and nothing distally, this vessel is well below 2 mm in diameter with nonbifurcating limited course along the anterolateral wall not in the area identified by the stress test at the apex or apical lateral portion which is supplied by distal LAD and more distal second diagonal branch.  This will be treated medically  3 circumflex nondominant with 2 obtuse marginal branches, 40 % mid at most nonflow limiting  4.  RCA 20 to 30% in the midportion at most, nothing flow-limiting, SORIN-3 flow throughout    Recommendations and conclusions  1.  Nonischemic cardiomyopathy  2.  Nonobstructive CAD of large epicardial vessels 50% at most in the mid LAD, 20 to 30% RCA, 40% circumflex, 80 to 90% disease in small diagonal branch which is inconsequential and should be treated medically not contributory to  his cardiomyopathy  3.  Continue secondary prevention goals for systolic heart failure, go down on Lasix to daily given prerenal azotemia and elevated creatinine on labs today on twice daily Lasix with normal filling pressures  For follow-up cardiology 2 to 4 weeks for further optimization and review of cath films    Chidi Figueroa MD, PhD    Home today    The following portions of the patient's history were reviewed and updated as appropriate: allergies, current medications, past family history, past medical history, past social history, past surgical history and problem list.      ROS:  14 point review of systems negative except as mentioned above    Current Outpatient Medications:   •  acetaminophen-codeine (TYLENOL #3) 300-30 MG per tablet, Take 1 tablet by mouth Every 6 (Six) Hours As Needed for Moderate Pain., Disp: 10 tablet, Rfl: 0  •  allopurinol (ZYLOPRIM) 300 MG tablet, TAKE ONE TABLET BY MOUTH DAILY, Disp: 90 tablet, Rfl: 1  •  calcium carbonate (OS-BRENDA) 600 MG tablet, Take 1 tablet by mouth Daily., Disp: , Rfl:   •  coenzyme Q10 100 MG capsule, Take 2 capsules by mouth Daily., Disp: , Rfl:   •  furosemide (LASIX) 20 MG tablet, Take 1 tablet by mouth 2 (Two) Times a Day. (Patient taking differently: Take 2 tablets by mouth Daily.), Disp: 60 tablet, Rfl: 5  •  lidocaine (LIDODERM) 5 %, Place 1 patch on the skin as directed by provider Daily. Remove & Discard patch within 12 hours or as directed by MD, Disp: 6 patch, Rfl: 0  •  losartan (COZAAR) 25 MG tablet, TAKE ONE TABLET BY MOUTH TWICE A DAY, Disp: 180 tablet, Rfl: 1  •  vitamin B-12 (CYANOCOBALAMIN) 1000 MCG tablet, Take 1 tablet by mouth Daily., Disp: , Rfl:   •  vitamin D3 125 MCG (5000 UT) capsule capsule, Take  by mouth Daily., Disp: , Rfl:   •  Xarelto 15 MG tablet, TAKE ONE TABLET BY MOUTH DAILY, Disp: 90 tablet, Rfl: 2    Problem List:  Patient Active Problem List   Diagnosis   • Presence of biventricular implantable cardioverter-defibrillator  (ICD)   • Permanent atrial fibrillation (CMS/HCC)   • Hyperlipidemia, mixed   • Mitral regurgitation   • Tricuspid regurgitation   • Essential hypertension   • Takotsubo cardiomyopathy   • Dilated cardiomyopathy (HCC)   • Acute on chronic systolic congestive heart failure (HCC)   • Atrial flutter with rapid ventricular response (HCC)   • Low serum vitamin B12   • Heart failure with reduced ejection fraction (HCC)   • Stage 3 chronic kidney disease (HCC)   • Nonsustained ventricular tachycardia   • Gout     Past Medical History:  Past Medical History:   Diagnosis Date   • Acid reflux disease    • Allergic rhinitis    • Arthritis     gouty   • Atrial fibrillation (HCC)    • BBB (bundle branch block)     right   • Bone spur 2017    neck   • Congestive heart failure (CHF) (HCC)    • Congestive heart failure (HCC) 10/08/2019   • Coronary artery disease     non obstructive   • Hypertension    • Mitral regurgitation    • Osteoarthritis    • Presence of biventricular implantable cardioverter-defibrillator (ICD) 07/29/2019   • S/P ablation of atrial fibrillation 01/2017   • Takotsubo cardiomyopathy    • Tricuspid regurgitation    • VT (ventricular tachycardia) 02/2018     Past Surgical History:  Past Surgical History:   Procedure Laterality Date   • AMPUTATION      index and middle finger   • CARDIAC ABLATION  08/22/2017    BHF   • CARDIAC CATHETERIZATION  12/08/2016    non obst CAD; takotsubo CM   • CARDIAC CATHETERIZATION  07/24/2017    treating medically   • CARDIAC CATHETERIZATION N/A 1/19/2022    Procedure: Left Heart Cath;  Surgeon: Chidi Figueroa MD;  Location:  [facility];  Service: Cardiology;  Laterality: N/A;   • CARDIAC DEFIBRILLATOR PLACEMENT  2017    Bs   • CARDIAC ELECTROPHYSIOLOGY PROCEDURE N/A 4/14/2021    Procedure: AV node ablation;  Surgeon: iHra Ochoa MD;  Location:  [facility];  Service: Cardiovascular;  Laterality: N/A;   • COLONOSCOPY N/A  2022    Procedure: COLONOSCOPY with polypectomy x5;  Surgeon: KAYLIN Ruiz MD;  Location: [facility] ENDOSCOPY;  Service: Gastroenterology;  Laterality: N/A;  post: diverticulosis, hemorrhoids, polyps   • FRACTURE SURGERY  2021    jaw    • INTERNAL CARDIAC DEFIBRILLATOR INSERTION  2017    BiV ICD BS   • OTHER SURGICAL HISTORY  2017    Heart ablation-BHF   • ROTATOR CUFF REPAIR Right    • SINUS SURGERY  2014     Social History:  Social History     Socioeconomic History   • Marital status:    Tobacco Use   • Smoking status: Former     Types: Cigarettes     Quit date:      Years since quittin.2   • Smokeless tobacco: Never   Vaping Use   • Vaping Use: Never used   Substance and Sexual Activity   • Alcohol use: Yes     Comment: occasionally   • Drug use: Never   • Sexual activity: Defer     Allergies:  Allergies   Allergen Reactions   • Hydrocodone Urinary Retention     Immunizations:  Immunization History   Administered Date(s) Administered   • COVID-19 (MODERNA) 1st, 2nd, 3rd Dose Only 2021, 2021, 10/26/2021, 2022   • FLUAD TRI 65YR+ 10/06/2022   • Flu Vaccine Intradermal Quad 18-64YR 2011, 2012, 2013   • Fluad Quad 65+ 10/07/2019, 09/10/2020   • Fluzone High Dose =>65 Years ([employer] ONLY) 10/03/2014, 10/09/2015, 2016, 10/02/2017, 2018   • Fluzone High-Dose 65+yrs 2021, 10/17/2021, 10/06/2022   • Pneumococcal Conjugate 13-Valent (PCV13) 10/25/2019   • Pneumococcal Polysaccharide (PPSV23) 10/27/2020            In-Office Procedure(s):  No orders to display        ASCVD RIsk Score::  The ASCVD Risk score (Veronica DK, et al., 2019) failed to calculate for the following reasons:    The 2019 ASCVD risk score is only valid for ages 40 to 79    Imaging:    Results for orders placed during the hospital encounter of 23    XR Spine Lumbar Complete 4+VW    Narrative  XR SPINE LUMBAR COMPLETE 4+VW    Date of Exam: 2023 4:40 PM  EST    Indication: pain.    Comparison: None available.    Findings:  There are 5 nonrib-bearing lumbar-type vertebral bodies. There is moderate levoconvex curvature of the lumbar spine. There is osteopenia. There are large bridging osteophytes on the left at the L1 L2, L2 L3, and L3 L4 level. There are no pars defects.  There is mild wedging of the L2 vertebral body anteriorly age indeterminate. There is moderate to severe disc height loss at all levels. There is moderate to severe facet degenerative change of the lower lumbar spine.    Impression  Impression:  No acute findings lumbar spine by radiograph.  Osteopenia with moderate dextro, is curvature lumbar spine, severe degenerative disc disease and moderate to severe facet degenerative change of the lower lumbar spine.    Electronically Signed: Latanya Charlton  1/22/2023 5:29 PM EST  Workstation ID: [id number]       Results for orders placed during the hospital encounter of 11/30/21    US Liver    Narrative  US LIVER-    Date of Exam: 11/30/2021 10:12 AM    Indication: Elevated alk phos and bilirubin; R74.8-Abnormal levels of  other serum enzymes.    Comparison: None available.    Technique: Transverse and sagittal ultrasound images of the right upper  quadrant were obtained. Doppler evaluation was also conducted.    FINDINGS:  Liver appears within normal limits throughout. Echogenicity within  normal limits. No evidence of a focal lesion. No evidence of biliary  dilatation/obstruction, common bile duct is normal diameter at 3 mm.    Impression  Negative ultrasound of the liver.    Electronically Signed By-Brain Noonan On:11/30/2021 10:40 AM  This report was finalized on [id number] by  Brain Noonan, .      Results for orders placed during the hospital encounter of 04/17/21    CT Head Without Contrast    Narrative  DATE OF EXAM:  4/18/2021 4:49 PM    PROCEDURE:  CT HEAD WO CONTRAST-    INDICATIONS:  Brain cancer screening, Cowden Syndrome/PHTS (Age <  19y);  J18.9-Pneumonia, unspecified organism; R09.02-Hypoxemia;  I48.11-Longstanding persistent atrial fibrillation; R77.8-Other  specified abnormalities of plasma proteins    COMPARISON:  04/18/2021 2:22 PM    TECHNIQUE:  Routine transaxial cuts were obtained through the head without the  administration of contrast. Automated exposure control and iterative  reconstruction methods were used.    FINDINGS:  There is cerebral atrophy. There is no intracranial hemorrhage. There is  no midline shift. The appearance to the head is similar to the prior  study.    There are fractures of the maxillofacial area. There is some  subcutaneous air around the nasal bridge area. There is density within  the paranasal sinuses which could relate to blood.    Impression  1.No significant change in appearance of the head from the earlier  study.  2.Fractures of the maxillofacial area are again noted. There is density  within the paranasal sinuses suggesting underlying blood.    Electronically Signed By-Moises Recinos MD On:4/18/2021 5:01 PM  This report was finalized on [id number] by  Moises Recinos MD.      Lab Review:   Admission on 01/22/2023, Discharged on 01/22/2023   Component Date Value   • Color, UA 01/22/2023 Yellow    • Appearance, UA 01/22/2023 Clear    • pH, UA 01/22/2023 6.0    • Specific Gravity, UA 01/22/2023 1.019    • Glucose, UA 01/22/2023 Negative    • Ketones, UA 01/22/2023 Negative    • Bilirubin, UA 01/22/2023 Negative    • Blood, UA 01/22/2023 Negative    • Protein, UA 01/22/2023 30 mg/dL (1+) (A)    • Leuk Esterase, UA 01/22/2023 Negative    • Nitrite, UA 01/22/2023 Negative    • Urobilinogen, UA 01/22/2023 1.0 E.U./dL    • RBC, UA 01/22/2023 0-2 (A)    • WBC, UA 01/22/2023 0-2 (A)    • Bacteria, UA 01/22/2023 None Seen    • Squamous Epithelial Cell* 01/22/2023 0-2    • Hyaline Casts, UA 01/22/2023 3-6    • Methodology 01/22/2023 Automated Microscopy    Office Visit on 11/28/2022   Component Date Value   •  Glucose 11/28/2022 90    • BUN 11/28/2022 22    • Creatinine 11/28/2022 1.14    • Sodium 11/28/2022 142    • Potassium 11/28/2022 4.2    • Chloride 11/28/2022 103    • CO2 11/28/2022 29.3 (H)    • Calcium 11/28/2022 10.0    • Total Protein 11/28/2022 7.1    • Albumin 11/28/2022 3.90    • ALT (SGPT) 11/28/2022 16    • AST (SGOT) 11/28/2022 27    • Alkaline Phosphatase 11/28/2022 90    • Total Bilirubin 11/28/2022 2.0 (H)    • Globulin 11/28/2022 3.2    • A/G Ratio 11/28/2022 1.2    • BUN/Creatinine Ratio 11/28/2022 19.3    • Anion Gap 11/28/2022 9.7    • eGFR 11/28/2022 63.8    • Hemoglobin A1C 11/28/2022 5.2    • Total Cholesterol 11/28/2022 157    • Triglycerides 11/28/2022 84    • HDL Cholesterol 11/28/2022 68 (H)    • LDL Cholesterol  11/28/2022 73    • VLDL Cholesterol 11/28/2022 16    • LDL/HDL Ratio 11/28/2022 1.06    • TSH 11/28/2022 2.380    • Free T4 11/28/2022 1.20    • 25 Hydroxy, Vitamin D 11/28/2022 71.4    • Vitamin B-12 11/28/2022 1,768 (H)    • PSA 11/28/2022 0.727    • Uric Acid 11/28/2022 4.7    • WBC 11/28/2022 7.42    • RBC 11/28/2022 4.80    • Hemoglobin 11/28/2022 16.1    • Hematocrit 11/28/2022 47.4    • MCV 11/28/2022 98.8 (H)    • MCH 11/28/2022 33.5 (H)    • MCHC 11/28/2022 34.0    • RDW 11/28/2022 14.3    • RDW-SD 11/28/2022 51.5    • MPV 11/28/2022 10.9    • Platelets 11/28/2022 167    • Neutrophil % 11/28/2022 70.0    • Lymphocyte % 11/28/2022 19.1 (L)    • Monocyte % 11/28/2022 8.4    • Eosinophil % 11/28/2022 1.1    • Basophil % 11/28/2022 0.9    • Immature Grans % 11/28/2022 0.5    • Neutrophils, Absolute 11/28/2022 5.19    • Lymphocytes, Absolute 11/28/2022 1.42    • Monocytes, Absolute 11/28/2022 0.62    • Eosinophils, Absolute 11/28/2022 0.08    • Basophils, Absolute 11/28/2022 0.07    • Immature Grans, Absolute 11/28/2022 0.04    • nRBC 11/28/2022 0.0    Lab on 11/17/2022   Component Date Value   • Color, UA 11/17/2022 Yellow    • Appearance, UA 11/17/2022 Clear    • pH, UA  11/17/2022 6.0    • Specific Gravity, UA 11/17/2022 1.015    • Glucose, UA 11/17/2022 Negative    • Ketones, UA 11/17/2022 Negative    • Bilirubin, UA 11/17/2022 Negative    • Blood, UA 11/17/2022 Small (1+) (A)    • Protein, UA 11/17/2022 30 mg/dL (1+) (A)    • Leuk Esterase, UA 11/17/2022 Negative    • Nitrite, UA 11/17/2022 Negative    • Urobilinogen, UA 11/17/2022 1.0 E.U./dL    • RBC, UA 11/17/2022 13-20 (A)    • WBC, UA 11/17/2022 0-2    • Bacteria, UA 11/17/2022 None Seen    • Squamous Epithelial Cell* 11/17/2022 0-2    • Hyaline Casts, UA 11/17/2022 3-6    • Methodology 11/17/2022 Automated Microscopy    Lab on 11/03/2022   Component Date Value   • Color, UA 11/03/2022 Yellow    • Appearance, UA 11/03/2022 Clear    • pH, UA 11/03/2022 6.0    • Specific Gravity, UA 11/03/2022 1.009    • Glucose, UA 11/03/2022 Negative    • Ketones, UA 11/03/2022 Negative    • Bilirubin, UA 11/03/2022 Negative    • Blood, UA 11/03/2022 Trace (A)    • Protein, UA 11/03/2022 Negative    • Leuk Esterase, UA 11/03/2022 Negative    • Nitrite, UA 11/03/2022 Negative    • Urobilinogen, UA 11/03/2022 0.2 E.U./dL    • RBC, UA 11/03/2022 0-2    • WBC, UA 11/03/2022 None Seen    • Bacteria, UA 11/03/2022 None Seen    • Squamous Epithelial Cell* 11/03/2022 0-2    • Hyaline Casts, UA 11/03/2022 0-2    • Methodology 11/03/2022 Automated Microscopy    Office Visit on 10/18/2022   Component Date Value   • WBC 10/18/2022 7.71    • RBC 10/18/2022 4.59    • Hemoglobin 10/18/2022 15.6    • Hematocrit 10/18/2022 45.9    • MCV 10/18/2022 100.0 (H)    • MCH 10/18/2022 34.0 (H)    • MCHC 10/18/2022 34.0    • RDW 10/18/2022 14.9    • RDW-SD 10/18/2022 55.2 (H)    • MPV 10/18/2022 11.2    • Platelets 10/18/2022 180    • TSH 10/18/2022 2.140    • Free T4 10/18/2022 1.18    • 25 Hydroxy, Vitamin D 10/18/2022 84.8    • Vitamin B-12 10/18/2022 1,761 (H)    • Color, UA 10/18/2022 Yellow    • Appearance, UA 10/18/2022 Clear    • pH, UA 10/18/2022 6.0    •  Specific Gravity, UA 10/18/2022 1.015    • Glucose, UA 10/18/2022 Negative    • Ketones, UA 10/18/2022 Negative    • Bilirubin, UA 10/18/2022 Negative    • Blood, UA 10/18/2022 Moderate (2+) (A)    • Protein, UA 10/18/2022 Trace (A)    • Leuk Esterase, UA 10/18/2022 Negative    • Nitrite, UA 10/18/2022 Negative    • Urobilinogen, UA 10/18/2022 1.0 E.U./dL    • Glucose 10/18/2022 74    • BUN 10/18/2022 24 (H)    • Creatinine 10/18/2022 1.19    • Sodium 10/18/2022 143    • Potassium 10/18/2022 4.2    • Chloride 10/18/2022 102    • CO2 10/18/2022 27.0    • Calcium 10/18/2022 9.7    • Total Protein 10/18/2022 7.2    • Albumin 10/18/2022 4.00    • ALT (SGPT) 10/18/2022 21    • AST (SGOT) 10/18/2022 38    • Alkaline Phosphatase 10/18/2022 103    • Total Bilirubin 10/18/2022 2.0 (H)    • Globulin 10/18/2022 3.2    • A/G Ratio 10/18/2022 1.3    • BUN/Creatinine Ratio 10/18/2022 20.2    • Anion Gap 10/18/2022 14.0    • eGFR 10/18/2022 60.6    • proBNP 10/18/2022 2,039.0 (H)    • RBC, UA 10/18/2022 31-50 (A)    • WBC, UA 10/18/2022 0-2    • Bacteria, UA 10/18/2022 None Seen    • Squamous Epithelial Cell* 10/18/2022 0-2    • Hyaline Casts, UA 10/18/2022 None Seen    • Methodology 10/18/2022 Automated Microscopy      Recent labs reviewed and interpreted for clinical significance and application            Level of Care:           Chidi Figueroa MD  03/20/23  .

## 2023-03-27 RX ORDER — RIVAROXABAN 15 MG/1
TABLET, FILM COATED ORAL
Qty: 90 TABLET | Refills: 2 | Status: SHIPPED | OUTPATIENT
Start: 2023-03-27

## 2023-04-14 PROCEDURE — 93295 DEV INTERROG REMOTE 1/2/MLT: CPT | Performed by: NURSE PRACTITIONER

## 2023-04-14 PROCEDURE — 93296 REM INTERROG EVL PM/IDS: CPT | Performed by: NURSE PRACTITIONER

## 2023-04-25 ENCOUNTER — OFFICE VISIT (OUTPATIENT)
Dept: FAMILY MEDICINE CLINIC | Facility: CLINIC | Age: 83
End: 2023-04-25
Payer: MEDICARE

## 2023-04-25 ENCOUNTER — LAB (OUTPATIENT)
Dept: FAMILY MEDICINE CLINIC | Facility: CLINIC | Age: 83
End: 2023-04-25
Payer: MEDICARE

## 2023-04-25 VITALS
HEIGHT: 74 IN | DIASTOLIC BLOOD PRESSURE: 62 MMHG | SYSTOLIC BLOOD PRESSURE: 118 MMHG | RESPIRATION RATE: 18 BRPM | OXYGEN SATURATION: 98 % | BODY MASS INDEX: 22.46 KG/M2 | HEART RATE: 69 BPM | WEIGHT: 175 LBS

## 2023-04-25 DIAGNOSIS — R41.3 MEMORY PROBLEM: Primary | ICD-10-CM

## 2023-04-25 LAB
BACTERIA UR QL AUTO: ABNORMAL /HPF
BILIRUB UR QL STRIP: NEGATIVE
CLARITY UR: CLEAR
COLOR UR: ABNORMAL
GLUCOSE UR STRIP-MCNC: NEGATIVE MG/DL
HGB UR QL STRIP.AUTO: ABNORMAL
HYALINE CASTS UR QL AUTO: ABNORMAL /LPF
KETONES UR QL STRIP: NEGATIVE
LEUKOCYTE ESTERASE UR QL STRIP.AUTO: NEGATIVE
NITRITE UR QL STRIP: NEGATIVE
PH UR STRIP.AUTO: 6 [PH] (ref 5–8)
PROT UR QL STRIP: ABNORMAL
RBC # UR STRIP: ABNORMAL /HPF
REF LAB TEST METHOD: ABNORMAL
SP GR UR STRIP: 1.02 (ref 1–1.03)
SQUAMOUS #/AREA URNS HPF: ABNORMAL /HPF
UROBILINOGEN UR QL STRIP: ABNORMAL
WBC # UR STRIP: ABNORMAL /HPF

## 2023-04-25 PROCEDURE — 3074F SYST BP LT 130 MM HG: CPT | Performed by: FAMILY MEDICINE

## 2023-04-25 PROCEDURE — 81001 URINALYSIS AUTO W/SCOPE: CPT | Performed by: FAMILY MEDICINE

## 2023-04-25 PROCEDURE — 99214 OFFICE O/P EST MOD 30 MIN: CPT | Performed by: FAMILY MEDICINE

## 2023-04-25 PROCEDURE — 1160F RVW MEDS BY RX/DR IN RCRD: CPT | Performed by: FAMILY MEDICINE

## 2023-04-25 PROCEDURE — 3078F DIAST BP <80 MM HG: CPT | Performed by: FAMILY MEDICINE

## 2023-04-25 PROCEDURE — 1159F MED LIST DOCD IN RCRD: CPT | Performed by: FAMILY MEDICINE

## 2023-04-25 RX ORDER — DONEPEZIL HYDROCHLORIDE 10 MG/1
10 TABLET, FILM COATED ORAL NIGHTLY
Qty: 30 TABLET | Refills: 2 | Status: SHIPPED | OUTPATIENT
Start: 2023-04-25

## 2023-04-25 NOTE — PROGRESS NOTES
Chief Complaint   Patient presents with   • Memory Loss     HPI  Hank Ponce is a 83 y.o. male that presents for   Chief Complaint   Patient presents with   • Memory Loss     Memory loss: patient and son reports memory has really declined in the last few months. He has also been sleeping more and having to take notes on everything to help him remember thing. Last night patient burnt asparagus on the stove because he had put this on the stove top and forgot about it. This scared both himself and family and interested in evaluating anything that might be needed to help. MMSE was performed 6 months ago w/ score 28/30. Tried Prevagen but hasn't noticed any benefit in the last 60 days. He does have the opportunity to go live w/ his daughter if he chooses. Patient denies hallucinations but later states that he sees someone from the bank in the hallway of his home    Review of Systems   Neurological: Positive for memory problem and confusion.     The following portions of the patient's history were reviewed and updated as appropriate: problem list, past medical history, past surgical history, allergies, current medication.    Problem List Tab  Patient History Tab  Immunizations Tab  Medications Tab  Chart Review Tab  Care Everywhere Tab  Synopsis Tab    PE  Vitals:    04/25/23 0911   BP: 118/62   Pulse: 69   Resp: 18   SpO2: 98%     Body mass index is 22.46 kg/m².  General: Well nourished, NAD  Head: AT/NC  Eyes: EOMI, anicteric sclera  Resp: CTAB, SCR, BS equal  CV: RRR w/o m/r/g; 2+ pulses  GI: Soft, NT/ND, +BS  MSK: FROM, no deformity, no edema  Skin: Warm, dry, intact  Neuro: Alert and oriented. No focal deficits  Psych: Appropriate mood and affect    Imaging  XR Spine Lumbar Complete 4+VW    Result Date: 1/22/2023  Impression: No acute findings lumbar spine by radiograph. Osteopenia with moderate dextro, is curvature lumbar spine, severe degenerative disc disease and moderate to severe facet degenerative  change of the lower lumbar spine. Electronically Signed: Latanya Charlton  1/22/2023 5:29 PM EST  Workstation ID: [id number]    XR Hip With or Without Pelvis 2 - 3 View Left    Result Date: 1/22/2023  Impression: Normal radiographic appearance of the left hip. Electronically Signed: Latanya Charlton  1/22/2023 7:29 PM EST  Workstation ID: [id number]      Assessment & Plan   Hank Ponce is a 83 y.o. male that presents for   Chief Complaint   Patient presents with   • Memory Loss     Diagnoses and all orders for this visit:    1. Memory problem (Primary): MMSE 27/30 (missed to recall items and took paper in his right hand instead of his left) today, largely unchanged from 28/30 6 months ago. Patient and family concerned about considerable decline in memory. Counseled regarding safe interventions at home, including unplugging stove given recent event.  Based on MMSE score today and history, likely okay to continue living at home alone but counseled to avoid use of stove, iron, etc.  We discussed potential advantages of living with his daughter or in a group home/assisted living.  Patient prefer defer this for the time being.  We discussed the potential of neurology evaluation but unlikely to change recommendations at this point in time.  Of note, patient did endorse some visual hallucinations just before leaving.  We will monitor for continued hallucinations and consider MRI brain if such concerns continue or progress.  Would also be more apt to refer to neurology if this continues  -     Start donepezil (Aricept) 10 MG tablet; Take 1 tablet by mouth Every Night. Take 0.5 tablet nightly for the first 4 weeks, then increase to 1 tablet nightly  Dispense: 30 tablet; Refill: 2  -     Urinalysis With Culture If Indicated - Urine, Clean Catch  - Counseled regarding physical exercise, reading, socializing, crossword puzzles/brain teethers     Return in about 10 weeks (around 7/4/2023) for Recheck- 30min- memory trouble.   36  minutes spent on the day of service face-to-face with the patient obtaining history, performing physical, reviewing chart/data, placing orders, and documenting.  Additional time spent in documentation outside the day of service.

## 2023-04-26 ENCOUNTER — TELEPHONE (OUTPATIENT)
Dept: FAMILY MEDICINE CLINIC | Facility: CLINIC | Age: 83
End: 2023-04-26
Payer: MEDICARE

## 2023-04-26 NOTE — TELEPHONE ENCOUNTER
----- Message from Bunny Curran MD sent at 4/25/2023  5:48 PM EDT -----  Urine showed some dehydration and blood. Blood in urine has been present for years and previously evaluated by urology. No evidence for UTI. No further evaluation or treatment recommended aside from good hydration.

## 2023-06-05 RX ORDER — LOSARTAN POTASSIUM 25 MG/1
TABLET ORAL
Qty: 180 TABLET | Refills: 1 | Status: SHIPPED | OUTPATIENT
Start: 2023-06-05

## 2023-07-20 ENCOUNTER — HOSPITAL ENCOUNTER (OUTPATIENT)
Facility: HOSPITAL | Age: 83
Setting detail: OBSERVATION
Discharge: HOME | End: 2023-07-22
Attending: EMERGENCY MEDICINE | Admitting: EMERGENCY MEDICINE
Payer: MEDICARE

## 2023-07-20 ENCOUNTER — APPOINTMENT (OUTPATIENT)
Dept: GENERAL RADIOLOGY | Facility: HOSPITAL | Age: 83
End: 2023-07-20
Payer: MEDICARE

## 2023-07-20 DIAGNOSIS — R06.09 DYSPNEA ON EXERTION: ICD-10-CM

## 2023-07-20 DIAGNOSIS — I10 ESSENTIAL (PRIMARY) HYPERTENSION: ICD-10-CM

## 2023-07-20 DIAGNOSIS — I50.43 ACUTE ON CHRONIC COMBINED SYSTOLIC AND DIASTOLIC CONGESTIVE HEART FAILURE: Primary | ICD-10-CM

## 2023-07-20 PROBLEM — I50.9 ACUTE EXACERBATION OF CHF (CONGESTIVE HEART FAILURE): Status: ACTIVE | Noted: 2023-07-20

## 2023-07-20 LAB
ALBUMIN SERPL-MCNC: 4.1 G/DL (ref 3.5–5.2)
ALBUMIN/GLOB SERPL: 1.5 G/DL
ALP SERPL-CCNC: 113 U/L (ref 39–117)
ALT SERPL W P-5'-P-CCNC: 18 U/L (ref 1–41)
ANION GAP SERPL CALCULATED.3IONS-SCNC: 12 MMOL/L (ref 5–15)
AST SERPL-CCNC: 36 U/L (ref 1–40)
BACTERIA UR QL AUTO: ABNORMAL /HPF
BACTERIA UR QL AUTO: ABNORMAL /HPF
BASOPHILS # BLD AUTO: 0 10*3/MM3 (ref 0–0.2)
BASOPHILS NFR BLD AUTO: 0.7 % (ref 0–1.5)
BILIRUB SERPL-MCNC: 2.1 MG/DL (ref 0–1.2)
BILIRUB UR QL STRIP: NEGATIVE
BILIRUB UR QL STRIP: NEGATIVE
BUN SERPL-MCNC: 16 MG/DL (ref 8–23)
BUN/CREAT SERPL: 12.6 (ref 7–25)
CALCIUM SPEC-SCNC: 9.3 MG/DL (ref 8.6–10.5)
CHLORIDE SERPL-SCNC: 106 MMOL/L (ref 98–107)
CHOLEST SERPL-MCNC: 161 MG/DL (ref 0–200)
CLARITY UR: CLEAR
CLARITY UR: CLEAR
CO2 SERPL-SCNC: 29 MMOL/L (ref 22–29)
COLOR UR: ABNORMAL
COLOR UR: YELLOW
CREAT SERPL-MCNC: 1.27 MG/DL (ref 0.76–1.27)
CREAT UR-MCNC: 68.1 MG/DL
DEPRECATED RDW RBC AUTO: 60.4 FL (ref 37–54)
EGFRCR SERPLBLD CKD-EPI 2021: 55.7 ML/MIN/1.73
EOSINOPHIL # BLD AUTO: 0 10*3/MM3 (ref 0–0.4)
EOSINOPHIL NFR BLD AUTO: 0.7 % (ref 0.3–6.2)
ERYTHROCYTE [DISTWIDTH] IN BLOOD BY AUTOMATED COUNT: 17.3 % (ref 12.3–15.4)
GLOBULIN UR ELPH-MCNC: 2.8 GM/DL
GLUCOSE SERPL-MCNC: 135 MG/DL (ref 65–99)
GLUCOSE UR STRIP-MCNC: NEGATIVE MG/DL
GLUCOSE UR STRIP-MCNC: NEGATIVE MG/DL
HBA1C MFR BLD: 5.3 % (ref 4.8–5.6)
HCT VFR BLD AUTO: 51.7 % (ref 37.5–51)
HDLC SERPL-MCNC: 74 MG/DL (ref 40–60)
HGB BLD-MCNC: 16.9 G/DL (ref 13–17.7)
HGB UR QL STRIP.AUTO: ABNORMAL
HGB UR QL STRIP.AUTO: ABNORMAL
HOLD SPECIMEN: NORMAL
HOLD SPECIMEN: NORMAL
HYALINE CASTS UR QL AUTO: ABNORMAL /LPF
HYALINE CASTS UR QL AUTO: ABNORMAL /LPF
KETONES UR QL STRIP: NEGATIVE
KETONES UR QL STRIP: NEGATIVE
LDLC SERPL CALC-MCNC: 71 MG/DL (ref 0–100)
LDLC/HDLC SERPL: 0.94 {RATIO}
LEUKOCYTE ESTERASE UR QL STRIP.AUTO: ABNORMAL
LEUKOCYTE ESTERASE UR QL STRIP.AUTO: NEGATIVE
LYMPHOCYTES # BLD AUTO: 0.9 10*3/MM3 (ref 0.7–3.1)
LYMPHOCYTES NFR BLD AUTO: 13.8 % (ref 19.6–45.3)
MAGNESIUM SERPL-MCNC: 2.1 MG/DL (ref 1.6–2.4)
MCH RBC QN AUTO: 33.2 PG (ref 26.6–33)
MCHC RBC AUTO-ENTMCNC: 32.7 G/DL (ref 31.5–35.7)
MCV RBC AUTO: 101.7 FL (ref 79–97)
MONOCYTES # BLD AUTO: 0.7 10*3/MM3 (ref 0.1–0.9)
MONOCYTES NFR BLD AUTO: 10.2 % (ref 5–12)
NEUTROPHILS NFR BLD AUTO: 4.9 10*3/MM3 (ref 1.7–7)
NEUTROPHILS NFR BLD AUTO: 74.6 % (ref 42.7–76)
NITRITE UR QL STRIP: NEGATIVE
NITRITE UR QL STRIP: NEGATIVE
NRBC BLD AUTO-RTO: 0 /100 WBC (ref 0–0.2)
NT-PROBNP SERPL-MCNC: 4280 PG/ML (ref 0–1800)
PH UR STRIP.AUTO: 6 [PH] (ref 5–8)
PH UR STRIP.AUTO: 6.5 [PH] (ref 5–8)
PLATELET # BLD AUTO: 182 10*3/MM3 (ref 140–450)
PMV BLD AUTO: 9.7 FL (ref 6–12)
POTASSIUM SERPL-SCNC: 3.2 MMOL/L (ref 3.5–5.2)
POTASSIUM SERPL-SCNC: 3.3 MMOL/L (ref 3.5–5.2)
PROT ?TM UR-MCNC: 21.6 MG/DL
PROT SERPL-MCNC: 6.9 G/DL (ref 6–8.5)
PROT UR QL STRIP: ABNORMAL
PROT UR QL STRIP: NEGATIVE
RBC # BLD AUTO: 5.08 10*6/MM3 (ref 4.14–5.8)
RBC # UR STRIP: ABNORMAL /HPF
RBC # UR STRIP: ABNORMAL /HPF
REF LAB TEST METHOD: ABNORMAL
REF LAB TEST METHOD: ABNORMAL
SODIUM SERPL-SCNC: 147 MMOL/L (ref 136–145)
SODIUM UR-SCNC: 83 MMOL/L
SP GR UR STRIP: 1.01 (ref 1–1.03)
SP GR UR STRIP: 1.01 (ref 1–1.03)
SQUAMOUS #/AREA URNS HPF: ABNORMAL /HPF
SQUAMOUS #/AREA URNS HPF: ABNORMAL /HPF
TRIGL SERPL-MCNC: 88 MG/DL (ref 0–150)
TROPONIN T SERPL HS-MCNC: 37 NG/L
TROPONIN T SERPL HS-MCNC: 39 NG/L
TSH SERPL DL<=0.05 MIU/L-ACNC: 2.35 UIU/ML (ref 0.27–4.2)
UROBILINOGEN UR QL STRIP: ABNORMAL
UROBILINOGEN UR QL STRIP: ABNORMAL
VLDLC SERPL-MCNC: 16 MG/DL (ref 5–40)
WBC # UR STRIP: ABNORMAL /HPF
WBC # UR STRIP: ABNORMAL /HPF
WBC NRBC COR # BLD: 6.6 10*3/MM3 (ref 3.4–10.8)
WHOLE BLOOD HOLD COAG: NORMAL
WHOLE BLOOD HOLD SPECIMEN: NORMAL

## 2023-07-20 PROCEDURE — 84132 ASSAY OF SERUM POTASSIUM: CPT | Performed by: NURSE PRACTITIONER

## 2023-07-20 PROCEDURE — G0378 HOSPITAL OBSERVATION PER HR: HCPCS

## 2023-07-20 PROCEDURE — 84300 ASSAY OF URINE SODIUM: CPT | Performed by: INTERNAL MEDICINE

## 2023-07-20 PROCEDURE — 81001 URINALYSIS AUTO W/SCOPE: CPT | Performed by: EMERGENCY MEDICINE

## 2023-07-20 PROCEDURE — 84443 ASSAY THYROID STIM HORMONE: CPT | Performed by: NURSE PRACTITIONER

## 2023-07-20 PROCEDURE — 80053 COMPREHEN METABOLIC PANEL: CPT | Performed by: EMERGENCY MEDICINE

## 2023-07-20 PROCEDURE — 93005 ELECTROCARDIOGRAM TRACING: CPT

## 2023-07-20 PROCEDURE — 85025 COMPLETE CBC W/AUTO DIFF WBC: CPT | Performed by: EMERGENCY MEDICINE

## 2023-07-20 PROCEDURE — 96375 TX/PRO/DX INJ NEW DRUG ADDON: CPT

## 2023-07-20 PROCEDURE — 81001 URINALYSIS AUTO W/SCOPE: CPT | Performed by: INTERNAL MEDICINE

## 2023-07-20 PROCEDURE — 83036 HEMOGLOBIN GLYCOSYLATED A1C: CPT | Performed by: NURSE PRACTITIONER

## 2023-07-20 PROCEDURE — 71045 X-RAY EXAM CHEST 1 VIEW: CPT

## 2023-07-20 PROCEDURE — 83735 ASSAY OF MAGNESIUM: CPT | Performed by: NURSE PRACTITIONER

## 2023-07-20 PROCEDURE — 84484 ASSAY OF TROPONIN QUANT: CPT | Performed by: NURSE PRACTITIONER

## 2023-07-20 PROCEDURE — 99285 EMERGENCY DEPT VISIT HI MDM: CPT

## 2023-07-20 PROCEDURE — 93005 ELECTROCARDIOGRAM TRACING: CPT | Performed by: EMERGENCY MEDICINE

## 2023-07-20 PROCEDURE — 82570 ASSAY OF URINE CREATININE: CPT | Performed by: INTERNAL MEDICINE

## 2023-07-20 PROCEDURE — 83880 ASSAY OF NATRIURETIC PEPTIDE: CPT | Performed by: EMERGENCY MEDICINE

## 2023-07-20 PROCEDURE — 84156 ASSAY OF PROTEIN URINE: CPT | Performed by: INTERNAL MEDICINE

## 2023-07-20 PROCEDURE — 84484 ASSAY OF TROPONIN QUANT: CPT | Performed by: EMERGENCY MEDICINE

## 2023-07-20 PROCEDURE — 80061 LIPID PANEL: CPT | Performed by: NURSE PRACTITIONER

## 2023-07-20 RX ORDER — POLYETHYLENE GLYCOL 3350 17 G/17G
17 POWDER, FOR SOLUTION ORAL DAILY PRN
Status: DISCONTINUED | OUTPATIENT
Start: 2023-07-20 | End: 2023-07-22 | Stop reason: HOSPADM

## 2023-07-20 RX ORDER — ALLOPURINOL 300 MG/1
300 TABLET ORAL DAILY
Status: DISCONTINUED | OUTPATIENT
Start: 2023-07-21 | End: 2023-07-22 | Stop reason: HOSPADM

## 2023-07-20 RX ORDER — BISACODYL 5 MG/1
5 TABLET, DELAYED RELEASE ORAL DAILY PRN
Status: DISCONTINUED | OUTPATIENT
Start: 2023-07-20 | End: 2023-07-22 | Stop reason: HOSPADM

## 2023-07-20 RX ORDER — POTASSIUM CHLORIDE 20 MEQ/1
40 TABLET, EXTENDED RELEASE ORAL EVERY 4 HOURS
Status: COMPLETED | OUTPATIENT
Start: 2023-07-20 | End: 2023-07-20

## 2023-07-20 RX ORDER — FUROSEMIDE 20 MG/1
20 TABLET ORAL 2 TIMES DAILY
COMMUNITY

## 2023-07-20 RX ORDER — FUROSEMIDE 20 MG/1
20 TABLET ORAL 2 TIMES DAILY
Status: DISCONTINUED | OUTPATIENT
Start: 2023-07-20 | End: 2023-07-22

## 2023-07-20 RX ORDER — SODIUM CHLORIDE 0.9 % (FLUSH) 0.9 %
10 SYRINGE (ML) INJECTION EVERY 12 HOURS SCHEDULED
Status: DISCONTINUED | OUTPATIENT
Start: 2023-07-20 | End: 2023-07-22 | Stop reason: HOSPADM

## 2023-07-20 RX ORDER — DONEPEZIL HYDROCHLORIDE 5 MG/1
10 TABLET, FILM COATED ORAL NIGHTLY
Status: DISCONTINUED | OUTPATIENT
Start: 2023-07-21 | End: 2023-07-22 | Stop reason: HOSPADM

## 2023-07-20 RX ORDER — CHOLECALCIFEROL (VITAMIN D3) 25 MCG
2500 TABLET,CHEWABLE ORAL DAILY
COMMUNITY

## 2023-07-20 RX ORDER — LOSARTAN POTASSIUM 25 MG/1
25 TABLET ORAL 2 TIMES DAILY
COMMUNITY

## 2023-07-20 RX ORDER — ACETAMINOPHEN 325 MG/1
650 TABLET ORAL EVERY 4 HOURS PRN
Status: DISCONTINUED | OUTPATIENT
Start: 2023-07-20 | End: 2023-07-22 | Stop reason: HOSPADM

## 2023-07-20 RX ORDER — SODIUM CHLORIDE 0.9 % (FLUSH) 0.9 %
10 SYRINGE (ML) INJECTION AS NEEDED
Status: DISCONTINUED | OUTPATIENT
Start: 2023-07-20 | End: 2023-07-22 | Stop reason: HOSPADM

## 2023-07-20 RX ORDER — SODIUM CHLORIDE 9 MG/ML
40 INJECTION, SOLUTION INTRAVENOUS AS NEEDED
Status: DISCONTINUED | OUTPATIENT
Start: 2023-07-20 | End: 2023-07-22 | Stop reason: HOSPADM

## 2023-07-20 RX ORDER — BISACODYL 10 MG
10 SUPPOSITORY, RECTAL RECTAL DAILY PRN
Status: DISCONTINUED | OUTPATIENT
Start: 2023-07-20 | End: 2023-07-22 | Stop reason: HOSPADM

## 2023-07-20 RX ORDER — LOSARTAN POTASSIUM 25 MG/1
25 TABLET ORAL 2 TIMES DAILY
Status: DISCONTINUED | OUTPATIENT
Start: 2023-07-20 | End: 2023-07-22 | Stop reason: HOSPADM

## 2023-07-20 RX ORDER — AMOXICILLIN 250 MG
2 CAPSULE ORAL 2 TIMES DAILY
Status: DISCONTINUED | OUTPATIENT
Start: 2023-07-20 | End: 2023-07-22 | Stop reason: HOSPADM

## 2023-07-20 RX ORDER — BUMETANIDE 0.25 MG/ML
2 INJECTION INTRAMUSCULAR; INTRAVENOUS ONCE
Status: COMPLETED | OUTPATIENT
Start: 2023-07-20 | End: 2023-07-20

## 2023-07-20 RX ORDER — CHOLECALCIFEROL (VITAMIN D3) 125 MCG
5 CAPSULE ORAL NIGHTLY PRN
Status: DISCONTINUED | OUTPATIENT
Start: 2023-07-20 | End: 2023-07-22 | Stop reason: HOSPADM

## 2023-07-20 RX ORDER — ALLOPURINOL 300 MG/1
300 TABLET ORAL DAILY
COMMUNITY

## 2023-07-20 RX ORDER — DONEPEZIL HYDROCHLORIDE 23 MG/1
23 TABLET, FILM COATED ORAL 2 TIMES DAILY
COMMUNITY

## 2023-07-20 RX ORDER — ONDANSETRON 2 MG/ML
4 INJECTION INTRAMUSCULAR; INTRAVENOUS EVERY 6 HOURS PRN
Status: DISCONTINUED | OUTPATIENT
Start: 2023-07-20 | End: 2023-07-22 | Stop reason: HOSPADM

## 2023-07-20 RX ORDER — METOLAZONE 5 MG/1
5 TABLET ORAL ONCE
Status: COMPLETED | OUTPATIENT
Start: 2023-07-20 | End: 2023-07-20

## 2023-07-20 RX ADMIN — Medication 10 ML: at 15:53

## 2023-07-20 RX ADMIN — Medication 10 ML: at 22:19

## 2023-07-20 RX ADMIN — POTASSIUM CHLORIDE 40 MEQ: 1500 TABLET, EXTENDED RELEASE ORAL at 19:56

## 2023-07-20 RX ADMIN — NITROGLYCERIN 1 INCH: 20 OINTMENT TOPICAL at 13:48

## 2023-07-20 RX ADMIN — METOLAZONE 5 MG: 5 TABLET ORAL at 16:33

## 2023-07-20 RX ADMIN — LOSARTAN POTASSIUM 25 MG: 25 TABLET, FILM COATED ORAL at 22:18

## 2023-07-20 RX ADMIN — BUMETANIDE 2 MG: 0.25 INJECTION INTRAMUSCULAR; INTRAVENOUS at 13:51

## 2023-07-20 RX ADMIN — FUROSEMIDE 20 MG: 20 TABLET ORAL at 22:18

## 2023-07-20 RX ADMIN — POTASSIUM CHLORIDE 40 MEQ: 1500 TABLET, EXTENDED RELEASE ORAL at 15:53

## 2023-07-20 RX ADMIN — SENNOSIDES AND DOCUSATE SODIUM 2 TABLET: 50; 8.6 TABLET ORAL at 22:18

## 2023-07-20 NOTE — CASE MANAGEMENT/SOCIAL WORK
Discharge Planning Assessment  [facility]     Patient Name: Hank Ponce  MRN: [id number]  Today's Date: 7/20/2023    Admit Date: 7/20/2023    Plan: Home   Discharge Needs Assessment       Row Name 07/20/23 1448       Resource/Environmental Concerns    Transportation Concerns none       Discharge Needs Assessment    Concerns to be Addressed denies needs/concerns at this time    Anticipated Changes Related to Illness none    Equipment Needed After Discharge none    Current Discharge Risk lives alone      Row Name 07/20/23 1447       Living Environment    People in Home alone    Current Living Arrangements home    Potentially Unsafe Housing Conditions none    Primary Care Provided by self    Provides Primary Care For no one    Family Caregiver if Needed child(lonny), adult    Family Caregiver Names Sons Bunny and     Quality of Family Relationships supportive;involved;helpful    Able to Return to Prior Arrangements yes       Resource/Environmental Concerns    Resource/Environmental Concerns none       Transition Planning    Patient/Family Anticipates Transition to home    Patient/Family Anticipated Services at Transition none    Transportation Anticipated family or friend will provide       Discharge Needs Assessment    Equipment Currently Used at Home none                   Discharge Plan       Row Name 07/20/23 1448       Plan    Plan Home    Patient/Family in Agreement with Plan yes    Plan Comments  spoke with patient and his sons Bunny and . Pt reports he lives alone and is IADLs. Sons assist with yardwork.Pt states he fixes own breakfast, but other meals are either brought in by family or he goes to their homes for meals. Sons report they as well as other family members check on patient at least daily. One of them will provide transportation for patient at [place]. Pt confirms pcp and pharmacy and declines enrollment in meds to bed. He denies difficulty obtaining  medications/food/transportation.  He intends to return home at discharge and currently denies dc needs.                  Continued Care and Services - Admitted Since 7/20/2023    Coordination has not been started for this encounter.          Demographic Summary       Row Name 07/20/23 1446       General Information    Admission Type observation    Arrived From home    Referral Source admission list    Reason for Consult discharge planning    Preferred Language English       Contact Information    Permission Granted to Share Info With ;family/designee                   Functional Status       Row Name 07/20/23 1447       Functional Status    Usual Activity Tolerance good    Current Activity Tolerance moderate       Functional Status, IADL    Medications independent    Meal Preparation assistive person    Housekeeping independent    Laundry independent    Shopping independent                        Cindi Arellano RN, [facility]  Office: [phone]  Fax: [phone]  [email or web address]      I met with patient in room wearing PPE: mask and glasses     Maintained distance greater than six feet and spent </=15 minutes in the room  Cindi Arellano RN

## 2023-07-20 NOTE — H&P
[facility] H&P    Patient Name: Hank Ponce  : 1940  MRN: [id number]  Primary Care Physician: Bunny Curran MD  Date of admission: 2023     Patient Care Team:  Bunny Curran MD as PCP - General (Internal Medicine)  Chema Curran MD as Consulting Physician (Cardiology)          Subjective   History Present Illness     Chief Complaint:   Chief Complaint   Patient presents with    Weakness - Generalized     General weakness, feeling bad no energy for 2 day. Little bit dizzy            Mr. Ponce is a 83 y.o.  presents to [facility] complaining of exertional dyspnea and chest tightness      History of Present Illness    Patient is an 83-year-old male who presents with his sons with complaints of chest tightness and worsening exertional dyspnea today.  He states he has had a 10 pound weight gain in the last 6 weeks.  Did note some diaphoresis while ambulating today but denies any chest heaviness or pressure and describes it as a tightness.  He does have a defibrillator but denies any shocks.    Review of Systems   Constitutional: Positive for diaphoresis. Negative for fever.   HENT:  Negative for congestion.    Cardiovascular:  Positive for dyspnea on exertion and leg swelling. Negative for syncope.   Respiratory:  Positive for shortness of breath. Negative for cough.    Gastrointestinal:  Negative for melena.   Genitourinary:  Negative for dysuria.   Neurological:  Negative for focal weakness.          Personal History     Past Medical History:   Past Medical History:   Diagnosis Date    Acid reflux disease     Allergic rhinitis     Arthritis     gouty    Atrial fibrillation     BBB (bundle branch block)     right    Bone spur 2017    neck    Congestive heart failure 10/08/2019    Congestive heart failure (CHF)     Coronary artery disease     non obstructive    Hypertension     Mitral regurgitation     Osteoarthritis     Presence of  biventricular implantable cardioverter-defibrillator (ICD) 07/29/2019    S/P ablation of atrial fibrillation 01/2017    Takotsubo cardiomyopathy     Tricuspid regurgitation     VT (ventricular tachycardia) 02/2018       Surgical History:      Past Surgical History:   Procedure Laterality Date    AMPUTATION      index and middle finger    CARDIAC ABLATION  08/22/2017    F    CARDIAC CATHETERIZATION  12/08/2016    non obst CAD; takotsubo CM    CARDIAC CATHETERIZATION  07/24/2017    treating medically    CARDIAC CATHETERIZATION N/A 1/19/2022    Procedure: Left Heart Cath;  Surgeon: Chidi Figueroa MD;  Location: [facility] CATH INVASIVE LOCATION;  Service: Cardiology;  Laterality: N/A;    CARDIAC DEFIBRILLATOR PLACEMENT  2017    Bs    CARDIAC ELECTROPHYSIOLOGY PROCEDURE N/A 4/14/2021    Procedure: AV node ablation;  Surgeon: Hira Ochoa MD;  Location: [facility] CATH INVASIVE LOCATION;  Service: Cardiovascular;  Laterality: N/A;    COLONOSCOPY N/A 6/29/2022    Procedure: COLONOSCOPY with polypectomy x5;  Surgeon: KAYLIN Ruiz MD;  Location: [facility] ENDOSCOPY;  Service: Gastroenterology;  Laterality: N/A;  post: diverticulosis, hemorrhoids, polyps    FRACTURE SURGERY  04/18/2021    jaw     INTERNAL CARDIAC DEFIBRILLATOR INSERTION  2017    BiV ICD BS    OTHER SURGICAL HISTORY  01/2017    Heart ablation-[facility]    ROTATOR CUFF REPAIR Right     SINUS SURGERY  02/2014           Family History: family history includes Alcohol abuse in his father; Parkinsonism in his mother. Otherwise pertinent FHx was reviewed and unremarkable.     Social History:  reports that he quit smoking about 33 years ago. His smoking use included cigarettes. He has been exposed to tobacco smoke. He has never used smokeless tobacco. He reports current alcohol use. He reports that he does not use drugs.      Medications:  Prior to Admission medications    Medication Sig Start Date End Date Taking? Authorizing Provider   allopurinol  (ZYLOPRIM) 300 MG tablet Take 1 tablet by mouth Daily.   Yes Nickolas Rankin MD   coenzyme Q10 100 MG capsule Take 2 capsules by mouth Daily.   Yes Nickolas Rankin MD   cyanocobalamin (VITAMIN B-12) 2500 MCG tablet tablet Take 1 tablet by mouth Daily.   Yes Nickolas Rankin MD   donepezil (ARICEPT) 23 MG tablet Take 1 tablet by mouth 2 (Two) Times a Day.   Yes Nickolas Rankin MD   furosemide (LASIX) 20 MG tablet Take 1 tablet by mouth 2 (Two) Times a Day.   Yes Nickolas Rankin MD   losartan (COZAAR) 25 MG tablet Take 1 tablet by mouth 2 (Two) Times a Day.   Yes Nickolas Rankin MD   rivaroxaban (XARELTO) 15 MG tablet Take 1 tablet by mouth Daily.   Yes Nickolas Rankin MD   vitamin D3 125 MCG (5000 UT) capsule capsule Take  by mouth Daily.   Yes Nickolas Rankin MD   vitamin B-12 (CYANOCOBALAMIN) 1000 MCG tablet Take 1 tablet by mouth Daily.  7/20/23 Yes Nickolas Rankin MD   acetaminophen-codeine (TYLENOL #3) 300-30 MG per tablet Take 1 tablet by mouth Every 6 (Six) Hours As Needed for Moderate Pain. 1/22/23 7/20/23  Salina Bartholomew PA   allopurinol (ZYLOPRIM) 300 MG tablet TAKE ONE TABLET BY MOUTH DAILY 2/13/23 7/20/23  Bunny Curran MD   calcium carbonate (OS-BRENDA) 600 MG tablet Take 1 tablet by mouth Daily.  7/20/23  Nickolas Rankin MD   donepezil (Aricept) 23 MG tablet Take 1 tablet by mouth Every Night. 7/11/23 7/20/23  Bunny Curran MD   furosemide (LASIX) 20 MG tablet Take 1 tablet by mouth 2 (Two) Times a Day.  Patient taking differently: Take 2 tablets by mouth Daily. 10/31/22 7/20/23  Chidi Figueroa MD   lidocaine (LIDODERM) 5 % Place 1 patch on the skin as directed by provider Daily. Remove & Discard patch within 12 hours or as directed by MD  Patient not taking: Reported on 7/11/2023 1/22/23 7/20/23  Salina Bartholomew PA   losartan (COZAAR) 25 MG tablet TAKE ONE TABLET BY MOUTH TWICE A DAY 6/5/23 7/20/23  Bunny Curran  MD Mir   Xarelto 15 MG tablet TAKE ONE TABLET BY MOUTH DAILY 3/27/23 7/20/23  Bunny Curran MD       Allergies:    Allergies   Allergen Reactions    Hydrocodone Urinary Retention       Objective   Objective     Vital Signs  Temp:  [97.6 °F (36.4 °C)] 97.6 °F (36.4 °C)  Heart Rate:  [76-89] 85  Resp:  [18-20] 18  BP: (126-146)/(67-91) 133/68  SpO2:  [97 %-98 %] 97 %  on   ;   Device (Oxygen Therapy): room air  Body mass index is 22.36 kg/m².    Physical Exam        Constitutional: Awake, alert; well-developed and well-nourished. No acute distress is noted.  Nontoxic in appearance.  Sons at bedside  HEENT: Normocephalic, atraumatic;  with intact EOM; oropharynx is pink and moist without exudate or erythema.  Neck: Supple, full range of motion without pain; no JVD  Cardiovascular: Regular rate and rhythm, normal S1-S2.  Murmur  Pulmonary: Respiratory effort regular nonlabored, breath sounds no rales  Abdomen: Soft, nontender nondistended with normoactive bowel sounds; no rebound or guarding.  Musculoskeletal: Independent range of motion of all extremities with no palpable tenderness or edema.  Neuro: Alert oriented x3, speech is clear and appropriate, GCS 15  Skin:  Fleshtone warm, dry, intact;       Results Review:  I have personally reviewed most recent cardiac tracings, lab results, and radiology images and interpretations and agree with findings, most notably: k 3.3, bnp 4280.    Results from last 7 days   Lab Units 07/20/23  1220   WBC 10*3/mm3 6.60   HEMOGLOBIN g/dL 16.9   HEMATOCRIT % 51.7*   PLATELETS 10*3/mm3 182     Results from last 7 days   Lab Units 07/20/23  1220   SODIUM mmol/L 147*   POTASSIUM mmol/L 3.3*   CHLORIDE mmol/L 106   CO2 mmol/L 29.0   BUN mg/dL 16   CREATININE mg/dL 1.27   GLUCOSE mg/dL 135*   CALCIUM mg/dL 9.3   ALT (SGPT) U/L 18   AST (SGOT) U/L 36   HSTROP T ng/L 39*   PROBNP pg/mL 4,280.0*     Estimated Creatinine Clearance: 48.4 mL/min (by RICH-G formula based on SCr of 1.27  mg/dL).  Brief Urine Lab Results  (Last result in the past 365 days)        Color   Clarity   Blood   Leuk Est   Nitrite   Protein   CREAT   Urine HCG        07/20/23 1322 Dark Yellow   Clear   Large (3+)   Trace   Negative   Trace                   Microbiology Results (last 10 days)       ** No results found for the last 240 hours. **            ECG/EMG Results (most recent)       Procedure Component Value Units Date/Time    ECG 12 Lead Other; weakness [[id number]] Collected: 07/20/23 1216     Updated: 07/20/23 1218     QT Interval 458 ms     Narrative:      HEART RATE= 87  bpm  RR Interval= 687  ms  IN Interval=   ms  P Horizontal Axis=   deg  P Front Axis=   deg  QRSD Interval= 165  ms  QT Interval= 458  ms  QRS Axis= 122  deg  T Wave Axis= 10  deg  - ABNORMAL ECG -  Atrial-sensed ventricular-paced complexes  RBBB and LPFB  Electronically Signed By:   Date and Time of Study: 2023-07-20 12:16:51                Results for orders placed during the hospital encounter of 01/03/22    Adult Transthoracic Echo Complete W/ Cont if Necessary Per Protocol    Interpretation Summary  · The left ventricular cavity is moderately dilated.  · Left ventricular ejection fraction appears to be 31 - 35%. Left ventricular systolic function is severely decreased.  · Left ventricular diastolic function is consistent with (grade II w/high LAP) pseudonormalization.  · The right ventricular cavity is moderate to severely dilated.  · Moderately reduced right ventricular systolic function noted.  · Left atrial volume is severely increased.  · The right atrial cavity is severely dilated.  · There is bileaflet mitral valve thickening present.  · Moderate mitral valve regurgitation is present with a posteriorly-directed jet noted.  · Moderate to severe tricuspid valve regurgitation is present.  · Estimated right ventricular systolic pressure from tricuspid regurgitation is moderately elevated (45-55 mmHg).  · Mild to moderate pulmonary  hypertension is present.      XR Chest 1 View    Result Date: 7/20/2023  Impression: 1. Findings consistent with central vascular congestion and interstitial pulmonary edema. 2. Moderate to marked cardiac enlargement, which appears increased compared to 10/18/2022. Electronically Signed: Kathieantoine Oshea  7/20/2023 1:06 PM EDT  Workstation ID: [id number]       Estimated Creatinine Clearance: 48.4 mL/min (by C-G formula based on SCr of 1.27 mg/dL).    Assessment & Plan   Assessment/Plan       Active Hospital Problems    Diagnosis  POA    **Acute exacerbation of CHF (congestive heart failure) [I50.9]  Yes      Resolved Hospital Problems   No resolved problems to display.     CHF exacerbation  - echo pending  - bnp >4000  - diuretics  - losartan, xarelto    Dementia  - aricept    HTN  - cozaar    HLD    CKD  - nephro consult  - no recent IV dye  - on lasix 20 bid    Defib/pacer  - interrogation pending        VTE Prophylaxis -   Mechanical Order History:        Ordered        07/20/23 1448  Place Sequential Compression Device  Once            07/20/23 1448  Maintain Sequential Compression Device  Continuous                          Pharmalogical Order History:       None            CODE STATUS:    There are no questions and answers to display.       This patient has been examined wearing personal protective equipment.     I discussed the patient's findings and my recommendations with patient and family.      Signature:Electronically signed by TRINA Sharp, 07/20/23, 2:58 PM EDT.

## 2023-07-20 NOTE — CONSULTS
INITIAL CONSULT NOTE      Patient Name: Hank Ponce  : 1940  MRN: [id number]  Primary Care Physician: Bunny Curran MD  Date of admission: 2023    Patient Care Team:  Bunny Curran MD as PCP - General (Internal Medicine)  Chema Curran MD as Consulting Physician (Cardiology)        Reason for Consult:       Bilateral lower extremity edema congestive heart failure  Subjective   History of Present Illness:   Chief Complaint:   Chief Complaint   Patient presents with    Weakness - Generalized     General weakness, feeling bad no energy for 2 day. Little bit dizzy       HISTORY:  Hank Ponce is a 83 y.o. male with past medical history of congestive heart failure with ejection fraction of 25% with some valvular heart disease.  With some history of atrial fibrillation, bundle branch block, mitral regurgitation, who presents with increasing shortness of breath increased swelling of the lower extremities has gained about 10 pounds recently.  Patient was started on some diuretics that was decreased today blood pressure is stable.  Urine output is adequate patient already have 900 cc urine output.  But still has significant lower extremity edema potassium already down to 3.2.  That would be readily placed sodium increased to 147.          Review of systems:  All other review of system unremarkable  Constitutional: No fever, no chills, no lethargy, no weakness.  HEENT:  No headache, otalgia, itchy eyes, nasal discharge or sore throat.  Cardiac:  No chest pain, dyspnea, orthopnea or PND.  Chest:              No cough, phlegm or wheezing.  Abdomen:  No abdominal pain, nausea or vomiting.  Neuro:  No focal weakness, abnormal movements orseizure like activity.  :   No hematuria, no pyuria, no dysuria, no flank pain.  ROS was otherwise negative except as mentioned in the [place].       Personal History:     Past Medical History:   Past Medical History:   Diagnosis  Date    Acid reflux disease     Allergic rhinitis     Arthritis     gouty    Atrial fibrillation     BBB (bundle branch block)     right    Bone spur 2017    neck    Congestive heart failure 10/08/2019    Congestive heart failure (CHF)     Coronary artery disease     non obstructive    Hypertension     Mitral regurgitation     Osteoarthritis     Presence of biventricular implantable cardioverter-defibrillator (ICD) 07/29/2019    S/P ablation of atrial fibrillation 01/2017    Takotsubo cardiomyopathy     Tricuspid regurgitation     VT (ventricular tachycardia) 02/2018       Surgical History:      Past Surgical History:   Procedure Laterality Date    AMPUTATION      index and middle finger    CARDIAC ABLATION  08/22/2017    [facility]    CARDIAC CATHETERIZATION  12/08/2016    non obst CAD; takotsubo CM    CARDIAC CATHETERIZATION  07/24/2017    treating medically    CARDIAC CATHETERIZATION N/A 1/19/2022    Procedure: Left Heart Cath;  Surgeon: Chidi Figueroa MD;  Location: [facility] CATH INVASIVE LOCATION;  Service: Cardiology;  Laterality: N/A;    CARDIAC DEFIBRILLATOR PLACEMENT  2017    Bs    CARDIAC ELECTROPHYSIOLOGY PROCEDURE N/A 4/14/2021    Procedure: AV node ablation;  Surgeon: Hira Ochoa MD;  Location: [facility] CATH INVASIVE LOCATION;  Service: Cardiovascular;  Laterality: N/A;    COLONOSCOPY N/A 6/29/2022    Procedure: COLONOSCOPY with polypectomy x5;  Surgeon: KAYLIN Ruiz MD;  Location: [facility] ENDOSCOPY;  Service: Gastroenterology;  Laterality: N/A;  post: diverticulosis, hemorrhoids, polyps    FRACTURE SURGERY  04/18/2021    jaw     INTERNAL CARDIAC DEFIBRILLATOR INSERTION  2017    BiV ICD BS    OTHER SURGICAL HISTORY  01/2017    Heart ablation-[facility]    ROTATOR CUFF REPAIR Right     SINUS SURGERY  02/2014       Family History: family history includes Alcohol abuse in his father; Parkinsonism in his mother. Otherwise pertinent FHx was reviewed and unremarkable.     Social History:  reports that he  quit smoking about 33 years ago. His smoking use included cigarettes. He has been exposed to tobacco smoke. He has never used smokeless tobacco. He reports current alcohol use. He reports that he does not use drugs.    Medications:  Prior to Admission medications    Medication Sig Start Date End Date Taking? Authorizing Provider   allopurinol (ZYLOPRIM) 300 MG tablet Take 1 tablet by mouth Daily.   Yes Nickolas Rankin MD   coenzyme Q10 100 MG capsule Take 2 capsules by mouth Daily.   Yes Nickolas Rankin MD   cyanocobalamin (VITAMIN B-12) 2500 MCG tablet tablet Take 1 tablet by mouth Daily.   Yes Nickolas Rankin MD   donepezil (ARICEPT) 23 MG tablet Take 1 tablet by mouth 2 (Two) Times a Day.   Yes Nickolas Rankin MD   furosemide (LASIX) 20 MG tablet Take 1 tablet by mouth 2 (Two) Times a Day.   Yes Nickolas Rankin MD   losartan (COZAAR) 25 MG tablet Take 1 tablet by mouth 2 (Two) Times a Day.   Yes Nickolas Rankin MD   rivaroxaban (XARELTO) 15 MG tablet Take 1 tablet by mouth Daily.   Yes Nickolas Rankin MD   vitamin D3 125 MCG (5000 UT) capsule capsule Take  by mouth Daily.   Yes Nickolas Rankin MD   vitamin B-12 (CYANOCOBALAMIN) 1000 MCG tablet Take 1 tablet by mouth Daily.  7/20/23 Yes Nickolas Rankin MD   acetaminophen-codeine (TYLENOL #3) 300-30 MG per tablet Take 1 tablet by mouth Every 6 (Six) Hours As Needed for Moderate Pain. 1/22/23 7/20/23  Salina Bartholomew PA   allopurinol (ZYLOPRIM) 300 MG tablet TAKE ONE TABLET BY MOUTH DAILY 2/13/23 7/20/23  Bunny Curran MD   calcium carbonate (OS-BRENDA) 600 MG tablet Take 1 tablet by mouth Daily.  7/20/23  Nickolas Rankin MD   donepezil (Aricept) 23 MG tablet Take 1 tablet by mouth Every Night. 7/11/23 7/20/23  Bunny Curran MD   furosemide (LASIX) 20 MG tablet Take 1 tablet by mouth 2 (Two) Times a Day.  Patient taking differently: Take 2 tablets by mouth Daily. 10/31/22 7/20/23  Henry  Chidi Causey MD   lidocaine (LIDODERM) 5 % Place 1 patch on the skin as directed by provider Daily. Remove & Discard patch within 12 hours or as directed by MD  Patient not taking: Reported on 7/11/2023 1/22/23 7/20/23  Salina Bartholomew PA   losartan (COZAAR) 25 MG tablet TAKE ONE TABLET BY MOUTH TWICE A DAY 6/5/23 7/20/23  Bunny Curran MD   Xarelto 15 MG tablet TAKE ONE TABLET BY MOUTH DAILY 3/27/23 7/20/23  Bunny Curran MD     Scheduled Meds:[START ON 7/21/2023] allopurinol, 300 mg, Oral, Daily  [START ON 7/21/2023] donepezil, 10 mg, Oral, Nightly  furosemide, 20 mg, Oral, BID  losartan, 25 mg, Oral, BID  potassium chloride ER, 40 mEq, Oral, Q4H  [START ON 7/21/2023] rivaroxaban, 15 mg, Oral, Daily  senna-docusate sodium, 2 tablet, Oral, BID  sodium chloride, 10 mL, Intravenous, Q12H      Continuous Infusions:   PRN Meds:  acetaminophen    senna-docusate sodium **AND** polyethylene glycol **AND** bisacodyl **AND** bisacodyl    melatonin    ondansetron    Potassium Replacement - Follow Nurse / BPA Driven Protocol    sodium chloride    sodium chloride  Allergies:    Allergies   Allergen Reactions    Hydrocodone Urinary Retention       Objective   Exam:     Vital Signs  Temp:  [97.3 °F (36.3 °C)-97.6 °F (36.4 °C)] 97.3 °F (36.3 °C)  Heart Rate:  [76-89] 77  Resp:  [16-20] 16  BP: (126-146)/(67-91) 131/87  SpO2:  [96 %-98 %] 96 %  on   ;   Device (Oxygen Therapy): room air  Body mass index is 22.36 kg/m².  EXAM  General: Elderly white male in no acute distress.    Head:      Normocephalic and atraumatic.    Eyes:      PERRL/EOM intact, conjunctivae and sclerae clear without nystagmus.    Neck:      No masses, thyromegaly,  trachea central   Lungs:    Mild crackles bilaterally to auscultation.    Heart:      Regular rate and rhythm, no murmur no gallop AICD  Abd:        Soft, nontender, not distended, bowel sounds positive, no shifting dullness.  Msk:        No deformity or scoliosis noted of  thoracic or lumbar spine.    Pulses:   Pulses normal in all 4 extremities.    Extremities:        No cyanosis or clubbing--+2-3 edema.    Neuro:    No focal deficits.   alert oriented x3  Skin:       Intact without lesions or rashes.    Psych:    Alert and cooperative; normal mood and affect; normal attention span       Results Review:  I have personally reviewed most recent Data :  [facility]  CBC    Results from last 7 days   Lab Units 07/20/23  1220   WBC 10*3/mm3 6.60   HEMOGLOBIN g/dL 16.9   PLATELETS 10*3/mm3 182     CMP   Results from last 7 days   Lab Units 07/20/23  1615 07/20/23  1220   SODIUM mmol/L  --  147*   POTASSIUM mmol/L 3.2* 3.3*   CHLORIDE mmol/L  --  106   CO2 mmol/L  --  29.0   BUN mg/dL  --  16   CREATININE mg/dL  --  1.27   GLUCOSE mg/dL  --  135*   ALBUMIN g/dL  --  4.1   BILIRUBIN mg/dL  --  2.1*   ALK PHOS U/L  --  113   AST (SGOT) U/L  --  36   ALT (SGPT) U/L  --  18     ABG      XR Chest 1 View    Result Date: 7/20/2023  Impression: 1. Findings consistent with central vascular congestion and interstitial pulmonary edema. 2. Moderate to marked cardiac enlargement, which appears increased compared to 10/18/2022. Electronically Signed: Kathie Oshea  7/20/2023 1:06 PM EDT  Workstation ID: [id number]     Results for orders placed during the hospital encounter of 01/03/22    Adult Transthoracic Echo Complete W/ Cont if Necessary Per Protocol    Interpretation Summary  · The left ventricular cavity is moderately dilated.  · Left ventricular ejection fraction appears to be 31 - 35%. Left ventricular systolic function is severely decreased.  · Left ventricular diastolic function is consistent with (grade II w/high LAP) pseudonormalization.  · The right ventricular cavity is moderate to severely dilated.  · Moderately reduced right ventricular systolic function noted.  · Left atrial volume is severely increased.  · The right atrial cavity is severely dilated.  · There is  bileaflet mitral valve thickening present.  · Moderate mitral valve regurgitation is present with a posteriorly-directed jet noted.  · Moderate to severe tricuspid valve regurgitation is present.  · Estimated right ventricular systolic pressure from tricuspid regurgitation is moderately elevated (45-55 mmHg).  · Mild to moderate pulmonary hypertension is present.        Assessment & Plan   Assessment and Plan:         Acute exacerbation of CHF (congestive heart failure)    ASSESSMENT:  Mild crackles x-ray consistent with increased infiltrate with bilateral lower extremity edema  Congestive heart failure with ejection fraction 25% with severe tricuspid valve regurgitation with some grade 2 diastolic dysfunction  Acute kidney injury at risk  Hypokalemia  Hyper natremia  Volume overload          PLAN :     Patient has both systolic and diastolic heart failure with significant edema at this time  As sodium level of increase with loop diuretics I will give a dose of thiazide diuretics to improve sodium level  Depending upon the potassium electrolytes may need to start Aldactone  Follow-up with volume status electrolytes  Already on losartan that need to be maximized slowly  Follow-up with repeat labs later today tomorrow morning  Follow-up with a urine studies  Renal ultrasound only if needed      Charly Cooper MD  [facility]  7/20/2023  17:28 EDT

## 2023-07-20 NOTE — PROGRESS NOTES
Patient is called for significant edema although renal functions are stable.  Studies patient ejection fraction is 21 to 25% with grade 2 diastolic dysfunctions with reduced right ventricular function with mitral valve regurgitation that is moderate severe tricuspid valve regurgitation some pulmonary hypertension is also present.  Patient is already on diuretics we will follow-up with a urine output we will readjust diuretics tomorrow depending upon the volume status

## 2023-07-20 NOTE — ED PROVIDER NOTES
Subjective   History of Present Illness  83-year-old complaining of chest tightness and dyspnea on exertion that was severe today.  He reports increasing shortness of breath over the last 3 days.  He reports symptoms suggestive of orthopnea.  He states that his legs feel little tight but nowhere near as badly swollen as they have been in the past.  He reports no fever chills or cough.  He states that he was a little sweaty while walking to the bathroom earlier today.  He reports no hemoptysis    Review of Systems   Constitutional:  Positive for diaphoresis and fatigue. Negative for chills and fever.   Respiratory:  Positive for chest tightness and shortness of breath. Negative for cough.    Cardiovascular:  Positive for chest pain and leg swelling. Negative for palpitations.   Gastrointestinal:  Negative for nausea.   Neurological:  Seizures: .[id number].   Hematological:  Does not bruise/bleed easily.   All other systems reviewed and are negative.    Past Medical History:   Diagnosis Date    Acid reflux disease     Allergic rhinitis     Arthritis     gouty    Atrial fibrillation     BBB (bundle branch block)     right    Bone spur 2017    neck    Congestive heart failure 10/08/2019    Congestive heart failure (CHF)     Coronary artery disease     non obstructive    Hypertension     Mitral regurgitation     Osteoarthritis     Presence of biventricular implantable cardioverter-defibrillator (ICD) 07/29/2019    S/P ablation of atrial fibrillation 01/2017    Takotsubo cardiomyopathy     Tricuspid regurgitation     VT (ventricular tachycardia) 02/2018       Allergies   Allergen Reactions    Hydrocodone Urinary Retention       Past Surgical History:   Procedure Laterality Date    AMPUTATION      index and middle finger    CARDIAC ABLATION  08/22/2017    [facility]    CARDIAC CATHETERIZATION  12/08/2016    non obst CAD; takotsubo CM    CARDIAC CATHETERIZATION  07/24/2017    treating medically    CARDIAC CATHETERIZATION N/A  2022    Procedure: Left Heart Cath;  Surgeon: Chidi Figueroa MD;  Location: [facility] CATH INVASIVE LOCATION;  Service: Cardiology;  Laterality: N/A;    CARDIAC DEFIBRILLATOR PLACEMENT      Bs    CARDIAC ELECTROPHYSIOLOGY PROCEDURE N/A 2021    Procedure: AV node ablation;  Surgeon: Hira Ochoa MD;  Location: [facility] CATH INVASIVE LOCATION;  Service: Cardiovascular;  Laterality: N/A;    COLONOSCOPY N/A 2022    Procedure: COLONOSCOPY with polypectomy x5;  Surgeon: KAYLIN Ruiz MD;  Location: [facility] ENDOSCOPY;  Service: Gastroenterology;  Laterality: N/A;  post: diverticulosis, hemorrhoids, polyps    FRACTURE SURGERY  2021    jaw     INTERNAL CARDIAC DEFIBRILLATOR INSERTION      BiV ICD BS    OTHER SURGICAL HISTORY  2017    Heart ablation-[facility]    ROTATOR CUFF REPAIR Right     SINUS SURGERY  2014       Family History   Problem Relation Age of Onset    Parkinsonism Mother     Alcohol abuse Father        Social History     Socioeconomic History    Marital status:    Tobacco Use    Smoking status: Former     Types: Cigarettes     Quit date:      Years since quittin.5     Passive exposure: Past    Smokeless tobacco: Never   Vaping Use    Vaping Use: Never used   Substance and Sexual Activity    Alcohol use: Yes     Comment: occasionally    Drug use: Never    Sexual activity: Defer     Is able to live independently with family checking on him daily      Objective   Physical Exam  Alert McWilliams Coma Scale 15   HEENT: Pupils equal and reactive to light. Conjunctivae are not injected. Normal tympanic membranes. Oropharynx and nares are normal.   Neck: Supple. Midline trachea. No JVD. No goiter.   Chest: Bibasilar Rales and equal breath sounds bilaterally, regular rate and rhythm without murmur or rub.  There is an S3   Abdomen: Positive bowel sounds, nontender, nondistended. No rebound or peritoneal signs. No CVA tenderness.   Extremities no clubbing. cyanosis  trace of pedal edema. Motor sensory exam is normal. The full range of motion is intact   Skin: Warm and dry, no rashes or petechia.   Lymphatic: No regional lymphadenopathy. No calf pain, swelling or Homans sign    Procedures           ED Course           Labs Reviewed   COMPREHENSIVE METABOLIC PANEL - Abnormal; Notable for the following components:       Result Value    Glucose 135 (*)     Sodium 147 (*)     Potassium 3.3 (*)     Total Bilirubin 2.1 (*)     eGFR 55.7 (*)     All other components within normal limits    Narrative:     GFR Normal >60  Chronic Kidney Disease <60  Kidney Failure <15    The GFR formula is only valid for adults with stable renal function between ages 18 and 70.   BNP (IN-HOUSE) - Abnormal; Notable for the following components:    proBNP 4,280.0 (*)     All other components within normal limits    Narrative:     Among patients with dyspnea, NT-proBNP is highly sensitive for the detection of acute congestive heart failure. In addition NT-proBNP of <300 pg/ml effectively rules out acute congestive heart failure with 99% negative predictive value.    Results may be falsely decreased if patient taking Biotin.     SINGLE HSTROPONIN T - Abnormal; Notable for the following components:    HS Troponin T 39 (*)     All other components within normal limits    Narrative:     High Sensitive Troponin T Reference Range:  <10.0 ng/L- Negative Female for AMI  <15.0 ng/L- Negative Male for AMI  >=10 - Abnormal Female indicating possible myocardial injury.  >=15 - Abnormal Male indicating possible myocardial injury.   Clinicians would have to utilize clinical acumen, EKG, Troponin, and serial changes to determine if it is an Acute Myocardial Infarction or myocardial injury due to an underlying chronic condition.        URINALYSIS W/ CULTURE IF INDICATED - Abnormal; Notable for the following components:    Color, UA Dark Yellow (*)     Blood, UA Large (3+) (*)     Protein, UA Trace (*)     Leuk Esterase, UA  Trace (*)     Urobilinogen, UA >=8.0 E.U./dL (*)     All other components within normal limits    Narrative:     In absence of clinical symptoms, the presence of pyuria, bacteria, and/or nitrites on the urinalysis result does not correlate with infection.   CBC WITH AUTO DIFFERENTIAL - Abnormal; Notable for the following components:    Hematocrit 51.7 (*)     .7 (*)     MCH 33.2 (*)     RDW 17.3 (*)     RDW-SD 60.4 (*)     Lymphocyte % 13.8 (*)     All other components within normal limits   URINALYSIS, MICROSCOPIC ONLY - Abnormal; Notable for the following components:    RBC, UA 31-50 (*)     WBC, UA 0-2 (*)     All other components within normal limits   RAINBOW DRAW    Narrative:     The following orders were created for panel order [place] Draw.  Procedure                               Abnormality         Status                     ---------                               -----------         ------                     Green Top (Gel)[[id number]]                                  Final result               Lavender Top[[id number]]                                     Final result               Gold Top - SST[[id number]]                                   Final result               Light Blue Top[[id number]]                                   Final result                 Please view results for these tests on the individual orders.   GREEN TOP   LAVENDER TOP   GOLD TOP - SST   LIGHT BLUE TOP   CBC AND DIFFERENTIAL    Narrative:     The following orders were created for panel order CBC & Differential.  Procedure                               Abnormality         Status                     ---------                               -----------         ------                     CBC Auto Differential[[id number]]        Abnormal            Final result                 Please view results for these tests on the individual orders.     Medications   nitroglycerin (NITROSTAT) ointment 1 inch (1 inch Topical Given 7/20/23 7533)    bumetanide (BUMEX) injection 2 mg (2 mg Intravenous Given 7/20/23 5117)                                     Medical Decision Making  The patient felt better with ER therapy and the relief started about 20 minutes after application of topical nitrates.  The patient will be placed in observation for cautious diuresis and serial troponin.  Echocardiogram may be of assistance in checking for decline in ejection fraction which is known to be impaired.  The pacemaker was interrogated results are pending at time of dictation    Problems Addressed:  Acute on chronic combined systolic and diastolic congestive heart failure: complicated acute illness or injury  Dyspnea on exertion: complicated acute illness or injury    Amount and/or Complexity of Data Reviewed  Independent Historian:      Details: Family  External Data Reviewed: ECG and notes.  Labs: ordered. Decision-making details documented in ED Course.  Radiology: ordered and independent interpretation performed.  ECG/medicine tests: ordered and independent interpretation performed.     Details: EKG showed atrial sensed complexes the patient has had a biventricular paced rhythm with interventricular conduction delay in the past  Discussion of management or test interpretation with external provider(s): Case discussed with observation area practitioner    Risk  OTC drugs.  Prescription drug management.  Decision regarding hospitalization.  Risk Details: Scope progression of congestive heart failure        Final diagnoses:   Acute on chronic combined systolic and diastolic congestive heart failure   Dyspnea on exertion       ED Disposition  ED Disposition       ED Disposition   Decision to Admit    Condition   --    Comment   --               No follow-up provider specified.       Medication List      No changes were made to your prescriptions during this visit.            Thiago Bourne MD  07/20/23 2426

## 2023-07-21 ENCOUNTER — APPOINTMENT (OUTPATIENT)
Dept: CARDIOLOGY | Facility: HOSPITAL | Age: 83
End: 2023-07-21
Payer: MEDICARE

## 2023-07-21 LAB
ALBUMIN SERPL-MCNC: 3.6 G/DL (ref 3.5–5.2)
ALBUMIN/GLOB SERPL: 1.2 G/DL
ALP SERPL-CCNC: 104 U/L (ref 39–117)
ALT SERPL W P-5'-P-CCNC: 16 U/L (ref 1–41)
ANION GAP SERPL CALCULATED.3IONS-SCNC: 11 MMOL/L (ref 5–15)
AST SERPL-CCNC: 29 U/L (ref 1–40)
BASOPHILS # BLD AUTO: 0.1 10*3/MM3 (ref 0–0.2)
BASOPHILS NFR BLD AUTO: 1.6 % (ref 0–1.5)
BILIRUB SERPL-MCNC: 2.4 MG/DL (ref 0–1.2)
BUN SERPL-MCNC: 17 MG/DL (ref 8–23)
BUN/CREAT SERPL: 12.7 (ref 7–25)
CALCIUM SPEC-SCNC: 9.6 MG/DL (ref 8.6–10.5)
CHLORIDE SERPL-SCNC: 100 MMOL/L (ref 98–107)
CO2 SERPL-SCNC: 32 MMOL/L (ref 22–29)
CREAT SERPL-MCNC: 1.34 MG/DL (ref 0.76–1.27)
DEPRECATED RDW RBC AUTO: 59.9 FL (ref 37–54)
EGFRCR SERPLBLD CKD-EPI 2021: 52.2 ML/MIN/1.73
EOSINOPHIL # BLD AUTO: 0.1 10*3/MM3 (ref 0–0.4)
EOSINOPHIL NFR BLD AUTO: 1.4 % (ref 0.3–6.2)
ERYTHROCYTE [DISTWIDTH] IN BLOOD BY AUTOMATED COUNT: 17.3 % (ref 12.3–15.4)
GLOBULIN UR ELPH-MCNC: 2.9 GM/DL
GLUCOSE SERPL-MCNC: 101 MG/DL (ref 65–99)
HCT VFR BLD AUTO: 50.8 % (ref 37.5–51)
HGB BLD-MCNC: 16.7 G/DL (ref 13–17.7)
LYMPHOCYTES # BLD AUTO: 1.5 10*3/MM3 (ref 0.7–3.1)
LYMPHOCYTES NFR BLD AUTO: 21.6 % (ref 19.6–45.3)
MAGNESIUM SERPL-MCNC: 1.9 MG/DL (ref 1.6–2.4)
MCH RBC QN AUTO: 33.2 PG (ref 26.6–33)
MCHC RBC AUTO-ENTMCNC: 32.8 G/DL (ref 31.5–35.7)
MCV RBC AUTO: 100.9 FL (ref 79–97)
MONOCYTES # BLD AUTO: 0.7 10*3/MM3 (ref 0.1–0.9)
MONOCYTES NFR BLD AUTO: 11 % (ref 5–12)
NEUTROPHILS NFR BLD AUTO: 4.4 10*3/MM3 (ref 1.7–7)
NEUTROPHILS NFR BLD AUTO: 64.4 % (ref 42.7–76)
NRBC BLD AUTO-RTO: 0.2 /100 WBC (ref 0–0.2)
PHOSPHATE SERPL-MCNC: 3 MG/DL (ref 2.5–4.5)
PLATELET # BLD AUTO: 164 10*3/MM3 (ref 140–450)
PMV BLD AUTO: 10 FL (ref 6–12)
POTASSIUM SERPL-SCNC: 3.4 MMOL/L (ref 3.5–5.2)
POTASSIUM SERPL-SCNC: 3.5 MMOL/L (ref 3.5–5.2)
PROT SERPL-MCNC: 6.5 G/DL (ref 6–8.5)
QT INTERVAL: 458 MS
RBC # BLD AUTO: 5.03 10*6/MM3 (ref 4.14–5.8)
SODIUM SERPL-SCNC: 143 MMOL/L (ref 136–145)
WBC NRBC COR # BLD: 6.8 10*3/MM3 (ref 3.4–10.8)

## 2023-07-21 PROCEDURE — 96365 THER/PROPH/DIAG IV INF INIT: CPT

## 2023-07-21 PROCEDURE — 80053 COMPREHEN METABOLIC PANEL: CPT | Performed by: INTERNAL MEDICINE

## 2023-07-21 PROCEDURE — 84100 ASSAY OF PHOSPHORUS: CPT | Performed by: INTERNAL MEDICINE

## 2023-07-21 PROCEDURE — G0378 HOSPITAL OBSERVATION PER HR: HCPCS

## 2023-07-21 PROCEDURE — 99214 OFFICE O/P EST MOD 30 MIN: CPT | Performed by: INTERNAL MEDICINE

## 2023-07-21 PROCEDURE — 93306 TTE W/DOPPLER COMPLETE: CPT | Performed by: INTERNAL MEDICINE

## 2023-07-21 PROCEDURE — 93306 TTE W/DOPPLER COMPLETE: CPT

## 2023-07-21 PROCEDURE — 96366 THER/PROPH/DIAG IV INF ADDON: CPT

## 2023-07-21 PROCEDURE — 97162 PT EVAL MOD COMPLEX 30 MIN: CPT

## 2023-07-21 PROCEDURE — 84132 ASSAY OF SERUM POTASSIUM: CPT | Performed by: EMERGENCY MEDICINE

## 2023-07-21 PROCEDURE — 97166 OT EVAL MOD COMPLEX 45 MIN: CPT

## 2023-07-21 PROCEDURE — 85025 COMPLETE CBC W/AUTO DIFF WBC: CPT | Performed by: INTERNAL MEDICINE

## 2023-07-21 PROCEDURE — 25010000002 MAGNESIUM SULFATE 2 GM/50ML SOLUTION: Performed by: INTERNAL MEDICINE

## 2023-07-21 PROCEDURE — 83735 ASSAY OF MAGNESIUM: CPT | Performed by: INTERNAL MEDICINE

## 2023-07-21 RX ORDER — METOPROLOL SUCCINATE 25 MG/1
25 TABLET, EXTENDED RELEASE ORAL
Status: DISCONTINUED | OUTPATIENT
Start: 2023-07-21 | End: 2023-07-22 | Stop reason: HOSPADM

## 2023-07-21 RX ORDER — POTASSIUM CHLORIDE 20 MEQ/1
40 TABLET, EXTENDED RELEASE ORAL ONCE
Status: COMPLETED | OUTPATIENT
Start: 2023-07-21 | End: 2023-07-21

## 2023-07-21 RX ORDER — POTASSIUM CHLORIDE 20 MEQ/1
20 TABLET, EXTENDED RELEASE ORAL ONCE
Status: COMPLETED | OUTPATIENT
Start: 2023-07-21 | End: 2023-07-21

## 2023-07-21 RX ORDER — MAGNESIUM SULFATE HEPTAHYDRATE 40 MG/ML
2 INJECTION, SOLUTION INTRAVENOUS ONCE
Status: COMPLETED | OUTPATIENT
Start: 2023-07-21 | End: 2023-07-21

## 2023-07-21 RX ORDER — POTASSIUM CHLORIDE 20 MEQ/1
40 TABLET, EXTENDED RELEASE ORAL
Status: COMPLETED | OUTPATIENT
Start: 2023-07-21 | End: 2023-07-22

## 2023-07-21 RX ORDER — POTASSIUM CHLORIDE 20 MEQ/1
40 TABLET, EXTENDED RELEASE ORAL EVERY 4 HOURS
Status: DISCONTINUED | OUTPATIENT
Start: 2023-07-21 | End: 2023-07-21

## 2023-07-21 RX ORDER — SPIRONOLACTONE 25 MG/1
12.5 TABLET ORAL DAILY
Status: DISCONTINUED | OUTPATIENT
Start: 2023-07-21 | End: 2023-07-22

## 2023-07-21 RX ADMIN — LOSARTAN POTASSIUM 25 MG: 25 TABLET, FILM COATED ORAL at 22:23

## 2023-07-21 RX ADMIN — Medication 10 ML: at 09:23

## 2023-07-21 RX ADMIN — LOSARTAN POTASSIUM 25 MG: 25 TABLET, FILM COATED ORAL at 09:22

## 2023-07-21 RX ADMIN — DONEPEZIL HYDROCHLORIDE 10 MG: 5 TABLET, FILM COATED ORAL at 22:23

## 2023-07-21 RX ADMIN — POTASSIUM CHLORIDE 20 MEQ: 1500 TABLET, EXTENDED RELEASE ORAL at 13:04

## 2023-07-21 RX ADMIN — MAGNESIUM SULFATE HEPTAHYDRATE 2 G: 2 INJECTION, SOLUTION INTRAVENOUS at 09:24

## 2023-07-21 RX ADMIN — SENNOSIDES AND DOCUSATE SODIUM 2 TABLET: 50; 8.6 TABLET ORAL at 09:22

## 2023-07-21 RX ADMIN — ALLOPURINOL 300 MG: 300 TABLET ORAL at 09:23

## 2023-07-21 RX ADMIN — RIVAROXABAN 15 MG: 15 TABLET, FILM COATED ORAL at 09:55

## 2023-07-21 RX ADMIN — METOPROLOL SUCCINATE 25 MG: 25 TABLET, EXTENDED RELEASE ORAL at 09:22

## 2023-07-21 RX ADMIN — FUROSEMIDE 20 MG: 20 TABLET ORAL at 09:23

## 2023-07-21 RX ADMIN — POTASSIUM CHLORIDE 40 MEQ: 1500 TABLET, EXTENDED RELEASE ORAL at 22:23

## 2023-07-21 RX ADMIN — FUROSEMIDE 20 MG: 20 TABLET ORAL at 22:23

## 2023-07-21 RX ADMIN — POTASSIUM CHLORIDE 40 MEQ: 1500 TABLET, EXTENDED RELEASE ORAL at 09:23

## 2023-07-21 RX ADMIN — Medication 10 ML: at 22:24

## 2023-07-21 RX ADMIN — SPIRONOLACTONE 12.5 MG: 25 TABLET ORAL at 09:23

## 2023-07-21 NOTE — CASE MANAGEMENT/SOCIAL WORK
Continued Stay Note  [facility]     Patient Name: Hank Ponce  MRN: [id number]  Today's Date: 7/21/2023    Admit Date: 7/20/2023    Plan: Return home   Discharge Plan       Row Name 07/21/23 1439       Plan    Plan Return home    Plan Comments Barriers: Echo results. At discharge Mr. Ponce plans to return home alone with family assistance. CM to follow as needed                      Phone communication or documentation only - no physical contact with patient or family.     Salina GANN,RN Case Manager  [facility]  Phone: Desk- [phone] cell- [phone]

## 2023-07-21 NOTE — PLAN OF CARE
Goal Outcome Evaluation:  Plan of Care Reviewed With: patient        Progress: improving  Outcome Evaluation: Patient was admitted for exacerbation of CHF with a BNP of 4,280. Patient is getting po lasix 2 times daily. His urine output has increased so this RN placed an external male cath of him per his request. Patient is resting well during the night with no complaints. Patient did have a run of 29 beat V-Tach during the night with no symptoms. VVS, chest rise and fall are noted. Care continues

## 2023-07-21 NOTE — PLAN OF CARE
Goal Outcome Evaluation:  Plan of Care Reviewed With: patient              84 y/o male who presented with SOA, weight gain, and weakness. Diagnosed with AE CHF. PMH includes: dementia. Pt from home alone and reports he is independent with ADLs, functional mobility with RW, and driving. Sibling is present and states hat family can help as much as needed at home. Patient denies falls. He uses STC in community and RW at home. He is pleasant and requests to weight himself. He only required SBA for bed mobility, transfers and ambulation with '. Patient has baseline gait deficits of forward flexed posture, poor proximity to RW. He is very systematic and aware of safety precautions. Patient is safe to d/c home with HHPT. Recommended RW use in community as well as at home.    Anticipated Discharge Disposition (PT): home with home health

## 2023-07-21 NOTE — THERAPY EVALUATION
Patient Name: Hank Ponce  : 1940    MRN: [id number]                              Today's Date: 2023       Admit Date: 2023    Visit Dx:     ICD-10-CM ICD-9-CM   1. Acute on chronic combined systolic and diastolic congestive heart failure  I50.43 428.43     428.0   2. Dyspnea on exertion  R06.09 786.09     Patient Active Problem List   Diagnosis    Presence of biventricular implantable cardioverter-defibrillator (ICD)    Permanent atrial fibrillation    Hyperlipidemia, mixed    Mitral regurgitation    Tricuspid regurgitation    Essential hypertension    Takotsubo cardiomyopathy    Dilated cardiomyopathy    Acute on chronic systolic congestive heart failure    Atrial flutter with rapid ventricular response    Low serum vitamin B12    Heart failure with reduced ejection fraction    Stage 3 chronic kidney disease    Nonsustained ventricular tachycardia    Gout    Acute exacerbation of CHF (congestive heart failure)     Past Medical History:   Diagnosis Date    Acid reflux disease     Allergic rhinitis     Arthritis     gouty    Atrial fibrillation     BBB (bundle branch block)     right    Bone spur 2017    neck    Congestive heart failure 10/08/2019    Congestive heart failure (CHF)     Coronary artery disease     non obstructive    Hypertension     Mitral regurgitation     Osteoarthritis     Presence of biventricular implantable cardioverter-defibrillator (ICD) 2019    S/P ablation of atrial fibrillation 2017    Takotsubo cardiomyopathy     Tricuspid regurgitation     VT (ventricular tachycardia) 2018     Past Surgical History:   Procedure Laterality Date    AMPUTATION      index and middle finger    CARDIAC ABLATION  2017    BHF    CARDIAC CATHETERIZATION  2016    non obst CAD; takotsubo CM    CARDIAC CATHETERIZATION  2017    treating medically    CARDIAC CATHETERIZATION N/A 2022    Procedure: Left Heart Cath;  Surgeon: Chidi Figueroa MD;   Location: [facility] CATH INVASIVE LOCATION;  Service: Cardiology;  Laterality: N/A;    CARDIAC DEFIBRILLATOR PLACEMENT  2017    Bs    CARDIAC ELECTROPHYSIOLOGY PROCEDURE N/A 4/14/2021    Procedure: AV node ablation;  Surgeon: Hira Ochoa MD;  Location: [facility] CATH INVASIVE LOCATION;  Service: Cardiovascular;  Laterality: N/A;    COLONOSCOPY N/A 6/29/2022    Procedure: COLONOSCOPY with polypectomy x5;  Surgeon: KAYLIN Ruiz MD;  Location: [facility] ENDOSCOPY;  Service: Gastroenterology;  Laterality: N/A;  post: diverticulosis, hemorrhoids, polyps    FRACTURE SURGERY  04/18/2021    jaw     INTERNAL CARDIAC DEFIBRILLATOR INSERTION  2017    BiV ICD BS    OTHER SURGICAL HISTORY  01/2017    Heart ablation-F    ROTATOR CUFF REPAIR Right     SINUS SURGERY  02/2014      General Information       Row Name 07/21/23 1541          General Information    Patient Profile Reviewed yes  -BL     Prior Level of Function independent:;all household mobility;community mobility;ADL's;home management;cooking;driving  Pt uses a RW at all times  -     Existing Precautions/Restrictions fall  -       Row Name 07/21/23 1541          Living Environment    People in Home alone  -       Row Name 07/21/23 1541          Cognition    Orientation Status (Cognition) oriented x 4  -       Row Name 07/21/23 1541          Safety Issues, Functional Mobility    Impairments Affecting Function (Mobility) endurance/activity tolerance  -               User Key  (r) = Recorded By, (t) = Taken By, (c) = Cosigned By      Initials Name Provider Type     Mar Macdonald OT Occupational Therapist                     Mobility/ADL's       Row Name 07/21/23 1543          Bed Mobility    Comment, (Bed Mobility) Pt up in chair on arrival  -       Row Name 07/21/23 1543          Transfers    Transfers sit-stand transfer  -       Row Name 07/21/23 1543          Sit-Stand Transfer    Sit-Stand [place] (Transfers) standby assist  -      Comment, (Sit-Stand Transfer) HHA to commode; non-slip socks; gait belt  -[facility] Name 07/21/23 1543          Functional Mobility    Functional Mobility- Ind. Level supervision required  -[facility] Name 07/21/23 1543          Activities of Daily Living    BADL Assessment/Intervention toileting;lower body dressing  -BL       Row Name 07/21/23 1543          Toileting Assessment/Training    [place] (Toileting) toileting skills;supervision  -     Position (Toileting) supported standing  -BL       Row Name 07/21/23 1543          Lower Body Dressing Assessment/Training    [place] (Lower Body Dressing) socks;doff;don;standby assist  -     Position (Lower Body Dressing) unsupported sitting  -               User Key  (r) = Recorded By, (t) = Taken By, (c) = Cosigned By      Initials Name Provider Type     Mar Macdonald OT Occupational Therapist                   Obj/Interventions       Row Name 07/21/23 1544          Sensory Assessment (Somatosensory)    Sensory Assessment (Somatosensory) sensation intact  -BL       Row Name 07/21/23 1544          Vision Assessment/Intervention    Visual Impairment/Limitations corrective lenses full-time  -BL       Row Name 07/21/23 1544          Range of Motion Comprehensive    General Range of Motion bilateral upper extremity ROM WFL  -[facility] Name 07/21/23 1544          Strength Comprehensive (MMT)    Comment, General Manual Muscle Testing (MMT) Assessment BUEs grossly 5/5  -BL       Row Name 07/21/23 1544          Motor Skills    Motor Skills functional endurance  -     Functional Endurance fair  -BL       Row Name 07/21/23 1544          Balance    Balance Assessment sitting static balance;sitting dynamic balance;standing static balance;standing dynamic balance  -     Static Sitting Balance independent  -     Dynamic Sitting Balance independent  -     Static Standing Balance supervision  -     Dynamic Standing Balance supervision   -BL     Position/Device Used, Standing Balance supported  -BL               User Key  (r) = Recorded By, (t) = Taken By, (c) = Cosigned By      Initials Name Provider Type     Mar Macdonald OT Occupational Therapist                   Goals/Plan       Row Name 07/21/23 1552          Dressing Goal 1 (OT)    Activity/Device (Dressing Goal 1, OT) dressing skills, all  -BL     [place]/Cues Needed (Dressing Goal 1, OT) modified independence  -BL     Time Frame (Dressing Goal 1, OT) 2 weeks  -BL       Row Name 07/21/23 1552          Toileting Goal 1 (OT)    Activity/Device (Toileting Goal 1, OT) toileting skills, all  -BL     [place] Level/Cues Needed (Toileting Goal 1, OT) modified independence  -BL     Time Frame (Toileting Goal 1, OT) 2 weeks  -BL       Row Name 07/21/23 1552          Grooming Goal 1 (OT)    Activity/Device (Grooming Goal 1, OT) grooming skills, all  -BL     [place] (Grooming Goal 1, OT) modified independence  -BL     Time Frame (Grooming Goal 1, OT) 2 weeks  -BL       Row Name 07/21/23 1552          Therapy Assessment/Plan (OT)    Planned Therapy Interventions (OT) activity tolerance training;BADL retraining;functional balance retraining;IADL retraining;occupation/activity based interventions;neuromuscular control/coordination retraining;patient/caregiver education/training;ROM/therapeutic exercise;strengthening exercise;transfer/mobility retraining  -BL               User Key  (r) = Recorded By, (t) = Taken By, (c) = Cosigned By      Initials Name Provider Type     Mar Macdonald OT Occupational Therapist                   Clinical Impression       Row Name 07/21/23 1545          Pain Assessment    Pretreatment Pain Rating 0/10 - no pain  -BL     Posttreatment Pain Rating 0/10 - no pain  -BL       Row Name 07/21/23 1546          Plan of Care Review    Plan of Care Reviewed With patient  -     Outcome Evaluation 84 y/o male presenting to [facility] with SOA, miah gain, and  weakness. PMH includes: acute CHF and JASKARAN. Pt from home alone and reports he is independent with ADLs, functional mobility with RW, and driving. At time of evaluation, pt A&)X4. Pt required supervision to transfer to commode. Pt supervision for toileting with HHA for balance support. Pt able to perform functional mobility with HHA x2 for functional mobility short distance. Pt presenting with decreased balance and endurance. OT recommending pt return home with HHOT. OT will follow.  -       Row Name 07/21/23 1545          Therapy Assessment/Plan (OT)    Criteria for Skilled Therapeutic Interventions Met (OT) yes;skilled treatment is necessary  -BL     Therapy Frequency (OT) 3 times/wk  -       Row Name 07/21/23 1545          Therapy Plan Review/Discharge Plan (OT)    Anticipated Discharge Disposition (OT) home with home health  -       Row Name 07/21/23 1545          Positioning and Restraints    Pre-Treatment Position sitting in chair/recliner  -BL     Post Treatment Position chair  -BL     In Chair notified nsg;call light within reach;encouraged to call for assist;exit alarm on  -BL               User Key  (r) = Recorded By, (t) = Taken By, (c) = Cosigned By      Initials Name Provider Type    Mar Hubbard, OT Occupational Therapist                   Outcome Measures       Row Name 07/21/23 1300          How much help from another person do you currently need...    Turning from your back to your side while in flat bed without using bedrails? 4  -RR     Moving from lying on back to sitting on the side of a flat bed without bedrails? 4  -RR     Moving to and from a bed to a chair (including a wheelchair)? 3  -RR     Standing up from a chair using your arms (e.g., wheelchair, bedside chair)? 3  -RR     Climbing 3-5 steps with a railing? 3  -RR     To walk in hospital room? 3  -RR     AM-PAC 6 Clicks Score (PT) 20  -RR     Highest level of mobility 6 --> Walked 10 steps or more  -RR               User Key   (r) = Recorded By, (t) = Taken By, (c) = Cosigned By      Initials Name Provider Type    RR Ayala Vazquez, RN Registered Nurse                    Occupational Therapy Education       Title: PT OT SLP Therapies (Done)       Topic: Occupational Therapy (Done)       Point: ADL training (Done)       Description:   Instruct learner(s) on proper safety adaptation and remediation techniques during self care or transfers.   Instruct in proper use of assistive devices.                  Learning Progress Summary             Patient Acceptance, E,TB, VU by  at 7/21/2023 1553                                         User Key       Initials Effective Dates Name Provider Type Discipline     09/22/22 -  Mar Macdonald OT Occupational Therapist OT                  OT Recommendation and Plan  Planned Therapy Interventions (OT): activity tolerance training, BADL retraining, functional balance retraining, IADL retraining, occupation/activity based interventions, neuromuscular control/coordination retraining, patient/caregiver education/training, ROM/therapeutic exercise, strengthening exercise, transfer/mobility retraining  Therapy Frequency (OT): 3 times/wk  Plan of Care Review  Plan of Care Reviewed With: patient  Outcome Evaluation: 84 y/o male presenting to [facility] with SOA, miah gain, and weakness. PMH includes: acute CHF and JASKARAN. Pt from home alone and reports he is independent with ADLs, functional mobility with RW, and driving. At time of evaluation, pt A&)X4. Pt required supervision to transfer to commode. Pt supervision for toileting with HHA for balance support. Pt able to perform functional mobility with HHA x2 for functional mobility short distance. Pt presenting with decreased balance and endurance. OT recommending pt return home with HHOT. OT will follow.     Time Calculation:         Time Calculation- OT       Row Name 07/21/23 3770             Time Calculation- OT    OT Start Time 1132  -      OT Stop Time 1152   -      OT Time Calculation (min) 20 min  -      OT Received On 07/21/23  -      OT - Next Appointment 07/24/23  -      OT Goal Re-Cert Due Date 08/04/23  -                User Key  (r) = Recorded By, (t) = Taken By, (c) = Cosigned By      Initials Name Provider Type    Mar Hubbard OT Occupational Therapist                           Mar Macdonald OT  7/21/2023

## 2023-07-21 NOTE — PROGRESS NOTES
[facility]&P    Patient Name: Hank Ponce  : 1940  MRN: [id number]  Primary Care Physician: Bunny Curran MD  Date of admission: 2023     Patient Care Team:  Bunny Curran MD as PCP - General (Internal Medicine)  Chema Curran MD as Consulting Physician (Cardiology)          Subjective   History Present Illness     Chief Complaint:   Chief Complaint   Patient presents with    Weakness - Generalized     General weakness, feeling bad no energy for 2 day. Little bit dizzy       Weakness    Mr. Ponce is a 83 y.o.  presents to [facility] complaining of weakness      History of Present Illness    This is a very pleasant 83-year-old gentleman well-known to me with history of nonischemic cardiomyopathy nonobstructive CAD, history of nonsustained VT recurrently, biventricular ICD in place, last heart cath  revealed nonobstructive CAD less than 50% in any distribution with reduced EF.  He was admitted with some weight gain fluid retention recently had his Lasix increased to twice daily by primary physician.  He received 2 mg of IV Bumex and has diuresed over 3 L since being here.  He also has significant supraclavicular retractions, lower weight than previous, diffuse weakness and he has had trouble getting out of calories and saying he is not very hungry at home.  Family has noticed his drop-off in oral intake contributing likely to his debility weakness and increasing frailty.  Counseling provided today for adequate calories to 1000 kim/day intake as well as good protein intake to avoid continued muscle wasting and allow recovery.  Today on encounter he appears largely euvolemic and he is on room air able to lie flat without significant issues.  He still complains of chief complaint of weakness largely.  No fevers chill sweats nausea vomiting or diarrhea.  No ICD shocks no anginal chest pain     Observation 23: Patient is a  83-year-old male presenting to the hospital shortness of breath and increased weight gain over the past few weeks.  Patient reports a 10 pound weight gain over the past 6 weeks.  Patient states his legs are slightly swollen but has seen them much worse.  Patient reports feeling much better today with increase in energy.  Patient states he has noticed an increase in urinary output.      Review of Systems   Constitutional: Positive for decreased appetite and malaise/fatigue.   HENT: Negative.     Eyes: Negative.    Cardiovascular:  Positive for dyspnea on exertion and leg swelling.   Respiratory:  Positive for cough and shortness of breath.    Endocrine: Negative.    Hematologic/Lymphatic: Negative.    Skin: Negative.    Musculoskeletal: Negative.    Gastrointestinal: Negative.    Genitourinary: Negative.    Neurological:  Positive for weakness.   Psychiatric/Behavioral: Negative.           Personal History     Past Medical History:   Past Medical History:   Diagnosis Date    Acid reflux disease     Allergic rhinitis     Arthritis     gouty    Atrial fibrillation     BBB (bundle branch block)     right    Bone spur 2017    neck    Congestive heart failure 10/08/2019    Congestive heart failure (CHF)     Coronary artery disease     non obstructive    Hypertension     Mitral regurgitation     Osteoarthritis     Presence of biventricular implantable cardioverter-defibrillator (ICD) 07/29/2019    S/P ablation of atrial fibrillation 01/2017    Takotsubo cardiomyopathy     Tricuspid regurgitation     VT (ventricular tachycardia) 02/2018       Surgical History:      Past Surgical History:   Procedure Laterality Date    AMPUTATION      index and middle finger    CARDIAC ABLATION  08/22/2017    BHF    CARDIAC CATHETERIZATION  12/08/2016    non obst CAD; takotsubo CM    CARDIAC CATHETERIZATION  07/24/2017    treating medically    CARDIAC CATHETERIZATION N/A 1/19/2022    Procedure: Left Heart Cath;  Surgeon: Chidi Figueroa  MD Padilla;  Location: [facility] CATH INVASIVE LOCATION;  Service: Cardiology;  Laterality: N/A;    CARDIAC DEFIBRILLATOR PLACEMENT  2017    Bs    CARDIAC ELECTROPHYSIOLOGY PROCEDURE N/A 4/14/2021    Procedure: AV node ablation;  Surgeon: Hira Ochoa MD;  Location: [facility] CATH INVASIVE LOCATION;  Service: Cardiovascular;  Laterality: N/A;    COLONOSCOPY N/A 6/29/2022    Procedure: COLONOSCOPY with polypectomy x5;  Surgeon: KAYLIN Ruiz MD;  Location: [facility] ENDOSCOPY;  Service: Gastroenterology;  Laterality: N/A;  post: diverticulosis, hemorrhoids, polyps    FRACTURE SURGERY  04/18/2021    jaw     INTERNAL CARDIAC DEFIBRILLATOR INSERTION  2017    BiV ICD BS    OTHER SURGICAL HISTORY  01/2017    Heart ablation-F    ROTATOR CUFF REPAIR Right     SINUS SURGERY  02/2014           Family History: family history includes Alcohol abuse in his father; Parkinsonism in his mother. Otherwise pertinent FHx was reviewed and unremarkable.     Social History:  reports that he quit smoking about 33 years ago. His smoking use included cigarettes. He has been exposed to tobacco smoke. He has never used smokeless tobacco. He reports current alcohol use. He reports that he does not use drugs.      Medications:  Prior to Admission medications    Medication Sig Start Date End Date Taking? Authorizing Provider   allopurinol (ZYLOPRIM) 300 MG tablet Take 1 tablet by mouth Daily.   Yes Nickolas Rankin MD   coenzyme Q10 100 MG capsule Take 2 capsules by mouth Daily.   Yes Nickolas Rankin MD   cyanocobalamin (VITAMIN B-12) 2500 MCG tablet tablet Take 1 tablet by mouth Daily.   Yes Nickolas Rankin MD   donepezil (ARICEPT) 23 MG tablet Take 1 tablet by mouth 2 (Two) Times a Day.   Yes Nickolas Rankin MD   furosemide (LASIX) 20 MG tablet Take 1 tablet by mouth 2 (Two) Times a Day.   Yes Nickolas Rankin MD   losartan (COZAAR) 25 MG tablet Take 1 tablet by mouth 2 (Two) Times a Day.   Yes Chucho  MD Nickolas   rivaroxaban (XARELTO) 15 MG tablet Take 1 tablet by mouth Daily.   Yes Provider, MD Nickolas   vitamin D3 125 MCG (5000 UT) capsule capsule Take  by mouth Daily.   Yes Provider, MD Nickolas       Allergies:    Allergies   Allergen Reactions    Hydrocodone Urinary Retention       Objective   Objective     Vital Signs  Temp:  [97.2 °F (36.2 °C)-98.2 °F (36.8 °C)] 97.8 °F (36.6 °C)  Heart Rate:  [70-85] 70  Resp:  [14-18] 14  BP: (105-142)/(68-87) 105/71  SpO2:  [93 %-98 %] 97 %  on   ;   Device (Oxygen Therapy): room air  Body mass index is 22.36 kg/m².    Physical Exam  Vitals and nursing note reviewed.   Constitutional:       Appearance: Normal appearance. He is underweight.   HENT:      Head: Normocephalic and atraumatic.      Right Ear: External ear normal.      Left Ear: External ear normal.      Nose: Nose normal.      Mouth/Throat:      Pharynx: Oropharynx is clear.   Eyes:      Extraocular Movements: Extraocular movements intact.      Conjunctiva/sclera: Conjunctivae normal.      Pupils: Pupils are equal, round, and reactive to light.   Cardiovascular:      Rate and Rhythm: Normal rate and regular rhythm.      Pulses: Normal pulses.      Heart sounds: Normal heart sounds.   Pulmonary:      Effort: Pulmonary effort is normal.      Breath sounds: Normal breath sounds.   Abdominal:      General: Bowel sounds are normal.      Palpations: Abdomen is soft.   Musculoskeletal:         General: Normal range of motion.      Cervical back: Normal range of motion.      Right lower leg: Edema present.      Left lower leg: Edema present.   Skin:     General: Skin is warm.      Capillary Refill: Capillary refill takes less than 2 seconds.   Neurological:      Mental Status: He is alert and oriented to person, place, and time.   Psychiatric:         Mood and Affect: Mood normal.         Behavior: Behavior normal.         Thought Content: Thought content normal.         Judgment: Judgment normal.          Results Review:  I have personally reviewed most recent cardiac tracings, lab results, microbiology results, and radiology images and interpretations and agree with findings, most notably: CBC, CMP, troponin, BNP, EKG, chest x-ray, echocardiogram.    Results from last 7 days   Lab Units 07/21/23  0507   WBC 10*3/mm3 6.80   HEMOGLOBIN g/dL 16.7   HEMATOCRIT % 50.8   PLATELETS 10*3/mm3 164     Results from last 7 days   Lab Units 07/21/23  0507 07/20/23  1615 07/20/23  1220   SODIUM mmol/L 143  --  147*   POTASSIUM mmol/L 3.4* 3.2* 3.3*   CHLORIDE mmol/L 100  --  106   CO2 mmol/L 32.0*  --  29.0   BUN mg/dL 17  --  16   CREATININE mg/dL 1.34*  --  1.27   GLUCOSE mg/dL 101*  --  135*   CALCIUM mg/dL 9.6  --  9.3   ALT (SGPT) U/L 16  --  18   AST (SGOT) U/L 29  --  36   HSTROP T ng/L  --  37* 39*   PROBNP pg/mL  --   --  4,280.0*     Estimated Creatinine Clearance: 45.9 mL/min (A) (by C-G formula based on SCr of 1.34 mg/dL (H)).  Brief Urine Lab Results  (Last result in the past 365 days)        Color   Clarity   Blood   Leuk Est   Nitrite   Protein   CREAT   Urine HCG        07/20/23 1828 Yellow   Clear   Small (1+)   Negative   Negative   Negative                   Microbiology Results (last 10 days)       ** No results found for the last 240 hours. **            ECG/EMG Results (most recent)       Procedure Component Value Units Date/Time    SCANNED - TELEMETRY   [[id number]] Resulted: 07/20/23     Updated: 07/20/23 1758    SCANNED - TELEMETRY   [[id number]] Resulted: 07/20/23     Updated: 07/20/23 2254    SCANNED - TELEMETRY   [[id number]] Resulted: 07/20/23     Updated: 07/21/23 0702    SCANNED - TELEMETRY   [[id number]] Resulted: 07/20/23     Updated: 07/21/23 0717    SCANNED - TELEMETRY   [[id number]] Resulted: 07/20/23     Updated: 07/21/23 0727    ECG 12 Lead Other; weakness [[id number]] Collected: 07/20/23 1216     Updated: 07/21/23 0749     QT Interval 458 ms     Narrative:      HEART RATE= 87  bpm  RR  Interval= 687  ms  MD Interval=   ms  P Horizontal Axis=   deg  P Front Axis=   deg  QRSD Interval= 165  ms  QT Interval= 458  ms  QRS Axis= 122  deg  T Wave Axis= 10  deg  - ABNORMAL ECG -  Atrial-sensed ventricular-paced complexes  RBBB and LPFB  Electronically Signed By: Thiago Bourne ([facility]) 21-Jul-2023 07:49:21  Date and Time of Study: 2023-07-20 12:16:51    SCANNED - TELEMETRY   [[id number]] Resulted: 07/20/23     Updated: 07/21/23 0758    SCANNED - TELEMETRY   [[id number]] Resulted: 07/20/23     Updated: 07/21/23 0812    SCANNED - TELEMETRY   [[id number]] Resulted: 07/20/23     Updated: 07/21/23 1129                Results for orders placed during the hospital encounter of 01/03/22    Adult Transthoracic Echo Complete W/ Cont if Necessary Per Protocol    Interpretation Summary  · The left ventricular cavity is moderately dilated.  · Left ventricular ejection fraction appears to be 31 - 35%. Left ventricular systolic function is severely decreased.  · Left ventricular diastolic function is consistent with (grade II w/high LAP) pseudonormalization.  · The right ventricular cavity is moderate to severely dilated.  · Moderately reduced right ventricular systolic function noted.  · Left atrial volume is severely increased.  · The right atrial cavity is severely dilated.  · There is bileaflet mitral valve thickening present.  · Moderate mitral valve regurgitation is present with a posteriorly-directed jet noted.  · Moderate to severe tricuspid valve regurgitation is present.  · Estimated right ventricular systolic pressure from tricuspid regurgitation is moderately elevated (45-55 mmHg).  · Mild to moderate pulmonary hypertension is present.      XR Chest 1 View    Result Date: 7/20/2023  Impression: 1. Findings consistent with central vascular congestion and interstitial pulmonary edema. 2. Moderate to marked cardiac enlargement, which appears increased compared to 10/18/2022. Electronically Signed: Kathie Oshea   7/20/2023 1:06 PM EDT  Workstation ID: [id number]       Estimated Creatinine Clearance: 45.9 mL/min (A) (by C-G formula based on SCr of 1.34 mg/dL (H)).    Assessment & Plan   Assessment/Plan       Active Hospital Problems    Diagnosis  POA    **Acute exacerbation of CHF (congestive heart failure) [I50.9]  Yes      Resolved Hospital Problems   No resolved problems to display.       Acute CHF exacerbation  Lab Results   Component Value Date    TROPONINT 37 (H) 07/20/2023    TROPONINT 39 (H) 07/20/2023    PROBNP 4,280.0 (H) 07/20/2023    PROBNP 2,039.0 (H) 10/18/2022     07/21/2023     -Echocardiogram pending   -Chest x-ray:central vascular congestion and interstitial pulmonary edema, moderate to marked cardiac enlargement, which appears increased compared to 10/18/2022  -Continue metoprolol, bumex   -Monitor I's and O's and daily weights  -2 g sodium diet  -Cardiology consulted      Dementia  - aricept     HTN  - cozaar     HLD  -Continue statin      CKD  Lab Results   Component Value Date    CREATININE 1.34 (H) 07/21/2023    BUN 17 07/21/2023    BCR 12.7 07/21/2023    EGFR 52.2 (L) 07/21/2023      -Continue bumex gently    -Avoid nephrotoxic medication IV dye unless urgently needed  -Monitor BMP and I's and O's while admitted  -Nephrology consulted-urinalysis showed small blood, 0-2 RBC, 0-2 WBC with normal sodium and creatinine urine     Defib/pacer  - interrogation showed no shocks           VTE Prophylaxis -   Mechanical Order History:        Ordered        07/20/23 1546  Place Sequential Compression Device  Once            07/20/23 1546  Maintain Sequential Compression Device  Continuous            07/20/23 1448  Place Sequential Compression Device  Once            07/20/23 1448  Maintain Sequential Compression Device  Continuous                          Pharmalogical Order History:        Ordered     Dose Route Frequency Stop    07/20/23 1546  rivaroxaban (XARELTO) tablet 15 mg         15 mg PO Daily --                     CODE STATUS:    Code Status and Medical Interventions:   Ordered at: 07/20/23 1546     Code Status (Patient has no pulse and is not breathing):    CPR (Attempt to Resuscitate)     Medical Interventions (Patient has pulse or is breathing):    Full Support     Release to patient:    Routine Release       This patient has been examined wearing personal protective equipment.     I discussed the patient's findings and my recommendations with patient, family, nursing staff, primary care team, and consulting provider.      Signature:Electronically signed by TRINA Smith, 07/21/23, 1:28 PM EDT.          I spent 35 minutes caring for Hank on this date of service. This time includes time spent by me in the following activities: reviewing tests, obtaining and/or reviewing a separately obtained history, performing a medically appropriate examination and/or evaluation, counseling and educating the patient/family/caregiver, ordering medications, tests, or procedures, referring and communicating with other health care professionals, documenting information in the medical record, independently interpreting results and communicating that information with the patient/family/caregiver, and care coordination.

## 2023-07-21 NOTE — PROGRESS NOTES
PROGRESS NOTE      Patient Name: Hank Ponce  : 1940  MRN: [id number]  Primary Care Physician: Bunny Curran MD  Date of admission: 2023    Patient Care Team:  Bunny Curran MD as PCP - General (Internal Medicine)  Chema Curran MD as Consulting Physician (Cardiology)        Subjective   Subjective:     Patient is feeling better with improvement in the edema and respiratory condition  Review of systems:  All other review of system unremarkable      Allergies:    Allergies   Allergen Reactions    Hydrocodone Urinary Retention       Objective   Exam:     Vital Signs  Temp:  [97.2 °F (36.2 °C)-98.2 °F (36.8 °C)] 97.8 °F (36.6 °C)  Heart Rate:  [70-77] 70  Resp:  [14-18] 14  BP: (105-142)/(71-86) 105/71  SpO2:  [93 %-98 %] 97 %  on   ;   Device (Oxygen Therapy): room air  Body mass index is 22.36 kg/m².    General: Elderly white male in no acute distress.    Head:      Normocephalic and atraumatic.    Eyes:      PERRL/EOM intact, conjunctiva and sclera clear with out nystagmus.    Neck:      No masses, thyromegaly,  trachea central with normal respiratory effort   Lungs:    Mild bilaterally to auscultation.    Heart:      Regular rate and rhythm, there is slight gallop   abd:        Soft, nontender, not distended, bowel sounds positive, no shifting dullness   Pulses:   Pulses palpable  Extr:        No cyanosis or clubbing--mild edema.    Neuro:    No focal deficits.   alert oriented x3  Skin:       Intact without lesions or rashes.    Psych:    Alert and cooperative; normal mood and affect; .      Results Review:  I have personally reviewed most recent Data :  CBC    Results from last 7 days   Lab Units 23  0507 23  1220   WBC 10*3/mm3 6.80 6.60   HEMOGLOBIN g/dL 16.7 16.9   PLATELETS 10*3/mm3 164 182     CMP   Results from last 7 days   Lab Units 23  0507 23  1615 23  1220   SODIUM mmol/L 143  --  147*   POTASSIUM mmol/L 3.4* 3.2* 3.3*    CHLORIDE mmol/L 100  --  106   CO2 mmol/L 32.0*  --  29.0   BUN mg/dL 17  --  16   CREATININE mg/dL 1.34*  --  1.27   GLUCOSE mg/dL 101*  --  135*   ALBUMIN g/dL 3.6  --  4.1   BILIRUBIN mg/dL 2.4*  --  2.1*   ALK PHOS U/L 104  --  113   AST (SGOT) U/L 29  --  36   ALT (SGPT) U/L 16  --  18     ABG      XR Chest 1 View    Result Date: 7/20/2023  Impression: 1. Findings consistent with central vascular congestion and interstitial pulmonary edema. 2. Moderate to marked cardiac enlargement, which appears increased compared to 10/18/2022. Electronically Signed: Kathie Oshea  7/20/2023 1:06 PM EDT  Workstation ID: [id number]     Results for orders placed during the hospital encounter of 01/03/22    Adult Transthoracic Echo Complete W/ Cont if Necessary Per Protocol    Interpretation Summary  · The left ventricular cavity is moderately dilated.  · Left ventricular ejection fraction appears to be 31 - 35%. Left ventricular systolic function is severely decreased.  · Left ventricular diastolic function is consistent with (grade II w/high LAP) pseudonormalization.  · The right ventricular cavity is moderate to severely dilated.  · Moderately reduced right ventricular systolic function noted.  · Left atrial volume is severely increased.  · The right atrial cavity is severely dilated.  · There is bileaflet mitral valve thickening present.  · Moderate mitral valve regurgitation is present with a posteriorly-directed jet noted.  · Moderate to severe tricuspid valve regurgitation is present.  · Estimated right ventricular systolic pressure from tricuspid regurgitation is moderately elevated (45-55 mmHg).  · Mild to moderate pulmonary hypertension is present.    Scheduled Meds:allopurinol, 300 mg, Oral, Daily  donepezil, 10 mg, Oral, Nightly  furosemide, 20 mg, Oral, BID  losartan, 25 mg, Oral, BID  metoprolol succinate XL, 25 mg, Oral, Q24H  rivaroxaban, 15 mg, Oral, Daily  senna-docusate sodium, 2 tablet, Oral, BID  sodium  chloride, 10 mL, Intravenous, Q12H  spironolactone, 12.5 mg, Oral, Daily      Continuous Infusions:   PRN Meds:  acetaminophen    senna-docusate sodium **AND** polyethylene glycol **AND** bisacodyl **AND** bisacodyl    melatonin    ondansetron    Potassium Replacement - Follow Nurse / BPA Driven Protocol    sodium chloride    sodium chloride    Assessment & Plan   Assessment and Plan:         Acute exacerbation of CHF (congestive heart failure)    ASSESSMENT:  Mild crackles x-ray consistent with increased infiltrate with bilateral lower extremity edema  Congestive heart failure with ejection fraction 25% with severe tricuspid valve regurgitation with some grade 2 diastolic dysfunction  Acute kidney injury at risk  Hypokalemia  Hyper natremia  Volume overload    PLAN :      Patient has both systolic and diastolic heart failure which is getting better with improvement in the volume status   Edema has improved significantly   Respiratory condition much better than before   Depending upon the labs and volume status may need an extra dose of metolazone  Okay to discharge home same dose of loop diuretics  Low-dose Aldactone needed but as blood pressure is slightly on the low side we will follow-up closely we will consider starting in 1 to 2 days  Follow-up with volume status electrolytes  Already on losartan that need to be maximized slowly  Follow-up with repeat labs later today tomorrow morning  Follow-up with a urine studies  Renal ultrasound only if needed  Slight increase in creatinine from yesterday might be related to some underlying volume depletion             Electronically signed by Charly Cooper MD,   [facility] kidney consultant  [phone]  7/21/2023  15:30 EDT

## 2023-07-21 NOTE — CONSULTS
Cardiology consult note  Chidi Figueroa MD, PhD      Patient Care Team:  Bunny Curran MD as PCP - General (Internal Medicine)  Chema Curran MD as Consulting Physician (Cardiology)    CHIEF COMPLAINT: Shortness of breath, weight gain, weakness    HISTORY OF PRESENT ILLNESS:    This is a very pleasant 83-year-old gentleman well-known to me with history of nonischemic cardiomyopathy nonobstructive CAD, history of nonsustained VT recurrently, biventricular ICD in place, last heart cath 2022 revealed nonobstructive CAD less than 50% in any distribution with reduced EF.  He was admitted with some weight gain fluid retention recently had his Lasix increased to twice daily by primary physician.  He received 2 mg of IV Bumex and has diuresed over 3 L since being here.  He also has significant supraclavicular retractions, lower weight than previous, diffuse weakness and he has had trouble getting out of calories and saying he is not very hungry at home.  Family has noticed his drop-off in oral intake contributing likely to his debility weakness and increasing frailty.  Counseling provided today for adequate calories to 1000 kim/day intake as well as good protein intake to avoid continued muscle wasting and allow recovery.  Today on encounter he appears largely euvolemic and he is on room air able to lie flat without significant issues.  He still complains of chief complaint of weakness largely.  No fevers chill sweats nausea vomiting or diarrhea.  No ICD shocks no anginal chest pain    Review of systems otherwise negative x14 point review of systems except as mentioned above  Historical data copied forward from previous encounters numerous unchanged    Telemetry demonstrates biventricular paced rhythm      Past Medical History:   Diagnosis Date    Acid reflux disease     Allergic rhinitis     Arthritis     gouty    Atrial fibrillation     BBB (bundle branch block)     right    Bone spur 2017    neck     Congestive heart failure 10/08/2019    Congestive heart failure (CHF)     Coronary artery disease     non obstructive    Hypertension     Mitral regurgitation     Osteoarthritis     Presence of biventricular implantable cardioverter-defibrillator (ICD) 2019    S/P ablation of atrial fibrillation 2017    Takotsubo cardiomyopathy     Tricuspid regurgitation     VT (ventricular tachycardia) 2018     Past Surgical History:   Procedure Laterality Date    AMPUTATION      index and middle finger    CARDIAC ABLATION  2017    BHF    CARDIAC CATHETERIZATION  2016    non obst CAD; takotsubo CM    CARDIAC CATHETERIZATION  2017    treating medically    CARDIAC CATHETERIZATION N/A 2022    Procedure: Left Heart Cath;  Surgeon: Chidi Figueroa MD;  Location: [facility] CATH INVASIVE LOCATION;  Service: Cardiology;  Laterality: N/A;    CARDIAC DEFIBRILLATOR PLACEMENT      Bs    CARDIAC ELECTROPHYSIOLOGY PROCEDURE N/A 2021    Procedure: AV node ablation;  Surgeon: Hira Ochoa MD;  Location: [facility] CATH INVASIVE LOCATION;  Service: Cardiovascular;  Laterality: N/A;    COLONOSCOPY N/A 2022    Procedure: COLONOSCOPY with polypectomy x5;  Surgeon: KAYLIN Ruiz MD;  Location: [facility] ENDOSCOPY;  Service: Gastroenterology;  Laterality: N/A;  post: diverticulosis, hemorrhoids, polyps    FRACTURE SURGERY  2021    jaw     INTERNAL CARDIAC DEFIBRILLATOR INSERTION      BiV ICD BS    OTHER SURGICAL HISTORY  2017    Heart ablation-[facility]    ROTATOR CUFF REPAIR Right     SINUS SURGERY  2014     Family History   Problem Relation Age of Onset    Parkinsonism Mother     Alcohol abuse Father      Social History     Tobacco Use    Smoking status: Former     Types: Cigarettes     Quit date:      Years since quittin.5     Passive exposure: Past    Smokeless tobacco: Never   Vaping Use    Vaping Use: Never used   Substance Use Topics    Alcohol use: Yes     Comment:  "occasionally    Drug use: Never     Medications Prior to Admission   Medication Sig Dispense Refill Last Dose    allopurinol (ZYLOPRIM) 300 MG tablet Take 1 tablet by mouth Daily.   7/20/2023 at 0700    coenzyme Q10 100 MG capsule Take 2 capsules by mouth Daily.   7/20/2023 at 0700    cyanocobalamin (VITAMIN B-12) 2500 MCG tablet tablet Take 1 tablet by mouth Daily.   7/20/2023 at 0700    donepezil (ARICEPT) 23 MG tablet Take 1 tablet by mouth 2 (Two) Times a Day.   7/20/2023 at 0700    furosemide (LASIX) 20 MG tablet Take 1 tablet by mouth 2 (Two) Times a Day.   7/20/2023 at 0700    losartan (COZAAR) 25 MG tablet Take 1 tablet by mouth 2 (Two) Times a Day.   7/20/2023 at 0700    rivaroxaban (XARELTO) 15 MG tablet Take 1 tablet by mouth Daily.   7/20/2023 at 0700    vitamin D3 125 MCG (5000 UT) capsule capsule Take  by mouth Daily.   7/20/2023 at 0700     Allergies:  Hydrocodone    REVIEW OF SYSTEMS:  Please see the above history of present illness for pertinent positives and negatives.  The remainder of the patient's systems have been reviewed and are negative.     Vital Signs  Temp:  [97.2 °F (36.2 °C)-98.2 °F (36.8 °C)] 97.3 °F (36.3 °C)  Heart Rate:  [70-89] 75  Resp:  [16-20] 18  BP: (116-146)/(67-91) 134/86    Flowsheet Rows      Flowsheet Row First Filed Value   Admission Height 188 cm (74\") Documented at 07/20/2023 1201   Admission Weight 79 kg (174 lb 2.6 oz) Documented at 07/20/2023 1201             Physical Exam:  Physical Exam   Constitutional: Patient appears stated age, frail deconditioned   HEENT:   Head: Normocephalic and atraumatic.   Eyes:  Pupils are equal, round, and reactive to light. EOM are intact. Sclerae are anicteric and noninjected.  Mouth and Throat: Patient has moist mucous membranes. Oropharynx is clear of any erythema or exudate.     Neck: Neck supple.  Trace to mild JVD present. No thyromegaly present. No lymphadenopathy present.  Cardiovascular: Regular rate, regular rhythm, 80s 1 " out of 6 stock ejection murmur S1 normal and S2 normal.  Exam reveals no gallop and no friction rub.  Pulmonary/Chest: Lungs are clear to auscultation bilaterally anteriorly  No respiratory distress. No wheezes. No rhonchi. No rales.   Abdominal: Soft. Bowel sounds are normal. No distension and no mass. There is no hepatosplenomegaly. There is no tenderness.   Musculoskeletal: Normal muscle tone  Extremities: Trace to 1+ ankle edema pulses are palpable in all 4 extremities.  Neurological: Patient is alert and oriented to person, place, and time. Cranial nerves II-XII are grossly intact with no focal deficits.  Skin: Skin is warm. No rash noted. Nails show no clubbing.  No cyanosis or erythema.     Results Review:    I reviewed the patient's new clinical results.  Lab Results (most recent)       Procedure Component Value Units Date/Time    Comprehensive Metabolic Panel [[id number]]  (Abnormal) Collected: 07/21/23 0507    Specimen: Blood from Arm, Left Updated: 07/21/23 0647     Glucose 101 mg/dL      BUN 17 mg/dL      Creatinine 1.34 mg/dL      Sodium 143 mmol/L      Potassium 3.4 mmol/L      Chloride 100 mmol/L      CO2 32.0 mmol/L      Calcium 9.6 mg/dL      Total Protein 6.5 g/dL      Albumin 3.6 g/dL      ALT (SGPT) 16 U/L      AST (SGOT) 29 U/L      Alkaline Phosphatase 104 U/L      Total Bilirubin 2.4 mg/dL      Globulin 2.9 gm/dL      A/G Ratio 1.2 g/dL      BUN/Creatinine Ratio 12.7     Anion Gap 11.0 mmol/L      eGFR 52.2 mL/min/1.73     Narrative:      GFR Normal >60  Chronic Kidney Disease <60  Kidney Failure <15    The GFR formula is only valid for adults with stable renal function between ages 18 and 70.    Magnesium [[id number]]  (Normal) Collected: 07/21/23 0507    Specimen: Blood from Arm, Left Updated: 07/21/23 0647     Magnesium 1.9 mg/dL     Phosphorus [[id number]]  (Normal) Collected: 07/21/23 0507    Specimen: Blood from Arm, Left Updated: 07/21/23 0647     Phosphorus 3.0 mg/dL     CBC &  Differential [[id number]]  (Abnormal) Collected: 07/21/23 0507    Specimen: Blood from Arm, Left Updated: 07/21/23 0612    Narrative:      The following orders were created for panel order CBC & Differential.  Procedure                               Abnormality         Status                     ---------                               -----------         ------                     CBC Auto Differential[[id number]]        Abnormal            Final result                 Please view results for these tests on the individual orders.    CBC Auto Differential [[id number]]  (Abnormal) Collected: 07/21/23 0507    Specimen: Blood from Arm, Left Updated: 07/21/23 0612     WBC 6.80 10*3/mm3      RBC 5.03 10*6/mm3      Hemoglobin 16.7 g/dL      Hematocrit 50.8 %      .9 fL      MCH 33.2 pg      MCHC 32.8 g/dL      RDW 17.3 %      RDW-SD 59.9 fl      MPV 10.0 fL      Platelets 164 10*3/mm3      Neutrophil % 64.4 %      Lymphocyte % 21.6 %      Monocyte % 11.0 %      Eosinophil % 1.4 %      Basophil % 1.6 %      Neutrophils, Absolute 4.40 10*3/mm3      Lymphocytes, Absolute 1.50 10*3/mm3      Monocytes, Absolute 0.70 10*3/mm3      Eosinophils, Absolute 0.10 10*3/mm3      Basophils, Absolute 0.10 10*3/mm3      nRBC 0.2 /100 WBC     Creatinine Urine Random (kidney function) GFR component - Urine, Clean Catch [[id number]] Collected: 07/20/23 1322    Specimen: Urine, Clean Catch Updated: 07/20/23 2052     Creatinine, Urine 68.1 mg/dL     Narrative:      Reference intervals for random urine have not been established.  Clinical usage is dependent upon physician's interpretation in combination with other laboratory tests.       Urinalysis, Microscopic Only - Urine, Clean Catch [[id number]]  (Abnormal) Collected: 07/20/23 1828    Specimen: Urine, Clean Catch Updated: 07/20/23 1851     RBC, UA 0-2 /HPF      WBC, UA 0-2 /HPF      Bacteria, UA None Seen /HPF      Squamous Epithelial Cells, UA 0-2 /HPF      Hyaline Casts, UA 3-6 /LPF       Methodology Automated Microscopy    Urinalysis With Microscopic If Indicated (No Culture) - Urine, Clean Catch [[id number]]  (Abnormal) Collected: 07/20/23 1828    Specimen: Urine, Clean Catch Updated: 07/20/23 1851     Color, UA Yellow     Appearance, UA Clear     pH, UA 6.5     Specific Gravity, UA 1.006     Glucose, UA Negative     Ketones, UA Negative     Bilirubin, UA Negative     Blood, UA Small (1+)     Protein, UA Negative     Leuk Esterase, UA Negative     Nitrite, UA Negative     Urobilinogen, UA 1.0 E.U./dL    Hemoglobin A1c [[id number]]  (Normal) Collected: 07/20/23 1220    Specimen: Blood Updated: 07/20/23 1816     Hemoglobin A1C 5.30 %     Sodium, Urine, Random - Urine, Clean Catch [[id number]] Collected: 07/20/23 1322    Specimen: Urine, Clean Catch Updated: 07/20/23 1745     Sodium, Urine 83 mmol/L     Narrative:      Reference intervals for random urine have not been established.  Clinical usage is dependent upon physician's interpretation in combination with other laboratory tests.       Protein, Urine, Random - Urine, Clean Catch [[id number]] Collected: 07/20/23 1322    Specimen: Urine, Clean Catch Updated: 07/20/23 1745     Total Protein, Urine 21.6 mg/dL     Narrative:      Reference intervals for random urine have not been established.  Clinical usage is dependent upon physician's interpretation in combination with other laboratory tests.       TSH [[id number]]  (Normal) Collected: 07/20/23 1220    Specimen: Blood Updated: 07/20/23 1654     TSH 2.350 uIU/mL     Potassium [[id number]]  (Abnormal) Collected: 07/20/23 1615    Specimen: Blood from Arm, Left Updated: 07/20/23 1652     Potassium 3.2 mmol/L     High Sensitivity Troponin T [[id number]]  (Abnormal) Collected: 07/20/23 1615    Specimen: Blood from Arm, Left Updated: 07/20/23 1652     HS Troponin T 37 ng/L     Narrative:      High Sensitive Troponin T Reference Range:  <10.0 ng/L- Negative Female for AMI  <15.0 ng/L- Negative Male for AMI  >=10  - Abnormal Female indicating possible myocardial injury.  >=15 - Abnormal Male indicating possible myocardial injury.   Clinicians would have to utilize clinical acumen, EKG, Troponin, and serial changes to determine if it is an Acute Myocardial Infarction or myocardial injury due to an underlying chronic condition.         Lipid Panel [[id number]]  (Abnormal) Collected: 07/20/23 1220    Specimen: Blood Updated: 07/20/23 1648     Total Cholesterol 161 mg/dL      Triglycerides 88 mg/dL      HDL Cholesterol 74 mg/dL      LDL Cholesterol  71 mg/dL      VLDL Cholesterol 16 mg/dL      LDL/HDL Ratio 0.94    Narrative:      Cholesterol Reference Ranges  (U.S. Department of Health and Human Services ATP III Classifications)    Desirable          <200 mg/dL  Borderline High    200-239 mg/dL  High Risk          >240 mg/dL      Triglyceride Reference Ranges  (U.S. Department of Health and Human Services ATP III Classifications)    Normal           <150 mg/dL  Borderline High  150-199 mg/dL  High             200-499 mg/dL  Very High        >500 mg/dL    HDL Reference Ranges  (U.S. Department of Health and Human Services ATP III Classifications)    Low     <40 mg/dl (major risk factor for CHD)  High    >60 mg/dl ('negative' risk factor for CHD)        LDL Reference Ranges  (U.S. Department of Health and Human Services ATP III Classifications)    Optimal          <100 mg/dL  Near Optimal     100-129 mg/dL  Borderline High  130-159 mg/dL  High             160-189 mg/dL  Very High        >189 mg/dL    Magnesium [[id number]]  (Normal) Collected: 07/20/23 1220    Specimen: Blood Updated: 07/20/23 1648     Magnesium 2.1 mg/dL     Urinalysis, Microscopic Only - Urine, Clean Catch [[id number]]  (Abnormal) Collected: 07/20/23 1322    Specimen: Urine, Clean Catch Updated: 07/20/23 1339     RBC, UA 31-50 /HPF      WBC, UA 0-2 /HPF      Comment: Urine culture not indicated.        Bacteria, UA None Seen /HPF      Squamous Epithelial Cells, UA  0-2 /HPF      Hyaline Casts, UA 0-2 /LPF      Methodology Automated Microscopy    Urinalysis With Culture If Indicated - Urine, Clean Catch [[id number]]  (Abnormal) Collected: 07/20/23 1322    Specimen: Urine, Clean Catch Updated: 07/20/23 1339     Color, UA Dark Yellow     Appearance, UA Clear     pH, UA 6.0     Specific Gravity, UA 1.013     Glucose, UA Negative     Ketones, UA Negative     Bilirubin, UA Negative     Blood, UA Large (3+)     Protein, UA Trace     Leuk Esterase, UA Trace     Nitrite, UA Negative     Urobilinogen, UA >=8.0 E.U./dL    Narrative:      In absence of clinical symptoms, the presence of pyuria, bacteria, and/or nitrites on the urinalysis result does not correlate with infection.    [place] Draw [[id number]] Collected: 07/20/23 1220    Specimen: Blood Updated: 07/20/23 1330    Narrative:      The following orders were created for panel order [place] Draw.  Procedure                               Abnormality         Status                     ---------                               -----------         ------                     Green Top (Gel)[[id number]]                                  Final result               Lavender Top[[id number]]                                     Final result               Gold Top - SST[[id number]]                                   Final result               Light Blue Top[[id number]]                                   Final result                 Please view results for these tests on the individual orders.    Light Blue Top [[id number]] Collected: 07/20/23 1220    Specimen: Blood Updated: 07/20/23 1330     Extra Tube Hold for add-ons.     Comment: Auto resulted       Lavender Top [[id number]] Collected: 07/20/23 1220    Specimen: Blood Updated: 07/20/23 1330     Extra Tube hold for add-on     Comment: Auto resulted       Gold Top - SST [[id number]] Collected: 07/20/23 1220    Specimen: Blood Updated: 07/20/23 1330     Extra Tube Hold for add-ons.     Comment: Auto  resulted.       Green Top (Gel) [[id number]] Collected: 07/20/23 1220    Specimen: Blood Updated: 07/20/23 1310     Extra Tube hide    BNP [[id number]]  (Abnormal) Collected: 07/20/23 1220    Specimen: Blood Updated: 07/20/23 1306     proBNP 4,280.0 pg/mL     Narrative:      Among patients with dyspnea, NT-proBNP is highly sensitive for the detection of acute congestive heart failure. In addition NT-proBNP of <300 pg/ml effectively rules out acute congestive heart failure with 99% negative predictive value.    Results may be falsely decreased if patient taking Biotin.      Single High Sensitivity Troponin T [[id number]]  (Abnormal) Collected: 07/20/23 1220    Specimen: Blood Updated: 07/20/23 1306     HS Troponin T 39 ng/L     Narrative:      High Sensitive Troponin T Reference Range:  <10.0 ng/L- Negative Female for AMI  <15.0 ng/L- Negative Male for AMI  >=10 - Abnormal Female indicating possible myocardial injury.  >=15 - Abnormal Male indicating possible myocardial injury.   Clinicians would have to utilize clinical acumen, EKG, Troponin, and serial changes to determine if it is an Acute Myocardial Infarction or myocardial injury due to an underlying chronic condition.         Comprehensive Metabolic Panel [[id number]]  (Abnormal) Collected: 07/20/23 1220    Specimen: Blood Updated: 07/20/23 1259     Glucose 135 mg/dL      BUN 16 mg/dL      Creatinine 1.27 mg/dL      Sodium 147 mmol/L      Potassium 3.3 mmol/L      Comment: Slight hemolysis detected by analyzer. Results may be affected.        Chloride 106 mmol/L      CO2 29.0 mmol/L      Calcium 9.3 mg/dL      Total Protein 6.9 g/dL      Albumin 4.1 g/dL      ALT (SGPT) 18 U/L      AST (SGOT) 36 U/L      Comment: Slight hemolysis detected by analyzer. Results may be affected.        Alkaline Phosphatase 113 U/L      Total Bilirubin 2.1 mg/dL      Globulin 2.8 gm/dL      A/G Ratio 1.5 g/dL      BUN/Creatinine Ratio 12.6     Anion Gap 12.0 mmol/L      eGFR 55.7  mL/min/1.73     Narrative:      GFR Normal >60  Chronic Kidney Disease <60  Kidney Failure <15    The GFR formula is only valid for adults with stable renal function between ages 18 and 70.    CBC & Differential [[id number]]  (Abnormal) Collected: 07/20/23 1220    Specimen: Blood Updated: 07/20/23 1245    Narrative:      The following orders were created for panel order CBC & Differential.  Procedure                               Abnormality         Status                     ---------                               -----------         ------                     CBC Auto Differential[[id number]]        Abnormal            Final result                 Please view results for these tests on the individual orders.    CBC Auto Differential [[id number]]  (Abnormal) Collected: 07/20/23 1220    Specimen: Blood Updated: 07/20/23 1245     WBC 6.60 10*3/mm3      RBC 5.08 10*6/mm3      Hemoglobin 16.9 g/dL      Hematocrit 51.7 %      .7 fL      MCH 33.2 pg      MCHC 32.7 g/dL      RDW 17.3 %      RDW-SD 60.4 fl      MPV 9.7 fL      Platelets 182 10*3/mm3      Neutrophil % 74.6 %      Lymphocyte % 13.8 %      Monocyte % 10.2 %      Eosinophil % 0.7 %      Basophil % 0.7 %      Neutrophils, Absolute 4.90 10*3/mm3      Lymphocytes, Absolute 0.90 10*3/mm3      Monocytes, Absolute 0.70 10*3/mm3      Eosinophils, Absolute 0.00 10*3/mm3      Basophils, Absolute 0.00 10*3/mm3      nRBC 0.0 /100 WBC             Imaging Results (Most Recent)       Procedure Component Value Units Date/Time    XR Chest 1 View [[id number]] Collected: 07/20/23 1305     Updated: 07/20/23 1308    Narrative:      XR CHEST 1 VW    Date of Exam: 7/20/2023 1:00 PM EDT    Indication: weakness, chf    Comparison: PA and lateral chest radiograph 10/18/2022.    Findings:  Moderate to marked cardiac enlargement is present, and appears increased compared to the prior study. Pulmonary vasculature appears enlarged, indistinct, suggesting congestive change, increased  from prior. Fine interstitial thickening is present in both   lungs consistent with mild edema. No significant pleural fluid collection is identified. Pacemaker/defibrillator appears unchanged. No visible pneumothorax.      Impression:      Impression:    1. Findings consistent with central vascular congestion and interstitial pulmonary edema.  2. Moderate to marked cardiac enlargement, which appears increased compared to 10/18/2022.      Electronically Signed: Kathie Barksdalejaci    7/20/2023 1:06 PM EDT    Workstation ID: [id number]          reviewed    ECG/EMG Results (most recent)       Procedure Component Value Units Date/Time    SCANNED - TELEMETRY   [[id number]] Resulted: 07/20/23     Updated: 07/20/23 1758    SCANNED - TELEMETRY   [[id number]] Resulted: 07/20/23     Updated: 07/20/23 2254    SCANNED - TELEMETRY   [[id number]] Resulted: 07/20/23     Updated: 07/21/23 0702    SCANNED - TELEMETRY   [[id number]] Resulted: 07/20/23     Updated: 07/21/23 0717    SCANNED - TELEMETRY   [[id number]] Resulted: 07/20/23     Updated: 07/21/23 0727    ECG 12 Lead Other; weakness [[id number]] Collected: 07/20/23 1216     Updated: 07/21/23 0749     QT Interval 458 ms     Narrative:      HEART RATE= 87  bpm  RR Interval= 687  ms  PA Interval=   ms  P Horizontal Axis=   deg  P Front Axis=   deg  QRSD Interval= 165  ms  QT Interval= 458  ms  QRS Axis= 122  deg  T Wave Axis= 10  deg  - ABNORMAL ECG -  Atrial-sensed ventricular-paced complexes  RBBB and LPFB  Electronically Signed By: Thiago Bourne ([facility]) 21-Jul-2023 07:49:21  Date and Time of Study: 2023-07-20 12:16:51    SCANNED - TELEMETRY   [[id number]] Resulted: 07/20/23     Updated: 07/21/23 0758    SCANNED - TELEMETRY   [[id number]] Resulted: 07/20/23     Updated: 07/21/23 0812          reviewed    Assessment & Plan     Acute on chronic systolic and diastolic CHF  Status post 2 mg of Bumex with large volume diuresis  Avoid further IV diuretics today  Restart oral Lasix twice  daily  Restart metoprolol extended release 25 daily  Start Aldactone 12.5 daily, increase to 25 if tolerated  60 mg in divided doses potassium chloride  2D echo is pending  No ICD shocks medication for interrogation    Hypertension controlled  Nonobstructive CAD and angina free  Frailty deconditioning, encourage p.o. intake, consider calorie count if does not adequate intake  Pure wick /Condom catheter in place for measure strict I's and O's Daily weights fluid salt restriction,      Patient volume status appears significantly improved, likely not far from discharge from the standpoint however he has significant debility and weakness, will see how his kidney function does, blood pressure response, new medications including metoprolol succinate and Aldactone in addition to his Lasix, daily weights prior to discharge    He can follow-up within 7 days after discharge with recheck of BMP outpatient once discharged home    Chidi Figueroa MD, PhD      I discussed the patient's findings and my recommendations with patient and .     Chidi Figueroa MD  07/21/23  08:19 EDT

## 2023-07-21 NOTE — THERAPY EVALUATION
Patient Name: Hank Ponce  : 1940    MRN: [id number]                              Today's Date: 2023       Admit Date: 2023    Visit Dx:     ICD-10-CM ICD-9-CM   1. Acute on chronic combined systolic and diastolic congestive heart failure  I50.43 428.43     428.0   2. Dyspnea on exertion  R06.09 786.09     Patient Active Problem List   Diagnosis    Presence of biventricular implantable cardioverter-defibrillator (ICD)    Permanent atrial fibrillation    Hyperlipidemia, mixed    Mitral regurgitation    Tricuspid regurgitation    Essential hypertension    Takotsubo cardiomyopathy    Dilated cardiomyopathy    Acute on chronic systolic congestive heart failure    Atrial flutter with rapid ventricular response    Low serum vitamin B12    Heart failure with reduced ejection fraction    Stage 3 chronic kidney disease    Nonsustained ventricular tachycardia    Gout    Acute exacerbation of CHF (congestive heart failure)     Past Medical History:   Diagnosis Date    Acid reflux disease     Allergic rhinitis     Arthritis     gouty    Atrial fibrillation     BBB (bundle branch block)     right    Bone spur 2017    neck    Congestive heart failure 10/08/2019    Congestive heart failure (CHF)     Coronary artery disease     non obstructive    Hypertension     Mitral regurgitation     Osteoarthritis     Presence of biventricular implantable cardioverter-defibrillator (ICD) 2019    S/P ablation of atrial fibrillation 2017    Takotsubo cardiomyopathy     Tricuspid regurgitation     VT (ventricular tachycardia) 2018     Past Surgical History:   Procedure Laterality Date    AMPUTATION      index and middle finger    CARDIAC ABLATION  2017    BHF    CARDIAC CATHETERIZATION  2016    non obst CAD; takotsubo CM    CARDIAC CATHETERIZATION  2017    treating medically    CARDIAC CATHETERIZATION N/A 2022    Procedure: Left Heart Cath;  Surgeon: Chidi Figueroa MD;   Location: [facility] CATH INVASIVE LOCATION;  Service: Cardiology;  Laterality: N/A;    CARDIAC DEFIBRILLATOR PLACEMENT  2017    Bs    CARDIAC ELECTROPHYSIOLOGY PROCEDURE N/A 4/14/2021    Procedure: AV node ablation;  Surgeon: Hira Ochoa MD;  Location: [facility] CATH INVASIVE LOCATION;  Service: Cardiovascular;  Laterality: N/A;    COLONOSCOPY N/A 6/29/2022    Procedure: COLONOSCOPY with polypectomy x5;  Surgeon: KAYLIN Ruiz MD;  Location: [facility] ENDOSCOPY;  Service: Gastroenterology;  Laterality: N/A;  post: diverticulosis, hemorrhoids, polyps    FRACTURE SURGERY  04/18/2021    jaw     INTERNAL CARDIAC DEFIBRILLATOR INSERTION  2017    BiV ICD BS    OTHER SURGICAL HISTORY  01/2017    Heart ablation-BHF    ROTATOR CUFF REPAIR Right     SINUS SURGERY  02/2014      General Information       Row Name 07/21/23 1634          Physical Therapy Time and Intention    Document Type evaluation  -SS     Mode of Treatment physical therapy  -       Row Name 07/21/23 1634          General Information    Patient Profile Reviewed yes  -SS     Prior Level of Function independent:;all household mobility;ADL's  -SS     Existing Precautions/Restrictions fall  -SS       Row Name 07/21/23 1634          Living Environment    People in Home alone  -SS       Row Name 07/21/23 1634          Home Main Entrance    Number of Stairs, Main Entrance one  -SS       Row Name 07/21/23 1634          Stairs Within Home, Primary    Number of Stairs, Within Home, Primary none  -SS       Row Name 07/21/23 1634          Cognition    Orientation Status (Cognition) oriented x 4  -SS       Row Name 07/21/23 1634          Safety Issues, Functional Mobility    Impairments Affecting Function (Mobility) endurance/activity tolerance;balance  -SS               User Key  (r) = Recorded By, (t) = Taken By, (c) = Cosigned By      Initials Name Provider Type    SS Nolvia Chand PT Physical Therapist                   Mobility       Row Name 07/21/23 1635           Bed Mobility    Bed Mobility bed mobility (all) activities  -     All Activities, Nicholson (Bed Mobility) supervision  -SS       Row Name 07/21/23 1634          Sit-Stand Transfer    Sit-Stand Nicholson (Transfers) standby assist  -     Assistive Device (Sit-Stand Transfers) walker, front-wheeled  -SS       Row Name 07/21/23 1634          Gait/Stairs (Locomotion)    Nicholson Level (Gait) standby assist  -     Assistive Device (Gait) walker, front-wheeled  -SS     Distance in Feet (Gait) 300'  -     Deviations/Abnormal Patterns (Gait) gait speed decreased  -     Bilateral Gait Deviations forward flexed posture  -               User Key  (r) = Recorded By, (t) = Taken By, (c) = Cosigned By      Initials Name Provider Type    SS Nolvia Chand PT Physical Therapist                   Obj/Interventions       Row Name 07/21/23 1635          Range of Motion Comprehensive    Comment, General Range of Motion B LE WFL  -       Row Name 07/21/23 1635          Strength Comprehensive (MMT)    Comment, General Manual Muscle Testing (MMT) Assessment B LE >3/5  -SS       Row Name 07/21/23 1635          Balance    Balance Assessment sitting static balance  -SS     Static Sitting Balance independent  -SS     Static Standing Balance standby assist  -SS     Position/Device Used, Standing Balance walker, front-wheeled  -SS       Row Name 07/21/23 1635          Sensory Assessment (Somatosensory)    Sensory Assessment (Somatosensory) sensation intact  -               User Key  (r) = Recorded By, (t) = Taken By, (c) = Cosigned By      Initials Name Provider Type    SS Nolvia Chand PT Physical Therapist                   Goals/Plan    No documentation.                  Clinical Impression       Row Name 07/21/23 1635          Pain    Pretreatment Pain Rating 0/10 - no pain  -     Posttreatment Pain Rating 0/10 - no pain  -       Row Name 07/21/23 1640 07/21/23 1635       Plan of Care  Review    Plan of Care Reviewed With -- patient  -SS    Outcome Evaluation 82 y/o male who presented with SOA, weight gain, and weakness. Diagnosed with AE CHF. PMH includes: dementia. Pt from home alone and reports he is independent with ADLs, functional mobility with RW, and driving. Sibling is present and states hat family can help as much as needed at home. Patient denies falls. He uses STC in community and RW at home. He is pleasant and requests to weight himself. He only required SBA for bed mobility, transfers and ambulation with '. Patient has baseline gait deficits of forward flexed posture, poor proximity to RW. He is very systematic and aware of safety precautions. Patient is safe to d/c home with HHPT. Recommended RW use in community as well as at home.  -SS --      Row Name 07/21/23 1638          Therapy Assessment/Plan (PT)    Criteria for Skilled Interventions Met (PT) no  -SS     Therapy Frequency (PT) evaluation only  -SS       Row Name 07/21/23 1636          Positioning and Restraints    Pre-Treatment Position in bed  -SS     Post Treatment Position bed  -SS               User Key  (r) = Recorded By, (t) = Taken By, (c) = Cosigned By      Initials Name Provider Type    SS Nolvia Chand, PT Physical Therapist                   Outcome Measures       Row Name 07/21/23 1300          How much help from another person do you currently need...    Turning from your back to your side while in flat bed without using bedrails? 4  -RR     Moving from lying on back to sitting on the side of a flat bed without bedrails? 4  -RR     Moving to and from a bed to a chair (including a wheelchair)? 3  -RR     Standing up from a chair using your arms (e.g., wheelchair, bedside chair)? 3  -RR     Climbing 3-5 steps with a railing? 3  -RR     To walk in hospital room? 3  -RR     AM-PAC 6 Clicks Score (PT) 20  -RR     Highest level of mobility 6 --> Walked 10 steps or more  -RR               User Key  (r) =  Recorded By, (t) = Taken By, (c) = Cosigned By      Initials Name Provider Type    RR Ayala Vazquez, RN Registered Nurse                                 Physical Therapy Education       Title: PT OT SLP Therapies (Done)       Topic: Physical Therapy (Done)       Point: Mobility training (Done)       Learning Progress Summary             Patient Acceptance, E, VU by  at 7/21/2023 1636                                         User Key       Initials Effective Dates Name Provider Type Discipline     06/16/21 -  Nolvia Chand, PT Physical Therapist PT                  PT Recommendation and Plan     Plan of Care Reviewed With: patient  Outcome Evaluation: 84 y/o male who presented with SOA, weight gain, and weakness. Diagnosed with AE CHF. PMH includes: dementia. Pt from home alone and reports he is independent with ADLs, functional mobility with RW, and driving. Sibling is present and states hat family can help as much as needed at home. Patient denies falls. He uses STC in community and RW at home. He is pleasant and requests to weight himself. He only required SBA for bed mobility, transfers and ambulation with '. Patient has baseline gait deficits of forward flexed posture, poor proximity to RW. He is very systematic and aware of safety precautions. Patient is safe to d/c home with HHPT. Recommended RW use in community as well as at home.     Time Calculation:   PT Evaluation Complexity  History, PT Evaluation Complexity: 3 or more personal factors and/or comorbidities  Examination of Body Systems (PT Eval Complexity): total of 4 or more elements  Clinical Presentation (PT Evaluation Complexity): evolving  Clinical Decision Making (PT Evaluation Complexity): moderate complexity  Overall Complexity (PT Evaluation Complexity): moderate complexity     PT Charges       Row Name 07/21/23 1636             Time Calculation    Start Time 1346  -SS      Stop Time 1406  -SS      Time Calculation (min) 20 min   -SS      PT Received On 07/21/23  -SS         Time Calculation- PT    Total Timed Code Minutes- PT 0 minute(s)  -SS                User Key  (r) = Recorded By, (t) = Taken By, (c) = Cosigned By      Initials Name Provider Type    SS Nolvia Chand, PT Physical Therapist                  Therapy Charges for Today       Code Description Service Date Service Provider Modifiers Qty    47436809003 HC PT EVAL MOD COMPLEXITY 4 7/21/2023 Nolvia Chand, PT GP 1            PT G-Codes  AM-PAC 6 Clicks Score (PT): 20  PT Discharge Summary  Anticipated Discharge Disposition (PT): home with home health    Nolvia Chand, PT  7/21/2023

## 2023-07-21 NOTE — PLAN OF CARE
Goal Outcome Evaluation:  Plan of Care Reviewed With: patient           Outcome Evaluation: 84 y/o male presenting to [facility] with SOA, miah gain, and weakness. PMH includes: acute CHF and JASKARAN. Pt from home alone and reports he is independent with ADLs, functional mobility with RW, and driving. At time of evaluation, pt A&)X4. Pt required supervision to transfer to commode. Pt supervision for toileting with HHA for balance support. Pt able to perform functional mobility with HHA x2 for functional mobility short distance. Pt presenting with decreased balance and endurance. OT recommending pt return home with HHOT. OT will follow.      Anticipated Discharge Disposition (OT): home with home health

## 2023-07-22 VITALS
WEIGHT: 156.31 LBS | RESPIRATION RATE: 23 BRPM | SYSTOLIC BLOOD PRESSURE: 97 MMHG | OXYGEN SATURATION: 96 % | HEART RATE: 72 BPM | DIASTOLIC BLOOD PRESSURE: 57 MMHG | TEMPERATURE: 97.7 F | HEIGHT: 74 IN | BODY MASS INDEX: 20.06 KG/M2

## 2023-07-22 LAB
ANION GAP SERPL CALCULATED.3IONS-SCNC: 8 MMOL/L (ref 5–15)
BH CV ECHO MEAS - ACS: 2.05 CM
BH CV ECHO MEAS - AI P1/2T: 448.1 MSEC
BH CV ECHO MEAS - AO MAX PG: 3.7 MMHG
BH CV ECHO MEAS - AO MEAN PG: 1.9 MMHG
BH CV ECHO MEAS - AO ROOT DIAM: 3.1 CM
BH CV ECHO MEAS - AO V2 MAX: 96.5 CM/SEC
BH CV ECHO MEAS - AO V2 VTI: 14.6 CM
BH CV ECHO MEAS - AVA(I,D): 2.7 CM2
BH CV ECHO MEAS - EDV(CUBED): 186.6 ML
BH CV ECHO MEAS - EDV(MOD-SP4): 141.9 ML
BH CV ECHO MEAS - EF(MOD-SP4): 38.8 %
BH CV ECHO MEAS - ESV(CUBED): 101.7 ML
BH CV ECHO MEAS - ESV(MOD-SP4): 86.8 ML
BH CV ECHO MEAS - FS: 18.3 %
BH CV ECHO MEAS - IVS/LVPW: 0.94 CM
BH CV ECHO MEAS - IVSD: 1.06 CM
BH CV ECHO MEAS - LV DIASTOLIC VOL/BSA (35-75): 418.1 CM2
BH CV ECHO MEAS - LV MASS(C)D: 256.4 GRAMS
BH CV ECHO MEAS - LV MAX PG: 0.92 MMHG
BH CV ECHO MEAS - LV MEAN PG: 0.5 MMHG
BH CV ECHO MEAS - LV SYSTOLIC VOL/BSA (12-30): 255.8 CM2
BH CV ECHO MEAS - LV V1 MAX: 47.8 CM/SEC
BH CV ECHO MEAS - LV V1 VTI: 8.5 CM
BH CV ECHO MEAS - LVIDD: 5.7 CM
BH CV ECHO MEAS - LVIDS: 4.7 CM
BH CV ECHO MEAS - LVOT AREA: 4.6 CM2
BH CV ECHO MEAS - LVOT DIAM: 2.42 CM
BH CV ECHO MEAS - LVPWD: 1.13 CM
BH CV ECHO MEAS - MR MAX PG: 93.8 MMHG
BH CV ECHO MEAS - MR MAX VEL: 484.2 CM/SEC
BH CV ECHO MEAS - MV A MAX VEL: 20.1 CM/SEC
BH CV ECHO MEAS - MV DEC SLOPE: 1091 CM/SEC2
BH CV ECHO MEAS - MV DEC TIME: 0.06 MSEC
BH CV ECHO MEAS - MV E MAX VEL: 70.6 CM/SEC
BH CV ECHO MEAS - MV E/A: 3.5
BH CV ECHO MEAS - MV MAX PG: 5 MMHG
BH CV ECHO MEAS - MV MEAN PG: 1.39 MMHG
BH CV ECHO MEAS - MV V2 VTI: 17.5 CM
BH CV ECHO MEAS - MVA(VTI): 2.21 CM2
BH CV ECHO MEAS - PA ACC TIME: 0.11 SEC
BH CV ECHO MEAS - PA V2 MAX: 117.5 CM/SEC
BH CV ECHO MEAS - RAP SYSTOLE: 3 MMHG
BH CV ECHO MEAS - RV MAX PG: 2 MMHG
BH CV ECHO MEAS - RV V1 MAX: 70.7 CM/SEC
BH CV ECHO MEAS - RV V1 VTI: 18.7 CM
BH CV ECHO MEAS - RVDD: 4.5 CM
BH CV ECHO MEAS - RVSP: 41 MMHG
BH CV ECHO MEAS - SI(MOD-SP4): 162.2 ML/M2
BH CV ECHO MEAS - SV(LVOT): 38.7 ML
BH CV ECHO MEAS - SV(MOD-SP4): 55.1 ML
BH CV ECHO MEAS - TR MAX PG: 38 MMHG
BH CV ECHO MEAS - TR MAX VEL: 302.4 CM/SEC
BUN SERPL-MCNC: 25 MG/DL (ref 8–23)
BUN/CREAT SERPL: 14.7 (ref 7–25)
CALCIUM SPEC-SCNC: 9.8 MG/DL (ref 8.6–10.5)
CHLORIDE SERPL-SCNC: 100 MMOL/L (ref 98–107)
CO2 SERPL-SCNC: 35 MMOL/L (ref 22–29)
CREAT SERPL-MCNC: 1.7 MG/DL (ref 0.76–1.27)
EGFRCR SERPLBLD CKD-EPI 2021: 39.3 ML/MIN/1.73
GLUCOSE SERPL-MCNC: 87 MG/DL (ref 65–99)
NT-PROBNP SERPL-MCNC: 2604 PG/ML (ref 0–1800)
POTASSIUM SERPL-SCNC: 4.5 MMOL/L (ref 3.5–5.2)
SODIUM SERPL-SCNC: 143 MMOL/L (ref 136–145)

## 2023-07-22 PROCEDURE — 99214 OFFICE O/P EST MOD 30 MIN: CPT | Performed by: INTERNAL MEDICINE

## 2023-07-22 PROCEDURE — 83880 ASSAY OF NATRIURETIC PEPTIDE: CPT | Performed by: INTERNAL MEDICINE

## 2023-07-22 PROCEDURE — G0378 HOSPITAL OBSERVATION PER HR: HCPCS

## 2023-07-22 PROCEDURE — 80048 BASIC METABOLIC PNL TOTAL CA: CPT | Performed by: INTERNAL MEDICINE

## 2023-07-22 RX ORDER — FUROSEMIDE 20 MG/1
20 TABLET ORAL DAILY
Status: DISCONTINUED | OUTPATIENT
Start: 2023-07-23 | End: 2023-07-22 | Stop reason: HOSPADM

## 2023-07-22 RX ORDER — METOPROLOL SUCCINATE 25 MG/1
25 TABLET, EXTENDED RELEASE ORAL
Qty: 30 TABLET | Refills: 0 | Status: SHIPPED | OUTPATIENT
Start: 2023-07-23

## 2023-07-22 RX ORDER — POTASSIUM CHLORIDE 20 MEQ/1
20 TABLET, EXTENDED RELEASE ORAL 2 TIMES DAILY
Qty: 60 TABLET | Refills: 0 | Status: SHIPPED | OUTPATIENT
Start: 2023-07-22 | End: 2023-08-21

## 2023-07-22 RX ADMIN — POTASSIUM CHLORIDE 40 MEQ: 1500 TABLET, EXTENDED RELEASE ORAL at 02:41

## 2023-07-22 RX ADMIN — ALLOPURINOL 300 MG: 300 TABLET ORAL at 10:28

## 2023-07-22 RX ADMIN — Medication 10 ML: at 10:28

## 2023-07-22 RX ADMIN — RIVAROXABAN 15 MG: 15 TABLET, FILM COATED ORAL at 10:26

## 2023-07-22 NOTE — PROGRESS NOTES
PROGRESS NOTE      Patient Name: Hank Ponce  : 1940  MRN: [id number]  Primary Care Physician: Bunny Curran MD  Date of admission: 2023    Patient Care Team:  Bunny Curran MD as PCP - General (Internal Medicine)  Chema Curran MD as Consulting Physician (Cardiology)        Subjective   Subjective:     Patient is feeling better with improvement in the edema and respiratory condition  Review of systems:  All other review of system unremarkable      Allergies:    Allergies   Allergen Reactions    Hydrocodone Urinary Retention       Objective   Exam:     Vital Signs  Temp:  [97 °F (36.1 °C)-97.8 °F (36.6 °C)] 97.7 °F (36.5 °C)  Heart Rate:  [70-74] 72  Resp:  [18-23] 23  BP: ()/(51-79) 97/57  SpO2:  [94 %-98 %] 96 %  on   ;   Device (Oxygen Therapy): room air  Body mass index is 20.07 kg/m².    General: Elderly white male in no acute distress.    Head:      Normocephalic and atraumatic.    Eyes:      PERRL/EOM intact, conjunctiva and sclera clear with out nystagmus.    Neck:      No masses, thyromegaly,  trachea central with normal respiratory effort   Lungs:    Mild bilaterally to auscultation.    Heart:      Regular rate and rhythm, there is slight gallop   abd:        Soft, nontender, not distended, bowel sounds positive, no shifting dullness   Pulses:   Pulses palpable  Extr:        No cyanosis or clubbing--mild edema.    Neuro:    No focal deficits.   alert oriented x3  Skin:       Intact without lesions or rashes.    Psych:    Alert and cooperative; normal mood and affect; .      Results Review:  I have personally reviewed most recent Data :  CBC    Results from last 7 days   Lab Units 23  0507 23  1220   WBC 10*3/mm3 6.80 6.60   HEMOGLOBIN g/dL 16.7 16.9   PLATELETS 10*3/mm3 164 182     CMP   Results from last 7 days   Lab Units 23  0551 23  1627 23  0507 23  1615 23  1220   SODIUM mmol/L 143  --  143  --   147*   POTASSIUM mmol/L 4.5 3.5 3.4* 3.2* 3.3*   CHLORIDE mmol/L 100  --  100  --  106   CO2 mmol/L 35.0*  --  32.0*  --  29.0   BUN mg/dL 25*  --  17  --  16   CREATININE mg/dL 1.70*  --  1.34*  --  1.27   GLUCOSE mg/dL 87  --  101*  --  135*   ALBUMIN g/dL  --   --  3.6  --  4.1   BILIRUBIN mg/dL  --   --  2.4*  --  2.1*   ALK PHOS U/L  --   --  104  --  113   AST (SGOT) U/L  --   --  29  --  36   ALT (SGPT) U/L  --   --  16  --  18     ABG      No radiology results for the last day    Results for orders placed during the hospital encounter of 07/20/23    Adult Transthoracic Echo Complete W/ Cont if Necessary Per Protocol    Interpretation Summary    Left ventricular systolic function is moderately decreased. Left ventricular ejection fraction appears to be 36 - 40%.    The left ventricular cavity is moderately dilated.    Left ventricular wall thickness is consistent with mild concentric hypertrophy.    Left ventricular diastolic dysfunction is noted.    Moderately reduced right ventricular systolic function noted.    The right ventricular cavity is severely dilated.    The left atrial cavity is severely dilated.    The right atrial cavity is severely  dilated.    Moderate to severe mitral valve regurgitation is present.    Severe tricuspid valve regurgitation is present.    Estimated right ventricular systolic pressure from tricuspid regurgitation is moderately elevated (45-55 mmHg).    Moderate pulmonary hypertension is present.    Scheduled Meds:allopurinol, 300 mg, Oral, Daily  donepezil, 10 mg, Oral, Nightly  [START ON 7/23/2023] furosemide, 20 mg, Oral, Daily  losartan, 25 mg, Oral, BID  metoprolol succinate XL, 25 mg, Oral, Q24H  rivaroxaban, 15 mg, Oral, Daily  senna-docusate sodium, 2 tablet, Oral, BID  sodium chloride, 10 mL, Intravenous, Q12H      Continuous Infusions:   PRN Meds:  acetaminophen    senna-docusate sodium **AND** polyethylene glycol **AND** bisacodyl **AND** bisacodyl    melatonin     ondansetron    Potassium Replacement - Follow Nurse / BPA Driven Protocol    sodium chloride    sodium chloride    Assessment & Plan   Assessment and Plan:         Acute exacerbation of CHF (congestive heart failure)    ASSESSMENT:  Mild crackles x-ray consistent with increased infiltrate with bilateral lower extremity edema  Congestive heart failure with ejection fraction 25% with severe tricuspid valve regurgitation with some grade 2 diastolic dysfunction  Acute kidney injury at risk  Hypokalemia  Hyper natremia  Volume overload    PLAN :      Patient has both systolic and diastolic heart failure which is getting better with improvement in the volume status   Edema has improved significantly   As clinically patient has improved although there is significant increase in creatinine I think that is likely because of low-dose angiotensin receptor blocker at this timE.  Blood pressure is acceptable and clinically patient is better no change in medications needed okay to be discharged  Respiratory condition much better than before   Follow-up in the renal clinic in 4 to 6 weeks   low-dose Aldactone needed but as blood pressure is slightly on the low side we will follow-up closely we will consider starting in 1 to 2 days  Follow-up with volume status electrolytes  Already on losartan that need to be maximized slowly  Follow-up with repeat labs later today tomorrow morning  Follow-up with a urine studies  Renal ultrasound only if needed  Slight increase in creatinine from yesterday might be related to some underlying volume depletion             Electronically signed by Charly Cooper MD,   [facility] kidney consultant  [phone]  7/22/2023  18:05 EDT

## 2023-07-22 NOTE — DISCHARGE SUMMARY
[facility]    Bunny Curran MD    CHIEF COMPLAINT:     Shortness of Breath     HISTORY OF PRESENT ILLNESS:    HPI  This is a very pleasant 83-year-old gentleman well-known to me with history of nonischemic cardiomyopathy nonobstructive CAD, history of nonsustained VT recurrently, biventricular ICD in place, last heart cath 2022 revealed nonobstructive CAD less than 50% in any distribution with reduced EF.  He was admitted with some weight gain fluid retention recently had his Lasix increased to twice daily by primary physician.  He received 2 mg of IV Bumex and has diuresed over 3 L since being here.  He also has significant supraclavicular retractions, lower weight than previous, diffuse weakness and he has had trouble getting out of calories and saying he is not very hungry at home.  Family has noticed his drop-off in oral intake contributing likely to his debility weakness and increasing frailty.  Counseling provided today for adequate calories to 1000 kim/day intake as well as good protein intake to avoid continued muscle wasting and allow recovery.  Today on encounter he appears largely euvolemic and he is on room air able to lie flat without significant issues.  He still complains of chief complaint of weakness largely.  No fevers chill sweats nausea vomiting or diarrhea.  No ICD shocks no anginal chest pain     Observation 7/21/23: Patient is a 83-year-old male presenting to the hospital shortness of breath and increased weight gain over the past few weeks.  Patient reports a 10 pound weight gain over the past 6 weeks.  Patient states his legs are slightly swollen but has seen them much worse.  Patient reports feeling much better today with increase in energy.  Patient states he has noticed an increase in urinary output.    Past Medical History:   Diagnosis Date    Acid reflux disease     Allergic rhinitis     Arthritis     gouty    Atrial fibrillation     BBB (bundle branch  block)     right    Bone spur 2017    neck    Congestive heart failure 10/08/2019    Congestive heart failure (CHF)     Coronary artery disease     non obstructive    Hypertension     Mitral regurgitation     Osteoarthritis     Presence of biventricular implantable cardioverter-defibrillator (ICD) 2019    S/P ablation of atrial fibrillation 2017    Takotsubo cardiomyopathy     Tricuspid regurgitation     VT (ventricular tachycardia) 2018     Past Surgical History:   Procedure Laterality Date    AMPUTATION      index and middle finger    CARDIAC ABLATION  2017    BHF    CARDIAC CATHETERIZATION  2016    non obst CAD; takotsubo CM    CARDIAC CATHETERIZATION  2017    treating medically    CARDIAC CATHETERIZATION N/A 2022    Procedure: Left Heart Cath;  Surgeon: Chidi Figueroa MD;  Location: [facility] CATH INVASIVE LOCATION;  Service: Cardiology;  Laterality: N/A;    CARDIAC DEFIBRILLATOR PLACEMENT      Bs    CARDIAC ELECTROPHYSIOLOGY PROCEDURE N/A 2021    Procedure: AV node ablation;  Surgeon: Hira Ochoa MD;  Location: [facility] CATH INVASIVE LOCATION;  Service: Cardiovascular;  Laterality: N/A;    COLONOSCOPY N/A 2022    Procedure: COLONOSCOPY with polypectomy x5;  Surgeon: KAYLIN Ruiz MD;  Location: [facility] ENDOSCOPY;  Service: Gastroenterology;  Laterality: N/A;  post: diverticulosis, hemorrhoids, polyps    FRACTURE SURGERY  2021    jaw     INTERNAL CARDIAC DEFIBRILLATOR INSERTION      BiV ICD BS    OTHER SURGICAL HISTORY  2017    Heart ablation-[facility]    ROTATOR CUFF REPAIR Right     SINUS SURGERY  2014     Family History   Problem Relation Age of Onset    Parkinsonism Mother     Alcohol abuse Father      Social History     Tobacco Use    Smoking status: Former     Types: Cigarettes     Quit date:      Years since quittin.5     Passive exposure: Past    Smokeless tobacco: Never   Vaping Use    Vaping Use: Never used   Substance  Use Topics    Alcohol use: Yes     Comment: occasionally    Drug use: Never     Medications Prior to Admission   Medication Sig Dispense Refill Last Dose    allopurinol (ZYLOPRIM) 300 MG tablet Take 1 tablet by mouth Daily.   7/20/2023 at 0700    coenzyme Q10 100 MG capsule Take 2 capsules by mouth Daily.   7/20/2023 at 0700    cyanocobalamin (VITAMIN B-12) 2500 MCG tablet tablet Take 1 tablet by mouth Daily.   7/20/2023 at 0700    donepezil (ARICEPT) 23 MG tablet Take 1 tablet by mouth 2 (Two) Times a Day.   7/20/2023 at 0700    furosemide (LASIX) 20 MG tablet Take 1 tablet by mouth 2 (Two) Times a Day.   7/20/2023 at 0700    losartan (COZAAR) 25 MG tablet Take 1 tablet by mouth 2 (Two) Times a Day.   7/20/2023 at 0700    rivaroxaban (XARELTO) 15 MG tablet Take 1 tablet by mouth Daily.   7/20/2023 at 0700    vitamin D3 125 MCG (5000 UT) capsule capsule Take  by mouth Daily.   7/20/2023 at 0700     Allergies:  Hydrocodone    Immunization History   Administered Date(s) Administered    COVID-19 (MODERNA) 1st,2nd,3rd Dose Monovalent 01/14/2021, 02/11/2021, 10/26/2021, 04/25/2022    FLUAD TRI 65YR+ 10/06/2022    Flu Vaccine Intradermal Quad 18-64YR 09/23/2011, 09/05/2012, 09/26/2013    Fluad Quad 65+ 10/07/2019, 09/10/2020    Fluzone High Dose =>65 Years ([employer] ONLY) 10/03/2014, 10/09/2015, 09/28/2016, 10/02/2017, 09/25/2018    Fluzone High-Dose 65+yrs 09/24/2021, 10/17/2021, 10/06/2022    Pneumococcal Conjugate 13-Valent (PCV13) 10/25/2019    Pneumococcal Polysaccharide (PPSV23) 10/27/2020           REVIEW OF SYSTEMS:    Review of Systems   Constitutional: Positive for decreased appetite and malaise/fatigue.   HENT: Negative.     Eyes: Negative.    Cardiovascular:  Positive for dyspnea on exertion.   Respiratory: Negative.     Endocrine: Negative.    Hematologic/Lymphatic: Negative.    Skin: Negative.    Musculoskeletal: Negative.    Gastrointestinal: Negative.    Genitourinary:  Positive for frequency.    Neurological: Negative.  Positive for weakness.   Psychiatric/Behavioral: Negative.     Allergic/Immunologic: Negative.      Vital Signs  Temp:  [97 °F (36.1 °C)-97.8 °F (36.6 °C)] 97.7 °F (36.5 °C)  Heart Rate:  [70-74] 72  Resp:  [18-23] 23  BP: ()/(51-79) 97/57          Physical Exam:  Physical Exam  Vitals and nursing note reviewed.   Constitutional:       Appearance: Normal appearance. He is underweight.   HENT:      Head: Normocephalic and atraumatic.      Right Ear: External ear normal.      Nose: Nose normal.      Mouth/Throat:      Pharynx: Oropharynx is clear.   Eyes:      Extraocular Movements: Extraocular movements intact.      Conjunctiva/sclera: Conjunctivae normal.      Pupils: Pupils are equal, round, and reactive to light.   Cardiovascular:      Rate and Rhythm: Normal rate and regular rhythm.      Pulses: Normal pulses.   Pulmonary:      Effort: Pulmonary effort is normal.      Breath sounds: Normal breath sounds.   Abdominal:      General: Bowel sounds are normal.      Palpations: Abdomen is soft.   Musculoskeletal:         General: Normal range of motion.      Cervical back: Normal range of motion.   Skin:     General: Skin is warm.      Capillary Refill: Capillary refill takes less than 2 seconds.   Neurological:      Mental Status: He is alert and oriented to person, place, and time.      Motor: Weakness present.   Psychiatric:         Mood and Affect: Mood normal.         Behavior: Behavior normal.         Thought Content: Thought content normal.         Judgment: Judgment normal.       Emotional Behavior:    WNL   Debilities:   none  Results Review:    I reviewed the patient's new clinical results.  Lab Results (most recent)       Procedure Component Value Units Date/Time    BNP [[id number]]  (Abnormal) Collected: 07/22/23 0551    Specimen: Blood from Arm, Left Updated: 07/22/23 0941     proBNP 2,604.0 pg/mL     Narrative:      Among patients with dyspnea, NT-proBNP is highly sensitive  for the detection of acute congestive heart failure. In addition NT-proBNP of <300 pg/ml effectively rules out acute congestive heart failure with 99% negative predictive value.    Results may be falsely decreased if patient taking Biotin.      Basic Metabolic Panel [[id number]]  (Abnormal) Collected: 07/22/23 0551    Specimen: Blood from Arm, Left Updated: 07/22/23 0718     Glucose 87 mg/dL      BUN 25 mg/dL      Creatinine 1.70 mg/dL      Sodium 143 mmol/L      Potassium 4.5 mmol/L      Comment: Slight hemolysis detected by analyzer. Results may be affected.  Result checked          Chloride 100 mmol/L      CO2 35.0 mmol/L      Calcium 9.8 mg/dL      BUN/Creatinine Ratio 14.7     Anion Gap 8.0 mmol/L      eGFR 39.3 mL/min/1.73     Narrative:      GFR Normal >60  Chronic Kidney Disease <60  Kidney Failure <15    The GFR formula is only valid for adults with stable renal function between ages 18 and 70.    Potassium [[id number]]  (Normal) Collected: 07/21/23 1627    Specimen: Blood from Arm, Left Updated: 07/21/23 1656     Potassium 3.5 mmol/L     Comprehensive Metabolic Panel [[id number]]  (Abnormal) Collected: 07/21/23 0507    Specimen: Blood from Arm, Left Updated: 07/21/23 0647     Glucose 101 mg/dL      BUN 17 mg/dL      Creatinine 1.34 mg/dL      Sodium 143 mmol/L      Potassium 3.4 mmol/L      Chloride 100 mmol/L      CO2 32.0 mmol/L      Calcium 9.6 mg/dL      Total Protein 6.5 g/dL      Albumin 3.6 g/dL      ALT (SGPT) 16 U/L      AST (SGOT) 29 U/L      Alkaline Phosphatase 104 U/L      Total Bilirubin 2.4 mg/dL      Globulin 2.9 gm/dL      A/G Ratio 1.2 g/dL      BUN/Creatinine Ratio 12.7     Anion Gap 11.0 mmol/L      eGFR 52.2 mL/min/1.73     Narrative:      GFR Normal >60  Chronic Kidney Disease <60  Kidney Failure <15    The GFR formula is only valid for adults with stable renal function between ages 18 and 70.    Magnesium [[id number]]  (Normal) Collected: 07/21/23 0507    Specimen: Blood from Arm, Left  Updated: 07/21/23 0647     Magnesium 1.9 mg/dL     Phosphorus [[id number]]  (Normal) Collected: 07/21/23 0507    Specimen: Blood from Arm, Left Updated: 07/21/23 0647     Phosphorus 3.0 mg/dL     CBC & Differential [[id number]]  (Abnormal) Collected: 07/21/23 0507    Specimen: Blood from Arm, Left Updated: 07/21/23 0612    Narrative:      The following orders were created for panel order CBC & Differential.  Procedure                               Abnormality         Status                     ---------                               -----------         ------                     CBC Auto Differential[[id number]]        Abnormal            Final result                 Please view results for these tests on the individual orders.    CBC Auto Differential [[id number]]  (Abnormal) Collected: 07/21/23 0507    Specimen: Blood from Arm, Left Updated: 07/21/23 0612     WBC 6.80 10*3/mm3      RBC 5.03 10*6/mm3      Hemoglobin 16.7 g/dL      Hematocrit 50.8 %      .9 fL      MCH 33.2 pg      MCHC 32.8 g/dL      RDW 17.3 %      RDW-SD 59.9 fl      MPV 10.0 fL      Platelets 164 10*3/mm3      Neutrophil % 64.4 %      Lymphocyte % 21.6 %      Monocyte % 11.0 %      Eosinophil % 1.4 %      Basophil % 1.6 %      Neutrophils, Absolute 4.40 10*3/mm3      Lymphocytes, Absolute 1.50 10*3/mm3      Monocytes, Absolute 0.70 10*3/mm3      Eosinophils, Absolute 0.10 10*3/mm3      Basophils, Absolute 0.10 10*3/mm3      nRBC 0.2 /100 WBC     Creatinine Urine Random (kidney function) GFR component - Urine, Clean Catch [[id number]] Collected: 07/20/23 1322    Specimen: Urine, Clean Catch Updated: 07/20/23 2052     Creatinine, Urine 68.1 mg/dL     Narrative:      Reference intervals for random urine have not been established.  Clinical usage is dependent upon physician's interpretation in combination with other laboratory tests.       Urinalysis, Microscopic Only - Urine, Clean Catch [[id number]]  (Abnormal) Collected: 07/20/23 1486     Specimen: Urine, Clean Catch Updated: 07/20/23 1851     RBC, UA 0-2 /HPF      WBC, UA 0-2 /HPF      Bacteria, UA None Seen /HPF      Squamous Epithelial Cells, UA 0-2 /HPF      Hyaline Casts, UA 3-6 /LPF      Methodology Automated Microscopy    Urinalysis With Microscopic If Indicated (No Culture) - Urine, Clean Catch [[id number]]  (Abnormal) Collected: 07/20/23 1828    Specimen: Urine, Clean Catch Updated: 07/20/23 1851     Color, UA Yellow     Appearance, UA Clear     pH, UA 6.5     Specific Gravity, UA 1.006     Glucose, UA Negative     Ketones, UA Negative     Bilirubin, UA Negative     Blood, UA Small (1+)     Protein, UA Negative     Leuk Esterase, UA Negative     Nitrite, UA Negative     Urobilinogen, UA 1.0 E.U./dL    Hemoglobin A1c [[id number]]  (Normal) Collected: 07/20/23 1220    Specimen: Blood Updated: 07/20/23 1816     Hemoglobin A1C 5.30 %     Sodium, Urine, Random - Urine, Clean Catch [[id number]] Collected: 07/20/23 1322    Specimen: Urine, Clean Catch Updated: 07/20/23 1745     Sodium, Urine 83 mmol/L     Narrative:      Reference intervals for random urine have not been established.  Clinical usage is dependent upon physician's interpretation in combination with other laboratory tests.       Protein, Urine, Random - Urine, Clean Catch [[id number]] Collected: 07/20/23 1322    Specimen: Urine, Clean Catch Updated: 07/20/23 1745     Total Protein, Urine 21.6 mg/dL     Narrative:      Reference intervals for random urine have not been established.  Clinical usage is dependent upon physician's interpretation in combination with other laboratory tests.       TSH [[id number]]  (Normal) Collected: 07/20/23 1220    Specimen: Blood Updated: 07/20/23 1654     TSH 2.350 uIU/mL     Potassium [[id number]]  (Abnormal) Collected: 07/20/23 1615    Specimen: Blood from Arm, Left Updated: 07/20/23 1652     Potassium 3.2 mmol/L     High Sensitivity Troponin T [[id number]]  (Abnormal) Collected: 07/20/23 1615    Specimen:  Blood from Arm, Left Updated: 07/20/23 1652     HS Troponin T 37 ng/L     Narrative:      High Sensitive Troponin T Reference Range:  <10.0 ng/L- Negative Female for AMI  <15.0 ng/L- Negative Male for AMI  >=10 - Abnormal Female indicating possible myocardial injury.  >=15 - Abnormal Male indicating possible myocardial injury.   Clinicians would have to utilize clinical acumen, EKG, Troponin, and serial changes to determine if it is an Acute Myocardial Infarction or myocardial injury due to an underlying chronic condition.         Lipid Panel [[id number]]  (Abnormal) Collected: 07/20/23 1220    Specimen: Blood Updated: 07/20/23 1648     Total Cholesterol 161 mg/dL      Triglycerides 88 mg/dL      HDL Cholesterol 74 mg/dL      LDL Cholesterol  71 mg/dL      VLDL Cholesterol 16 mg/dL      LDL/HDL Ratio 0.94    Narrative:      Cholesterol Reference Ranges  (U.S. Department of Health and Human Services ATP III Classifications)    Desirable          <200 mg/dL  Borderline High    200-239 mg/dL  High Risk          >240 mg/dL      Triglyceride Reference Ranges  (U.S. Department of Health and Human Services ATP III Classifications)    Normal           <150 mg/dL  Borderline High  150-199 mg/dL  High             200-499 mg/dL  Very High        >500 mg/dL    HDL Reference Ranges  (U.S. Department of Health and Human Services ATP III Classifications)    Low     <40 mg/dl (major risk factor for CHD)  High    >60 mg/dl ('negative' risk factor for CHD)        LDL Reference Ranges  (U.S. Department of Health and Human Services ATP III Classifications)    Optimal          <100 mg/dL  Near Optimal     100-129 mg/dL  Borderline High  130-159 mg/dL  High             160-189 mg/dL  Very High        >189 mg/dL    Magnesium [[id number]]  (Normal) Collected: 07/20/23 1220    Specimen: Blood Updated: 07/20/23 1648     Magnesium 2.1 mg/dL     Urinalysis, Microscopic Only - Urine, Clean Catch [[id number]]  (Abnormal) Collected: 07/20/23 1322     Specimen: Urine, Clean Catch Updated: 07/20/23 1339     RBC, UA 31-50 /HPF      WBC, UA 0-2 /HPF      Comment: Urine culture not indicated.        Bacteria, UA None Seen /HPF      Squamous Epithelial Cells, UA 0-2 /HPF      Hyaline Casts, UA 0-2 /LPF      Methodology Automated Microscopy    Urinalysis With Culture If Indicated - Urine, Clean Catch [[id number]]  (Abnormal) Collected: 07/20/23 1322    Specimen: Urine, Clean Catch Updated: 07/20/23 1339     Color, UA Dark Yellow     Appearance, UA Clear     pH, UA 6.0     Specific Gravity, UA 1.013     Glucose, UA Negative     Ketones, UA Negative     Bilirubin, UA Negative     Blood, UA Large (3+)     Protein, UA Trace     Leuk Esterase, UA Trace     Nitrite, UA Negative     Urobilinogen, UA >=8.0 E.U./dL    Narrative:      In absence of clinical symptoms, the presence of pyuria, bacteria, and/or nitrites on the urinalysis result does not correlate with infection.    [place] Draw [[id number]] Collected: 07/20/23 1220    Specimen: Blood Updated: 07/20/23 1330    Narrative:      The following orders were created for panel order [place] Draw.  Procedure                               Abnormality         Status                     ---------                               -----------         ------                     Green Top (Gel)[[id number]]                                  Final result               Lavender Top[[id number]]                                     Final result               Gold Top - SST[[id number]]                                   Final result               Light Blue Top[[id number]]                                   Final result                 Please view results for these tests on the individual orders.    Light Blue Top [[id number]] Collected: 07/20/23 1220    Specimen: Blood Updated: 07/20/23 1330     Extra Tube Hold for add-ons.     Comment: Auto resulted       Lavender Top [[id number]] Collected: 07/20/23 1220    Specimen: Blood Updated: 07/20/23 1330      Extra Tube hold for add-on     Comment: Auto resulted       Gold Top - SST [[id number]] Collected: 07/20/23 1220    Specimen: Blood Updated: 07/20/23 1330     Extra Tube Hold for add-ons.     Comment: Auto resulted.       Green Top (Gel) [[id number]] Collected: 07/20/23 1220    Specimen: Blood Updated: 07/20/23 1310     Extra Tube hide    BNP [[id number]]  (Abnormal) Collected: 07/20/23 1220    Specimen: Blood Updated: 07/20/23 1306     proBNP 4,280.0 pg/mL     Narrative:      Among patients with dyspnea, NT-proBNP is highly sensitive for the detection of acute congestive heart failure. In addition NT-proBNP of <300 pg/ml effectively rules out acute congestive heart failure with 99% negative predictive value.    Results may be falsely decreased if patient taking Biotin.      Single High Sensitivity Troponin T [[id number]]  (Abnormal) Collected: 07/20/23 1220    Specimen: Blood Updated: 07/20/23 1306     HS Troponin T 39 ng/L     Narrative:      High Sensitive Troponin T Reference Range:  <10.0 ng/L- Negative Female for AMI  <15.0 ng/L- Negative Male for AMI  >=10 - Abnormal Female indicating possible myocardial injury.  >=15 - Abnormal Male indicating possible myocardial injury.   Clinicians would have to utilize clinical acumen, EKG, Troponin, and serial changes to determine if it is an Acute Myocardial Infarction or myocardial injury due to an underlying chronic condition.         Comprehensive Metabolic Panel [[id number]]  (Abnormal) Collected: 07/20/23 1220    Specimen: Blood Updated: 07/20/23 1259     Glucose 135 mg/dL      BUN 16 mg/dL      Creatinine 1.27 mg/dL      Sodium 147 mmol/L      Potassium 3.3 mmol/L      Comment: Slight hemolysis detected by analyzer. Results may be affected.        Chloride 106 mmol/L      CO2 29.0 mmol/L      Calcium 9.3 mg/dL      Total Protein 6.9 g/dL      Albumin 4.1 g/dL      ALT (SGPT) 18 U/L      AST (SGOT) 36 U/L      Comment: Slight hemolysis detected by analyzer. Results may  be affected.        Alkaline Phosphatase 113 U/L      Total Bilirubin 2.1 mg/dL      Globulin 2.8 gm/dL      A/G Ratio 1.5 g/dL      BUN/Creatinine Ratio 12.6     Anion Gap 12.0 mmol/L      eGFR 55.7 mL/min/1.73     Narrative:      GFR Normal >60  Chronic Kidney Disease <60  Kidney Failure <15    The GFR formula is only valid for adults with stable renal function between ages 18 and 70.    CBC & Differential [[id number]]  (Abnormal) Collected: 07/20/23 1220    Specimen: Blood Updated: 07/20/23 1245    Narrative:      The following orders were created for panel order CBC & Differential.  Procedure                               Abnormality         Status                     ---------                               -----------         ------                     CBC Auto Differential[[id number]]        Abnormal            Final result                 Please view results for these tests on the individual orders.    CBC Auto Differential [[id number]]  (Abnormal) Collected: 07/20/23 1220    Specimen: Blood Updated: 07/20/23 1245     WBC 6.60 10*3/mm3      RBC 5.08 10*6/mm3      Hemoglobin 16.9 g/dL      Hematocrit 51.7 %      .7 fL      MCH 33.2 pg      MCHC 32.7 g/dL      RDW 17.3 %      RDW-SD 60.4 fl      MPV 9.7 fL      Platelets 182 10*3/mm3      Neutrophil % 74.6 %      Lymphocyte % 13.8 %      Monocyte % 10.2 %      Eosinophil % 0.7 %      Basophil % 0.7 %      Neutrophils, Absolute 4.90 10*3/mm3      Lymphocytes, Absolute 0.90 10*3/mm3      Monocytes, Absolute 0.70 10*3/mm3      Eosinophils, Absolute 0.00 10*3/mm3      Basophils, Absolute 0.00 10*3/mm3      nRBC 0.0 /100 WBC             Imaging Results (Most Recent)       Procedure Component Value Units Date/Time    XR Chest 1 View [[id number]] Collected: 07/20/23 1305     Updated: 07/20/23 1308    Narrative:      XR CHEST 1 VW    Date of Exam: 7/20/2023 1:00 PM EDT    Indication: weakness, chf    Comparison: PA and lateral chest radiograph  10/18/2022.    Findings:  Moderate to marked cardiac enlargement is present, and appears increased compared to the prior study. Pulmonary vasculature appears enlarged, indistinct, suggesting congestive change, increased from prior. Fine interstitial thickening is present in both   lungs consistent with mild edema. No significant pleural fluid collection is identified. Pacemaker/defibrillator appears unchanged. No visible pneumothorax.      Impression:      Impression:    1. Findings consistent with central vascular congestion and interstitial pulmonary edema.  2. Moderate to marked cardiac enlargement, which appears increased compared to 10/18/2022.      Electronically Signed: Kathie Oshea    7/20/2023 1:06 PM EDT    Workstation ID: [id number]          reviewed    ECG/EMG Results (most recent)       Procedure Component Value Units Date/Time    SCANNED - TELEMETRY   [[id number]] Resulted: 07/20/23     Updated: 07/20/23 1758    SCANNED - TELEMETRY   [[id number]] Resulted: 07/20/23     Updated: 07/20/23 2254    SCANNED - TELEMETRY   [[id number]] Resulted: 07/20/23     Updated: 07/21/23 0702    SCANNED - TELEMETRY   [[id number]] Resulted: 07/20/23     Updated: 07/21/23 0717    SCANNED - TELEMETRY   [[id number]] Resulted: 07/20/23     Updated: 07/21/23 0727    ECG 12 Lead Other; weakness [[id number]] Collected: 07/20/23 1216     Updated: 07/21/23 0749     QT Interval 458 ms     Narrative:      HEART RATE= 87  bpm  RR Interval= 687  ms  KS Interval=   ms  P Horizontal Axis=   deg  P Front Axis=   deg  QRSD Interval= 165  ms  QT Interval= 458  ms  QRS Axis= 122  deg  T Wave Axis= 10  deg  - ABNORMAL ECG -  Atrial-sensed ventricular-paced complexes  RBBB and LPFB  Electronically Signed By: Thiago Bourne ([facility]) 21-Jul-2023 07:49:21  Date and Time of Study: 2023-07-20 12:16:51    SCANNED - TELEMETRY   [[id number]] Resulted: 07/20/23     Updated: 07/21/23 0758    SCANNED - TELEMETRY   [[id number]] Resulted: 07/20/23     Updated:  07/21/23 0812    SCANNED - TELEMETRY   [[id number]] Resulted: 07/20/23     Updated: 07/21/23 1129    SCANNED - TELEMETRY   [[id number]] Resulted: 07/20/23     Updated: 07/21/23 1544    Adult Transthoracic Echo Complete W/ Cont if Necessary Per Protocol [[id number]] Resulted: 07/22/23 1353     Updated: 07/22/23 1358     LVIDd 5.7 cm      LVIDs 4.7 cm      IVSd 1.06 cm      LVPWd 1.13 cm      FS 18.3 %      IVS/LVPW 0.94 cm      ESV(cubed) 101.7 ml      LV Sys Vol (BSA corrected) 255.8 cm2      EDV(cubed) 186.6 ml      LV [place]ala Vol (BSA corrected) 418.1 cm2      LVOT area 4.6 cm2      LV mass(C)d 256.4 grams      LVOT diam 2.42 cm      EDV(MOD-sp4) 141.9 ml      ESV(MOD-sp4) 86.8 ml      SV(MOD-sp4) 55.1 ml      SI(MOD-sp4) 162.2 ml/m2      EF(MOD-sp4) 38.8 %      MV E max renea 70.6 cm/sec      MV A max renea 20.1 cm/sec      MV dec time 0.06 msec      MV E/A 3.5     SV(LVOT) 38.7 ml      RVIDd 4.5 cm      LV V1 max 47.8 cm/sec      LV V1 max PG 0.92 mmHg      LV V1 mean PG 0.50 mmHg      LV V1 VTI 8.5 cm      Ao pk renea 96.5 cm/sec      Ao max PG 3.7 mmHg      Ao mean PG 1.90 mmHg      Ao V2 VTI 14.6 cm      MARIA DE JESUS(I,D) 2.7 cm2      AI P1/2t 448.1 msec      MV max PG 5.0 mmHg      MV mean PG 1.39 mmHg      MV V2 VTI 17.5 cm      MVA(VTI) 2.21 cm2      MV dec slope 1,091 cm/sec2      MR max renea 484.2 cm/sec      MR max PG 93.8 mmHg      TR max renea 302.4 cm/sec      TR max PG 38.0 mmHg      RVSP(TR) 41.0 mmHg      RAP systole 3.0 mmHg      RV V1 max PG 2.00 mmHg      RV V1 max 70.7 cm/sec      RV V1 VTI 18.7 cm      PA V2 max 117.5 cm/sec      PA acc time 0.11 sec      Ao root diam 3.1 cm      ACS 2.05 cm     Narrative:        Left ventricular systolic function is moderately decreased. Left   ventricular ejection fraction appears to be 36 - 40%.    The left ventricular cavity is moderately dilated.    Left ventricular wall thickness is consistent with mild concentric   hypertrophy.    Left ventricular diastolic dysfunction is  noted.    Moderately reduced right ventricular systolic function noted.    The right ventricular cavity is severely dilated.    The left atrial cavity is severely dilated.    The right atrial cavity is severely  dilated.    Moderate to severe mitral valve regurgitation is present.    Severe tricuspid valve regurgitation is present.    Estimated right ventricular systolic pressure from tricuspid   regurgitation is moderately elevated (45-55 mmHg).    Moderate pulmonary hypertension is present.            reviewed        Results for orders placed during the hospital encounter of 07/20/23    Adult Transthoracic Echo Complete W/ Cont if Necessary Per Protocol    Interpretation Summary    Left ventricular systolic function is moderately decreased. Left ventricular ejection fraction appears to be 36 - 40%.    The left ventricular cavity is moderately dilated.    Left ventricular wall thickness is consistent with mild concentric hypertrophy.    Left ventricular diastolic dysfunction is noted.    Moderately reduced right ventricular systolic function noted.    The right ventricular cavity is severely dilated.    The left atrial cavity is severely dilated.    The right atrial cavity is severely  dilated.    Moderate to severe mitral valve regurgitation is present.    Severe tricuspid valve regurgitation is present.    Estimated right ventricular systolic pressure from tricuspid regurgitation is moderately elevated (45-55 mmHg).    Moderate pulmonary hypertension is present.      Microbiology Results (last 10 days)       ** No results found for the last 240 hours. **            Assessment & Plan     Acute exacerbation of CHF (congestive heart failure)     Acute CHF exacerbation        Lab Results   Component Value Date     TROPONINT 37 (H) 07/20/2023     TROPONINT 39 (H) 07/20/2023     PROBNP 4,280.0 (H) 07/20/2023     PROBNP 2,039.0 (H) 10/18/2022      07/21/2023      -Echocardiogram pending   -Chest x-ray:central  vascular congestion and interstitial pulmonary edema, moderate to marked cardiac enlargement, which appears increased compared to 10/18/2022  -Continue metoprolol, bumex   -Monitor I's and O's and daily weights  -2 g sodium diet  -Cardiology consulted      Dementia  - aricept     HTN  - cozaar     HLD  -Continue statin      CKD        Lab Results   Component Value Date     CREATININE 1.34 (H) 07/21/2023     BUN 17 07/21/2023     BCR 12.7 07/21/2023     EGFR 52.2 (L) 07/21/2023      -Continue bumex gently     -Avoid nephrotoxic medication IV dye unless urgently needed  -Monitor BMP and I's and O's while admitted  -Nephrology consulted-urinalysis showed small blood, 0-2 RBC, 0-2 WBC with normal sodium and creatinine urine     Defib/pacer  - interrogation showed no shocks       I discussed the patients findings and my recommendations with patient and family.     Discharge Diagnosis:      Acute exacerbation of CHF (congestive heart failure)      Hospital Course  Patient is a 83 y.o. male presented with shortness of breath.  Patient has underlying coronary disease, ICD and kidney disease.  Patient reported increase in weight with minimal lower extremity edema over the past few weeks.  Patient states he does have loss of appetite and decreased oral intake.  Patient was seen by cardiology and nephrology.  Patient had echocardiogram which showed LV ejection fraction of 35 to 40% severe RV dysfunction moderate to severe mitral regurgitation severe tricuspid regurgitation moderate pulmonary hypertension. Troponin were slightly elevated at 3937 likely related to renal disease and volume overload.  BNP was 4280 and decreased to 2039 after careful diuresis.  Patient had adequate urinary output and stated he felt better with relief and ability to breathe.  Patient to take new medication as prescribed monitor blood pressure closely at home.  Patient to have home health nursing and physical therapy due to weakness.  Patient to  follow-up with cardiology and nephrology in 1 to 2 weeks.  Patient follow PCP in 1 to 2 weeks as well.  Testing recommendations reviewed patient and family and they agree with treatment plan.  If symptoms worsen patient to call 911 or go to nearest ED.    Past Medical History:     Past Medical History:   Diagnosis Date    Acid reflux disease     Allergic rhinitis     Arthritis     gouty    Atrial fibrillation     BBB (bundle branch block)     right    Bone spur 2017    neck    Congestive heart failure 10/08/2019    Congestive heart failure (CHF)     Coronary artery disease     non obstructive    Hypertension     Mitral regurgitation     Osteoarthritis     Presence of biventricular implantable cardioverter-defibrillator (ICD) 07/29/2019    S/P ablation of atrial fibrillation 01/2017    Takotsubo cardiomyopathy     Tricuspid regurgitation     VT (ventricular tachycardia) 02/2018       Past Surgical History:     Past Surgical History:   Procedure Laterality Date    AMPUTATION      index and middle finger    CARDIAC ABLATION  08/22/2017    [facility]    CARDIAC CATHETERIZATION  12/08/2016    non obst CAD; takotsubo CM    CARDIAC CATHETERIZATION  07/24/2017    treating medically    CARDIAC CATHETERIZATION N/A 1/19/2022    Procedure: Left Heart Cath;  Surgeon: Chidi Figueroa MD;  [facility];  Service: Cardiology;  Laterality: N/A;    CARDIAC DEFIBRILLATOR PLACEMENT  2017        CARDIAC ELECTROPHYSIOLOGY PROCEDURE N/A 4/14/2021    Procedure: AV node ablation;  Surgeon: Hira Ochoa MD;  [facility];  Service: Cardiovascular;  Laterality: N/A;    COLONOSCOPY N/A 6/29/2022    Procedure: COLONOSCOPY with polypectomy x5;  Surgeon: KAYLIN Ruiz MD;  [facility];  Service: Gastroenterology;  Laterality: N/A;  post: diverticulosis, hemorrhoids, polyps    FRACTURE SURGERY  04/18/2021    jaw     INTERNAL CARDIAC DEFIBRILLATOR INSERTION  2017     BiV ICD BS    OTHER SURGICAL HISTORY  2017    Heart ablation-[facility]    ROTATOR CUFF REPAIR Right     SINUS SURGERY  2014       Social History:   Social History     Socioeconomic History    Marital status:    Tobacco Use    Smoking status: Former     Types: Cigarettes     Quit date:      Years since quittin.5     Passive exposure: Past    Smokeless tobacco: Never   Vaping Use    Vaping Use: Never used   Substance and Sexual Activity    Alcohol use: Yes     Comment: occasionally    Drug use: Never    Sexual activity: Defer       Procedures Performed         Consults:   Consults       Date and Time Order Name Status Description    2023  2:50 PM Inpatient Nephrology Consult      2023  2:50 PM Inpatient Cardiology Consult Completed             Condition on Discharge:     Stable    Discharge Disposition  Home or Self Care    Discharge Medications     Discharge Medications        New Medications        Instructions Start Date   metoprolol succinate XL 25 MG 24 hr tablet  Commonly known as: TOPROL-XL   25 mg, Oral, Every 24 Hours Scheduled   Start Date: 2023     potassium chloride 20 MEQ CR tablet  Commonly known as: K-DUR,KLOR-CON   20 mEq, Oral, 2 Times Daily             Continue These Medications        Instructions Start Date   allopurinol 300 MG tablet  Commonly known as: ZYLOPRIM   300 mg, Oral, Daily      coenzyme Q10 100 MG capsule   200 mg, Oral, Daily      cyanocobalamin 2500 MCG tablet tablet  Commonly known as: VITAMIN B-12   2,500 mcg, Oral, Daily      donepezil 23 MG tablet  Commonly known as: ARICEPT   23 mg, Oral, 2 Times Daily      furosemide 20 MG tablet  Commonly known as: LASIX   20 mg, Oral, 2 Times Daily      losartan 25 MG tablet  Commonly known as: COZAAR   25 mg, Oral, 2 Times Daily      rivaroxaban 15 MG tablet  Commonly known as: XARELTO   15 mg, Oral, Daily      vitamin D3 125 MCG (5000 UT) capsule capsule   Oral, Daily               Discharge Diet:   Diet  Instructions       Diet: Cardiac Diets; Low Sodium (2g); Regular Texture (IDDSI 7); Thin (IDDSI 0)      Discharge Diet: Cardiac Diets    Cardiac Diet: Low Sodium (2g)    Texture: Regular Texture (IDDSI 7)    Fluid Consistency: Thin (IDDSI 0)            Activity at Discharge:   Activity Instructions       Activity as Tolerated              Follow-up Appointments  Future Appointments   Date Time Provider Department Center   8/4/2023 10:30 AM Hira Ochoa MD [facility]   9/11/2023  1:00 PM Chidi Figueroa MD [facility]   11/30/2023  8:00 AM Krystal Curran MD [facility]     Additional Instructions for the Follow-ups that You Need to Schedule       Ambulatory Referral to Home Health   As directed      Face to Face Visit Date: 7/22/2023    Follow-up provider for Plan of Care?: I treated the patient in an acute care facility and will not continue treatment after discharge.    Follow-up provider: KRYSTAL CURRAN [[id number]]    Reason/Clinical Findings: Weakness, immobility    Describe mobility limitations that make leaving home difficult: Weakness, immobility    Nursing/Therapeutic Services Requested: Skilled Nursing Physical Therapy    Skilled nursing orders: CHF management    PT orders: Home safety assessment Strengthening    Frequency: 1 Week 1         Discharge Follow-up with PCP   As directed       Currently Documented PCP:    Krystal Curran MD    PCP Phone Number:    [phone]     Follow Up Details: 7-10 days         BNP   Jul 27, 2023       Per Laboratory policy, BNPs will only be performed once every 72 hours.  Additional orders within this timeframe will be automatically canceled.  Please contact the lab if repeat testing is required.     On Natrecor?: No    Release to patient: Routine Release         Basic Metabolic Panel    Jul 27, 2023 (Approximate)      Release to patient: Routine Release                 Test Results Pending at Discharge       Risk for  Readmission (LACE) Score: 6 (7/22/2023  6:01 AM)          Angelina TRINA Keen  07/22/23  14:34 EDT        I spent 40 minutes caring for Hank on this date of service. This time includes time spent by me in the following activities: reviewing tests, obtaining and/or reviewing a separately obtained history, performing a medically appropriate examination and/or evaluation, counseling and educating the patient/family/caregiver, ordering medications, tests, or procedures, referring and communicating with other health care professionals, documenting information in the medical record, independently interpreting results and communicating that information with the patient/family/caregiver, and care coordination.

## 2023-07-22 NOTE — PLAN OF CARE
Problem: Adult Inpatient Plan of Care  Goal: Plan of Care Review  Outcome: Ongoing, Not Progressing  Flowsheets (Taken 7/22/2023 0600)  Progress: no change  Plan of Care Reviewed With: patient  Outcome Evaluation: pt stable, falls risk maintained, VSS, paced on tele, getting diuretics, plan of care continued  Goal: Patient-Specific Goal (Individualized)  Outcome: Ongoing, Not Progressing  Goal: Absence of Hospital-Acquired Illness or Injury  Outcome: Ongoing, Not Progressing  Goal: Optimal Comfort and Wellbeing  Outcome: Ongoing, Not Progressing  Goal: Readiness for Transition of Care  Outcome: Ongoing, Not Progressing     Problem: Adjustment to Illness (Heart Failure)  Goal: Optimal Coping  Outcome: Ongoing, Not Progressing     Problem: Cardiac Output Decreased (Heart Failure)  Goal: Optimal Cardiac Output  Outcome: Ongoing, Not Progressing     Problem: Dysrhythmia (Heart Failure)  Goal: Stable Heart Rate and Rhythm  Outcome: Ongoing, Not Progressing     Problem: Fluid Imbalance (Heart Failure)  Goal: Fluid Balance  Outcome: Ongoing, Not Progressing     Problem: Functional Ability Impaired (Heart Failure)  Goal: Optimal Functional Ability  Outcome: Ongoing, Not Progressing     Problem: Oral Intake Inadequate (Heart Failure)  Goal: Optimal Nutrition Intake  Outcome: Ongoing, Not Progressing     Problem: Respiratory Compromise (Heart Failure)  Goal: Effective Oxygenation and Ventilation  Outcome: Ongoing, Not Progressing     Problem: Sleep Disordered Breathing (Heart Failure)  Goal: Effective Breathing Pattern During Sleep  Outcome: Ongoing, Not Progressing     Problem: Hypertension Comorbidity  Goal: Blood Pressure in Desired Range  Outcome: Ongoing, Not Progressing     Problem: Skin Injury Risk Increased  Goal: Skin Health and Integrity  Outcome: Ongoing, Not Progressing     Problem: Fall Injury Risk  Goal: Absence of Fall and Fall-Related Injury  Outcome: Ongoing, Not Progressing   Goal Outcome Evaluation:  Plan  of Care Reviewed With: patient        Progress: no change  Outcome Evaluation: pt stable, falls risk maintained, VSS, paced on tele, getting diuretics, plan of care continued

## 2023-07-22 NOTE — CASE MANAGEMENT/SOCIAL WORK
Case Management Discharge Note      Final Note: home with family           Transportation Services  Private: Car    Final Discharge Disposition Code: 01 - home or self-care

## 2023-07-22 NOTE — CONSULTS
Cardiology consult note  Chidi Figueroa MD, PhD      Patient Care Team:  Bunny Curran MD as PCP - General (Internal Medicine)  Chema Curran MD as Consulting Physician (Cardiology)    CHIEF COMPLAINT: Shortness of breath, weight gain, weakness    HISTORY OF PRESENT ILLNESS:    This is a very pleasant 83-year-old gentleman well-known to me with history of nonischemic cardiomyopathy nonobstructive CAD, history of nonsustained VT recurrently, biventricular ICD in place, last heart cath 2022 revealed nonobstructive CAD less than 50% in any distribution with reduced EF.  He was admitted with some weight gain fluid retention recently had his Lasix increased to twice daily by primary physician.  He received 2 mg of IV Bumex and has diuresed over 3 L since being here.  He also has significant supraclavicular retractions, lower weight than previous, diffuse weakness and he has had trouble getting out of calories and saying he is not very hungry at home.  Family has noticed his drop-off in oral intake contributing likely to his debility weakness and increasing frailty.  Counseling provided today for adequate calories to 1000 kim/day intake as well as good protein intake to avoid continued muscle wasting and allow recovery.  Today on encounter he appears largely euvolemic and he is on room air able to lie flat without significant issues.  He still complains of chief complaint of weakness largely.  No fevers chill sweats nausea vomiting or diarrhea.  No ICD shocks no anginal chest pain    Review of systems otherwise negative x14 point review of systems except as mentioned above  Historical data copied forward from previous encounters numerous unchanged    Telemetry demonstrates biventricular paced rhythm      Past Medical History:   Diagnosis Date    Acid reflux disease     Allergic rhinitis     Arthritis     gouty    Atrial fibrillation     BBB (bundle branch block)     right    Bone spur 2017    neck     Congestive heart failure 10/08/2019    Congestive heart failure (CHF)     Coronary artery disease     non obstructive    Hypertension     Mitral regurgitation     Osteoarthritis     Presence of biventricular implantable cardioverter-defibrillator (ICD) 2019    S/P ablation of atrial fibrillation 2017    Takotsubo cardiomyopathy     Tricuspid regurgitation     VT (ventricular tachycardia) 2018     Past Surgical History:   Procedure Laterality Date    AMPUTATION      index and middle finger    CARDIAC ABLATION  2017    BHF    CARDIAC CATHETERIZATION  2016    non obst CAD; takotsubo CM    CARDIAC CATHETERIZATION  2017    treating medically    CARDIAC CATHETERIZATION N/A 2022    Procedure: Left Heart Cath;  Surgeon: Chidi Figueroa MD;  Location: [facility] CATH INVASIVE LOCATION;  Service: Cardiology;  Laterality: N/A;    CARDIAC DEFIBRILLATOR PLACEMENT      Bs    CARDIAC ELECTROPHYSIOLOGY PROCEDURE N/A 2021    Procedure: AV node ablation;  Surgeon: Hira Ochoa MD;  Location: [facility] CATH INVASIVE LOCATION;  Service: Cardiovascular;  Laterality: N/A;    COLONOSCOPY N/A 2022    Procedure: COLONOSCOPY with polypectomy x5;  Surgeon: KAYLIN Ruiz MD;  Location: [facility] ENDOSCOPY;  Service: Gastroenterology;  Laterality: N/A;  post: diverticulosis, hemorrhoids, polyps    FRACTURE SURGERY  2021    jaw     INTERNAL CARDIAC DEFIBRILLATOR INSERTION      BiV ICD BS    OTHER SURGICAL HISTORY  2017    Heart ablation-[facility]    ROTATOR CUFF REPAIR Right     SINUS SURGERY  2014     Family History   Problem Relation Age of Onset    Parkinsonism Mother     Alcohol abuse Father      Social History     Tobacco Use    Smoking status: Former     Types: Cigarettes     Quit date:      Years since quittin.5     Passive exposure: Past    Smokeless tobacco: Never   Vaping Use    Vaping Use: Never used   Substance Use Topics    Alcohol use: Yes     Comment:  "occasionally    Drug use: Never     Medications Prior to Admission   Medication Sig Dispense Refill Last Dose    allopurinol (ZYLOPRIM) 300 MG tablet Take 1 tablet by mouth Daily.   7/20/2023 at 0700    coenzyme Q10 100 MG capsule Take 2 capsules by mouth Daily.   7/20/2023 at 0700    cyanocobalamin (VITAMIN B-12) 2500 MCG tablet tablet Take 1 tablet by mouth Daily.   7/20/2023 at 0700    donepezil (ARICEPT) 23 MG tablet Take 1 tablet by mouth 2 (Two) Times a Day.   7/20/2023 at 0700    furosemide (LASIX) 20 MG tablet Take 1 tablet by mouth 2 (Two) Times a Day.   7/20/2023 at 0700    losartan (COZAAR) 25 MG tablet Take 1 tablet by mouth 2 (Two) Times a Day.   7/20/2023 at 0700    rivaroxaban (XARELTO) 15 MG tablet Take 1 tablet by mouth Daily.   7/20/2023 at 0700    vitamin D3 125 MCG (5000 UT) capsule capsule Take  by mouth Daily.   7/20/2023 at 0700     Allergies:  Hydrocodone    REVIEW OF SYSTEMS:  Please see the above history of present illness for pertinent positives and negatives.  The remainder of the patient's systems have been reviewed and are negative.     Vital Signs  Temp:  [97 °F (36.1 °C)-97.8 °F (36.6 °C)] 97.7 °F (36.5 °C)  Heart Rate:  [70-74] 72  Resp:  [18-23] 23  BP: ()/(51-79) 97/57    Flowsheet Rows      Flowsheet Row First Filed Value   Admission Height 188 cm (74\") Documented at 07/20/2023 1201   Admission Weight 79 kg (174 lb 2.6 oz) Documented at 07/20/2023 1201             Physical Exam:  Physical Exam   Constitutional: Patient appears stated age, frail deconditioned   HEENT:   Head: Normocephalic and atraumatic.   Eyes:  Pupils are equal, round, and reactive to light. EOM are intact. Sclerae are anicteric and noninjected.  Mouth and Throat: Patient has moist mucous membranes. Oropharynx is clear of any erythema or exudate.     Neck: Neck supple.  Trace to mild JVD present. No thyromegaly present. No lymphadenopathy present.  Cardiovascular: Regular rate, regular rhythm, 80s 1 out of " 6 stock ejection murmur S1 normal and S2 normal.  Exam reveals no gallop and no friction rub.  Pulmonary/Chest: Lungs are clear to auscultation bilaterally anteriorly  No respiratory distress. No wheezes. No rhonchi. No rales.   Abdominal: Soft. Bowel sounds are normal. No distension and no mass. There is no hepatosplenomegaly. There is no tenderness.   Musculoskeletal: Normal muscle tone  Extremities: Trace to 1+ ankle edema pulses are palpable in all 4 extremities.  Neurological: Patient is alert and oriented to person, place, and time. Cranial nerves II-XII are grossly intact with no focal deficits.  Skin: Skin is warm. No rash noted. Nails show no clubbing.  No cyanosis or erythema.     Results Review:    I reviewed the patient's new clinical results.  Lab Results (most recent)       Procedure Component Value Units Date/Time    Comprehensive Metabolic Panel [[id number]]  (Abnormal) Collected: 07/21/23 0507    Specimen: Blood from Arm, Left Updated: 07/21/23 0647     Glucose 101 mg/dL      BUN 17 mg/dL      Creatinine 1.34 mg/dL      Sodium 143 mmol/L      Potassium 3.4 mmol/L      Chloride 100 mmol/L      CO2 32.0 mmol/L      Calcium 9.6 mg/dL      Total Protein 6.5 g/dL      Albumin 3.6 g/dL      ALT (SGPT) 16 U/L      AST (SGOT) 29 U/L      Alkaline Phosphatase 104 U/L      Total Bilirubin 2.4 mg/dL      Globulin 2.9 gm/dL      A/G Ratio 1.2 g/dL      BUN/Creatinine Ratio 12.7     Anion Gap 11.0 mmol/L      eGFR 52.2 mL/min/1.73     Narrative:      GFR Normal >60  Chronic Kidney Disease <60  Kidney Failure <15    The GFR formula is only valid for adults with stable renal function between ages 18 and 70.    Magnesium [[id number]]  (Normal) Collected: 07/21/23 0507    Specimen: Blood from Arm, Left Updated: 07/21/23 0647     Magnesium 1.9 mg/dL     Phosphorus [[id number]]  (Normal) Collected: 07/21/23 0507    Specimen: Blood from Arm, Left Updated: 07/21/23 0647     Phosphorus 3.0 mg/dL     CBC & Differential  [[id number]]  (Abnormal) Collected: 07/21/23 0507    Specimen: Blood from Arm, Left Updated: 07/21/23 0612    Narrative:      The following orders were created for panel order CBC & Differential.  Procedure                               Abnormality         Status                     ---------                               -----------         ------                     CBC Auto Differential[[id number]]        Abnormal            Final result                 Please view results for these tests on the individual orders.    CBC Auto Differential [[id number]]  (Abnormal) Collected: 07/21/23 0507    Specimen: Blood from Arm, Left Updated: 07/21/23 0612     WBC 6.80 10*3/mm3      RBC 5.03 10*6/mm3      Hemoglobin 16.7 g/dL      Hematocrit 50.8 %      .9 fL      MCH 33.2 pg      MCHC 32.8 g/dL      RDW 17.3 %      RDW-SD 59.9 fl      MPV 10.0 fL      Platelets 164 10*3/mm3      Neutrophil % 64.4 %      Lymphocyte % 21.6 %      Monocyte % 11.0 %      Eosinophil % 1.4 %      Basophil % 1.6 %      Neutrophils, Absolute 4.40 10*3/mm3      Lymphocytes, Absolute 1.50 10*3/mm3      Monocytes, Absolute 0.70 10*3/mm3      Eosinophils, Absolute 0.10 10*3/mm3      Basophils, Absolute 0.10 10*3/mm3      nRBC 0.2 /100 WBC     Creatinine Urine Random (kidney function) GFR component - Urine, Clean Catch [[id number]] Collected: 07/20/23 1322    Specimen: Urine, Clean Catch Updated: 07/20/23 2052     Creatinine, Urine 68.1 mg/dL     Narrative:      Reference intervals for random urine have not been established.  Clinical usage is dependent upon physician's interpretation in combination with other laboratory tests.       Urinalysis, Microscopic Only - Urine, Clean Catch [[id number]]  (Abnormal) Collected: 07/20/23 1828    Specimen: Urine, Clean Catch Updated: 07/20/23 1851     RBC, UA 0-2 /HPF      WBC, UA 0-2 /HPF      Bacteria, UA None Seen /HPF      Squamous Epithelial Cells, UA 0-2 /HPF      Hyaline Casts, UA 3-6 /LPF      Methodology  Automated Microscopy    Urinalysis With Microscopic If Indicated (No Culture) - Urine, Clean Catch [[id number]]  (Abnormal) Collected: 07/20/23 1828    Specimen: Urine, Clean Catch Updated: 07/20/23 1851     Color, UA Yellow     Appearance, UA Clear     pH, UA 6.5     Specific Gravity, UA 1.006     Glucose, UA Negative     Ketones, UA Negative     Bilirubin, UA Negative     Blood, UA Small (1+)     Protein, UA Negative     Leuk Esterase, UA Negative     Nitrite, UA Negative     Urobilinogen, UA 1.0 E.U./dL    Hemoglobin A1c [[id number]]  (Normal) Collected: 07/20/23 1220    Specimen: Blood Updated: 07/20/23 1816     Hemoglobin A1C 5.30 %     Sodium, Urine, Random - Urine, Clean Catch [[id number]] Collected: 07/20/23 1322    Specimen: Urine, Clean Catch Updated: 07/20/23 1745     Sodium, Urine 83 mmol/L     Narrative:      Reference intervals for random urine have not been established.  Clinical usage is dependent upon physician's interpretation in combination with other laboratory tests.       Protein, Urine, Random - Urine, Clean Catch [[id number]] Collected: 07/20/23 1322    Specimen: Urine, Clean Catch Updated: 07/20/23 1745     Total Protein, Urine 21.6 mg/dL     Narrative:      Reference intervals for random urine have not been established.  Clinical usage is dependent upon physician's interpretation in combination with other laboratory tests.       TSH [[id number]]  (Normal) Collected: 07/20/23 1220    Specimen: Blood Updated: 07/20/23 1654     TSH 2.350 uIU/mL     Potassium [[id number]]  (Abnormal) Collected: 07/20/23 1615    Specimen: Blood from Arm, Left Updated: 07/20/23 1652     Potassium 3.2 mmol/L     High Sensitivity Troponin T [[id number]]  (Abnormal) Collected: 07/20/23 1615    Specimen: Blood from Arm, Left Updated: 07/20/23 1652     HS Troponin T 37 ng/L     Narrative:      High Sensitive Troponin T Reference Range:  <10.0 ng/L- Negative Female for AMI  <15.0 ng/L- Negative Male for AMI  >=10 - Abnormal  Female indicating possible myocardial injury.  >=15 - Abnormal Male indicating possible myocardial injury.   Clinicians would have to utilize clinical acumen, EKG, Troponin, and serial changes to determine if it is an Acute Myocardial Infarction or myocardial injury due to an underlying chronic condition.         Lipid Panel [[id number]]  (Abnormal) Collected: 07/20/23 1220    Specimen: Blood Updated: 07/20/23 1648     Total Cholesterol 161 mg/dL      Triglycerides 88 mg/dL      HDL Cholesterol 74 mg/dL      LDL Cholesterol  71 mg/dL      VLDL Cholesterol 16 mg/dL      LDL/HDL Ratio 0.94    Narrative:      Cholesterol Reference Ranges  (U.S. Department of Health and Human Services ATP III Classifications)    Desirable          <200 mg/dL  Borderline High    200-239 mg/dL  High Risk          >240 mg/dL      Triglyceride Reference Ranges  (U.S. Department of Health and Human Services ATP III Classifications)    Normal           <150 mg/dL  Borderline High  150-199 mg/dL  High             200-499 mg/dL  Very High        >500 mg/dL    HDL Reference Ranges  (U.S. Department of Health and Human Services ATP III Classifications)    Low     <40 mg/dl (major risk factor for CHD)  High    >60 mg/dl ('negative' risk factor for CHD)        LDL Reference Ranges  (U.S. Department of Health and Human Services ATP III Classifications)    Optimal          <100 mg/dL  Near Optimal     100-129 mg/dL  Borderline High  130-159 mg/dL  High             160-189 mg/dL  Very High        >189 mg/dL    Magnesium [[id number]]  (Normal) Collected: 07/20/23 1220    Specimen: Blood Updated: 07/20/23 1648     Magnesium 2.1 mg/dL     Urinalysis, Microscopic Only - Urine, Clean Catch [[id number]]  (Abnormal) Collected: 07/20/23 1322    Specimen: Urine, Clean Catch Updated: 07/20/23 1339     RBC, UA 31-50 /HPF      WBC, UA 0-2 /HPF      Comment: Urine culture not indicated.        Bacteria, UA None Seen /HPF      Squamous Epithelial Cells, UA 0-2 /HPF       Hyaline Casts, UA 0-2 /LPF      Methodology Automated Microscopy    Urinalysis With Culture If Indicated - Urine, Clean Catch [[id number]]  (Abnormal) Collected: 07/20/23 1322    Specimen: Urine, Clean Catch Updated: 07/20/23 1339     Color, UA Dark Yellow     Appearance, UA Clear     pH, UA 6.0     Specific Gravity, UA 1.013     Glucose, UA Negative     Ketones, UA Negative     Bilirubin, UA Negative     Blood, UA Large (3+)     Protein, UA Trace     Leuk Esterase, UA Trace     Nitrite, UA Negative     Urobilinogen, UA >=8.0 E.U./dL    Narrative:      In absence of clinical symptoms, the presence of pyuria, bacteria, and/or nitrites on the urinalysis result does not correlate with infection.    [place] Draw [[id number]] Collected: 07/20/23 1220    Specimen: Blood Updated: 07/20/23 1330    Narrative:      The following orders were created for panel order [place] Draw.  Procedure                               Abnormality         Status                     ---------                               -----------         ------                     Green Top (Gel)[[id number]]                                  Final result               Lavender Top[[id number]]                                     Final result               Gold Top - SST[[id number]]                                   Final result               Light Blue Top[[id number]]                                   Final result                 Please view results for these tests on the individual orders.    Light Blue Top [[id number]] Collected: 07/20/23 1220    Specimen: Blood Updated: 07/20/23 1330     Extra Tube Hold for add-ons.     Comment: Auto resulted       Lavender Top [[id number]] Collected: 07/20/23 1220    Specimen: Blood Updated: 07/20/23 1330     Extra Tube hold for add-on     Comment: Auto resulted       Gold Top - SST [[id number]] Collected: 07/20/23 1220    Specimen: Blood Updated: 07/20/23 1330     Extra Tube Hold for add-ons.     Comment: Auto resulted.        Green Top (Gel) [[id number]] Collected: 07/20/23 1220    Specimen: Blood Updated: 07/20/23 1310     Extra Tube hide    BNP [[id number]]  (Abnormal) Collected: 07/20/23 1220    Specimen: Blood Updated: 07/20/23 1306     proBNP 4,280.0 pg/mL     Narrative:      Among patients with dyspnea, NT-proBNP is highly sensitive for the detection of acute congestive heart failure. In addition NT-proBNP of <300 pg/ml effectively rules out acute congestive heart failure with 99% negative predictive value.    Results may be falsely decreased if patient taking Biotin.      Single High Sensitivity Troponin T [[id number]]  (Abnormal) Collected: 07/20/23 1220    Specimen: Blood Updated: 07/20/23 1306     HS Troponin T 39 ng/L     Narrative:      High Sensitive Troponin T Reference Range:  <10.0 ng/L- Negative Female for AMI  <15.0 ng/L- Negative Male for AMI  >=10 - Abnormal Female indicating possible myocardial injury.  >=15 - Abnormal Male indicating possible myocardial injury.   Clinicians would have to utilize clinical acumen, EKG, Troponin, and serial changes to determine if it is an Acute Myocardial Infarction or myocardial injury due to an underlying chronic condition.         Comprehensive Metabolic Panel [[id number]]  (Abnormal) Collected: 07/20/23 1220    Specimen: Blood Updated: 07/20/23 1259     Glucose 135 mg/dL      BUN 16 mg/dL      Creatinine 1.27 mg/dL      Sodium 147 mmol/L      Potassium 3.3 mmol/L      Comment: Slight hemolysis detected by analyzer. Results may be affected.        Chloride 106 mmol/L      CO2 29.0 mmol/L      Calcium 9.3 mg/dL      Total Protein 6.9 g/dL      Albumin 4.1 g/dL      ALT (SGPT) 18 U/L      AST (SGOT) 36 U/L      Comment: Slight hemolysis detected by analyzer. Results may be affected.        Alkaline Phosphatase 113 U/L      Total Bilirubin 2.1 mg/dL      Globulin 2.8 gm/dL      A/G Ratio 1.5 g/dL      BUN/Creatinine Ratio 12.6     Anion Gap 12.0 mmol/L      eGFR 55.7 mL/min/1.73      Narrative:      GFR Normal >60  Chronic Kidney Disease <60  Kidney Failure <15    The GFR formula is only valid for adults with stable renal function between ages 18 and 70.    CBC & Differential [[id number]]  (Abnormal) Collected: 07/20/23 1220    Specimen: Blood Updated: 07/20/23 1245    Narrative:      The following orders were created for panel order CBC & Differential.  Procedure                               Abnormality         Status                     ---------                               -----------         ------                     CBC Auto Differential[[id number]]        Abnormal            Final result                 Please view results for these tests on the individual orders.    CBC Auto Differential [[id number]]  (Abnormal) Collected: 07/20/23 1220    Specimen: Blood Updated: 07/20/23 1245     WBC 6.60 10*3/mm3      RBC 5.08 10*6/mm3      Hemoglobin 16.9 g/dL      Hematocrit 51.7 %      .7 fL      MCH 33.2 pg      MCHC 32.7 g/dL      RDW 17.3 %      RDW-SD 60.4 fl      MPV 9.7 fL      Platelets 182 10*3/mm3      Neutrophil % 74.6 %      Lymphocyte % 13.8 %      Monocyte % 10.2 %      Eosinophil % 0.7 %      Basophil % 0.7 %      Neutrophils, Absolute 4.90 10*3/mm3      Lymphocytes, Absolute 0.90 10*3/mm3      Monocytes, Absolute 0.70 10*3/mm3      Eosinophils, Absolute 0.00 10*3/mm3      Basophils, Absolute 0.00 10*3/mm3      nRBC 0.0 /100 WBC             Imaging Results (Most Recent)       Procedure Component Value Units Date/Time    XR Chest 1 View [[id number]] Collected: 07/20/23 1305     Updated: 07/20/23 1308    Narrative:      XR CHEST 1 VW    Date of Exam: 7/20/2023 1:00 PM EDT    Indication: weakness, chf    Comparison: PA and lateral chest radiograph 10/18/2022.    Findings:  Moderate to marked cardiac enlargement is present, and appears increased compared to the prior study. Pulmonary vasculature appears enlarged, indistinct, suggesting congestive change, increased from prior. Fine  interstitial thickening is present in both   lungs consistent with mild edema. No significant pleural fluid collection is identified. Pacemaker/defibrillator appears unchanged. No visible pneumothorax.      Impression:      Impression:    1. Findings consistent with central vascular congestion and interstitial pulmonary edema.  2. Moderate to marked cardiac enlargement, which appears increased compared to 10/18/2022.      Electronically Signed: Kathie Barksdalejaci    7/20/2023 1:06 PM EDT    Workstation ID: [id number]          reviewed    ECG/EMG Results (most recent)       Procedure Component Value Units Date/Time    SCANNED - TELEMETRY   [[id number]] Resulted: 07/20/23     Updated: 07/20/23 1758    SCANNED - TELEMETRY   [[id number]] Resulted: 07/20/23     Updated: 07/20/23 2254    SCANNED - TELEMETRY   [[id number]] Resulted: 07/20/23     Updated: 07/21/23 0702    SCANNED - TELEMETRY   [[id number]] Resulted: 07/20/23     Updated: 07/21/23 0717    SCANNED - TELEMETRY   [[id number]] Resulted: 07/20/23     Updated: 07/21/23 0727    ECG 12 Lead Other; weakness [[id number]] Collected: 07/20/23 1216     Updated: 07/21/23 0749     QT Interval 458 ms     Narrative:      HEART RATE= 87  bpm  RR Interval= 687  ms  IL Interval=   ms  P Horizontal Axis=   deg  P Front Axis=   deg  QRSD Interval= 165  ms  QT Interval= 458  ms  QRS Axis= 122  deg  T Wave Axis= 10  deg  - ABNORMAL ECG -  Atrial-sensed ventricular-paced complexes  RBBB and LPFB  Electronically Signed By: Thiago Bourne ([facility]) 21-Jul-2023 07:49:21  Date and Time of Study: 2023-07-20 12:16:51    SCANNED - TELEMETRY   [[id number]] Resulted: 07/20/23     Updated: 07/21/23 0758    SCANNED - TELEMETRY   [[id number]] Resulted: 07/20/23     Updated: 07/21/23 0812    SCANNED - TELEMETRY   [[id number]] Resulted: 07/20/23     Updated: 07/21/23 1129    SCANNED - TELEMETRY   [[id number]] Resulted: 07/20/23     Updated: 07/21/23 1544    Adult Transthoracic Echo Complete W/ Cont if Necessary Per  Protocol [[id number]] Resulted: 07/22/23 1353     Updated: 07/22/23 1358     LVIDd 5.7 cm      LVIDs 4.7 cm      IVSd 1.06 cm      LVPWd 1.13 cm      FS 18.3 %      IVS/LVPW 0.94 cm      ESV(cubed) 101.7 ml      LV Sys Vol (BSA corrected) 255.8 cm2      EDV(cubed) 186.6 ml      LV Zabala Vol (BSA corrected) 418.1 cm2      LVOT area 4.6 cm2      LV mass(C)d 256.4 grams      LVOT diam 2.42 cm      EDV(MOD-sp4) 141.9 ml      ESV(MOD-sp4) 86.8 ml      SV(MOD-sp4) 55.1 ml      SI(MOD-sp4) 162.2 ml/m2      EF(MOD-sp4) 38.8 %      MV E max renea 70.6 cm/sec      MV A max renea 20.1 cm/sec      MV dec time 0.06 msec      MV E/A 3.5     SV(LVOT) 38.7 ml      RVIDd 4.5 cm      LV V1 max 47.8 cm/sec      LV V1 max PG 0.92 mmHg      LV V1 mean PG 0.50 mmHg      LV V1 VTI 8.5 cm      Ao pk renea 96.5 cm/sec      Ao max PG 3.7 mmHg      Ao mean PG 1.90 mmHg      Ao V2 VTI 14.6 cm      MARIA DE JESUS(I,D) 2.7 cm2      AI P1/2t 448.1 msec      MV max PG 5.0 mmHg      MV mean PG 1.39 mmHg      MV V2 VTI 17.5 cm      MVA(VTI) 2.21 cm2      MV dec slope 1,091 cm/sec2      MR max renea 484.2 cm/sec      MR max PG 93.8 mmHg      TR max renea 302.4 cm/sec      TR max PG 38.0 mmHg      RVSP(TR) 41.0 mmHg      RAP systole 3.0 mmHg      RV V1 max PG 2.00 mmHg      RV V1 max 70.7 cm/sec      RV V1 VTI 18.7 cm      PA V2 max 117.5 cm/sec      PA acc time 0.11 sec      Ao root diam 3.1 cm      ACS 2.05 cm     Narrative:        Left ventricular systolic function is moderately decreased. Left   ventricular ejection fraction appears to be 36 - 40%.    The left ventricular cavity is moderately dilated.    Left ventricular wall thickness is consistent with mild concentric   hypertrophy.    Left ventricular diastolic dysfunction is noted.    Moderately reduced right ventricular systolic function noted.    The right ventricular cavity is severely dilated.    The left atrial cavity is severely dilated.    The right atrial cavity is severely  dilated.    Moderate to severe  mitral valve regurgitation is present.    Severe tricuspid valve regurgitation is present.    Estimated right ventricular systolic pressure from tricuspid   regurgitation is moderately elevated (45-55 mmHg).    Moderate pulmonary hypertension is present.            reviewed    Assessment & Plan     Acute on chronic systolic and diastolic CHF  Status post 2 mg of Bumex with large volume diuresis  Avoid further IV diuretics today  Restart oral Lasix twice daily  Restart metoprolol extended release 25 daily  Start Aldactone 12.5 daily, increase to 25 if tolerated  60 mg in divided doses potassium chloride  2D echo is pending  No ICD shocks medication for interrogation    Hypertension controlled  Nonobstructive CAD and angina free  Frailty deconditioning, encourage p.o. intake, consider calorie count if does not adequate intake  Pure wick /Condom catheter in place for measure strict I's and O's Daily weights fluid salt restriction,      Patient volume status appears significantly improved, likely not far from discharge from the standpoint however he has significant debility and weakness, will see how his kidney function does, blood pressure response, new medications including metoprolol succinate and Aldactone in addition to his Lasix, daily weights prior to discharge    He can follow-up within 7 days after discharge with recheck of [facility] outpatient once discharged home    Denies any new complaints  Tmax is 97.6 pulse is 72 respirations are 22 blood pressure is 197/56 sats are 96% on room  Abnormal elevated proBNP at 2600  Nonspecific elevation of troponin with no significant trend  Sodium is 143 potassium is 4.5 creatinine is 1.7 somewhat worse compared to yesterday of 1.3  Volume status is better  Hemoglobin is 16.6  Echocardiogram with LV ejection fraction of 35 to 40% severe RV dysfunction moderate to severe mitral regurgitation severe tricuspid regurgitation moderate pulmonary hypertension  Current medications  include Lasix 20 mg p.o. once a day losartan 25 mg p.o. twice daily Toprol-XL 25 mg p.o. once a day patient is on Xarelto 15 mg p.o. once a day  Okay to discharge from cardiac standpoint and follow-up in office in 2 weeks                Miki Slaughter MD  07/22/23  14:08 EDT

## 2023-07-24 ENCOUNTER — TELEPHONE (OUTPATIENT)
Dept: FAMILY MEDICINE CLINIC | Facility: CLINIC | Age: 83
End: 2023-07-24
Payer: MEDICARE

## 2023-07-24 ENCOUNTER — TRANSITIONAL CARE MANAGEMENT TELEPHONE ENCOUNTER (OUTPATIENT)
Dept: CALL CENTER | Facility: HOSPITAL | Age: 83
End: 2023-07-24
Payer: MEDICARE

## 2023-07-24 NOTE — OUTREACH NOTE
Call Center TCM Note      Flowsheet Row Responses   [facility] patient discharged from? [facility]   Does the patient have one of the following disease processes/diagnoses(primary or secondary)? CHF   TCM attempt successful? Yes   Call start time 1110   Call end time 1117   Discharge diagnosis A/C CHF, cardiomyopathy   Meds reviewed with patient/caregiver? Yes   Is the patient having any side effects they believe may be caused by any medication additions or changes? No   Does the patient have all medications ordered at discharge? Yes   Is the patient taking all medications as directed (includes completed medication regime)? Yes   Does the patient have an appointment with their PCP within 7-14 days of discharge? Yes   Has home health visited the patient within 72 hours of discharge? N/A   Psychosocial issues? No   Did the patient receive a copy of their discharge instructions? Yes   Nursing interventions Reviewed instructions with patient   What is the patient's perception of their health status since discharge? Improving   Nursing interventions Nurse provided patient education   Is the patient able to teach back signs and symptoms of worsening condition? (i.e. weight gain, shortness of air, etc.) Yes   If the patient is a current smoker, are they able to teach back resources for cessation? Not a smoker   Is the patient/caregiver able to teach back the hierarchy of who to call/visit for symptoms/problems? PCP, Specialist, Home health nurse, Urgent Care, ED, 911 Yes   TCM call completed? Yes   Wrap up additional comments D/C DX: A/C CHF, cardiomyopathy - Spoke with pt who states he is feeling better. States he is monitoring weight at home as always. PT unsure about medications and appts. gave me permission to call son , who confirmed new rx's Metoprolol succinateXL and K-DUR in place. Pt does have TCM APPT sched with PCP jemal (Sultana Sandoval for this appt) on 08/02/2023.   Call end time 1117             Salina Haas  [place] Hicks    7/24/2023, 11:20 EDT

## 2023-07-24 NOTE — TELEPHONE ENCOUNTER
Hub staff attempted to follow warm transfer process and was unsuccessful     Caller: RADHA KING    Relationship to patient: Child    Best call back number: [phone]     Patient is needing:       DAUGHTER OF PATIENT IS WANTING TO SPEAK WITH SOMEONE TO SEE IF SHE CAN GET HER FATHER IN TO BE SEEN BY DR CRISTINA AS OPPOSED TO ANYONE ELSE.  THERE WAS NO APPOINTMENT AVAILABLE WHEN TRYING TO MAKE APPOINTMENT.      SHE WOULD LIKE A CALL BACK PLEASE.

## 2023-07-24 NOTE — TELEPHONE ENCOUNTER
I EXPLAINED THAT [facility] IS ON PATERNITY LEAVE AND THEY WERE FINE WITH KEEPING APPT WITH [facility]

## 2023-07-24 NOTE — TELEPHONE ENCOUNTER
Caller: KATERINA    Relationship: Child    Best call back number: [phone]     What medications are you currently taking:   Current Outpatient Medications on File Prior to Visit   Medication Sig Dispense Refill    allopurinol (ZYLOPRIM) 300 MG tablet Take 1 tablet by mouth Daily.      coenzyme Q10 100 MG capsule Take 2 capsules by mouth Daily.      cyanocobalamin (VITAMIN B-12) 2500 MCG tablet tablet Take 1 tablet by mouth Daily.      donepezil (ARICEPT) 23 MG tablet Take 1 tablet by mouth 2 (Two) Times a Day.      furosemide (LASIX) 20 MG tablet Take 1 tablet by mouth 2 (Two) Times a Day.      losartan (COZAAR) 25 MG tablet Take 1 tablet by mouth 2 (Two) Times a Day.      metoprolol succinate XL (TOPROL-XL) 25 MG 24 hr tablet Take 1 tablet by mouth Daily. 30 tablet 0    potassium chloride (K-DUR,KLOR-CON) 20 MEQ CR tablet Take 1 tablet by mouth 2 (Two) Times a Day for 30 days. 60 tablet 0    rivaroxaban (XARELTO) 15 MG tablet Take 1 tablet by mouth Daily.      vitamin D3 125 MCG (5000 UT) capsule capsule Take  by mouth Daily.       No current facility-administered medications on file prior to visit.        CONCERNS:    PATIENT WAS PRESCRIBED METOPROLOL FOR HIGH BLOOD PRESSURE WHILE HE WAS IN THE HOSPITAL LAST WEEK.      PATIENT HAS NOT STARTED THE MEDICATION YET DUE TO SOME QUESTIONS AND CONCERNS THAT THEY HAVE PERTAINING TO THIS MEDICATION.    HE WAS GIVEN THIS MEDICATION WHILE HE WAS IN THE HOSPITAL.    THEY WOULD LIKE A CALL BACK TO ASK THE QUESTIONS PLEASE.

## 2023-07-24 NOTE — TELEPHONE ENCOUNTER
PATIENT'S DAUGHTER SAID HE WAS PRESCRIBED METOPROLOL IN THE HOSPITAL BUT ALSO TAKES LOSARTAN AND THEY THINK HE WAS HAVING SOME CONFUSION AFTER TAKING METOPROLOL SO THEY STOPPED IT AND BP HAS BEEN IN A GOOD RANGE. I ADVISED HER TO KEEP AN EYE ON BP AND IF IT STAYS IN RANGE TO JUST DISCUSS IT WITH [facility] AT THE APPT.

## 2023-08-01 ENCOUNTER — READMISSION MANAGEMENT (OUTPATIENT)
Dept: CALL CENTER | Facility: HOSPITAL | Age: 83
End: 2023-08-01
Payer: MEDICARE

## 2023-08-02 ENCOUNTER — LAB (OUTPATIENT)
Dept: FAMILY MEDICINE CLINIC | Facility: CLINIC | Age: 83
End: 2023-08-02
Payer: MEDICARE

## 2023-08-02 ENCOUNTER — OFFICE VISIT (OUTPATIENT)
Dept: FAMILY MEDICINE CLINIC | Facility: CLINIC | Age: 83
End: 2023-08-02
Payer: MEDICARE

## 2023-08-02 VITALS
TEMPERATURE: 97.3 F | HEART RATE: 70 BPM | BODY MASS INDEX: 20.92 KG/M2 | RESPIRATION RATE: 16 BRPM | WEIGHT: 163 LBS | SYSTOLIC BLOOD PRESSURE: 122 MMHG | HEIGHT: 74 IN | DIASTOLIC BLOOD PRESSURE: 62 MMHG | OXYGEN SATURATION: 95 %

## 2023-08-02 DIAGNOSIS — N18.30 STAGE 3 CHRONIC KIDNEY DISEASE, UNSPECIFIED WHETHER STAGE 3A OR 3B CKD: ICD-10-CM

## 2023-08-02 DIAGNOSIS — I50.43 ACUTE ON CHRONIC COMBINED SYSTOLIC AND DIASTOLIC CONGESTIVE HEART FAILURE: ICD-10-CM

## 2023-08-02 DIAGNOSIS — R06.09 DYSPNEA ON EXERTION: ICD-10-CM

## 2023-08-02 DIAGNOSIS — I50.23 ACUTE ON CHRONIC SYSTOLIC CONGESTIVE HEART FAILURE: ICD-10-CM

## 2023-08-02 DIAGNOSIS — Z09 HOSPITAL DISCHARGE FOLLOW-UP: Primary | ICD-10-CM

## 2023-08-02 DIAGNOSIS — I50.813 ACUTE ON CHRONIC RIGHT-SIDED CONGESTIVE HEART FAILURE: ICD-10-CM

## 2023-08-02 DIAGNOSIS — I10 ESSENTIAL (PRIMARY) HYPERTENSION: ICD-10-CM

## 2023-08-02 LAB
ANION GAP SERPL CALCULATED.3IONS-SCNC: 11 MMOL/L (ref 5–15)
BUN SERPL-MCNC: 36 MG/DL (ref 8–23)
BUN/CREAT SERPL: 27.5 (ref 7–25)
CALCIUM SPEC-SCNC: 9.5 MG/DL (ref 8.6–10.5)
CHLORIDE SERPL-SCNC: 105 MMOL/L (ref 98–107)
CO2 SERPL-SCNC: 28 MMOL/L (ref 22–29)
CREAT SERPL-MCNC: 1.31 MG/DL (ref 0.76–1.27)
EGFRCR SERPLBLD CKD-EPI 2021: 53.7 ML/MIN/1.73
GLUCOSE SERPL-MCNC: 76 MG/DL (ref 65–99)
NT-PROBNP SERPL-MCNC: 1896 PG/ML (ref 0–1800)
POTASSIUM SERPL-SCNC: 4.2 MMOL/L (ref 3.5–5.2)
SODIUM SERPL-SCNC: 144 MMOL/L (ref 136–145)

## 2023-08-02 PROCEDURE — 36415 COLL VENOUS BLD VENIPUNCTURE: CPT

## 2023-08-02 PROCEDURE — 83880 ASSAY OF NATRIURETIC PEPTIDE: CPT | Performed by: NURSE PRACTITIONER

## 2023-08-02 PROCEDURE — 80048 BASIC METABOLIC PNL TOTAL CA: CPT | Performed by: NURSE PRACTITIONER

## 2023-08-03 ENCOUNTER — TELEPHONE (OUTPATIENT)
Dept: FAMILY MEDICINE CLINIC | Facility: CLINIC | Age: 83
End: 2023-08-03
Payer: MEDICARE

## 2023-08-04 ENCOUNTER — OFFICE VISIT (OUTPATIENT)
Dept: CARDIOLOGY | Facility: CLINIC | Age: 83
End: 2023-08-04
Payer: MEDICARE

## 2023-08-04 VITALS
WEIGHT: 162 LBS | BODY MASS INDEX: 20.79 KG/M2 | HEART RATE: 71 BPM | DIASTOLIC BLOOD PRESSURE: 67 MMHG | OXYGEN SATURATION: 99 % | HEIGHT: 74 IN | SYSTOLIC BLOOD PRESSURE: 113 MMHG

## 2023-08-04 DIAGNOSIS — Z95.810 PRESENCE OF BIVENTRICULAR IMPLANTABLE CARDIOVERTER-DEFIBRILLATOR (ICD): ICD-10-CM

## 2023-08-04 DIAGNOSIS — I47.20 VENTRICULAR TACHYCARDIA: ICD-10-CM

## 2023-08-04 DIAGNOSIS — I34.0 NONRHEUMATIC MITRAL VALVE REGURGITATION: ICD-10-CM

## 2023-08-04 DIAGNOSIS — I47.29 NONSUSTAINED VENTRICULAR TACHYCARDIA: ICD-10-CM

## 2023-08-04 DIAGNOSIS — I50.22 CHRONIC SYSTOLIC CONGESTIVE HEART FAILURE: ICD-10-CM

## 2023-08-04 DIAGNOSIS — I50.20 HEART FAILURE WITH REDUCED EJECTION FRACTION: ICD-10-CM

## 2023-08-04 DIAGNOSIS — I42.0 DILATED CARDIOMYOPATHY: Primary | ICD-10-CM

## 2023-08-04 DIAGNOSIS — I48.21 PERMANENT ATRIAL FIBRILLATION: ICD-10-CM

## 2023-08-04 PROCEDURE — 3078F DIAST BP <80 MM HG: CPT | Performed by: INTERNAL MEDICINE

## 2023-08-04 PROCEDURE — 3074F SYST BP LT 130 MM HG: CPT | Performed by: INTERNAL MEDICINE

## 2023-08-04 PROCEDURE — 1159F MED LIST DOCD IN RCRD: CPT | Performed by: INTERNAL MEDICINE

## 2023-08-04 PROCEDURE — 1160F RVW MEDS BY RX/DR IN RCRD: CPT | Performed by: INTERNAL MEDICINE

## 2023-08-04 PROCEDURE — 99214 OFFICE O/P EST MOD 30 MIN: CPT | Performed by: INTERNAL MEDICINE

## 2023-08-22 RX ORDER — POTASSIUM CHLORIDE 20 MEQ/1
20 TABLET, EXTENDED RELEASE ORAL 2 TIMES DAILY
Qty: 60 TABLET | Refills: 5 | Status: SHIPPED | OUTPATIENT
Start: 2023-08-22

## 2023-08-25 RX ORDER — ALLOPURINOL 300 MG/1
TABLET ORAL
Qty: 90 TABLET | Refills: 0 | Status: SHIPPED | OUTPATIENT
Start: 2023-08-25

## 2023-09-01 NOTE — PROGRESS NOTES
[facility] Medicine Services Daily Progress Note      Hospitalist Team  LOS 1 days      Patient Care Team:  Bunny Curran MD as PCP - General (Internal Medicine)    Patient Location: [facility]/      Subjective   Subjective     Chief Complaint / Subjective  Chief Complaint   Patient presents with   • Shortness of Breath         Brief Synopsis of Hospital Course/HPI    The patient is an 81-year-old male with history of atrial fibrillation on 4/14/2021 on chronic Xarelto, dilated cardiomyopathy LVEF 31-35% s/p BiV ICD, hyperlipidemia, hypertension, moderate to severe MR, moderate TR and  RBBB.    The patient presented to the emergency room on 4/17/2021 complaining of increased dyspnea, HICKS walking short distances, nausea., fever and lightheadedness.    In the ED, laboratory work was significant for troponin of 0.341, proBNP 14,004, creatinine 1.68, BUN 34, chest x-ray that showed pneumonia.    The patient was admitted to the medical floor for sepsis secondary to pneumonia, decompensated systolic heart failure and  elevated troponin.  T-max was 101.4F and required supplemental oxygen on admission.    The patient was evaluated by cardiologist in the morning of 4/18/2021.  Unfortunately the patient fell in his room in the afternoon of 4/18/2021 and sustained laceration of his nose bridge and epistaxis.  Stat CT head/cervical neck and facial bones was ordered.  It was significant for LeFort II classification fractures involving the lateral/posterior walls of the left maxillary sinus, anterior wall of right maxillary sinus, possibly nasal septum and possibly left frontal sinus. On call ENT physician was notified recommended outpatient follow-up, normal saline flush and Afrin for bleeding.      Date::      4/18/21: Discussed case with the patient's daughter.  Steri-Strips to nasal bridge laceration.  We will trend H&H every 6.  Last Xarelto dose around 9 AM on 4/18/2021.      Review of Systems  This medication is APPROVED. 8/17/2023- 8/30/2024. Pharmacy has been notified via . Thank you! "  All other systems reviewed and are negative.        Objective   Objective      Vital Signs  Temp:  [97.9 °F (36.6 °C)-101.4 °F (38.6 °C)] 101.4 °F (38.6 °C)  Heart Rate:  [70-77] 77  Resp:  [16-32] 26  BP: (113-177)/() 113/71  Oxygen Therapy  SpO2: 92 %  Pulse Oximetry Type: Continuous  Device (Oxygen Therapy): room air  Flow (L/min): 3  Flowsheet Rows      First Filed Value   Admission Height  188 cm (74\") Documented at 04/17/2021 1958   Admission Weight  93.4 kg (206 lb) Documented at 04/17/2021 1958        Intake & Output (last 3 days)       04/15 0701 - 04/16 0700 04/16 0701 - 04/17 0700 04/17 0701 - 04/18 0700 04/18 0701 - 04/19 0700    P.O.    240    IV Piggyback   590     Total Intake(mL/kg)   590 (6.3) 240 (2.6)    Urine (mL/kg/hr)   1900     Total Output   1900     Net   -1310 +240            Urine Unmeasured Occurrence   1 x         Lines, Drains & Airways    Active LDAs     Name:   Placement date:   Placement time:   Site:   Days:    Peripheral IV 04/17/21 2007 Left Forearm   04/17/21 2007    Forearm   less than 1                  Physical Exam:    Physical Exam  HENT:      Head:        Mouth/Throat:      Mouth: Mucous membranes are moist.   Eyes:      General: No scleral icterus.     Extraocular Movements: Extraocular movements intact.      Pupils: Pupils are equal, round, and reactive to light.   Cardiovascular:      Rate and Rhythm: Normal rate and regular rhythm.      Pulses: Normal pulses.   Pulmonary:      Effort: Pulmonary effort is normal.      Breath sounds: Normal breath sounds.   Abdominal:      General: Bowel sounds are normal.      Palpations: Abdomen is soft.   Musculoskeletal:         General: Normal range of motion.      Cervical back: Normal range of motion.      Comments: Moves all extremities equally.  Left forearm abrasion   Lymphadenopathy:      Cervical: No cervical adenopathy.   Skin:     General: Skin is warm.   Neurological:      Mental Status: He is alert and oriented " to person, place, and time. Mental status is at baseline.      Cranial Nerves: Cranial nerves are intact.   Psychiatric:         Mood and Affect: Mood normal.         Behavior: Behavior is cooperative.               Procedures:              Results Review:     I reviewed the patient's new clinical results.      Lab Results (last 24 hours)     Procedure Component Value Units Date/Time    COVID PRE-OP / PRE-PROCEDURE SCREENING ORDER (NO ISOLATION) - Swab, Nasopharynx [[id number]]  (Normal) Collected: 04/17/21 2327    Specimen: Swab from Nasopharynx Updated: 04/18/21 1615    Narrative:      The following orders were created for panel order COVID PRE-OP / PRE-PROCEDURE SCREENING ORDER (NO ISOLATION) - Swab, Nasopharynx.  Procedure                               Abnormality         Status                     ---------                               -----------         ------                     COVID-19,APTIMA PANTHER,...[[id number]]  Normal              Final result                 Please view results for these tests on the individual orders.    COVID-19,APTIMA PANTHER,[facility], NP/OP SWAB IN UTM/VTM/SALINE TRANSPORT MEDIA,24 HR TAT - Swab, Nasopharynx [[id number]]  (Normal) Collected: 04/17/21 2327    Specimen: Swab from Nasopharynx Updated: 04/18/21 1615     COVID19 Not Detected    Narrative:      Fact sheet for providers: https://www.fda.gov/media/232030/download     Fact sheet for patients: https://www.fda.gov/media/289371/download    Test performed by RT PCR.    Manual Differential [[id number]]  (Abnormal) Collected: 04/18/21 1403    Specimen: Blood Updated: 04/18/21 1511     Neutrophil % 79.0 %      Lymphocyte % 4.0 %      Monocyte % 11.0 %      Bands %  1.0 %      Atypical Lymphocyte % 5.0 %      Neutrophils Absolute 8.88 10*3/mm3      Lymphocytes Absolute 1.00 10*3/mm3      Monocytes Absolute 1.22 10*3/mm3      Anisocytosis Slight/1+     Basophilic Stippling Slight/1+     Macrocytes Slight/1+     WBC Morphology  Normal     Platelet Estimate Adequate     Large Platelets Slight/1+    CBC & Differential [[id number]]  (Abnormal) Collected: 04/18/21 1403    Specimen: Blood Updated: 04/18/21 1511    Narrative:      The following orders were created for panel order CBC & Differential.  Procedure                               Abnormality         Status                     ---------                               -----------         ------                     Scan Slide[[id number]]                                       Final result               CBC Auto Differential[[id number]]        Abnormal            Final result                 Please view results for these tests on the individual orders.    Scan Slide [[id number]] Collected: 04/18/21 1403    Specimen: Blood Updated: 04/18/21 1511     Scan Slide --     Comment: See Manual Differential Results       CBC Auto Differential [[id number]]  (Abnormal) Collected: 04/18/21 1403    Specimen: Blood Updated: 04/18/21 1511     WBC 11.10 10*3/mm3      RBC 4.60 10*6/mm3      Hemoglobin 15.5 g/dL      Hematocrit 46.3 %      .6 fL      MCH 33.8 pg      MCHC 33.6 g/dL      RDW 15.9 %      RDW-SD 54.3 fl      MPV 9.4 fL      Platelets 141 10*3/mm3     Narrative:      The previously reported component NRBC is no longer being reported. Previous result was 0.1 /100 WBC (Reference Range: 0.0-0.2 /100 WBC) on 4/18/2021 at 1420 EDT.    Comprehensive Metabolic Panel [[id number]]  (Abnormal) Collected: 04/18/21 1403    Specimen: Blood Updated: 04/18/21 1428     Glucose 116 mg/dL      BUN 31 mg/dL      Creatinine 1.54 mg/dL      Sodium 138 mmol/L      Potassium 3.4 mmol/L      Comment: Slight hemolysis detected by analyzer. Results may be affected.        Chloride 99 mmol/L      CO2 26.0 mmol/L      Calcium 8.7 mg/dL      Total Protein 6.2 g/dL      Albumin 3.20 g/dL      ALT (SGPT) 43 U/L      AST (SGOT) 73 U/L      Alkaline Phosphatase 91 U/L      Total Bilirubin 2.2 mg/dL      eGFR Non  Amer 43  mL/min/1.73      Globulin 3.0 gm/dL      A/G Ratio 1.1 g/dL      BUN/Creatinine Ratio 20.1     Anion Gap 13.0 mmol/L     Narrative:      GFR Normal >60  Chronic Kidney Disease <60  Kidney Failure <15      POC Glucose Once [[id number]]  (Abnormal) Collected: 04/18/21 1359    Specimen: Blood Updated: 04/18/21 1400     Glucose 141 mg/dL      Comment: Serial Number: [id number]:  [id number]       Vitamin B12 [[id number]]  (Abnormal) Collected: 04/18/21 0428    Specimen: Blood Updated: 04/18/21 1210     Vitamin B-12 1,438 pg/mL     Narrative:      Results may be falsely increased if patient taking Biotin.      Troponin [[id number]]  (Abnormal) Collected: 04/18/21 0428    Specimen: Blood Updated: 04/18/21 0529     Troponin T 0.257 ng/mL     Narrative:      Troponin T Reference Range:  <= 0.03 ng/mL-   Negative for AMI  >0.03 ng/mL-     Abnormal for myocardial necrosis.  Clinicians would have to utilize clinical acumen, EKG, Troponin and serial changes to determine if it is an Acute Myocardial Infarction or myocardial injury due to an underlying chronic condition.       Results may be falsely decreased if patient taking Biotin.      Procalcitonin [[id number]]  (Abnormal) Collected: 04/18/21 0428    Specimen: Blood Updated: 04/18/21 0528     Procalcitonin 1.50 ng/mL     Narrative:      As a Marker for Sepsis (Non-Neonates):     1. <0.5 ng/mL represents a low risk of severe sepsis and/or septic shock.  2. >2 ng/mL represents a high risk of severe sepsis and/or septic shock.    As a Marker for Lower Respiratory Tract Infections that require antibiotic therapy:  PCT on Admission     Antibiotic Therapy             6-12 Hrs later  >0.5                          Strongly Recommended            >0.25 - <0.5             Recommended  0.1 - 0.25                  Discouraged                       Remeasure/reassess PCT  <0.1                         Strongly Discouraged         Remeasure/reassess PCT      As 28 day mortality risk  "marker: \"Change in Procalcitonin Result\" (>80% or <=80%) if Day 0 (or Day 1) and Day 4 values are available. Refer to http://www.[facility] [place] – [email or web address][place]calculator.[email or web address]/    Change in PCT <=80 %   A decrease of PCT levels below or equal to 80% defines a positive change in PCT test result representing a higher risk for 28-day all-cause mortality of patients diagnosed with severe sepsis or septic shock.    Change in PCT >80 %   A decrease of PCT levels of more than 80% defines a negative change in PCT result representing a lower risk for 28-day all-cause mortality of patients diagnosed with severe sepsis or septic shock.              Results may be falsely decreased if patient taking Biotin.     Vancomycin, Random [[id number]]  (Normal) Collected: 04/18/21 0428    Specimen: Blood Updated: 04/18/21 0526     Vancomycin Random 13.40 mcg/mL     Narrative:      Therapeutic Ranges for Vancomycin    Vancomycin Random   5.0-40.0 mcg/mL  Vancomycin Trough   5.0-20.0 mcg/mL  Vancomycin Peak     20.0-40.0 mcg/mL    Basic Metabolic Panel [[id number]]  (Abnormal) Collected: 04/18/21 0428    Specimen: Blood Updated: 04/18/21 0526     Glucose 121 mg/dL      BUN 33 mg/dL      Creatinine 1.59 mg/dL      Sodium 138 mmol/L      Potassium 3.5 mmol/L      Chloride 102 mmol/L      CO2 24.0 mmol/L      Calcium 7.7 mg/dL      eGFR Non African Amer 42 mL/min/1.73      BUN/Creatinine Ratio 20.8     Anion Gap 12.0 mmol/L     Narrative:      GFR Normal >60  Chronic Kidney Disease <60  Kidney Failure <15      Lipid Panel [[id number]]  (Abnormal) Collected: 04/18/21 0428    Specimen: Blood Updated: 04/18/21 0526     Total Cholesterol 92 mg/dL      Triglycerides 50 mg/dL      HDL Cholesterol 37 mg/dL      LDL Cholesterol  43 mg/dL      VLDL Cholesterol 12 mg/dL      LDL/HDL Ratio 1.22    Narrative:      Cholesterol Reference Ranges  (U.S. Department of Health and Human Services ATP III Classifications)    Desirable          <200 mg/dL  Borderline High    200-239 " mg/dL  High Risk          >240 mg/dL      Triglyceride Reference Ranges  (U.S. Department of Health and Human Services ATP III Classifications)    Normal           <150 mg/dL  Borderline High  150-199 mg/dL  High             200-499 mg/dL  Very High        >500 mg/dL    HDL Reference Ranges  (U.S. Department of Health and Human Services ATP III Classifcations)    Low     <40 mg/dl (major risk factor for CHD)  High    >60 mg/dl ('negative' risk factor for CHD)        LDL Reference Ranges  (U.S. Department of Health and Human Services ATP III Classifcations)    Optimal          <100 mg/dL  Near Optimal     100-129 mg/dL  Borderline High  130-159 mg/dL  High             160-189 mg/dL  Very High        >189 mg/dL    Magnesium [[id number]]  (Normal) Collected: 04/18/21 0428    Specimen: Blood Updated: 04/18/21 0526     Magnesium 1.9 mg/dL     CBC & Differential [[id number]]  (Abnormal) Collected: 04/18/21 0428    Specimen: Blood Updated: 04/18/21 0500    Narrative:      The following orders were created for panel order CBC & Differential.  Procedure                               Abnormality         Status                     ---------                               -----------         ------                     CBC Auto Differential[[id number]]        Abnormal            Final result                 Please view results for these tests on the individual orders.    CBC Auto Differential [[id number]]  (Abnormal) Collected: 04/18/21 0428    Specimen: Blood Updated: 04/18/21 0500     WBC 7.40 10*3/mm3      RBC 4.18 10*6/mm3      Hemoglobin 14.1 g/dL      Hematocrit 41.4 %      MCV 99.0 fL      MCH 33.7 pg      MCHC 34.0 g/dL      RDW 15.8 %      RDW-SD 54.3 fl      MPV 9.3 fL      Platelets 119 10*3/mm3      Neutrophil % 86.4 %      Lymphocyte % 4.8 %      Monocyte % 8.6 %      Eosinophil % 0.0 %      Basophil % 0.2 %      Neutrophils, Absolute 6.40 10*3/mm3      Lymphocytes, Absolute 0.40 10*3/mm3      Monocytes, Absolute 0.60  10*3/mm3      Eosinophils, Absolute 0.00 10*3/mm3      Basophils, Absolute 0.00 10*3/mm3      nRBC 0.2 /100 WBC     MRSA Screen, PCR (Inpatient) - Swab, Nares [[id number]]  (Normal) Collected: 04/17/21 2359    Specimen: Swab from Nares Updated: 04/18/21 0255     MRSA PCR No MRSA Detected    Urinalysis, Microscopic Only - Urine, Clean Catch [[id number]]  (Abnormal) Collected: 04/17/21 2359    Specimen: Urine, Clean Catch Updated: 04/18/21 0057     RBC, UA 6-12 /HPF      WBC, UA 3-5 /HPF      Bacteria, UA None Seen /HPF      Squamous Epithelial Cells, UA 0-2 /HPF      Hyaline Casts, UA 3-6 /LPF      Methodology Manual Light Microscopy    Urinalysis With Microscopic If Indicated (No Culture) - Urine, Clean Catch [[id number]]  (Abnormal) Collected: 04/17/21 2359    Specimen: Urine, Clean Catch Updated: 04/18/21 0037     Color, UA Dark Yellow     Comment: Result checked         Appearance, UA Clear     pH, UA 5.5     Specific Gravity, UA 1.021     Glucose, UA Negative     Ketones, UA Negative     Bilirubin, UA Small (1+)     Comment: Confirmation testing is unavailable.  A serum bilirubin is recommended for further assessment.        Blood, UA Negative     Protein, UA 30 mg/dL (1+)     Leuk Esterase, UA Negative     Nitrite, UA Negative     Urobilinogen, UA 1.0 E.U./dL    S. Pneumo Ag Urine or CSF - Urine, Urine, Clean Catch [[id number]]  (Normal) Collected: 04/17/21 2359    Specimen: Urine, Clean Catch Updated: 04/18/21 0034     Strep Pneumo Ag Negative    Legionella Antigen, Urine - Urine, Urine, Clean Catch [[id number]]  (Normal) Collected: 04/17/21 2359    Specimen: Urine, Clean Catch Updated: 04/18/21 0034     LEGIONELLA ANTIGEN, URINE Negative    Sodium, Urine, Random - Urine, Clean Catch [[id number]] Collected: 04/17/21 2359    Specimen: Urine, Clean Catch Updated: 04/18/21 0027     Sodium, Urine <20 mmol/L     Narrative:      Reference intervals for random urine have not been established.  Clinical usage is dependent  upon physician's interpretation in combination with other laboratory tests.       Blood Culture - Blood, Arm, Right [[id number]] Collected: 04/17/21 2143    Specimen: Blood from Arm, Right Updated: 04/17/21 2146    Extra Tubes [[id number]] Collected: 04/17/21 2041    Specimen: Blood from Arm, Right Updated: 04/17/21 2145    Narrative:      The following orders were created for panel order Extra Tubes.  Procedure                               Abnormality         Status                     ---------                               -----------         ------                     Gold Top - SST[[id number]]                                   Final result                 Please view results for these tests on the individual orders.    Gold Top - SST [[id number]] Collected: 04/17/21 2041    Specimen: Blood from Arm, Right Updated: 04/17/21 2145     Extra Tube Hold for add-ons.     Comment: Auto resulted.       Blood Culture - Blood, Arm, Left [[id number]] Collected: 04/17/21 2134    Specimen: Blood from Arm, Left Updated: 04/17/21 2137    Comprehensive Metabolic Panel [[id number]]  (Abnormal) Collected: 04/17/21 2041    Specimen: Blood from Arm, Right Updated: 04/17/21 2111     Glucose 166 mg/dL      BUN 34 mg/dL      Creatinine 1.68 mg/dL      Sodium 137 mmol/L      Potassium 4.4 mmol/L      Comment: Slight hemolysis detected by analyzer. Results may be affected.        Chloride 100 mmol/L      CO2 22.0 mmol/L      Calcium 9.3 mg/dL      Total Protein 6.5 g/dL      Albumin 3.20 g/dL      ALT (SGPT) 42 U/L      AST (SGOT) 83 U/L      Alkaline Phosphatase 96 U/L      Total Bilirubin 3.0 mg/dL      eGFR Non African Amer 39 mL/min/1.73      Globulin 3.3 gm/dL      A/G Ratio 1.0 g/dL      BUN/Creatinine Ratio 20.2     Anion Gap 15.0 mmol/L     Narrative:      GFR Normal >60  Chronic Kidney Disease <60  Kidney Failure <15      Troponin [[id number]]  (Abnormal) Collected: 04/17/21 2041    Specimen: Blood from Arm, Right Updated: 04/17/21  2110     Troponin T 0.341 ng/mL     Narrative:      Troponin T Reference Range:  <= 0.03 ng/mL-   Negative for AMI  >0.03 ng/mL-     Abnormal for myocardial necrosis.  Clinicians would have to utilize clinical acumen, EKG, Troponin and serial changes to determine if it is an Acute Myocardial Infarction or myocardial injury due to an underlying chronic condition.       Results may be falsely decreased if patient taking Biotin.      Magnesium [[id number]]  (Normal) Collected: 04/17/21 2041    Specimen: Blood from Arm, Right Updated: 04/17/21 2108     Magnesium 2.1 mg/dL     BNP [[id number]]  (Abnormal) Collected: 04/17/21 2041    Specimen: Blood from Arm, Right Updated: 04/17/21 2106     proBNP 14,004.0 pg/mL     Narrative:      Among patients with dyspnea, NT-proBNP is highly sensitive for the detection of acute congestive heart failure. In addition NT-proBNP of <300 pg/ml effectively rules out acute congestive heart failure with 99% negative predictive value.    Results may be falsely decreased if patient taking Biotin.      Protime-INR [[id number]]  (Abnormal) Collected: 04/17/21 2041    Specimen: Blood from Arm, Right Updated: 04/17/21 2103     Protime 18.6 Seconds      INR 1.74    aPTT [[id number]]  (Abnormal) Collected: 04/17/21 2041    Specimen: Blood from Arm, Right Updated: 04/17/21 2103     PTT 36.6 seconds     CBC & Differential [[id number]]  (Abnormal) Collected: 04/17/21 2041    Specimen: Blood from Arm, Right Updated: 04/17/21 2053    Narrative:      The following orders were created for panel order CBC & Differential.  Procedure                               Abnormality         Status                     ---------                               -----------         ------                     CBC Auto Differential[[id number]]        Abnormal            Final result                 Please view results for these tests on the individual orders.    CBC Auto Differential [[id number]]  (Abnormal) Collected: 04/17/21  2041    Specimen: Blood from Arm, Right Updated: 04/17/21 2053     WBC 8.80 10*3/mm3      RBC 4.70 10*6/mm3      Hemoglobin 15.9 g/dL      Hematocrit 46.5 %      MCV 99.0 fL      MCH 33.8 pg      MCHC 34.1 g/dL      RDW 16.0 %      RDW-SD 55.6 fl      MPV 10.2 fL      Platelets 145 10*3/mm3      Neutrophil % 88.4 %      Lymphocyte % 4.3 %      Monocyte % 7.1 %      Eosinophil % 0.0 %      Basophil % 0.2 %      Neutrophils, Absolute 7.70 10*3/mm3      Lymphocytes, Absolute 0.40 10*3/mm3      Monocytes, Absolute 0.60 10*3/mm3      Eosinophils, Absolute 0.00 10*3/mm3      Basophils, Absolute 0.00 10*3/mm3      nRBC 0.2 /100 WBC         No results found for: HGBA1C  Results from last 7 days   Lab Units 04/17/21 2041   INR  1.74*           No results found for: LIPASE  Lab Results   Component Value Date    CHOL 92 04/18/2021    TRIG 50 04/18/2021    HDL 37 (L) 04/18/2021    LDL 43 04/18/2021       No results found for: INTRAOP, PREDX, FINALDX, COMDX    Microbiology Results (last 10 days)     Procedure Component Value - Date/Time    S. Pneumo Ag Urine or CSF - Urine, Urine, Clean Catch [[id number]]  (Normal) Collected: 04/17/21 2359    Lab Status: Final result Specimen: Urine, Clean Catch Updated: 04/18/21 0034     Strep Pneumo Ag Negative    Legionella Antigen, Urine - Urine, Urine, Clean Catch [[id number]]  (Normal) Collected: 04/17/21 2359    Lab Status: Final result Specimen: Urine, Clean Catch Updated: 04/18/21 0034     LEGIONELLA ANTIGEN, URINE Negative    MRSA Screen, PCR (Inpatient) - Swab, Nares [[id number]]  (Normal) Collected: 04/17/21 2359    Lab Status: Final result Specimen: Swab from Nares Updated: 04/18/21 0255     MRSA PCR No MRSA Detected    COVID PRE-OP / PRE-PROCEDURE SCREENING ORDER (NO ISOLATION) - Swab, Nasopharynx [[id number]]  (Normal) Collected: 04/17/21 2327    Lab Status: Final result Specimen: Swab from Nasopharynx Updated: 04/18/21 8748    Narrative:      The following orders were created for  panel order COVID PRE-OP / PRE-PROCEDURE SCREENING ORDER (NO ISOLATION) - Swab, Nasopharynx.  Procedure                               Abnormality         Status                     ---------                               -----------         ------                     COVID-19,APTIMA PANTHER,...[[id number]]  Normal              Final result                 Please view results for these tests on the individual orders.    COVID-19,APTIMA PANTHER,[facility], NP/OP SWAB IN UTM/VTM/SALINE TRANSPORT MEDIA,24 HR TAT - Swab, Nasopharynx [[id number]]  (Normal) Collected: 04/17/21 2327    Lab Status: Final result Specimen: Swab from Nasopharynx Updated: 04/18/21 1615     COVID19 Not Detected    Narrative:      Fact sheet for providers: https://www.fda.gov/media/109564/download     Fact sheet for patients: https://www.fda.gov/media/460923/download    Test performed by RT PCR.    COVID PRE-OP / PRE-PROCEDURE SCREENING ORDER (NO ISOLATION) - Swab, Nasopharynx [[id number]]  (Normal) Collected: 04/12/21 1153    Lab Status: Final result Specimen: Swab from Nasopharynx Updated: 04/12/21 2051    Narrative:      The following orders were created for panel order COVID PRE-OP / PRE-PROCEDURE SCREENING ORDER (NO ISOLATION) - Swab, Nasopharynx.  Procedure                               Abnormality         Status                     ---------                               -----------         ------                     COVID-19,APTIMA PANTHER,...[[id number]]  Normal              Final result                 Please view results for these tests on the individual orders.    COVID-19,APTIMA PANTHER,[facility], NP/OP SWAB IN UTM/VTM/SALINE TRANSPORT MEDIA,24 HR TAT - Swab, Nasopharynx [[id number]]  (Normal) Collected: 04/12/21 1153    Lab Status: Final result Specimen: Swab from Nasopharynx Updated: 04/12/21 2051     COVID19 Not Detected    Narrative:      Fact sheet for providers: https://www.fda.gov/media/223645/download     Fact sheet for  patients: https://www.[employer].gov/media/954158/download    Test performed by RT PCR.          ECG/EMG Results (most recent)     Procedure Component Value Units Date/Time    ECG 12 Lead [[id number]] Collected: 04/17/21 2007     Updated: 04/18/21 0811     QT Interval 535 ms     Narrative:      HEART RATE= 73  bpm  RR Interval= 817  ms  WI Interval= 105  ms  P Horizontal Axis= 15  deg  P Front Axis= 0  deg  QRSD Interval= 192  ms  QT Interval= 535  ms  QRS Axis= 187  deg  T Wave Axis= 14  deg  - ABNORMAL ECG -  Ventricular-paced complexes  Biventricular paced rhythm  When compared with ECG of 24-Oct-2016 18:00:44,  Significant change in rhythm  Electronically Signed By: Jonathan Chow (ATTILA) 18-Apr-2021 08:10:39  Date and Time of Study: 2021-04-17 20:07:26    Adult Transthoracic Echo Complete W/ Cont if Necessary Per Protocol [[id number]] Collected: 04/18/21 1100     Updated: 04/18/21 1320     BSA 2.2 m^2      RVIDd 3.9 cm      IVSd 1.1 cm      IVSs 1.7 cm      LVIDd 5.4 cm      LVIDs 4.6 cm      LVPWd 1.5 cm      BH CV ECHO LONG - LVPWS 1.7 cm      IVS/LVPW 0.76     FS 16.2 %      EDV(Teich) 143.5 ml      ESV(Teich) 95.2 ml      EF(Teich) 33.7 %      EDV(cubed) 160.7 ml      ESV(cubed) 94.6 ml      EF(cubed) 41.1 %      % IVS thick 47.3 %      % LVPW thick 10.9 %      LV mass(C)d 303.8 grams      LV mass(C)dI 138.4 grams/m^2      LV mass(C)s 330.0 grams      LV mass(C)sI 150.3 grams/m^2      SV(Teich) 48.3 ml      SI(Teich) 22.0 ml/m^2      SV(cubed) 66.1 ml      SI(cubed) 30.1 ml/m^2      Ao root diam 3.3 cm      Ao root area 8.3 cm^2      ACS 2.1 cm      LVOT diam 2.5 cm      LVOT area 5.0 cm^2      EDV(MOD-sp4) 126.3 ml      ESV(MOD-sp4) 78.6 ml      EF(MOD-sp4) 37.8 %      SV(MOD-sp4) 47.7 ml      SI(MOD-sp4) 21.7 ml/m^2      Ao root area (BSA corrected) 1.5     LV Zabala Vol (BSA corrected) 57.5 ml/m^2      LV Sys Vol (BSA corrected) 35.8 ml/m^2      MV E max renea 119.2 cm/sec      MV A max renea 33.0 cm/sec      MV E/A 3.6      MV V2 max 141.7 cm/sec      MV max PG 8.0 mmHg      MV V2 mean 64.0 cm/sec      MV mean PG 2.2 mmHg      MV V2 VTI 26.6 cm      MVA(VTI) 3.7 cm^2      MV dec slope 702.7 cm/sec^2      MV dec time 0.17 sec      Ao pk renea 107.7 cm/sec      Ao max PG 4.6 mmHg      Ao max PG (full) -0.72 mmHg      Ao V2 mean 79.3 cm/sec      Ao mean PG 2.7 mmHg      Ao mean PG (full) -0.17 mmHg      Ao V2 VTI 16.2 cm      MARIA DE JESUS(I,A) 6.1 cm^2      MARIA DE JESUS(I,D) 6.1 cm^2      MARIA DE JESUS(V,A) 5.3 cm^2      MARIA DE JESUS(V,D) 5.3 cm^2      LV V1 max PG 5.4 mmHg      LV V1 mean PG 2.9 mmHg      LV V1 max 115.7 cm/sec      LV V1 mean 78.6 cm/sec      LV V1 VTI 19.9 cm      SV(Ao) 135.2 ml      SI(Ao) 61.6 ml/m^2      SV(LVOT) 98.8 ml      SI(LVOT) 45.0 ml/m^2      PA V2 max 129.4 cm/sec      PA max PG 6.7 mmHg      PA max PG (full) 3.6 mmHg      PA V2 mean 98.0 cm/sec      PA mean PG 4.1 mmHg      PA mean PG (full) 2.7 mmHg      PA V2 VTI 26.4 cm      PA acc time 0.08 sec      RV V1 max PG 3.1 mmHg      RV V1 mean PG 1.4 mmHg      RV V1 max 87.8 cm/sec      RV V1 mean 54.2 cm/sec      RV V1 VTI 13.1 cm      TR max renea 337.6 cm/sec      RVSP(TR) 60.6 mmHg      RAP systole 15.0 mmHg      PA pr(Accel) 43.2 mmHg       CV ECHO LONG - BZI_BMI 26.3 kilograms/m^2       CV ECHO LONG - BSA(HAYCOCK) 2.2 m^2       CV ECHO LONG - BZI_METRIC_WEIGHT 93.0 kg       CV ECHO LONG - BZI_METRIC_HEIGHT 188.0 cm      Target HR (85%) 117 bpm      Max. Pred. HR (100%) 138 bpm      EF(MOD-bp) 38.0 %      LA dimension(2D) 5.0 cm     ECG 12 Lead [[id number]] Collected: 04/18/21 1633     Updated: 04/18/21 1634     QT Interval 465 ms     Narrative:      HEART RATE= 70  bpm  RR Interval= 791  ms  OR Interval=   ms  P Horizontal Axis= 187  deg  P Front Axis=   deg  QRSD Interval= 169  ms  QT Interval= 465  ms  QRS Axis= 195  deg  T Wave Axis= -1  deg  - ABNORMAL ECG -  Afib/flut and V-paced complexes  Biventricular paced rhythm  When compared with ECG of 17-Apr-2021 20:07:26,  No  significant change  Electronically Signed By:   Date and Time of Study: 2021-04-18 16:33:18              Results for orders placed during the hospital encounter of 01/29/21    Adult Transthoracic Echo Complete W/ Cont if Necessary Per Protocol    Interpretation Summary  · The left ventricular cavity is moderately dilated.  · Left ventricular wall thickness is consistent with mild to moderate eccentric hypertrophy.  · Estimated left ventricular EF = 35% Estimated left ventricular EF was in agreement with the calculated left ventricular EF. Left ventricular ejection fraction appears to be 31 - 35%. Left ventricular systolic function is moderately decreased.  · The right ventricular cavity is moderately dilated.  · Moderately reduced right ventricular systolic function noted.  · The right atrial cavity is severely dilated.  · There is mainly affecting the non-coronary, left coronary and right coronary cusp(s).  · There is moderate, bileaflet mitral valve thickening present.  · Moderate to severe mitral valve regurgitation is present with multiple jets noted.  · Moderate tricuspid valve regurgitation is present.  · Estimated right ventricular systolic pressure from tricuspid regurgitation is moderately elevated (45-55 mmHg).    Transthoracic echocardiography reveals moderately dilated left ventricle with eccentric LVH with EF between 30 to 35%  Moderately dilated right ventricle with moderate right ventricular dysfunction with moderate RVH  Severe biatrial enlargement  Moderate pulmonary hypertension  Large PFO with bidirectional flow  Moderate to severe MR  Moderate eccentric TR  Mild aortic regurgitation  No effusion      Electronically signed by Hira Ochoa MD, 02/02/21, 12:15 PM EST.      CT Head Without Contrast    Result Date: 4/18/2021  1.Cerebral atrophy. 2.The probably are small matter changes involving the cerebral hemispheres which could reflect more chronic small vessel ischemic change. 3.There are  fractures of the nasal area and maxillary sinuses. There is density within the paranasal sinuses that may relate to blood associated with the injury. Please see CT report of the maxillofacial area for more detailed findings.  Electronically Signed By-Moises Recinos MD On:4/18/2021 2:45 PM This report was finalized on [id number] by  Moises Recinos MD.    CT Cervical Spine Without Contrast    Result Date: 4/18/2021  1.There are multilevel degenerative changes involving the cervical spine. 2.Density right apical area that may be secondary to pneumonia. It looks like there probably is a right pleural effusion.  Electronically Signed By-Moises Recinos MD On:4/18/2021 3:08 PM This report was finalized on [id number] by  Moises Recinos MD.    XR Chest 1 View    Result Date: 4/18/2021  1.Interval change with respect to the chest with findings that may relate to multifocal pneumonia. An underlying pleural effusion at the left base not excluded. Correlation with clinical findings recommended. Depending on clinical findings additional imaging with CT may be warranted.  Electronically Signed By-Moises Recinos MD On:4/18/2021 7:46 AM This report was finalized on [id number] by  Moises Recinos MD.    CT Facial Bones Without Contrast    Result Date: 4/18/2021  1. Fractures of the maxillofacial area as described. This probably fits under the LeFort 2 classification. There is density within the paranasal sinuses which could reflect blood.  Electronically Signed By-Moises Recinos MD On:4/18/2021 3:04 PM This report was finalized on [id number] by  Moises Recinos MD.          Xrays, labs reviewed personally by physician.    Medication Review:   I have reviewed the patient's current medication list      Scheduled Meds  allopurinol, 300 mg, Oral, Daily  ampicillin-sulbactam, 3 g, Intravenous, Q6H  furosemide, 40 mg, Intravenous, Daily  guaiFENesin, 600 mg, Oral, Q12H  metoprolol succinate XL, 25 mg, Oral, Daily  sodium chloride, 10 mL,  Intravenous, Q12H  vitamin B-12, 1,000 mcg, Oral, Daily        Meds Infusions       Meds PRN  •  acetaminophen **OR** acetaminophen **OR** acetaminophen  •  albuterol  •  Calcium Gluconate-NaCl **AND** calcium gluconate **AND** Calcium, Ionized  •  magnesium sulfate **OR** magnesium sulfate **OR** magnesium sulfate  •  melatonin  •  naloxone  •  nitroglycerin  •  ondansetron **OR** ondansetron  •  oxyCODONE  •  oxymetazoline  •  potassium & sodium phosphates **OR** potassium & sodium phosphates  •  potassium chloride  •  potassium chloride  •  [COMPLETED] Insert peripheral IV **AND** sodium chloride  •  sodium chloride  •  sodium chloride        Assessment/Plan   Assessment/Plan     Active Hospital Problems    Diagnosis  POA   • **Pneumonia [J18.9]  Yes     Priority: High   • Elevated serum creatinine [R79.89]  Yes     Priority: High   • Acute on chronic systolic congestive heart failure (CMS/HCC) [I50.23]  Yes     Priority: High   • Low serum vitamin B12 [E53.8]  Yes     Priority: Medium   • Heart failure with reduced ejection fraction (CMS/HCC) [I50.20]  Yes     Priority: Medium   • Elevated troponin [R77.8]  Yes     Priority: Medium   • Dilated cardiomyopathy (CMS/HCC) [I42.0]  Yes     Priority: Medium   • Hyperlipidemia, mixed [E78.2]  Yes     Priority: Medium   • Presence of biventricular implantable cardioverter-defibrillator (ICD) [Z95.810]  Yes     Priority: Medium   • Permanent atrial fibrillation (CMS/HCC) [I48.21]  Yes     Priority: Medium   • Essential hypertension [I10]  Yes      Resolved Hospital Problems   No resolved problems to display.       MEDICAL DECISION MAKING COMPLEXITY BY PROBLEM:     Acute on chronic systolic heart failure (POA)  -Last TTE 1/21/2021--> LVEF 31-35%, MR and TR  -Continue diuresis and daily weights  -Consulted cardiology      Pneumonia:  -Elevated procalcitonin and febrile during hospitalization  -Continue antibiotics  -Consulted speech for swallow eval  -MRSA screen  negative    Elevated troponin:  -Denies chest pain  -Possibly due to recent ablation  -Continue to trend  -Cardiology following    Fall complicated with nasal laceration, maxillary sinus fracture and possible nasal septum fracture:  -s/p stat CT head, cervical spine and facial bone  -Discussed case with on-call ENT Dr. Mahan  -Needs outpatient follow-up with ENT on discharge  -Fall precaution  -Hold Xarelto last dose 9 AM 4/18/2021    Acute blood loss anemia:  -Due to nasal laceration/epistaxis/maxillary sinus fracture  -Trend H&H every 6  -Type and screen ordered      JASKARAN on CKD III:  -Nephrotoxin hold  -Gentle diuresis with Lasix  -Losartan held    Atrial fibrillation s/p ablation 4/14/21  -on metoprolol and on Xarelto at home      History of dilated cardiomyopathy s/p BiV ICD:       Hypertension  - Continue metoprolol and hold losartan     Hyperlipidemia  -Check lipid panel  -On Brewers yeast at home     Gout  -Continue allopurinol     -Vitamin B12 deficiency  -Check B12 level  -Continue supplementation           VTE Prophylaxis -   Mechanical Order History:     None      Pharmalogical Order History:      Ordered     Dose Route Frequency Stop    04/17/21 2252  rivaroxaban (XARELTO) tablet 15 mg  Status:  Discontinued     Question:  Are you ordering rivaroxaban for the prevention of blood clots in an acutely ill medical patient?  Answer:  No    15 mg PO Daily 04/18/21 0146                  Code Status -   Code Status and Medical Interventions:   Ordered at: 04/17/21 2043     Code Status:    CPR     Medical Interventions (Level of Support Prior to Arrest):    Full       This patient has been examined wearing appropriate Personal Protective Equipment and discussed with nursing. 04/18/21        Discharge Planning  Complicated case.  Patient admitted for pneumonia and systolic heart failure and ended up falling in his room sustaining nasal septum fracture and maxillary sinus fracture.  H&H needs to be monitored.  The  patient will need to follow-up with ENT on discharge.        Electronically signed by Manolo Stafford DO, 04/18/21, 16:38 EDT.  [facility] Hospitalist Team

## 2023-09-09 NOTE — TELEPHONE ENCOUNTER
Caller: Hank Ponce    Relationship to patient: Self    Best call back number:[phone]    Chief complaint:DYSPENA     Type of visit: HOSPITAL FOLLOW UP     Requested date: 8/2/2021      If rescheduling, when is the original appointment: 8/5/2021    Additional notes:    MR. VELÁSQUEZ WAS SEEN AT [facility], [place] 7/30/2021 , HE WAS TOLD TO HAVE A FOLLOW UP WITHIN 3 DAYS , THE EARLIEST I COULD SCHEDULE IS 8/5/2021 WITH EULALIA HENNING.     [facility]  ADMISSION 7/30/2021   DIAGNOSIS: DYSPENA      PLEASE ADVISE    Spontaneous, unlabored and symmetrical

## 2023-09-11 ENCOUNTER — CLINICAL SUPPORT NO REQUIREMENTS (OUTPATIENT)
Dept: CARDIOLOGY | Facility: CLINIC | Age: 83
End: 2023-09-11
Payer: MEDICARE

## 2023-09-11 ENCOUNTER — OFFICE VISIT (OUTPATIENT)
Dept: CARDIOLOGY | Facility: CLINIC | Age: 83
End: 2023-09-11
Payer: MEDICARE

## 2023-09-11 VITALS
WEIGHT: 166 LBS | RESPIRATION RATE: 18 BRPM | DIASTOLIC BLOOD PRESSURE: 54 MMHG | HEART RATE: 70 BPM | BODY MASS INDEX: 21.3 KG/M2 | HEIGHT: 74 IN | SYSTOLIC BLOOD PRESSURE: 90 MMHG

## 2023-09-11 DIAGNOSIS — I10 ESSENTIAL HYPERTENSION: ICD-10-CM

## 2023-09-11 DIAGNOSIS — I51.81 TAKOTSUBO CARDIOMYOPATHY: Primary | ICD-10-CM

## 2023-09-11 DIAGNOSIS — I47.29 NONSUSTAINED VENTRICULAR TACHYCARDIA: ICD-10-CM

## 2023-09-11 DIAGNOSIS — I34.0 NONRHEUMATIC MITRAL VALVE REGURGITATION: ICD-10-CM

## 2023-09-11 DIAGNOSIS — Z95.810 PRESENCE OF BIVENTRICULAR IMPLANTABLE CARDIOVERTER-DEFIBRILLATOR (ICD): ICD-10-CM

## 2023-09-11 DIAGNOSIS — I42.0 DILATED CARDIOMYOPATHY: Primary | ICD-10-CM

## 2023-09-11 DIAGNOSIS — I50.20 HEART FAILURE WITH REDUCED EJECTION FRACTION: ICD-10-CM

## 2023-09-11 DIAGNOSIS — I48.21 PERMANENT ATRIAL FIBRILLATION: ICD-10-CM

## 2023-09-19 NOTE — PROGRESS NOTES
DEVICE INTERROGATION:  IN OFFICE    DEVICE TYPE:   Biventricular ICD    :   [employer]    BATTERY:  Stable    TIME TO ELECTIVE REPLACEMENT INDICATORS:   5 years    CHARGE TIME:   11.9 seconds        LEAD DATA:       DEVICE REPROGRAMMED FOR TESTING      Atrial:       Ventricular:     8.9 mV, 447 ohms, 1.1 V@0.4 ms    LV:      Pacemaker dependent:   No      Atrial pacing percentage: Not applicable %    Ventricular pacing percentage: 91%      Arrhythmia Logbook Reviewed: Permanent A-fib        Summary:    Stable Device Function    No VT or VF episodes    Battery status is stable.    Reviewed with: Dr. Figueroa      NEXT IN OFFICE DEVICE CHECK DUE: 6-month    REMOTE DEVICE INTERROGATIONS: Ongoing

## 2023-09-19 NOTE — PROGRESS NOTES
Cardiology Clinic Note  Chidi Figueroa MD, PhD    Subjective:     Encounter Date:09/11/2023      Patient ID: Hank Ponce is a 83 y.o. male.    Chief Complaint:  Chief Complaint   Patient presents with    Hospital Follow Up Visit       HPI:      I the pleasure to see this 83-year-old gentleman in clinic today.  He has a history of cardiomyopathy with biventricular ICD in place, permanent atrial fibrillation hypertension hyperlipidemia, mitral vegetation, history of stress-induced cardiomyopathy dilated cardiomyopathy with acute on chronic systolic heart failure, EF 35%, he has had some atypical chest discomfort ultimately undergoing ischemic evaluation which was abnormal.   Left heart catheterization revealed patent vessels with nonobstructive disease in the LAD, small diagonal branch with focal 90% but too small for intervention and medically treated, patent but mildly diseased RCA, circumflex also mild disease but nothing flow-limiting.   He presents back to clinic today in stable condition.  Blood pressure still remain marginal 90s to 100s.  He is still tolerating losartan in divided doses, low-dose Lasix once or twice a day, Xarelto chronically for permanent atrial fibrillation, he is a paced rhythm at 70 not on beta-blockers.  He is having no anginal chest pain.  Again prior left heart catheterization revealed proximal diagonal disease only with limited myocardial supply long across the anterolateral wall, LAD with nonobstructive disease with no reversibility seen at the apex by stress testing, angiographically 40 to 50% only with sizable vessel with SORIN-3 flow, not responsible for his underlying cardiomyopathy, again he is status post BiV resynchronization and pacing with underlying atrial fibrillation.  His legs have no edema his JVD remains improved along with the shortness of breath which is NYHA class II-III but stable.  He is otherwise doing well today, blood pressure somewhat low 90s over 60s  with heart rate70.  We did discuss if he has dizziness to go down to once a day on his losartan, is not on beta-blocker given heart rates paced at 70, has not on Aldactone limited by blood pressure     Stress test reviewed and interpreted by me demonstrates the following  Interpretation Summary        Left ventricular ejection fraction is severely reduced. .  There is no prior study available for comparison. Abnormal nuclear Apical lateral and lateral wall reversibility seen Summed difference score of 6 Dilated ventricle EF 21%.  Findings consistent with an equivocal ECG stress test.     Abnormal study  Paced rhythm biventricular  Frequent PVCs and nonsustained VT with triplets and quadruplets in stress  No specific ST changes and paced rhythm  EF severely reduced 20% now improved  Severely dilated ventricle  Reversibility seen in the apical lateral lateral wall compared to baseline moderate size, mild to moderate moderate severity     Abnormal study cannot rule out ischemia of the apical lateral wall and mid lateral wall, this would correlate possibly with disease in the diagonal branch which is very limited and too small for intervention.  Severely reduced EF  ==========================================     Left heart catheterization results reviewed with the patient today along with all images  Results below     Exam  Vitals reviewed below  Regular, paced biventricular rate 65 with no rubs gallops heave or lift, 1 out of 6 systolic ejection murmur lower sternal border  No clubbing cyanosis with trace lower extremity edema  Normal pulses normal cap refill  Warm and dry  Intact grossly  Lungs are clear to auscultation  Soft nontender nondistended  Unchanged in prior encounter    Device/[employer] BiV ICD, interrogation today reveals permanent atrial fibrillation with biventricular pacing, patient referred to follow-up with us for routine interrogation moving forward  VVIR settings with permanent atrial  "fibrillation 90% BiV paced, she has had some short runs of nonsustained VT without therapy        Assessment:         Assessment       Coronary artery disease  Hyperlipidemia  Essential hypertension  Acute on chronic systolic and diastolic CHF  Dilated cardiomyopathy  Presence of BiV ICD, paced rhythm  Underlying permanent atrial fibrillation  Abnormal stress  Nonsustained VT      Plan:    Acute on chronic systolic and diastolic CHF  Continue Lasix 20 twice daily, volume status much better with 20 pound weight loss  Back clinic in 6 months  Diagonal disease, not responsible for global cardiomyopathy, no chest pain, continue medical management, 80% lesion and small vessel, nonobstructive 40 to 50% disease in the mid LAD, RCA is patent  Continue losartan with systolic heart failure in addition to Xarelto with stroke risk reduction with paroxysmal atrial fibrillation at the limits of goal-directed medical therapy blood pressure 102/66 heart rates in the 60s  Dilated cardiomyopathy, maximize medical therapy allowed by hemodynamics blood pressure  Nonsustained VT  Abnormal stress  No ICD shocks  No unexplained syncope  No anginal chest pain  Overall feels better with volume off further titration based on clinical course and response to therapy     Atrial fibrillation permanent, controlled, biventricular ICD with pacing for resynchronization  MR, stable  Essential hypertension controlled  Hyperlipidemia controlled     EF improved with resynchronization 35% from 20% along with medical therapy     Device interrogation in 3 to 6 months routinely     Continue present CV treatment plan  Back to clinic in 6 months for follow-up       Objective:         BP 90/54 (BP Location: Right arm, Patient Position: Sitting)   Pulse 70   Resp 18   Ht 188 cm (74\")   Wt 75.3 kg (166 lb)   BMI 21.31 kg/m²         The pleasure to be involved in this patient's cardiovascular care.  Please call with any questions or concerns  Chidi Figueroa, " MD, PhD    Most recent EKG as reviewed and interpreted by me:  Procedures     Most recent echo as reviewed and interpreted by me:  Results for orders placed during the hospital encounter of 07/20/23    Adult Transthoracic Echo Complete W/ Cont if Necessary Per Protocol    Interpretation Summary    Left ventricular systolic function is moderately decreased. Left ventricular ejection fraction appears to be 36 - 40%.    The left ventricular cavity is moderately dilated.    Left ventricular wall thickness is consistent with mild concentric hypertrophy.    Left ventricular diastolic dysfunction is noted.    Moderately reduced right ventricular systolic function noted.    The right ventricular cavity is severely dilated.    The left atrial cavity is severely dilated.    The right atrial cavity is severely  dilated.    Moderate to severe mitral valve regurgitation is present.    Severe tricuspid valve regurgitation is present.    Estimated right ventricular systolic pressure from tricuspid regurgitation is moderately elevated (45-55 mmHg).    Moderate pulmonary hypertension is present.      Most recent stress test as reviewed and interpreted by me:  Results for orders placed during the hospital encounter of 01/03/22    Stress Test With Myocardial Perfusion One Day    Interpretation Summary  · Left ventricular ejection fraction is severely reduced. .  · There is no prior study available for comparison. Abnormal nuclear Apical lateral and lateral wall reversibility seen Summed difference score of 6 Dilated ventricle EF 21%.  · Findings consistent with an equivocal ECG stress test.    Abnormal study  Paced rhythm biventricular  Frequent PVCs and nonsustained VT with triplets and quadruplets in stress  No specific ST changes and paced rhythm  EF severely reduced 20%  Severely dilated ventricle  Reversibility seen in the apical lateral lateral wall compared to baseline moderate size, mild to moderate moderate  severity    Abnormal study cannot rule out ischemia of the apical lateral wall and mid lateral wall  Severely reduced EF      Most recent cardiac catheterization as reviewed interpreted by me:  Results for orders placed during the hospital encounter of 01/19/22    Cardiac Catheterization/Vascular Study    Narrative   Chidi Figueroa MD, PhD  [facility] cardiology  Date of service 1-    Procedure  1.  Left heart catheterization coronary angiography left ventriculography in ZHAO position    Indication  Cardiomyopathy, low EF, nonsustained VT, abnormal stress test    Performed consent patient was brought to the Cath Lab sterilely prepped and draped in usual fashion expose the right groin for right common femoral arterial access via micropuncture modified Seldinger technique with placement of a 6 Hebrew sheath.  035 guidewire was advanced to the aortic valve followed by JL 4 and JR4 catheters for selective left and right coronary angiography.  The JR4 was used across aortic valve easily, hand-injection for LV gram, EDP assessment and pullback assessment of the transaortic valve gradient.  There was small vessel disease in the diagonal 80 to 90% but vessel well less than 2 mm in diameter with limited nonbifurcating course, we will proceed with medical therapy of this, there is nonobstructive disease in the LAD 50% in the midportion most significantly with long segment of eccentric plaque but SORIN-3 flow throughout and no critical stenoses as it courses to and around the apex, 20 to 30% proximally, 30% distally, circumflex also had 30 to 40% in the mid but nothing critical, SORIN-3 flow throughout, RCA diffuse luminary low 40 to 30% nothing flow-limiting.    Diagnosis ultimately is nonischemic cardiomyopathy  BiV ICD is in place  Small vessel obstructive CAD in the diagonal branch which is too small for intervention should be treated medically  Nonobstructive disease in the LAD 50% in the  midportion with significantly but nothing critical with SORIN-3 flow to around the apex which would not contribute to his underlying cardiomyopathy and PCI of this is unlikely to improve his EF over medical therapy which is on board    Findings  1.  128/86 opening pressure  2.  Closing pressure was unchanged  3.  LVEDP 10-12  4.  LV function LVEF 35% with dyssynchrony present  5.  No transaortic valve or LVOT gradient seen    Complications none  Blood loss less than 5 cc  Contrast used 65 cc  Moderate on sedation time of 30 minutes with IV Versed and fentanyl administered by registered nurse with complete ECG pulse oximetry and hemodynamic monitor throughout the entirety the case observed by me    Angiography  1 left main large-caliber vessel with no angiographic disease  2.  LAD is a long vessel coursing to and around the apex with 2 diagonal branches and septal perforators.  Proximal has diffuse irregularities 20 to 30% eccentric plaque, 50% in the mid but nothing flow-limiting, takeoff of the more distal diagonal branches without any flow-limiting stenosis, distal LAD diffusely irregularities 20 to 30% at most, proximal diagonal branch has an 80 to 90% focal narrowing and nothing distally, this vessel is well below 2 mm in diameter with nonbifurcating limited course along the anterolateral wall not in the area identified by the stress test at the apex or apical lateral portion which is supplied by distal LAD and more distal second diagonal branch.  This will be treated medically  3 circumflex nondominant with 2 obtuse marginal branches, 40 % mid at most nonflow limiting  4.  RCA 20 to 30% in the midportion at most, nothing flow-limiting, SORIN-3 flow throughout    Recommendations and conclusions  1.  Nonischemic cardiomyopathy  2.  Nonobstructive CAD of large epicardial vessels 50% at most in the mid LAD, 20 to 30% RCA, 40% circumflex, 80 to 90% disease in small diagonal branch which is inconsequential and should  be treated medically not contributory to his cardiomyopathy  3.  Continue secondary prevention goals for systolic heart failure, go down on Lasix to daily given prerenal azotemia and elevated creatinine on labs today on twice daily Lasix with normal filling pressures  For follow-up cardiology 2 to 4 weeks for further optimization and review of cath films    Chidi Figueroa MD, PhD    Home today    The following portions of the patient's history were reviewed and updated as appropriate: allergies, current medications, past family history, past medical history, past social history, past surgical history, and problem list.      ROS:  14 point review of systems negative except as mentioned above    Current Outpatient Medications:     allopurinol (ZYLOPRIM) 300 MG tablet, TAKE ONE TABLET BY MOUTH DAILY, Disp: 90 tablet, Rfl: 0    coenzyme Q10 100 MG capsule, Take 2 capsules by mouth Daily., Disp: , Rfl:     cyanocobalamin (VITAMIN B-12) 2500 MCG tablet tablet, Take 1 tablet by mouth Daily., Disp: , Rfl:     furosemide (LASIX) 20 MG tablet, Take 1 tablet by mouth 2 (Two) Times a Day., Disp: , Rfl:     losartan (COZAAR) 25 MG tablet, Take 1 tablet by mouth 2 (Two) Times a Day., Disp: , Rfl:     potassium chloride (K-DUR,KLOR-CON) 20 MEQ CR tablet, Take 1 tablet by mouth 2 (Two) Times a Day., Disp: 60 tablet, Rfl: 5    rivaroxaban (XARELTO) 15 MG tablet, Take 1 tablet by mouth Daily., Disp: , Rfl:     vitamin D3 125 MCG (5000 UT) capsule capsule, Take  by mouth Daily., Disp: , Rfl:     Problem List:  Patient Active Problem List   Diagnosis    Presence of biventricular implantable cardioverter-defibrillator (ICD)    Permanent atrial fibrillation    Hyperlipidemia, mixed    Mitral regurgitation    Tricuspid regurgitation    Essential hypertension    Takotsubo cardiomyopathy    Dilated cardiomyopathy    Acute on chronic systolic congestive heart failure    Atrial flutter with rapid ventricular response    Low serum vitamin B12     Heart failure with reduced ejection fraction    Stage 3 chronic kidney disease    Nonsustained ventricular tachycardia    Gout    Acute exacerbation of CHF (congestive heart failure)     Past Medical History:  Past Medical History:   Diagnosis Date    Acid reflux disease     Allergic rhinitis     Arthritis     gouty    Atrial fibrillation     BBB (bundle branch block)     right    Bone spur 2017    neck    Congestive heart failure 10/08/2019    Congestive heart failure (CHF)     Coronary artery disease     non obstructive    Hypertension     Mitral regurgitation     Osteoarthritis     Presence of biventricular implantable cardioverter-defibrillator (ICD) 07/29/2019    S/P ablation of atrial fibrillation 01/2017    Takotsubo cardiomyopathy     Tricuspid regurgitation     VT (ventricular tachycardia) 02/2018     Past Surgical History:  Past Surgical History:   Procedure Laterality Date    AMPUTATION      index and middle finger    CARDIAC ABLATION  08/22/2017    F    CARDIAC CATHETERIZATION  12/08/2016    non obst CAD; takotsubo CM    CARDIAC CATHETERIZATION  07/24/2017    treating medically    CARDIAC CATHETERIZATION N/A 1/19/2022    Procedure: Left Heart Cath;  Surgeon: Chidi Figueroa MD;  Location: [facility] CATH INVASIVE LOCATION;  Service: Cardiology;  Laterality: N/A;    CARDIAC DEFIBRILLATOR PLACEMENT  2017    Bs    CARDIAC ELECTROPHYSIOLOGY PROCEDURE N/A 4/14/2021    Procedure: AV node ablation;  Surgeon: Hira Ochoa MD;  Location: [facility] CATH INVASIVE LOCATION;  Service: Cardiovascular;  Laterality: N/A;    COLONOSCOPY N/A 6/29/2022    Procedure: COLONOSCOPY with polypectomy x5;  Surgeon: KAYLIN Ruiz MD;  Location: [facility] ENDOSCOPY;  Service: Gastroenterology;  Laterality: N/A;  post: diverticulosis, hemorrhoids, polyps    FRACTURE SURGERY  04/18/2021    jaw     INTERNAL CARDIAC DEFIBRILLATOR INSERTION  2017    BiV ICD BS    OTHER SURGICAL HISTORY  01/2017    Heart ablation-[facility]     ROTATOR CUFF REPAIR Right     SINUS SURGERY  2014     Social History:  Social History     Socioeconomic History    Marital status:    Tobacco Use    Smoking status: Former     Types: Cigarettes     Quit date:      Years since quittin.7     Passive exposure: Past    Smokeless tobacco: Never   Vaping Use    Vaping Use: Never used   Substance and Sexual Activity    Alcohol use: Yes     Comment: occasionally    Drug use: Never    Sexual activity: Defer     Allergies:  Allergies   Allergen Reactions    Hydrocodone Urinary Retention    Aricept [Donepezil] Mental Status Change    Metoprolol Confusion     Immunizations:  Immunization History   Administered Date(s) Administered    COVID-19 (MODERNA) 1st,2nd,3rd Dose Monovalent 2021, 2021, 10/26/2021, 2022    FLUAD TRI 65YR+ 10/06/2022    Flu Vaccine Intradermal Quad 18-64YR 2011, 2012, 2013    Fluad Quad 65+ 10/07/2019, 09/10/2020    Fluzone High Dose =>65 Years ([employer] ONLY) 10/03/2014, 10/09/2015, 2016, 10/02/2017, 2018    Fluzone High-Dose 65+yrs 2021, 10/17/2021, 10/06/2022    Pneumococcal Conjugate 13-Valent (PCV13) 10/25/2019    Pneumococcal Polysaccharide (PPSV23) 10/27/2020            In-Office Procedure(s):  No orders to display        ASCVD RIsk Score::  The ASCVD Risk score (Veronica DK, et al., 2019) failed to calculate for the following reasons:    The 2019 ASCVD risk score is only valid for ages 40 to 79    Imaging:    Results for orders placed during the hospital encounter of 23    XR Chest 1 View    Narrative  XR CHEST 1 VW    Date of Exam: 2023 1:00 PM EDT    Indication: weakness, chf    Comparison: PA and lateral chest radiograph 10/18/2022.    Findings:  Moderate to marked cardiac enlargement is present, and appears increased compared to the prior study. Pulmonary vasculature appears enlarged, indistinct, suggesting congestive change, increased from prior. Fine interstitial  thickening is present in both  lungs consistent with mild edema. No significant pleural fluid collection is identified. Pacemaker/defibrillator appears unchanged. No visible pneumothorax.    Impression  Impression:    1. Findings consistent with central vascular congestion and interstitial pulmonary edema.  2. Moderate to marked cardiac enlargement, which appears increased compared to 10/18/2022.      Electronically Signed: Kathie Oshea  7/20/2023 1:06 PM EDT  Workstation ID: [id number]       Results for orders placed during the hospital encounter of 11/30/21    US Liver    Narrative  US LIVER-    Date of Exam: 11/30/2021 10:12 AM    Indication: Elevated alk phos and bilirubin; R74.8-Abnormal levels of  other serum enzymes.    Comparison: None available.    Technique: Transverse and sagittal ultrasound images of the right upper  quadrant were obtained. Doppler evaluation was also conducted.    FINDINGS:  Liver appears within normal limits throughout. Echogenicity within  normal limits. No evidence of a focal lesion. No evidence of biliary  dilatation/obstruction, common bile duct is normal diameter at 3 mm.    Impression  Negative ultrasound of the liver.    Electronically Signed By-Brain Noonan On:11/30/2021 10:40 AM  This report was finalized on [id number] by  Brain Noonan, .      Results for orders placed during the hospital encounter of 04/17/21    CT Head Without Contrast    Narrative  DATE OF EXAM:  4/18/2021 4:49 PM    PROCEDURE:  CT HEAD WO CONTRAST-    INDICATIONS:  Brain cancer screening, Cowden Syndrome/PHTS (Age < 19y);  J18.9-Pneumonia, unspecified organism; R09.02-Hypoxemia;  I48.11-Longstanding persistent atrial fibrillation; R77.8-Other  specified abnormalities of plasma proteins    COMPARISON:  04/18/2021 2:22 PM    TECHNIQUE:  Routine transaxial cuts were obtained through the head without the  administration of contrast. Automated exposure control and iterative  reconstruction methods were  used.    FINDINGS:  There is cerebral atrophy. There is no intracranial hemorrhage. There is  no midline shift. The appearance to the head is similar to the prior  study.    There are fractures of the maxillofacial area. There is some  subcutaneous air around the nasal bridge area. There is density within  the paranasal sinuses which could relate to blood.    Impression  1.No significant change in appearance of the head from the earlier  study.  2.Fractures of the maxillofacial area are again noted. There is density  within the paranasal sinuses suggesting underlying blood.    Electronically Signed By-Moises Recinos MD On:4/18/2021 5:01 PM  This report was finalized on [id number] by  Moises Recinos MD.      Lab Review:   Lab on 08/02/2023   Component Date Value    Glucose 08/02/2023 76     BUN 08/02/2023 36 (H)     Creatinine 08/02/2023 1.31 (H)     Sodium 08/02/2023 144     Potassium 08/02/2023 4.2     Chloride 08/02/2023 105     CO2 08/02/2023 28.0     Calcium 08/02/2023 9.5     BUN/Creatinine Ratio 08/02/2023 27.5 (H)     Anion Gap 08/02/2023 11.0     eGFR 08/02/2023 53.7 (L)     proBNP 08/02/2023 1,896.0 (H)    No results displayed because visit has over 200 results.      Office Visit on 07/11/2023   Component Date Value    Color, UA 07/11/2023 Dark Yellow (A)     Appearance, UA 07/11/2023 Clear     pH, UA 07/11/2023 6.0     Specific Gravity, UA 07/11/2023 1.019     Glucose, UA 07/11/2023 Negative     Ketones, UA 07/11/2023 Negative     Bilirubin, UA 07/11/2023 Negative     Blood, UA 07/11/2023 Negative     Protein, UA 07/11/2023 100 mg/dL (2+) (A)     Leuk Esterase, UA 07/11/2023 Negative     Nitrite, UA 07/11/2023 Negative     Urobilinogen, UA 07/11/2023 1.0 E.U./dL     RBC, UA 07/11/2023 0-2     WBC, UA 07/11/2023 0-2     Bacteria, UA 07/11/2023 None Seen     Squamous Epithelial Cell* 07/11/2023 None Seen     Hyaline Casts, UA 07/11/2023 0-2     Mucus, UA 07/11/2023 Trace     Methodology 07/11/2023 Manual  Light Microscopy    Office Visit on 04/25/2023   Component Date Value    Color, UA 04/25/2023 Dark Yellow (A)     Appearance, UA 04/25/2023 Clear     pH, UA 04/25/2023 6.0     Specific Gravity, UA 04/25/2023 1.020     Glucose, UA 04/25/2023 Negative     Ketones, UA 04/25/2023 Negative     Bilirubin, UA 04/25/2023 Negative     Blood, UA 04/25/2023 Small (1+) (A)     Protein, UA 04/25/2023 30 mg/dL (1+) (A)     Leuk Esterase, UA 04/25/2023 Negative     Nitrite, UA 04/25/2023 Negative     Urobilinogen, UA 04/25/2023 2.0 E.U./dL (A)     RBC, UA 04/25/2023 13-20 (A)     WBC, UA 04/25/2023 0-2     Bacteria, UA 04/25/2023 None Seen     Squamous Epithelial Cell* 04/25/2023 0-2     Hyaline Casts, UA 04/25/2023 0-2     Methodology 04/25/2023 Automated Microscopy      Recent labs reviewed and interpreted for clinical significance and application            Level of Care:           Chidi Figueroa MD  09/19/23  .

## 2023-09-25 RX ORDER — FUROSEMIDE 20 MG/1
TABLET ORAL
Qty: 60 TABLET | Refills: 1 | Status: SHIPPED | OUTPATIENT
Start: 2023-09-25

## 2023-10-13 PROCEDURE — 93296 REM INTERROG EVL PM/IDS: CPT | Performed by: NURSE PRACTITIONER

## 2023-10-13 PROCEDURE — 93295 DEV INTERROG REMOTE 1/2/MLT: CPT | Performed by: NURSE PRACTITIONER

## 2023-11-19 RX ORDER — ALLOPURINOL 300 MG/1
300 TABLET ORAL DAILY
Qty: 90 TABLET | Refills: 0 | Status: SHIPPED | OUTPATIENT
Start: 2023-11-19

## 2023-11-22 RX ORDER — FUROSEMIDE 20 MG/1
20 TABLET ORAL 2 TIMES DAILY
Qty: 60 TABLET | Refills: 5 | Status: SHIPPED | OUTPATIENT
Start: 2023-11-22

## 2023-11-30 ENCOUNTER — OFFICE VISIT (OUTPATIENT)
Dept: FAMILY MEDICINE CLINIC | Facility: CLINIC | Age: 83
End: 2023-11-30
Payer: MEDICARE

## 2023-11-30 ENCOUNTER — LAB (OUTPATIENT)
Dept: FAMILY MEDICINE CLINIC | Facility: CLINIC | Age: 83
End: 2023-11-30
Payer: MEDICARE

## 2023-11-30 VITALS
HEART RATE: 80 BPM | RESPIRATION RATE: 18 BRPM | HEIGHT: 74 IN | BODY MASS INDEX: 22.77 KG/M2 | WEIGHT: 177.4 LBS | SYSTOLIC BLOOD PRESSURE: 112 MMHG | DIASTOLIC BLOOD PRESSURE: 64 MMHG

## 2023-11-30 DIAGNOSIS — R41.3 MEMORY PROBLEM: ICD-10-CM

## 2023-11-30 DIAGNOSIS — M10.9 GOUT, UNSPECIFIED CAUSE, UNSPECIFIED CHRONICITY, UNSPECIFIED SITE: ICD-10-CM

## 2023-11-30 DIAGNOSIS — Z12.5 ENCOUNTER FOR SCREENING FOR MALIGNANT NEOPLASM OF PROSTATE: ICD-10-CM

## 2023-11-30 DIAGNOSIS — I10 ESSENTIAL HYPERTENSION: ICD-10-CM

## 2023-11-30 DIAGNOSIS — I42.0 DILATED CARDIOMYOPATHY: ICD-10-CM

## 2023-11-30 DIAGNOSIS — Z00.00 MEDICARE ANNUAL WELLNESS VISIT, SUBSEQUENT: Primary | ICD-10-CM

## 2023-11-30 DIAGNOSIS — I48.21 PERMANENT ATRIAL FIBRILLATION: ICD-10-CM

## 2023-11-30 DIAGNOSIS — N18.31 STAGE 3A CHRONIC KIDNEY DISEASE: ICD-10-CM

## 2023-11-30 DIAGNOSIS — I50.20 HEART FAILURE WITH REDUCED EJECTION FRACTION: ICD-10-CM

## 2023-11-30 DIAGNOSIS — E55.9 VITAMIN D DEFICIENCY, UNSPECIFIED: ICD-10-CM

## 2023-11-30 PROBLEM — I50.9 ACUTE EXACERBATION OF CHF (CONGESTIVE HEART FAILURE): Status: RESOLVED | Noted: 2023-07-20 | Resolved: 2023-11-30

## 2023-11-30 LAB
25(OH)D3 SERPL-MCNC: 88.1 NG/ML (ref 30–100)
ALBUMIN SERPL-MCNC: 4.4 G/DL (ref 3.5–5.2)
ALBUMIN/GLOB SERPL: 1.5 G/DL
ALP SERPL-CCNC: 62 U/L (ref 39–117)
ALT SERPL W P-5'-P-CCNC: 12 U/L (ref 1–41)
ANION GAP SERPL CALCULATED.3IONS-SCNC: 12 MMOL/L (ref 5–15)
AST SERPL-CCNC: 27 U/L (ref 1–40)
BASOPHILS # BLD AUTO: 0.1 10*3/MM3 (ref 0–0.2)
BASOPHILS NFR BLD AUTO: 1.5 % (ref 0–1.5)
BILIRUB SERPL-MCNC: 1.5 MG/DL (ref 0–1.2)
BILIRUB UR QL STRIP: NEGATIVE
BUN SERPL-MCNC: 25 MG/DL (ref 8–23)
BUN/CREAT SERPL: 17.7 (ref 7–25)
CALCIUM SPEC-SCNC: 10.3 MG/DL (ref 8.6–10.5)
CHLORIDE SERPL-SCNC: 100 MMOL/L (ref 98–107)
CHOLEST SERPL-MCNC: 194 MG/DL (ref 0–200)
CLARITY UR: CLEAR
CO2 SERPL-SCNC: 29 MMOL/L (ref 22–29)
COLOR UR: YELLOW
CREAT SERPL-MCNC: 1.41 MG/DL (ref 0.76–1.27)
CREAT UR-MCNC: 33.4 MG/DL
DEPRECATED RDW RBC AUTO: 49.1 FL (ref 37–54)
EGFRCR SERPLBLD CKD-EPI 2021: 49.1 ML/MIN/1.73
EOSINOPHIL # BLD AUTO: 0.16 10*3/MM3 (ref 0–0.4)
EOSINOPHIL NFR BLD AUTO: 2.4 % (ref 0.3–6.2)
ERYTHROCYTE [DISTWIDTH] IN BLOOD BY AUTOMATED COUNT: 13.7 % (ref 12.3–15.4)
GLOBULIN UR ELPH-MCNC: 3 GM/DL
GLUCOSE SERPL-MCNC: 82 MG/DL (ref 65–99)
GLUCOSE UR STRIP-MCNC: NEGATIVE MG/DL
HBA1C MFR BLD: 5.4 % (ref 4.8–5.6)
HCT VFR BLD AUTO: 45.5 % (ref 37.5–51)
HDLC SERPL-MCNC: 66 MG/DL (ref 40–60)
HGB BLD-MCNC: 14.9 G/DL (ref 13–17.7)
HGB UR QL STRIP.AUTO: NEGATIVE
HOLD SPECIMEN: NORMAL
IMM GRANULOCYTES # BLD AUTO: 0.04 10*3/MM3 (ref 0–0.05)
IMM GRANULOCYTES NFR BLD AUTO: 0.6 % (ref 0–0.5)
KETONES UR QL STRIP: NEGATIVE
LDLC SERPL CALC-MCNC: 112 MG/DL (ref 0–100)
LDLC/HDLC SERPL: 1.66 {RATIO}
LEUKOCYTE ESTERASE UR QL STRIP.AUTO: NEGATIVE
LYMPHOCYTES # BLD AUTO: 1.57 10*3/MM3 (ref 0.7–3.1)
LYMPHOCYTES NFR BLD AUTO: 23.9 % (ref 19.6–45.3)
MAGNESIUM SERPL-MCNC: 2.5 MG/DL (ref 1.6–2.4)
MCH RBC QN AUTO: 32.2 PG (ref 26.6–33)
MCHC RBC AUTO-ENTMCNC: 32.7 G/DL (ref 31.5–35.7)
MCV RBC AUTO: 98.3 FL (ref 79–97)
MONOCYTES # BLD AUTO: 0.56 10*3/MM3 (ref 0.1–0.9)
MONOCYTES NFR BLD AUTO: 8.5 % (ref 5–12)
NEUTROPHILS NFR BLD AUTO: 4.15 10*3/MM3 (ref 1.7–7)
NEUTROPHILS NFR BLD AUTO: 63.1 % (ref 42.7–76)
NITRITE UR QL STRIP: NEGATIVE
NRBC BLD AUTO-RTO: 0 /100 WBC (ref 0–0.2)
PH UR STRIP.AUTO: 6.5 [PH] (ref 5–8)
PHOSPHATE SERPL-MCNC: 3.4 MG/DL (ref 2.5–4.5)
PLATELET # BLD AUTO: 200 10*3/MM3 (ref 140–450)
PMV BLD AUTO: 11.2 FL (ref 6–12)
POTASSIUM SERPL-SCNC: 4.1 MMOL/L (ref 3.5–5.2)
PROT ?TM UR-MCNC: 7.4 MG/DL
PROT SERPL-MCNC: 7.4 G/DL (ref 6–8.5)
PROT UR QL STRIP: NEGATIVE
PROT/CREAT UR: 221.6 MG/G CREA (ref 0–200)
PSA SERPL-MCNC: 0.94 NG/ML (ref 0–4)
PTH-INTACT SERPL-MCNC: 52.7 PG/ML (ref 15–65)
RBC # BLD AUTO: 4.63 10*6/MM3 (ref 4.14–5.8)
SODIUM SERPL-SCNC: 141 MMOL/L (ref 136–145)
SP GR UR STRIP: 1.01 (ref 1–1.03)
T4 FREE SERPL-MCNC: 1.22 NG/DL (ref 0.93–1.7)
TRIGL SERPL-MCNC: 92 MG/DL (ref 0–150)
TSH SERPL DL<=0.05 MIU/L-ACNC: 3.43 UIU/ML (ref 0.27–4.2)
UROBILINOGEN UR QL STRIP: NORMAL
VIT B12 BLD-MCNC: 856 PG/ML (ref 211–946)
VLDLC SERPL-MCNC: 16 MG/DL (ref 5–40)
WBC NRBC COR # BLD AUTO: 6.58 10*3/MM3 (ref 3.4–10.8)

## 2023-11-30 PROCEDURE — 36415 COLL VENOUS BLD VENIPUNCTURE: CPT | Performed by: FAMILY MEDICINE

## 2023-11-30 PROCEDURE — 85025 COMPLETE CBC W/AUTO DIFF WBC: CPT | Performed by: FAMILY MEDICINE

## 2023-11-30 PROCEDURE — 84439 ASSAY OF FREE THYROXINE: CPT | Performed by: FAMILY MEDICINE

## 2023-11-30 PROCEDURE — 83970 ASSAY OF PARATHORMONE: CPT | Performed by: FAMILY MEDICINE

## 2023-11-30 PROCEDURE — 80053 COMPREHEN METABOLIC PANEL: CPT | Performed by: FAMILY MEDICINE

## 2023-11-30 PROCEDURE — 83735 ASSAY OF MAGNESIUM: CPT | Performed by: FAMILY MEDICINE

## 2023-11-30 PROCEDURE — 84156 ASSAY OF PROTEIN URINE: CPT | Performed by: FAMILY MEDICINE

## 2023-11-30 PROCEDURE — 82570 ASSAY OF URINE CREATININE: CPT | Performed by: FAMILY MEDICINE

## 2023-11-30 PROCEDURE — G0103 PSA SCREENING: HCPCS | Performed by: FAMILY MEDICINE

## 2023-11-30 PROCEDURE — 83036 HEMOGLOBIN GLYCOSYLATED A1C: CPT | Performed by: FAMILY MEDICINE

## 2023-11-30 PROCEDURE — 84100 ASSAY OF PHOSPHORUS: CPT | Performed by: FAMILY MEDICINE

## 2023-11-30 PROCEDURE — 84165 PROTEIN E-PHORESIS SERUM: CPT | Performed by: FAMILY MEDICINE

## 2023-11-30 PROCEDURE — 82306 VITAMIN D 25 HYDROXY: CPT | Performed by: FAMILY MEDICINE

## 2023-11-30 PROCEDURE — 80061 LIPID PANEL: CPT | Performed by: FAMILY MEDICINE

## 2023-11-30 PROCEDURE — 84443 ASSAY THYROID STIM HORMONE: CPT | Performed by: FAMILY MEDICINE

## 2023-11-30 PROCEDURE — 81003 URINALYSIS AUTO W/O SCOPE: CPT | Performed by: FAMILY MEDICINE

## 2023-11-30 PROCEDURE — 82607 VITAMIN B-12: CPT | Performed by: FAMILY MEDICINE

## 2023-11-30 RX ORDER — METOPROLOL SUCCINATE 25 MG/1
12.5 TABLET, EXTENDED RELEASE ORAL DAILY
Qty: 30 TABLET | Refills: 5 | Status: SHIPPED | OUTPATIENT
Start: 2023-11-30

## 2023-11-30 NOTE — PROGRESS NOTES
The ABCs of the Annual Wellness Visit  Subsequent Medicare Wellness Visit    Subjective    Hank Ponce is a 83 y.o. male who presents for a Subsequent Medicare Wellness Visit.    The following portions of the patient's history were reviewed and   updated as appropriate: allergies, current medications, past family history, past medical history, past social history, past surgical history, and problem list.    Compared to one year ago, the patient feels his physical   health is the same.    Compared to one year ago, the patient feels his mental   health is the same.    Recent Hospitalizations:  This patient has had a [facility] admission record on file within the last 365 days.  7/2023- CHF exacerbation    Current Medical Providers:  Patient Care Team:  Bunny Curran MD as PCP - General (Internal Medicine)  Chema Curran MD as Consulting Physician (Cardiology)    Outpatient Medications Prior to Visit   Medication Sig Dispense Refill    allopurinol (ZYLOPRIM) 300 MG tablet TAKE 1 TABLET BY MOUTH DAILY 90 tablet 0    coenzyme Q10 100 MG capsule Take 2 capsules by mouth Daily.      furosemide (LASIX) 20 MG tablet Take 1 tablet by mouth 2 (Two) Times a Day. 60 tablet 5    losartan (COZAAR) 25 MG tablet Take 1 tablet by mouth 2 (Two) Times a Day.      potassium chloride (K-DUR,KLOR-CON) 20 MEQ CR tablet Take 1 tablet by mouth 2 (Two) Times a Day. 60 tablet 5    rivaroxaban (XARELTO) 15 MG tablet Take 1 tablet by mouth Daily.      vitamin D3 125 MCG (5000 UT) capsule capsule Take  by mouth Daily.      cyanocobalamin (VITAMIN B-12) 2500 MCG tablet tablet Take 1 tablet by mouth Daily. (Patient not taking: Reported on 11/30/2023)       No facility-administered medications prior to visit.     No opioid medication identified on active medication list. I have reviewed chart for other potential  high risk medication/s and harmful drug interactions in the elderly.      Aspirin is not on active  "medication list.  Aspirin use is not indicated based on review of current medical condition/s. Risk of harm outweighs potential benefits.  .    Patient Active Problem List   Diagnosis    Presence of biventricular implantable cardioverter-defibrillator (ICD)    Permanent atrial fibrillation    Hyperlipidemia, mixed    Mitral regurgitation    Tricuspid regurgitation    Essential hypertension    Takotsubo cardiomyopathy    Dilated cardiomyopathy    Acute on chronic systolic congestive heart failure    Atrial flutter with rapid ventricular response    Low serum vitamin B12    Heart failure with reduced ejection fraction    Stage 3 chronic kidney disease    Nonsustained ventricular tachycardia    Gout     Advance Care Planning   Advance Care Planning     Advance Directive is on file.  ACP discussion was held with the patient during this visit. Patient has an advance directive in EMR which is still valid.      Objective    Vitals:    23 0803   BP: 112/64   Pulse: 80   Resp: 18   Weight: 80.5 kg (177 lb 6.4 oz)   Height: 188 cm (74\")     Estimated body mass index is 22.78 kg/m² as calculated from the following:    Height as of this encounter: 188 cm (74\").    Weight as of this encounter: 80.5 kg (177 lb 6.4 oz).    BMI is within normal parameters. No other follow-up for BMI required.    Does the patient have evidence of cognitive impairment? No  Lab Results   Component Value Date    TRIG 92 2023    HDL 66 (H) 2023     (H) 2023    VLDL 16 2023    HGBA1C 5.40 2023        HEALTH RISK ASSESSMENT    Smoking Status:  Social History     Tobacco Use   Smoking Status Former    Packs/day: 1.00    Years: 20.00    Additional pack years: 0.00    Total pack years: 20.00    Types: Cigarettes    Quit date: 1990    Years since quittin.9    Passive exposure: Past   Smokeless Tobacco Never     Alcohol Consumption:  Social History     Substance and Sexual Activity   Alcohol Use Not Currently "    Comment: occasionally     Fall Risk Screen:  JESUS Fall Risk Assessment was completed, and patient is at LOW risk for falls.Assessment completed on:2023    Depression Screenin/30/2023     8:01 AM   PHQ-2/PHQ-9 Depression Screening   Little Interest or Pleasure in Doing Things 0-->not at all   Feeling Down, Depressed or Hopeless 0-->not at all   PHQ-9: Brief Depression Severity Measure Score 0     Health Habits and Functional and Cognitive Screenin/30/2023     8:01 AM   Functional & Cognitive Status   Do you have difficulty preparing food and eating? No   Do you have difficulty bathing yourself, getting dressed or grooming yourself? No   Do you have difficulty using the toilet? No   Do you have difficulty moving around from place to place? No   Do you have trouble with steps or getting out of a bed or a chair? No   Current Diet Well Balanced Diet   Dental Exam Up to date   Eye Exam Up to date   Exercise (times per week) 7 times per week   Current Exercises Include Walking   Do you need help using the phone?  No   Are you deaf or do you have serious difficulty hearing?  No   Do you need help to go to places out of walking distance? No   Do you need help shopping? No   Do you need help preparing meals?  No   Do you need help with housework?  No   Do you need help with laundry? No   Do you need help taking your medications? No   Do you need help managing money? No   Do you ever drive or ride in a car without wearing a seat belt? No   Have you felt unusual stress, anger or loneliness in the last month? No   Who do you live with? Alone   If you need help, do you have trouble finding someone available to you? No   Do you have difficulty concentrating, remembering or making decisions? Yes   Son helps oversee finances    Age-appropriate Screening Schedule:  Refer to the list below for future screening recommendations based on patient's age, sex and/or medical conditions. Orders for these  recommended tests are listed in the plan section. The patient has been provided with a written plan.    Health Maintenance   Topic Date Due    TDAP/TD VACCINES (1 - Tdap) Never done    ZOSTER VACCINE (1 of 2) Never done    COVID-19 Vaccine (5 - 2023-24 season) 09/01/2023    ANNUAL WELLNESS VISIT  11/30/2024    LIPID PANEL  11/30/2024    INFLUENZA VACCINE  Completed    Pneumococcal Vaccine 65+  Completed        CMS Preventative Services Quick Reference  Risk Factors Identified During Encounter  Immunizations Discussed/Encouraged: Tdap and Shingrix  The above risks/problems have been discussed with the patient.  Pertinent information has been shared with the patient in the After Visit Summary.  An After Visit Summary and PPPS were made available to the patient.    Preventative  Colonoscopy: 6/2022, no recall  PSA: 0.727 (11/2022), ordered today  DEXA: 12/2021- normal  Shingles: Recommended  Pneumonia: Prevnar 10/2019, Pneumovax 10/2020  Tdap: Recommended, deferred  Influenza: 10/2023  COVID: Completed 4 shots Moderna (4/2022) and had illness    Follow Up:   Next Medicare Wellness visit to be scheduled in 1 year.       Additional E&M Note during same encounter follows:  Patient has multiple medical problems which are significant and separately identifiable that require additional work above and beyond the Medicare Wellness Visit.      Chief Complaint  Medicare Wellness-subsequent    Subjective        HPI  Hank Ponce is also being seen today for multiple medical problems    CHF: dilated cardiomyopathy. s/p defibrillator. 7/2023 echo w/ EF 36-40% w/ severe mitral and tricuspid regurg noted as well as moderately dilated LV and pulm HTN (RVSP 45-55). Maintained on lasix 20 BID. No SOB, orthopnea, leg swelling. Follows w/ CARDS- [facility]     Afib: Maintained on Xarelto 15 daily. [id number] score 4 (CHF, HTN, age x2). Reportedly unable to tolerate beta-blockers, amiodarone, and Tikosyn in the past. Denies CP,  "palpitations, blood in stool/urine or easing bleeding. Follows w/ CRISTHIAN Figueroa     HTN: 112/64 today. Generally 140s at home. Maintained on losartan 25 BID and lasix 40 daily. Recently increased by CRISTHIAN Figueroa. No LH/dizziness, CP or SOB.      Gout: maintained on allopurinol 300 daily. No gout flare in years. Well controlled.     BCC: follows w/ Nitesh. Has follow-up with dermatology this week (12/1)     Memory problem: did previously try donepezil w/o notable benefit. Son reports, continued, slow memory loss. Patient continues to have mild memory concerns- making numerous lists    Review of Systems   Constitutional:  Negative for chills and fever.   HENT:  Negative for congestion and rhinorrhea.    Eyes:  Negative for visual disturbance.   Respiratory:  Negative for cough and shortness of breath.    Cardiovascular:  Negative for chest pain, palpitations and leg swelling.   Gastrointestinal:  Negative for abdominal pain, blood in stool and vomiting.   Genitourinary:  Negative for difficulty urinating, dysuria and hematuria.   Musculoskeletal:  Negative for arthralgias and back pain.   Skin:  Negative for rash.   Neurological:  Negative for dizziness, light-headedness and numbness.   Psychiatric/Behavioral:  Negative for sleep disturbance. The patient is not nervous/anxious.         +memory problem     Objective   Vital Signs:  /64   Pulse 80   Resp 18   Ht 188 cm (74\")   Wt 80.5 kg (177 lb 6.4 oz)   BMI 22.78 kg/m²     Physical Exam  Constitutional:       General: He is not in acute distress.     Appearance: He is well-developed.   HENT:      Head: Normocephalic and atraumatic.      Right Ear: Tympanic membrane and external ear normal. There is no impacted cerumen.      Left Ear: Tympanic membrane and external ear normal. There is no impacted cerumen.      Nose: Nose normal.      Mouth/Throat:      Mouth: Mucous membranes are moist.      Pharynx: No oropharyngeal exudate or posterior oropharyngeal " erythema.   Eyes:      General: No scleral icterus.        Right eye: No discharge.         Left eye: No discharge.      Extraocular Movements: Extraocular movements intact.      Conjunctiva/sclera: Conjunctivae normal.      Pupils: Pupils are equal, round, and reactive to light.   Neck:      Thyroid: No thyromegaly.      Vascular: No carotid bruit.   Cardiovascular:      Rate and Rhythm: Normal rate and regular rhythm.      Heart sounds: Normal heart sounds. Murmur heard.   Pulmonary:      Effort: Pulmonary effort is normal. No respiratory distress.      Breath sounds: Normal breath sounds. No wheezing or rales.   Abdominal:      General: Bowel sounds are normal. There is no distension.      Palpations: Abdomen is soft.      Tenderness: There is no abdominal tenderness.   Musculoskeletal:         General: No deformity. Normal range of motion.      Cervical back: Normal range of motion and neck supple.   Lymphadenopathy:      Cervical: No cervical adenopathy.   Skin:     General: Skin is warm and dry.      Findings: No rash.   Neurological:      General: No focal deficit present.      Mental Status: He is alert and oriented to person, place, and time. Mental status is at baseline.      Cranial Nerves: No cranial nerve deficit.      Sensory: No sensory deficit.   Psychiatric:         Behavior: Behavior normal.         Thought Content: Thought content normal.             Assessment and Plan   Diagnoses and all orders for this visit:    1. Medicare annual wellness visit, subsequent (Primary)  -     CBC & Differential  -     Comprehensive Metabolic Panel  -     Hemoglobin A1c  -     Lipid Panel  -     TSH  -     T4, Free  -     Vitamin D,25-Hydroxy  -     Vitamin B12  -     PSA Screen  -  Counseled regarding diet, exercise, weight loss, and preventative health maintenance items/immunizations below    2. Dilated cardiomyopathy  3. Heart failure with reduced ejection fraction: 7/2023 echo w/ EF 36-40% w/ severe mitral and  tricuspid regurg noted as well as moderately dilated LV and pulm HTN (RVSP 45-55). S/p defibrillator  -     Start empagliflozin (Jardiance) 10 MG tablet tablet; Take 1 tablet by mouth Daily.  Dispense: 30 tablet; Refill: 5  -     Start metoprolol succinate XL (Toprol XL) 25 MG 24 hr tablet; Take 0.5 tablets by mouth Daily.  Dispense: 30 tablet; Refill: 5  - Cont home lasix 20 BID  - Cont CARDS f/u- Henry    4. Permanent atrial fibrillation  -     Start metoprolol succinate XL (Toprol XL) 25 MG 24 hr tablet; Take 0.5 tablets by mouth Daily.  Dispense: 30 tablet; Refill: 5  - Cont home Xarelto 15 daily  - Cont CARDS f/u- Henry    5. Essential hypertension: 112/64 today   - Cont home losartan 25 BID and lasix 20 BID   - Start metoprolol XL 12.5 daily as above    6. Gout, unspecified cause, unspecified chronicity, unspecified site   - Cont home allopurinol 300 daily    7. Memory problem: previously tried Aricept but not felt to be helpful   - Counseled regarding mental and physical exercise    8. Stage 3a chronic kidney disease: instructed to f/u w/ renal following recent hospitalization. Will establish but not sure this is necessary going forward  -     Comprehensive Metabolic Panel  -     Protein Elec + Interp, Serum  -     Urinalysis With Culture If Indicated - Urine, Clean Catch  -     Protein / Creatinine Ratio, Urine - Urine, Clean Catch  -     Phosphorus  -     Magnesium  -     PTH, Intact  -     Start empagliflozin (Jardiance) 10 MG tablet tablet; Take 1 tablet by mouth Daily.  Dispense: 30 tablet; Refill: 5  -     [place] Urine Culture Tube - Urine, Clean Catch    9. Vitamin D deficiency, unspecified  -     Vitamin D,25-Hydroxy    10. Encounter for screening for malignant neoplasm of prostate  -     PSA Screen     I spent 54 minutes caring for Hank on this date of service. This time includes time spent by me in the following activities:reviewing tests, obtaining and/or reviewing a separately obtained history,  performing a medically appropriate examination and/or evaluation , counseling and educating the patient/family/caregiver, ordering medications, tests, or procedures, and documenting information in the medical record  Follow Up   Return in about 4 months (around 3/30/2024) for Recheck- 30min.  Patient was given instructions and counseling regarding his condition or for health maintenance advice. Please see specific information pulled into the AVS if appropriate.

## 2023-12-04 ENCOUNTER — PATIENT ROUNDING (BHMG ONLY) (OUTPATIENT)
Dept: FAMILY MEDICINE CLINIC | Facility: CLINIC | Age: 83
End: 2023-12-04
Payer: MEDICARE

## 2023-12-04 LAB
ALBUMIN SERPL ELPH-MCNC: 3.7 G/DL (ref 2.9–4.4)
ALBUMIN/GLOB SERPL: 1.1 {RATIO} (ref 0.7–1.7)
ALPHA1 GLOB SERPL ELPH-MCNC: 0.2 G/DL (ref 0–0.4)
ALPHA2 GLOB SERPL ELPH-MCNC: 0.7 G/DL (ref 0.4–1)
B-GLOBULIN SERPL ELPH-MCNC: 1 G/DL (ref 0.7–1.3)
GAMMA GLOB SERPL ELPH-MCNC: 1.6 G/DL (ref 0.4–1.8)
GLOBULIN SER CALC-MCNC: 3.5 G/DL (ref 2.2–3.9)
LABORATORY COMMENT REPORT: NORMAL
M PROTEIN SERPL ELPH-MCNC: NORMAL G/DL
PROT PATTERN SERPL ELPH-IMP: NORMAL
PROT SERPL-MCNC: 7.2 G/DL (ref 6–8.5)

## 2023-12-04 NOTE — PROGRESS NOTES
December 4, 2023      A [employer] message was sent to Hank Ponce inquiring about patient's experience at our office during a recent visit.      K RAYSHAWN [place]  [facility]  [address]  [place] [id number].

## 2023-12-19 RX ORDER — RIVAROXABAN 15 MG/1
15 TABLET, FILM COATED ORAL DAILY
Qty: 90 TABLET | Refills: 1 | Status: SHIPPED | OUTPATIENT
Start: 2023-12-19

## 2023-12-26 RX ORDER — LOSARTAN POTASSIUM 25 MG/1
25 TABLET ORAL 2 TIMES DAILY
Qty: 180 TABLET | Refills: 0 | Status: SHIPPED | OUTPATIENT
Start: 2023-12-26

## 2024-01-12 PROCEDURE — 93295 DEV INTERROG REMOTE 1/2/MLT: CPT | Performed by: NURSE PRACTITIONER

## 2024-01-12 PROCEDURE — 93296 REM INTERROG EVL PM/IDS: CPT | Performed by: NURSE PRACTITIONER

## 2024-02-15 RX ORDER — ALLOPURINOL 300 MG/1
300 TABLET ORAL DAILY
Qty: 90 TABLET | Refills: 0 | Status: SHIPPED | OUTPATIENT
Start: 2024-02-15

## 2024-02-15 RX ORDER — POTASSIUM CHLORIDE 1500 MG/1
20 TABLET, EXTENDED RELEASE ORAL 2 TIMES DAILY
Qty: 60 TABLET | Refills: 5 | Status: SHIPPED | OUTPATIENT
Start: 2024-02-15

## 2024-02-19 NOTE — SIGNIFICANT NOTE
Patient had fall with injury.    JULIO Santillan found patient on floor with injury to nose and lots of blood and shouted out for assistance.    Fall precautions in place include: yellow gown, non-slip socks, bed in low position, urinal at bedside, call light going off- call button in bed, phone in bed, urinal was being used when he fell per patient.    Bed alarm not engaged, yellow bracelet at the bedside.    Pressure applied to nose with gauze, patient assisted x4 RNs back to bed with gait belt.    Fast team called due to head injury at 1350.  Call put out to Dr. Seymour.     Ct of head and labs ordered during fast team, CT called, and we quickly brought patinent to CT room 3 with nurse transport, bedside monitor, and portable suction.  Dr. seymour ordered hold xarelto.    The patient's daughter arrived to the unit while the patient was in CT, Charge nurse Carmen was updating daughter regarding the fall as the patient returned to [facility].    Patient was in room 261 when he fell, then we moved the patient to room 265 upon his return from CT so he would be closer to the nurse's station as an improved fall prevention intervention.    Upon return, steri strips applied to nose, drip gauze applied under nose, cervical collar placed.  Bed alarm in use, daughter at the bedside.   Oxygen applied by non re-breather on 10L, for supportive care as patient has significant injury to nose, and simple oxygen mask not available.   22-year-old male with no reported past medical history, reports over the past year he has been using opioids that he was sold on the streets and since Saturday, 2 days ago patient stopped using Percocet and is coming in with complaint of nausea multiple episodes of nonbloody nonbilious vomiting, diarrhea, abdominal cramping, chills, subjective fevers, nasal congestion, increased urination, anxiousness, restlessness.  Patient presents to the emergency department with his mother asking for help and assistance with information to stop.  Denies cp ,sob, dark/ blood stools, headache, dizziness, lightheadedness,

## 2024-03-11 ENCOUNTER — OFFICE VISIT (OUTPATIENT)
Dept: CARDIOLOGY | Facility: CLINIC | Age: 84
End: 2024-03-11
Payer: MEDICARE

## 2024-03-11 ENCOUNTER — CLINICAL SUPPORT NO REQUIREMENTS (OUTPATIENT)
Dept: CARDIOLOGY | Facility: CLINIC | Age: 84
End: 2024-03-11
Payer: MEDICARE

## 2024-03-11 VITALS
HEART RATE: 70 BPM | HEIGHT: 74 IN | DIASTOLIC BLOOD PRESSURE: 69 MMHG | RESPIRATION RATE: 18 BRPM | SYSTOLIC BLOOD PRESSURE: 108 MMHG | WEIGHT: 175 LBS | BODY MASS INDEX: 22.46 KG/M2 | OXYGEN SATURATION: 98 %

## 2024-03-11 DIAGNOSIS — I51.81 TAKOTSUBO CARDIOMYOPATHY: Primary | ICD-10-CM

## 2024-03-11 DIAGNOSIS — I47.29 NONSUSTAINED VENTRICULAR TACHYCARDIA: ICD-10-CM

## 2024-03-11 DIAGNOSIS — Z95.810 PRESENCE OF BIVENTRICULAR IMPLANTABLE CARDIOVERTER-DEFIBRILLATOR (ICD): ICD-10-CM

## 2024-03-11 DIAGNOSIS — I50.20 HEART FAILURE WITH REDUCED EJECTION FRACTION: ICD-10-CM

## 2024-03-11 DIAGNOSIS — I42.0 DILATED CARDIOMYOPATHY: ICD-10-CM

## 2024-03-11 DIAGNOSIS — I42.0 DILATED CARDIOMYOPATHY: Primary | ICD-10-CM

## 2024-03-11 DIAGNOSIS — I34.0 NONRHEUMATIC MITRAL VALVE REGURGITATION: ICD-10-CM

## 2024-03-19 NOTE — PROGRESS NOTES
DEVICE INTERROGATION:  IN OFFICE    DEVICE TYPE:   Biventricular ICD    :   [employer]    BATTERY:  Stable    TIME TO ELECTIVE REPLACEMENT INDICATORS:   4.5 years    CHARGE TIME:   12.3 seconds        LEAD DATA:       DEVICE REPROGRAMMED FOR TESTING      Atrial:     , 783 ohms,      Ventricular:     25 mV, 471 ohms, 1.1 V@15.7 mV, 863 ohms, 1.5 V at 0.4 ms 0.4 ms    LV:      Pacemaker dependent:   No      Atrial pacing percentage: 0%    Ventricular pacing percentage: 90%      Arrhythmia Logbook Reviewed: Permanent A-fib        Summary:    Stable Device Function    No significant arhythmia burden.     Battery status is stable.    Reviewed with: Dr. Figueroa      NEXT IN OFFICE DEVICE CHECK DUE: 6 months    REMOTE DEVICE INTERROGATIONS: Ongoing

## 2024-03-21 ENCOUNTER — OFFICE VISIT (OUTPATIENT)
Dept: FAMILY MEDICINE CLINIC | Facility: CLINIC | Age: 84
End: 2024-03-21
Payer: MEDICARE

## 2024-03-21 VITALS
BODY MASS INDEX: 22.74 KG/M2 | DIASTOLIC BLOOD PRESSURE: 60 MMHG | WEIGHT: 177.2 LBS | HEIGHT: 74 IN | HEART RATE: 68 BPM | RESPIRATION RATE: 18 BRPM | SYSTOLIC BLOOD PRESSURE: 116 MMHG

## 2024-03-21 DIAGNOSIS — I50.20 HEART FAILURE WITH REDUCED EJECTION FRACTION: Primary | ICD-10-CM

## 2024-03-21 DIAGNOSIS — N18.30 STAGE 3 CHRONIC KIDNEY DISEASE, UNSPECIFIED WHETHER STAGE 3A OR 3B CKD: ICD-10-CM

## 2024-03-21 DIAGNOSIS — R41.3 MEMORY DIFFICULTY: ICD-10-CM

## 2024-03-21 DIAGNOSIS — I10 ESSENTIAL HYPERTENSION: ICD-10-CM

## 2024-03-21 LAB
ANION GAP SERPL CALCULATED.3IONS-SCNC: 13.5 MMOL/L (ref 5–15)
BUN SERPL-MCNC: 18 MG/DL (ref 8–23)
BUN/CREAT SERPL: 13 (ref 7–25)
CALCIUM SPEC-SCNC: 9.8 MG/DL (ref 8.6–10.5)
CHLORIDE SERPL-SCNC: 104 MMOL/L (ref 98–107)
CO2 SERPL-SCNC: 26.5 MMOL/L (ref 22–29)
CREAT SERPL-MCNC: 1.38 MG/DL (ref 0.76–1.27)
EGFRCR SERPLBLD CKD-EPI 2021: 50.1 ML/MIN/1.73
GLUCOSE SERPL-MCNC: 75 MG/DL (ref 65–99)
POTASSIUM SERPL-SCNC: 4.1 MMOL/L (ref 3.5–5.2)
SODIUM SERPL-SCNC: 144 MMOL/L (ref 136–145)

## 2024-03-21 PROCEDURE — 80048 BASIC METABOLIC PNL TOTAL CA: CPT | Performed by: FAMILY MEDICINE

## 2024-03-21 PROCEDURE — 36415 COLL VENOUS BLD VENIPUNCTURE: CPT | Performed by: FAMILY MEDICINE

## 2024-03-21 NOTE — PROGRESS NOTES
Cardiology Clinic Note  Chidi Figueroa MD, PhD    Subjective:     Encounter Date:03/11/2024      Patient ID: Hank Ponce is a 84 y.o. male.    Chief Complaint:  Chief Complaint   Patient presents with    Follow-up       HPI:      I the pleasure to see this 83-year-old gentleman in clinic today.  He has a history of cardiomyopathy with biventricular ICD in place, permanent atrial fibrillation hypertension hyperlipidemia, mitral vegetation, history of stress-induced cardiomyopathy dilated cardiomyopathy with acute on chronic systolic heart failure, EF 35%, he has had some atypical chest discomfort ultimately undergoing ischemic evaluation which was abnormal.   Left heart catheterization revealed patent vessels with nonobstructive disease in the LAD, small diagonal branch with focal 90% but too small for intervention and medically treated, patent but mildly diseased RCA, circumflex also mild disease but nothing flow-limiting.   He presents back to clinic today in stable condition.  Blood pressure still remain marginal 100 systolic, he is still tolerating losartan in divided doses, low-dose Lasix once or twice a day, we discussed free water and salt restriction, he continues on Xarelto chronically for permanent atrial fibrillation, he is a paced rhythm at 70 not on beta-blockers.  He is having no anginal chest pain.  Again prior left heart catheterization revealed proximal diagonal disease only with limited myocardial supply long across the anterolateral wall, LAD with nonobstructive disease with no reversibility seen at the apex by stress testing, angiographically 40 to 50% only with sizable vessel with SORIN-3 flow, not responsible for his underlying cardiomyopathy, again he is status post BiV resynchronization and pacing with underlying atrial fibrillation. We did discuss if he has dizziness to go down to once a day on his losartan, is not on beta-blocker given heart rates paced at 70, has not on Aldactone  limited by blood pressure, we discussed change to Entresto but he would like to continue on what he is on presently     Stress test reviewed and interpreted by me demonstrates the following  Interpretation Summary        Left ventricular ejection fraction is severely reduced. .  There is no prior study available for comparison. Abnormal nuclear Apical lateral and lateral wall reversibility seen Summed difference score of 6 Dilated ventricle EF 21%.  Findings consistent with an equivocal ECG stress test.     Abnormal study  Paced rhythm biventricular  Frequent PVCs and nonsustained VT with triplets and quadruplets in stress  No specific ST changes and paced rhythm  EF severely reduced 20% now improved  Severely dilated ventricle  Reversibility seen in the apical lateral lateral wall compared to baseline moderate size, mild to moderate moderate severity     Abnormal study cannot rule out ischemia of the apical lateral wall and mid lateral wall, this would correlate possibly with disease in the diagonal branch which is very limited and too small for intervention.  Severely reduced EF  ==========================================     Left heart catheterization results reviewed with the patient today along with all images  Results below     Exam  Vitals reviewed below  Regular, paced biventricular rate 65 with no rubs gallops heave or lift, 1 out of 6 systolic ejection murmur lower sternal border  No clubbing cyanosis with trace lower extremity edema  Normal pulses normal cap refill  Warm and dry  Intact grossly  Lungs are clear to auscultation  Soft nontender nondistended  Unchanged in prior encounter     Device/[employer] BiV ICD, interrogation today reveals permanent atrial fibrillation with biventricular pacing, patient referred to follow-up with us for routine interrogation moving forward  VVIR settings with permanent atrial fibrillation 90% BiV paced, she has had some short runs of nonsustained VT without  "therapy           Assessment:         Assessment       Coronary artery disease  Hyperlipidemia  Essential hypertension  Acute on chronic systolic and diastolic CHF  Dilated cardiomyopathy  Presence of BiV ICD, paced rhythm  Underlying permanent atrial fibrillation  Abnormal stress  Nonsustained VT      Plan:    Acute on chronic systolic and diastolic CHF  Continue Lasix 20 twice daily, volume status much better with 20 pound weight loss  Extra dose as needed swelling  Free water and salt restriction  Underlying EF around 35%  Blood pressure at the limits of further medication titration  Continue losartan twice daily, metoprolol succinate, Jardiance, consider Aldactone and change to Entresto later today    CAD, diagonal disease, not responsible for global cardiomyopathy, no chest pain, continue medical management, 80% lesion and small vessel, nonobstructive 40 to 50% disease in the mid LAD, RCA is patent    Continue Xarelto for stroke risk reduction with atrial fibrillation    Dilated cardiomyopathy,   Nonsustained VT  No ICD shocks  No unexplained syncope  No anginal chest pain  Medicines as above     Atrial fibrillation permanent, controlled, biventricular ICD with pacing for resynchronization  MR, stable  Essential hypertension controlled  Hyperlipidemia controlled     EF improved with resynchronization 35% from 20% along with medical therapy     Device interrogation in 3 to 6 months routinely    Objective:         /69 (BP Location: Left arm, Patient Position: Sitting)   Pulse 70   Resp 18   Ht 188 cm (74\")   Wt 79.4 kg (175 lb)   SpO2 98%   BMI 22.47 kg/m²           The pleasure to be involved in this patient's cardiovascular care.  Please call with any questions or concerns  Chidi Figueroa MD, PhD    Most recent EKG as reviewed and interpreted by me:  Procedures     Most recent echo as reviewed and interpreted by me:  Results for orders placed during the hospital encounter of 07/20/23    Adult " Transthoracic Echo Complete W/ Cont if Necessary Per Protocol    Interpretation Summary    Left ventricular systolic function is moderately decreased. Left ventricular ejection fraction appears to be 36 - 40%.    The left ventricular cavity is moderately dilated.    Left ventricular wall thickness is consistent with mild concentric hypertrophy.    Left ventricular diastolic dysfunction is noted.    Moderately reduced right ventricular systolic function noted.    The right ventricular cavity is severely dilated.    The left atrial cavity is severely dilated.    The right atrial cavity is severely  dilated.    Moderate to severe mitral valve regurgitation is present.    Severe tricuspid valve regurgitation is present.    Estimated right ventricular systolic pressure from tricuspid regurgitation is moderately elevated (45-55 mmHg).    Moderate pulmonary hypertension is present.      Most recent stress test as reviewed and interpreted by me:  Results for orders placed during the hospital encounter of 01/03/22    Stress Test With Myocardial Perfusion One Day    Interpretation Summary  · Left ventricular ejection fraction is severely reduced. .  · There is no prior study available for comparison. Abnormal nuclear Apical lateral and lateral wall reversibility seen Summed difference score of 6 Dilated ventricle EF 21%.  · Findings consistent with an equivocal ECG stress test.    Abnormal study  Paced rhythm biventricular  Frequent PVCs and nonsustained VT with triplets and quadruplets in stress  No specific ST changes and paced rhythm  EF severely reduced 20%  Severely dilated ventricle  Reversibility seen in the apical lateral lateral wall compared to baseline moderate size, mild to moderate moderate severity    Abnormal study cannot rule out ischemia of the apical lateral wall and mid lateral wall  Severely reduced EF      Most recent cardiac catheterization as reviewed interpreted by me:  Results for orders placed during  the hospital encounter of 01/19/22    Cardiac Catheterization/Vascular Study    Narrative   Chidi Figueroa MD, PhD  [facility] cardiology  Date of service 1-    Procedure  1.  Left heart catheterization coronary angiography left ventriculography in ZHAO position    Indication  Cardiomyopathy, low EF, nonsustained VT, abnormal stress test    Performed consent patient was brought to the Cath Lab sterilely prepped and draped in usual fashion expose the right groin for right common femoral arterial access via micropuncture modified Seldinger technique with placement of a 6 Czech sheath.  035 guidewire was advanced to the aortic valve followed by JL 4 and JR4 catheters for selective left and right coronary angiography.  The JR4 was used across aortic valve easily, hand-injection for LV gram, EDP assessment and pullback assessment of the transaortic valve gradient.  There was small vessel disease in the diagonal 80 to 90% but vessel well less than 2 mm in diameter with limited nonbifurcating course, we will proceed with medical therapy of this, there is nonobstructive disease in the LAD 50% in the midportion most significantly with long segment of eccentric plaque but SORIN-3 flow throughout and no critical stenoses as it courses to and around the apex, 20 to 30% proximally, 30% distally, circumflex also had 30 to 40% in the mid but nothing critical, SORIN-3 flow throughout, RCA diffuse luminary low 40 to 30% nothing flow-limiting.    Diagnosis ultimately is nonischemic cardiomyopathy  BiV ICD is in place  Small vessel obstructive CAD in the diagonal branch which is too small for intervention should be treated medically  Nonobstructive disease in the LAD 50% in the midportion with significantly but nothing critical with SORIN-3 flow to around the apex which would not contribute to his underlying cardiomyopathy and PCI of this is unlikely to improve his EF over medical therapy which is on  board    Findings  1.  128/86 opening pressure  2.  Closing pressure was unchanged  3.  LVEDP 10-12  4.  LV function LVEF 35% with dyssynchrony present  5.  No transaortic valve or LVOT gradient seen    Complications none  Blood loss less than 5 cc  Contrast used 65 cc  Moderate on sedation time of 30 minutes with IV Versed and fentanyl administered by registered nurse with complete ECG pulse oximetry and hemodynamic monitor throughout the entirety the case observed by me    Angiography  1 left main large-caliber vessel with no angiographic disease  2.  LAD is a long vessel coursing to and around the apex with 2 diagonal branches and septal perforators.  Proximal has diffuse irregularities 20 to 30% eccentric plaque, 50% in the mid but nothing flow-limiting, takeoff of the more distal diagonal branches without any flow-limiting stenosis, distal LAD diffusely irregularities 20 to 30% at most, proximal diagonal branch has an 80 to 90% focal narrowing and nothing distally, this vessel is well below 2 mm in diameter with nonbifurcating limited course along the anterolateral wall not in the area identified by the stress test at the apex or apical lateral portion which is supplied by distal LAD and more distal second diagonal branch.  This will be treated medically  3 circumflex nondominant with 2 obtuse marginal branches, 40 % mid at most nonflow limiting  4.  RCA 20 to 30% in the midportion at most, nothing flow-limiting, SORIN-3 flow throughout    Recommendations and conclusions  1.  Nonischemic cardiomyopathy  2.  Nonobstructive CAD of large epicardial vessels 50% at most in the mid LAD, 20 to 30% RCA, 40% circumflex, 80 to 90% disease in small diagonal branch which is inconsequential and should be treated medically not contributory to his cardiomyopathy  3.  Continue secondary prevention goals for systolic heart failure, go down on Lasix to daily given prerenal azotemia and elevated creatinine on labs today on twice  daily Lasix with normal filling pressures  For follow-up cardiology 2 to 4 weeks for further optimization and review of cath films    Chidi Figueroa MD, PhD    Home today    The following portions of the patient's history were reviewed and updated as appropriate: allergies, current medications, past family history, past medical history, past social history, past surgical history, and problem list.      ROS:  14 point review of systems negative except as mentioned above    Current Outpatient Medications:     allopurinol (ZYLOPRIM) 300 MG tablet, TAKE 1 TABLET BY MOUTH DAILY, Disp: 90 tablet, Rfl: 0    coenzyme Q10 100 MG capsule, Take 2 capsules by mouth Daily., Disp: , Rfl:     cyanocobalamin (VITAMIN B-12) 2500 MCG tablet tablet, Take 1 tablet by mouth Daily., Disp: , Rfl:     empagliflozin (Jardiance) 10 MG tablet tablet, Take 1 tablet by mouth Daily., Disp: 30 tablet, Rfl: 5    furosemide (LASIX) 20 MG tablet, Take 1 tablet by mouth 2 (Two) Times a Day., Disp: 60 tablet, Rfl: 5    KLOR-CON 20 MEQ CR tablet, TAKE 1 TABLET BY MOUTH TWICE A DAY, Disp: 60 tablet, Rfl: 5    losartan (COZAAR) 25 MG tablet, TAKE 1 TABLET BY MOUTH TWICE A DAY, Disp: 180 tablet, Rfl: 0    metoprolol succinate XL (Toprol XL) 25 MG 24 hr tablet, Take 0.5 tablets by mouth Daily., Disp: 30 tablet, Rfl: 5    rivaroxaban (Xarelto) 15 MG tablet, TAKE ONE TABLET BY MOUTH DAILY, Disp: 90 tablet, Rfl: 1    vitamin D3 125 MCG (5000 UT) capsule capsule, Take  by mouth Daily., Disp: , Rfl:     Problem List:  Patient Active Problem List   Diagnosis    Presence of biventricular implantable cardioverter-defibrillator (ICD)    Permanent atrial fibrillation    Hyperlipidemia, mixed    Mitral regurgitation    Tricuspid regurgitation    Essential hypertension    Takotsubo cardiomyopathy    Dilated cardiomyopathy    Acute on chronic systolic congestive heart failure    Atrial flutter with rapid ventricular response    Low serum vitamin B12    Heart failure  with reduced ejection fraction    Stage 3 chronic kidney disease    Nonsustained ventricular tachycardia    Gout     Past Medical History:  Past Medical History:   Diagnosis Date    Acid reflux disease     Allergic rhinitis     Arthritis     gouty    Atrial fibrillation     BBB (bundle branch block)     right    Bone spur 2017    neck    Congestive heart failure 10/08/2019    Congestive heart failure (CHF)     Coronary artery disease     non obstructive    Hypertension     Mitral regurgitation     Osteoarthritis     Presence of biventricular implantable cardioverter-defibrillator (ICD) 07/29/2019    S/P ablation of atrial fibrillation 01/2017    Takotsubo cardiomyopathy     Tricuspid regurgitation     VT (ventricular tachycardia) 02/2018     Past Surgical History:  Past Surgical History:   Procedure Laterality Date    AMPUTATION      index and middle finger    CARDIAC ABLATION  08/22/2017    [facility]    CARDIAC CATHETERIZATION  12/08/2016    non obst CAD; takotsubo CM    CARDIAC CATHETERIZATION  07/24/2017    treating medically    CARDIAC CATHETERIZATION N/A 1/19/2022    Procedure: Left Heart Cath;  Surgeon: Chidi Figueroa MD;  Location: [facility] CATH INVASIVE LOCATION;  Service: Cardiology;  Laterality: N/A;    CARDIAC DEFIBRILLATOR PLACEMENT  2017        CARDIAC ELECTROPHYSIOLOGY PROCEDURE N/A 4/14/2021    Procedure: AV node ablation;  Surgeon: Hira Ochoa MD;  Location: [facility] CATH INVASIVE LOCATION;  Service: Cardiovascular;  Laterality: N/A;    COLONOSCOPY N/A 6/29/2022    Procedure: COLONOSCOPY with polypectomy x5;  Surgeon: KAYLIN Ruiz MD;  Location: [facility] ENDOSCOPY;  Service: Gastroenterology;  Laterality: N/A;  post: diverticulosis, hemorrhoids, polyps    FRACTURE SURGERY  04/18/2021    jaw     INTERNAL CARDIAC DEFIBRILLATOR INSERTION  2017    BiV ICD BS    OTHER SURGICAL HISTORY  01/2017    Heart ablation-[facility]    ROTATOR CUFF REPAIR Right     SINUS SURGERY  02/2014     Social  History:  Social History     Socioeconomic History    Marital status:    Tobacco Use    Smoking status: Former     Current packs/day: 0.00     Average packs/day: 1 pack/day for 20.0 years (20.0 ttl pk-yrs)     Types: Cigarettes     Start date: 1970     Quit date: 1990     Years since quittin.2     Passive exposure: Past    Smokeless tobacco: Never   Vaping Use    Vaping status: Never Used   Substance and Sexual Activity    Alcohol use: Not Currently     Comment: occasionally    Drug use: Never    Sexual activity: Defer     Allergies:  Allergies   Allergen Reactions    Hydrocodone Urinary Retention    Aricept [Donepezil] Mental Status Change     Immunizations:  Immunization History   Administered Date(s) Administered    COVID-19 (MODERNA) 1st,2nd,3rd Dose Monovalent 2021, 2021, 10/26/2021, 2022    FLUAD TRI 65YR+ 10/06/2022    Flu Vaccine Intradermal Quad 18-64YR 2011, 2012, 2013    Fluad Quad 65+ 10/07/2019, 09/10/2020    Fluzone High Dose =>65 Years ([employer] ONLY) 10/03/2014, 10/09/2015, 2016, 10/02/2017, 2018    Fluzone High-Dose 65+yrs 2021, 10/17/2021, 10/06/2022    Pneumococcal Conjugate 13-Valent (PCV13) 10/25/2019    Pneumococcal Polysaccharide (PPSV23) 10/27/2020            In-Office Procedure(s):  No orders to display        ASCVD RIsk Score::  The ASCVD Risk score (Veronica DK, et al., 2019) failed to calculate for the following reasons:    The 2019 ASCVD risk score is only valid for ages 40 to 79    The patient has a prior MI or stroke diagnosis    Imaging:    Results for orders placed during the hospital encounter of 23    XR Chest 1 View    Narrative  XR CHEST 1 VW    Date of Exam: 2023 1:00 PM EDT    Indication: weakness, chf    Comparison: PA and lateral chest radiograph 10/18/2022.    Findings:  Moderate to marked cardiac enlargement is present, and appears increased compared to the prior study. Pulmonary vasculature  appears enlarged, indistinct, suggesting congestive change, increased from prior. Fine interstitial thickening is present in both  lungs consistent with mild edema. No significant pleural fluid collection is identified. Pacemaker/defibrillator appears unchanged. No visible pneumothorax.    Impression  Impression:    1. Findings consistent with central vascular congestion and interstitial pulmonary edema.  2. Moderate to marked cardiac enlargement, which appears increased compared to 10/18/2022.      Electronically Signed: Kathie Dorie  7/20/2023 1:06 PM EDT  Workstation ID: [id number]       Results for orders placed during the hospital encounter of 11/30/21    US Liver    Narrative  US LIVER-    Date of Exam: 11/30/2021 10:12 AM    Indication: Elevated alk phos and bilirubin; R74.8-Abnormal levels of  other serum enzymes.    Comparison: None available.    Technique: Transverse and sagittal ultrasound images of the right upper  quadrant were obtained. Doppler evaluation was also conducted.    FINDINGS:  Liver appears within normal limits throughout. Echogenicity within  normal limits. No evidence of a focal lesion. No evidence of biliary  dilatation/obstruction, common bile duct is normal diameter at 3 mm.    Impression  Negative ultrasound of the liver.    Electronically Signed By-Brain Noonan On:11/30/2021 10:40 AM  This report was finalized on [id number] by  Brain Noonan, .      Results for orders placed during the hospital encounter of 04/17/21    CT Head Without Contrast    Narrative  DATE OF EXAM:  4/18/2021 4:49 PM    PROCEDURE:  CT HEAD WO CONTRAST-    INDICATIONS:  Brain cancer screening, Cowden Syndrome/PHTS (Age < 19y);  J18.9-Pneumonia, unspecified organism; R09.02-Hypoxemia;  I48.11-Longstanding persistent atrial fibrillation; R77.8-Other  specified abnormalities of plasma proteins    COMPARISON:  04/18/2021 2:22 PM    TECHNIQUE:  Routine transaxial cuts were obtained through the head without  the  administration of contrast. Automated exposure control and iterative  reconstruction methods were used.    FINDINGS:  There is cerebral atrophy. There is no intracranial hemorrhage. There is  no midline shift. The appearance to the head is similar to the prior  study.    There are fractures of the maxillofacial area. There is some  subcutaneous air around the nasal bridge area. There is density within  the paranasal sinuses which could relate to blood.    Impression  1.No significant change in appearance of the head from the earlier  study.  2.Fractures of the maxillofacial area are again noted. There is density  within the paranasal sinuses suggesting underlying blood.    Electronically Signed By-Moises Recinos MD On:4/18/2021 5:01 PM  This report was finalized on [id number] by  Moises Recinos MD.      Lab Review:   Office Visit on 11/30/2023   Component Date Value    Glucose 11/30/2023 82     BUN 11/30/2023 25 (H)     Creatinine 11/30/2023 1.41 (H)     Sodium 11/30/2023 141     Potassium 11/30/2023 4.1     Chloride 11/30/2023 100     CO2 11/30/2023 29.0     Calcium 11/30/2023 10.3     Total Protein 11/30/2023 7.4     Albumin 11/30/2023 4.4     ALT (SGPT) 11/30/2023 12     AST (SGOT) 11/30/2023 27     Alkaline Phosphatase 11/30/2023 62     Total Bilirubin 11/30/2023 1.5 (H)     Globulin 11/30/2023 3.0     A/G Ratio 11/30/2023 1.5     BUN/Creatinine Ratio 11/30/2023 17.7     Anion Gap 11/30/2023 12.0     eGFR 11/30/2023 49.1 (L)     Hemoglobin A1C 11/30/2023 5.40     Total Cholesterol 11/30/2023 194     Triglycerides 11/30/2023 92     HDL Cholesterol 11/30/2023 66 (H)     LDL Cholesterol  11/30/2023 112 (H)     VLDL Cholesterol 11/30/2023 16     LDL/HDL Ratio 11/30/2023 1.66     TSH 11/30/2023 3.430     Free T4 11/30/2023 1.22     25 Hydroxy, Vitamin D 11/30/2023 88.1     Vitamin B-12 11/30/2023 856     PSA 11/30/2023 0.941     Total Protein 11/30/2023 7.2     Albumin 11/30/2023 3.7     Alpha-1-Globulin  11/30/2023 0.2     Alpha-2-Globulin 11/30/2023 0.7     Beta Globulin 11/30/2023 1.0     Gamma Globulin 11/30/2023 1.6     M-Dragan 11/30/2023 Not Observed     Globulin 11/30/2023 3.5     A/G Ratio 11/30/2023 1.1     Please note 11/30/2023 Comment     SPE Interpretation 11/30/2023 Comment     Color, UA 11/30/2023 Yellow     Appearance, UA 11/30/2023 Clear     pH, UA 11/30/2023 6.5     Specific [place]cher, UA 11/30/2023 1.011     Glucose, UA 11/30/2023 Negative     Ketones, UA 11/30/2023 Negative     Bilirubin, UA 11/30/2023 Negative     Blood, UA 11/30/2023 Negative     Protein, UA 11/30/2023 Negative     Leuk Esterase, UA 11/30/2023 Negative     Nitrite, UA 11/30/2023 Negative     Urobilinogen, UA 11/30/2023 1.0 E.U./dL     Protein/Creatinine Ratio* 11/30/2023 221.6 (H)     Creatinine, Urine 11/30/2023 33.4     Total Protein, Urine 11/30/2023 7.4     Phosphorus 11/30/2023 3.4     Magnesium 11/30/2023 2.5 (H)     PTH, Intact 11/30/2023 52.7     WBC 11/30/2023 6.58     RBC 11/30/2023 4.63     Hemoglobin 11/30/2023 14.9     Hematocrit 11/30/2023 45.5     MCV 11/30/2023 98.3 (H)     MCH 11/30/2023 32.2     MCHC 11/30/2023 32.7     RDW 11/30/2023 13.7     RDW-SD 11/30/2023 49.1     MPV 11/30/2023 11.2     Platelets 11/30/2023 200     Neutrophil % 11/30/2023 63.1     Lymphocyte % 11/30/2023 23.9     Monocyte % 11/30/2023 8.5     Eosinophil % 11/30/2023 2.4     Basophil % 11/30/2023 1.5     Immature Grans % 11/30/2023 0.6 (H)     Neutrophils, Absolute 11/30/2023 4.15     Lymphocytes, Absolute 11/30/2023 1.57     Monocytes, Absolute 11/30/2023 0.56     Eosinophils, Absolute 11/30/2023 0.16     Basophils, Absolute 11/30/2023 0.10     Immature Grans, Absolute 11/30/2023 0.04     nRBC 11/30/2023 0.0     Extra Tube 11/30/2023 Hold for add-ons.    Admission on 11/10/2023, Discharged on 11/10/2023   Component Date Value    Rapid Strep A Screen 11/10/2023 Negative     Internal Control 11/10/2023 Passed     Lot Number 11/10/2023  [id number]     Expiration Date 11/10/2023 [id number]      Recent labs reviewed and interpreted for clinical significance and application            Level of Care:           Chidi Figueroa MD  03/21/24  .

## 2024-03-21 NOTE — PROGRESS NOTES
"Chief Complaint   Patient presents with    Atrial Fibrillation    Hypertension     HPI  Hank Ponce is a 84 y.o. male that presents for   Chief Complaint   Patient presents with    Atrial Fibrillation    Hypertension     HFrEF: 7/2023 echo w/ EF 36-40% w/ severe mitral and tricuspid regurg as well as moderately dilated LV and pulm HTN (RVSP 45-55). S/p defibrillator. Patient was started on Jardiance 10mg daily and metprolol XL 12.5 daily at last visit. No complaints on these meds but patient feels that patient's endurance/stamina has improved over the last year. Still maintained on lasix 20 BID. No CP, palpitations, SOB. Follows w/ CARDS- [facility]    HTN: 116/60 today. Maintained on metoprolol XL 12.5 daily, losartan 25 BID, lasix 20 BID. Patient reports occasional LH/dizziness when rising too quickly but resolves w/in a few seconds. No CP or SOB     Dementia: patient w/ chronic memory problem but feels his memory has \"deteriorated\" in the last few weeks. Previously try donepezil w/o notable benefit. Son reports, continued, slow memory loss. Patient continues to have mild memory concerns- making numerous lists/lots of notes. 11/2022 MMSE 28/30, 4/2023 MMSE 27/30    Date 4/5, Location 5/5, Repeat 3/3, DLROW 5/5, Recall 1/3, Object 2/2, Phrase 1/1, 3-step 2/3, written command 1/1, sentence 1/1, drawing 1/1= 27/30    Review of Systems  Pertinent positives of ROS documented in HPI    The following portions of the patient's history were reviewed and updated as appropriate: problem list, past medical history, past surgical history, allergies, current medication    Problem List Tab  Patient History Tab  Immunizations Tab  Medications Tab  Chart Review Tab  Care Everywhere Tab  Synopsis Tab    PE  Vitals:    03/21/24 0801   BP: 116/60   Pulse: 68   Resp: 18     Body mass index is 22.75 kg/m².  General: Well nourished, NAD  Head: AT/NC  Eyes: EOMI, anicteric sclera  Resp: CTAB, SCR, BS equal  CV: Irregular irregular " w/o m/r/g; 2+ pulses  GI: Soft, NT/ND, +BS  MSK: FROM, no deformity, no edema  Skin: Warm, dry, intact  Neuro: Alert and oriented. No focal deficits. MMSE 27/30   Psych: Appropriate mood and affect    Imaging  No Images in the past 120 days found..    Assessment & Plan   Hank Ponce is a 84 y.o. male that presents for   Chief Complaint   Patient presents with    Atrial Fibrillation    Hypertension     Diagnoses and all orders for this visit:    1. Heart failure with reduced ejection fraction (Primary): 7/2023 echo w/ EF 36-40% w/ severe mitral and tricuspid regurg as well as moderately dilated LV and pulm HTN (RVSP 45-55). S/p defibrillator  -     Basic Metabolic Panel  - Cont home metoprolol XL 12.5 daily, Jardiance 10mg daily, lasix 20 BID and losartan 25 BID  - Cont CARDS f/u- Henry    2. Essential hypertension: 116/60 today  -     Basic Metabolic Panel  - Cont home metoprolol XL 12.5 daily, losartan 25 BID, lasix 20 BID    3. Memory difficulty: MMSE stable at 27/30 today. Patient likely has early dementia w/ progressive memory troubles but patient compensates very well and works very hard at his memory- making lists and lots of notes. Did not benefit from donepezil previously   - Cont mental and physical exercise    4. Stage 3 chronic kidney disease, unspecified whether stage 3a or 3b CKD  -     Basic Metabolic Panel  - Cont home losartan 25 BID and Jardiance 10 daily  - Renal f/u PRN    Validate home cuff at next appt       Return in about 8 months (around 12/2/2024) for Medicare Wellness.  40 minutes spent on the day of service face-to-face with the patient obtaining history, performing physical, reviewing chart/data, placing orders, and documenting.

## 2024-03-22 ENCOUNTER — TELEPHONE (OUTPATIENT)
Dept: FAMILY MEDICINE CLINIC | Facility: CLINIC | Age: 84
End: 2024-03-22
Payer: MEDICARE

## 2024-03-22 NOTE — TELEPHONE ENCOUNTER
Please Relay:    Called patient and they did not answer. Voicemail is not set up so unable to leave a voicemail.     Kidney function stable- no changes recommended

## 2024-03-25 RX ORDER — LOSARTAN POTASSIUM 25 MG/1
25 TABLET ORAL 2 TIMES DAILY
Qty: 180 TABLET | Refills: 0 | Status: SHIPPED | OUTPATIENT
Start: 2024-03-25

## 2024-04-01 ENCOUNTER — TELEPHONE (OUTPATIENT)
Dept: CARDIOLOGY | Facility: CLINIC | Age: 84
End: 2024-04-01
Payer: MEDICARE

## 2024-04-01 RX ORDER — FUROSEMIDE 20 MG/1
20 TABLET ORAL 2 TIMES DAILY
Qty: 60 TABLET | Refills: 5 | Status: SHIPPED | OUTPATIENT
Start: 2024-04-01

## 2024-04-01 NOTE — TELEPHONE ENCOUNTER
Caller: willie stevenson    Relationship: Emergency Contact    Best call back number: [phone]    Requested Prescriptions:   Requested Prescriptions     Pending Prescriptions Disp Refills    furosemide (LASIX) 20 MG tablet 60 tablet 5     Sig: Take 1 tablet by mouth 2 (Two) Times a Day.        Pharmacy where request should be sent: [facility] [id number]  [place], [place]  [address] [place] [address] - [phone]  - [phone] FX     Last office visit with prescribing clinician: 3/11/2024   Last telemedicine visit with prescribing clinician: Visit date not found   Next office visit with prescribing clinician: 9/9/2024     Additional details provided by patient:      Does the patient have less than a 3 day supply:  [x] Yes  [] No    Would you like a call back once the refill request has been completed: [] Yes [] No    If the office needs to give you a call back, can they leave a voicemail: [] Yes [] No    Yanely Stokes Rep   04/01/24 12:34 EDT

## 2024-05-26 DIAGNOSIS — N18.31 STAGE 3A CHRONIC KIDNEY DISEASE: ICD-10-CM

## 2024-05-26 DIAGNOSIS — I42.0 DILATED CARDIOMYOPATHY: ICD-10-CM

## 2024-05-26 DIAGNOSIS — I50.20 HEART FAILURE WITH REDUCED EJECTION FRACTION: ICD-10-CM

## 2024-05-28 RX ORDER — ALLOPURINOL 300 MG/1
300 TABLET ORAL DAILY
Qty: 90 TABLET | Refills: 0 | Status: SHIPPED | OUTPATIENT
Start: 2024-05-28

## 2024-05-28 RX ORDER — EMPAGLIFLOZIN 10 MG/1
10 TABLET, FILM COATED ORAL DAILY
Qty: 30 TABLET | Refills: 5 | Status: SHIPPED | OUTPATIENT
Start: 2024-05-28

## 2024-05-31 ENCOUNTER — OFFICE VISIT (OUTPATIENT)
Dept: FAMILY MEDICINE CLINIC | Facility: CLINIC | Age: 84
End: 2024-05-31
Payer: MEDICARE

## 2024-05-31 ENCOUNTER — LAB (OUTPATIENT)
Dept: FAMILY MEDICINE CLINIC | Facility: CLINIC | Age: 84
End: 2024-05-31
Payer: MEDICARE

## 2024-05-31 VITALS
BODY MASS INDEX: 22.84 KG/M2 | SYSTOLIC BLOOD PRESSURE: 128 MMHG | WEIGHT: 178 LBS | DIASTOLIC BLOOD PRESSURE: 62 MMHG | HEIGHT: 74 IN | OXYGEN SATURATION: 97 % | HEART RATE: 71 BPM | RESPIRATION RATE: 20 BRPM

## 2024-05-31 DIAGNOSIS — Z11.1 TUBERCULOSIS SCREENING: Primary | ICD-10-CM

## 2024-05-31 PROCEDURE — 99212 OFFICE O/P EST SF 10 MIN: CPT | Performed by: PHYSICIAN ASSISTANT

## 2024-05-31 PROCEDURE — 1160F RVW MEDS BY RX/DR IN RCRD: CPT | Performed by: PHYSICIAN ASSISTANT

## 2024-05-31 PROCEDURE — 1159F MED LIST DOCD IN RCRD: CPT | Performed by: PHYSICIAN ASSISTANT

## 2024-05-31 PROCEDURE — 86480 TB TEST CELL IMMUN MEASURE: CPT | Performed by: PHYSICIAN ASSISTANT

## 2024-05-31 PROCEDURE — 1126F AMNT PAIN NOTED NONE PRSNT: CPT | Performed by: PHYSICIAN ASSISTANT

## 2024-05-31 PROCEDURE — 36415 COLL VENOUS BLD VENIPUNCTURE: CPT

## 2024-05-31 PROCEDURE — 3078F DIAST BP <80 MM HG: CPT | Performed by: PHYSICIAN ASSISTANT

## 2024-05-31 PROCEDURE — 3074F SYST BP LT 130 MM HG: CPT | Performed by: PHYSICIAN ASSISTANT

## 2024-05-31 NOTE — PROGRESS NOTES
"Subjective   Hank Ponce is a 84 y.o. male.     Chief Complaint   Patient presents with    TB Test     Wants a test to go to assisted living.       /62   Pulse 71   Resp 20   Ht 188 cm (74\")   Wt 80.7 kg (178 lb)   SpO2 97%   BMI 22.85 kg/m²     BP Readings from Last 3 Encounters:   05/31/24 128/62   03/21/24 116/60   03/11/24 108/69       Wt Readings from Last 3 Encounters:   05/31/24 80.7 kg (178 lb)   03/21/24 80.4 kg (177 lb 3.2 oz)   03/11/24 79.4 kg (175 lb)       HPI Presents to the clinic with son for a TB test. Going into assisted living and needs a quantiferon tb test prior to admission. No signs of tb. No history of vaccination or previous exposure/infection. Feels good at this time.     The following portions of the patient's history were reviewed and updated as appropriate: allergies, current medications, past family history, past medical history, past social history, past surgical history, and problem list.    Review of Systems    Objective   Physical Exam  Constitutional:       Appearance: Normal appearance.   Eyes:      Extraocular Movements: Extraocular movements intact.      Pupils: Pupils are equal, round, and reactive to light.   Cardiovascular:      Rate and Rhythm: Normal rate.      Heart sounds: No murmur heard.  Pulmonary:      Effort: Pulmonary effort is normal.      Breath sounds: No wheezing.   Neurological:      General: No focal deficit present.      Mental Status: He is alert and oriented to person, place, and time.   Psychiatric:         Mood and Affect: Mood normal.         Behavior: Behavior normal.           Diagnoses and all orders for this visit:    1. Tuberculosis screening (Primary)  -     QuantiFERON TB Gold; Future        Return if symptoms worsen or fail to improve, for Recheck.  "

## 2024-06-04 LAB
GAMMA INTERFERON BACKGROUND BLD IA-ACNC: 0.01 IU/ML
M TB IFN-G BLD-IMP: NEGATIVE
M TB IFN-G CD4+ BCKGRND COR BLD-ACNC: 0 IU/ML
M TB IFN-G CD4+CD8+ BCKGRND COR BLD-ACNC: 0 IU/ML
MITOGEN IGNF BCKGRD COR BLD-ACNC: >10 IU/ML
QUANTIFERON INCUBATION: NORMAL
SERVICE CMNT-IMP: NORMAL

## 2024-06-19 ENCOUNTER — TELEPHONE (OUTPATIENT)
Dept: CARDIOLOGY | Facility: CLINIC | Age: 84
End: 2024-06-19

## 2024-06-19 NOTE — TELEPHONE ENCOUNTER
Patient monitor has been hooked up to phone line , displays that it is transmitting but confirmed regular phone lines don't work. Can you confirm if transmission is stable as of Saturday?

## 2024-06-19 NOTE — TELEPHONE ENCOUNTER
Caller: kati stevenson    Relationship to patient: Emergency Contact    Best call back number:  [phone]    Patient is needing: PATIENT HAS MOVED INTO ASSISTANT LIVING. PATIENTS SON KATI REQUESTING A CALL BACK BECAUSE HE'S NOT SURE IF PATIENTS PACEMAKER TRANSMITTER IS WORKING CORRECTLY THERE BECAUSE OF PHONE LINES.

## 2024-06-21 DIAGNOSIS — I48.21 PERMANENT ATRIAL FIBRILLATION: ICD-10-CM

## 2024-06-21 DIAGNOSIS — I50.20 HEART FAILURE WITH REDUCED EJECTION FRACTION: ICD-10-CM

## 2024-06-21 DIAGNOSIS — N18.31 STAGE 3A CHRONIC KIDNEY DISEASE: ICD-10-CM

## 2024-06-21 DIAGNOSIS — I42.0 DILATED CARDIOMYOPATHY: ICD-10-CM

## 2024-06-21 RX ORDER — ALLOPURINOL 300 MG/1
300 TABLET ORAL DAILY
Qty: 90 TABLET | Refills: 0 | Status: SHIPPED | OUTPATIENT
Start: 2024-06-21

## 2024-06-21 RX ORDER — METOPROLOL SUCCINATE 25 MG/1
12.5 TABLET, EXTENDED RELEASE ORAL DAILY
Qty: 30 TABLET | Refills: 5 | Status: SHIPPED | OUTPATIENT
Start: 2024-06-21

## 2024-06-21 RX ORDER — POTASSIUM CHLORIDE 20 MEQ/1
20 TABLET, EXTENDED RELEASE ORAL 2 TIMES DAILY
Qty: 60 TABLET | Refills: 5 | Status: SHIPPED | OUTPATIENT
Start: 2024-06-21

## 2024-06-21 RX ORDER — LOSARTAN POTASSIUM 25 MG/1
25 TABLET ORAL 2 TIMES DAILY
Qty: 180 TABLET | Refills: 0 | Status: SHIPPED | OUTPATIENT
Start: 2024-06-21

## 2024-06-21 NOTE — TELEPHONE ENCOUNTER
Caller: KRYSTAL SINGH    Relationship: Emergency Contact    Best call back number: [phone]    Requested Prescriptions:   Requested Prescriptions     Pending Prescriptions Disp Refills    allopurinol (ZYLOPRIM) 300 MG tablet 90 tablet 0     Sig: Take 1 tablet by mouth Daily.    losartan (COZAAR) 25 MG tablet 180 tablet 0     Sig: Take 1 tablet by mouth 2 (Two) Times a Day.    rivaroxaban (Xarelto) 15 MG tablet 90 tablet 1     Sig: Take 1 tablet by mouth Daily.    potassium chloride (Klor-Con M20) 20 MEQ CR tablet 60 tablet 5     Sig: Take 1 tablet by mouth 2 (Two) Times a Day.    empagliflozin (Jardiance) 10 MG tablet tablet 30 tablet 5     Sig: Take 1 tablet by mouth Daily.    metoprolol succinate XL (Toprol XL) 25 MG 24 hr tablet 30 tablet 5     Sig: Take 0.5 tablets by mouth Daily.        Pharmacy where request should be sent:  [phone] FAX FOR PHARMACY(THIS IS A MAILORDER PHARMACY BUT PATIENTS SON DOES NOT KNOW THE NAME OF IT)    Last office visit with prescribing clinician: 3/21/2024   Last telemedicine visit with prescribing clinician: Visit date not found   Next office visit with prescribing clinician: 12/5/2024     Additional details provided by patient: PATIENTS SON KRYSTAL IS ALSO ASKING FOR A REFILL ON FUROSEMIDE 20MG BUT THIS WAS NOT PRESCRIBED BY DR CRISTINA    Does the patient have less than a 3 day supply:  [] Yes  [x] No    Would you like a call back once the refill request has been completed: [x] Yes [] No    If the office needs to give you a call back, can they leave a voicemail: [x] Yes [] No    Yanely Miner   06/21/24 13:01 EDT

## 2024-08-28 RX ORDER — LOSARTAN POTASSIUM 25 MG/1
25 TABLET ORAL 2 TIMES DAILY
Qty: 180 TABLET | Refills: 0 | Status: SHIPPED | OUTPATIENT
Start: 2024-08-28 | End: 2024-08-28 | Stop reason: SDUPTHER

## 2024-08-28 RX ORDER — LOSARTAN POTASSIUM 25 MG/1
25 TABLET ORAL 2 TIMES DAILY
Qty: 180 TABLET | Refills: 0 | Status: SHIPPED | OUTPATIENT
Start: 2024-08-28

## 2024-08-28 NOTE — TELEPHONE ENCOUNTER
Caller: serafinianwillie howell    Relationship: Emergency Contact    Best call back number: [phone]     Requested Prescriptions:   Requested Prescriptions     Pending Prescriptions Disp Refills    losartan (COZAAR) 25 MG tablet 180 tablet 0     Sig: Take 1 tablet by mouth 2 (Two) Times a Day.        Pharmacy where request should be sent: [facility] [id number] - [place], [place] - [address] - [phone] [facility] [phone] FX     Last office visit with prescribing clinician: 3/21/2024   Last telemedicine visit with prescribing clinician: Visit date not found   Next office visit with prescribing clinician: 12/5/2024     Additional details provided by patient: PHARMACY CLAIMS THEY HAVE BEEN CONTACTING DR. CRISTINA TEAM WITH NO RESPONSE.AND THAT THEY DONT HAVE RX    Does the patient have less than a 3 day supply:  [x] Yes  [] No    Would you like a call back once the refill request has been completed: [x] Yes [] No    If the office needs to give you a call back, can they leave a voicemail: [x] Yes [] No    Yanely Carrero Rep   08/28/24 12:02 EDT

## 2024-08-29 RX ORDER — ALLOPURINOL 300 MG/1
300 TABLET ORAL DAILY
Qty: 90 TABLET | Refills: 0 | Status: SHIPPED | OUTPATIENT
Start: 2024-08-29

## 2024-08-29 NOTE — TELEPHONE ENCOUNTER
Caller: FRANCISCOKRYSTAL    Relationship: Emergency Contact    Best call back number: [phone]     Requested Prescriptions:   Requested Prescriptions     Pending Prescriptions Disp Refills    allopurinol (ZYLOPRIM) 300 MG tablet 90 tablet 0     Sig: Take 1 tablet by mouth Daily.        Pharmacy where request should be sent: [facility] [id number] - [place], [place] - [address] - [phone]  - [phone] FX     Last office visit with prescribing clinician: 3/21/2024   Last telemedicine visit with prescribing clinician: Visit date not found   Next office visit with prescribing clinician: 12/5/2024     Additional details provided by patient:     Does the patient have less than a 3 day supply:  [] Yes  [] No    Would you like a call back once the refill request has been completed: [] Yes [] No    If the office needs to give you a call back, can they leave a voicemail: [] Yes [] No    April Yanely Patel Rep   08/29/24 11:52 EDT

## 2024-09-09 ENCOUNTER — OFFICE VISIT (OUTPATIENT)
Dept: CARDIOLOGY | Facility: CLINIC | Age: 84
End: 2024-09-09
Payer: MEDICARE

## 2024-09-09 ENCOUNTER — CLINICAL SUPPORT NO REQUIREMENTS (OUTPATIENT)
Dept: CARDIOLOGY | Facility: CLINIC | Age: 84
End: 2024-09-09
Payer: MEDICARE

## 2024-09-09 VITALS
BODY MASS INDEX: 22.46 KG/M2 | OXYGEN SATURATION: 98 % | HEIGHT: 74 IN | SYSTOLIC BLOOD PRESSURE: 149 MMHG | DIASTOLIC BLOOD PRESSURE: 81 MMHG | HEART RATE: 68 BPM | RESPIRATION RATE: 16 BRPM | WEIGHT: 175 LBS

## 2024-09-09 DIAGNOSIS — I50.23 ACUTE ON CHRONIC SYSTOLIC CONGESTIVE HEART FAILURE: ICD-10-CM

## 2024-09-09 DIAGNOSIS — Z95.810 PRESENCE OF BIVENTRICULAR IMPLANTABLE CARDIOVERTER-DEFIBRILLATOR (ICD): ICD-10-CM

## 2024-09-09 DIAGNOSIS — Z09 FOLLOW-UP EXAM: Primary | ICD-10-CM

## 2024-09-09 DIAGNOSIS — E78.2 HYPERLIPIDEMIA, MIXED: ICD-10-CM

## 2024-09-09 DIAGNOSIS — I10 ESSENTIAL HYPERTENSION: ICD-10-CM

## 2024-09-09 DIAGNOSIS — I51.81 TAKOTSUBO CARDIOMYOPATHY: Primary | ICD-10-CM

## 2024-09-09 DIAGNOSIS — I48.21 PERMANENT ATRIAL FIBRILLATION: ICD-10-CM

## 2024-09-09 DIAGNOSIS — I42.0 DILATED CARDIOMYOPATHY: ICD-10-CM

## 2024-09-09 PROCEDURE — 93284 PRGRMG EVAL IMPLANTABLE DFB: CPT | Performed by: NURSE PRACTITIONER

## 2024-09-10 ENCOUNTER — TELEPHONE (OUTPATIENT)
Dept: CARDIOLOGY | Facility: CLINIC | Age: 84
End: 2024-09-10

## 2024-09-10 NOTE — TELEPHONE ENCOUNTER
Caller: willie stevenson    Relationship to patient: Emergency Contact    Best call back number: [phone]    Patient is needing: PATIENTS SON WAS UNABLE TO COME TO APPT YESTERDAY. SON WOULD LIKE A CALL TO GIVE HIM A SUMMARY OF HOW THE VISIT WENT AND IF THERE ARE ANY CHANGES. CANNOT FIND SUMMARY ON [employer]. PLEASE CALL TO CLARIFY.

## 2024-10-11 ENCOUNTER — TELEPHONE (OUTPATIENT)
Dept: FAMILY MEDICINE CLINIC | Facility: CLINIC | Age: 84
End: 2024-10-11
Payer: MEDICARE

## 2024-10-11 NOTE — TELEPHONE ENCOUNTER
I called patient to try to get him in sooner. Patient states he doesn't remember calling or asking for a appt to be seen.

## 2024-10-11 NOTE — TELEPHONE ENCOUNTER
PT REQUESTS APPTMT BEFORE 10/18, NEXT AVAILABLE IS 10/22. DOES DR CRISTINA WANT TO WORK PT IN OR HAVE SEEN BY ANOTHER PROVIDER?

## 2024-10-14 ENCOUNTER — OFFICE VISIT (OUTPATIENT)
Dept: FAMILY MEDICINE CLINIC | Facility: CLINIC | Age: 84
End: 2024-10-14
Payer: MEDICARE

## 2024-10-14 VITALS
OXYGEN SATURATION: 98 % | HEART RATE: 73 BPM | BODY MASS INDEX: 23.61 KG/M2 | WEIGHT: 184 LBS | DIASTOLIC BLOOD PRESSURE: 78 MMHG | RESPIRATION RATE: 20 BRPM | SYSTOLIC BLOOD PRESSURE: 122 MMHG

## 2024-10-14 DIAGNOSIS — R41.3 MEMORY DIFFICULTY: Primary | ICD-10-CM

## 2024-10-14 DIAGNOSIS — R41.3 MEMORY PROBLEM: ICD-10-CM

## 2024-10-14 PROCEDURE — 1126F AMNT PAIN NOTED NONE PRSNT: CPT | Performed by: PHYSICIAN ASSISTANT

## 2024-10-14 PROCEDURE — 3078F DIAST BP <80 MM HG: CPT | Performed by: PHYSICIAN ASSISTANT

## 2024-10-14 PROCEDURE — 3074F SYST BP LT 130 MM HG: CPT | Performed by: PHYSICIAN ASSISTANT

## 2024-10-14 PROCEDURE — 99213 OFFICE O/P EST LOW 20 MIN: CPT | Performed by: PHYSICIAN ASSISTANT

## 2024-10-14 PROCEDURE — 1159F MED LIST DOCD IN RCRD: CPT | Performed by: PHYSICIAN ASSISTANT

## 2024-10-14 PROCEDURE — 1160F RVW MEDS BY RX/DR IN RCRD: CPT | Performed by: PHYSICIAN ASSISTANT

## 2024-10-14 RX ORDER — MEMANTINE HYDROCHLORIDE 5 MG/1
5 TABLET ORAL DAILY
Qty: 90 TABLET | Refills: 1 | Status: SHIPPED | OUTPATIENT
Start: 2024-10-14

## 2024-10-14 NOTE — PROGRESS NOTES
"Chief Complaint  Chief Complaint   Patient presents with    Memory Loss    Depression       Subjective        Hank Ponce is a 84 y.o. male who presents to [facility].  History of Present Illness  Presents today for concerns for depressed mood and gradually worsening memory since he was seen in March.  He does have a pertinent history of chronic memory problems for which he has tried donepezil in the past but did not have notable benefit.  He has a difficult time remembering that they sold his home, among other things.  Cannot remember things he did yesterday.  He realizes he will forget things so he writes things down.   His son said he was called by a nurse at the assisted living facility, [facility], he lives at as she had concerns for depressed mood.   Mr. Ponce states he does not feel depressed or excessively sad.   His son has not noticed a change in his mood, hostility, aggression, agitation, or excessive sadness.   He reports he has transitioned well with moving into the assisted living facility.   He is still driving, he does not get lost when he drives. However, he states one time he was driving and had to pull over and call someone to ask where he was.   Has not been doing mental or physical exercises but he does have mental exercise books that his son gave him.   Denies visual or auditory hallucinations.     MMSE today 24/30. 4/2023 MMSE 27/30. 11/2022 MMSE 28/30    Objective   /78 (BP Location: Left arm, Patient Position: Sitting)   Pulse 73   Resp 20   Wt 83.5 kg (184 lb)   SpO2 98%   BMI 23.61 kg/m²     Estimated body mass index is 23.61 kg/m² as calculated from the following:    Height as of 9/9/24: 188 cm (74.02\").    Weight as of this encounter: 83.5 kg (184 lb).     Physical Exam   GEN: In no acute distress, non toxic appearing  CV: Regular rate and rhythm, no murmurs, 2+ peripheral pulses, No extremity edema.   RESP: Lungs clear to " auscultation anteriorly and posteriorly in all lung fields bilaterally.  PSYCH: Affect normal, insight fair      Result Review :              Assessment and Plan     Diagnoses and all orders for this visit:    1. Memory difficulty (Primary)  -     memantine (NAMENDA) 5 MG tablet; Take 1 tablet by mouth Daily. After 1 week if tolerating well, can increase  to 10mg (2 tablets) daily.  Dispense: 90 tablet; Refill: 1    2. Memory problem  -     memantine (NAMENDA) 5 MG tablet; Take 1 tablet by mouth Daily. After 1 week if tolerating well, can increase  to 10mg (2 tablets) daily.  Dispense: 90 tablet; Refill: 1    Discussed for him to try doing mental and physical exercises daily, which he agrees to do.  As he has already tried donepezil without benefit, discussed we can also try memantine which they would like to do.  He will start with 5 mg of memantine daily for 1 week, and then will increase to 10 mg daily if he is tolerating it well.  He follows up with Dr. Curran in 12/2024, at this time they will discuss if the memantine is improving his symptoms and can discuss if he should move up to 20 mg daily.  Also discussed he may benefit from seeing a neurologist, they would prefer to wait for now.  They are not overly concerned about depressed mood, therefore this will be observed closely.        Follow Up     Return if symptoms worsen or fail to improve.

## 2024-10-17 PROCEDURE — 93295 DEV INTERROG REMOTE 1/2/MLT: CPT | Performed by: NURSE PRACTITIONER

## 2024-10-17 PROCEDURE — 93296 REM INTERROG EVL PM/IDS: CPT | Performed by: NURSE PRACTITIONER

## 2024-11-18 DIAGNOSIS — I48.21 PERMANENT ATRIAL FIBRILLATION: ICD-10-CM

## 2024-11-18 DIAGNOSIS — I42.0 DILATED CARDIOMYOPATHY: ICD-10-CM

## 2024-11-18 DIAGNOSIS — I50.20 HEART FAILURE WITH REDUCED EJECTION FRACTION: ICD-10-CM

## 2024-11-18 RX ORDER — ALLOPURINOL 300 MG/1
300 TABLET ORAL DAILY
Qty: 90 TABLET | Refills: 0 | Status: SHIPPED | OUTPATIENT
Start: 2024-11-18

## 2024-11-18 RX ORDER — METOPROLOL SUCCINATE 25 MG/1
12.5 TABLET, EXTENDED RELEASE ORAL DAILY
Qty: 45 TABLET | Refills: 3 | Status: SHIPPED | OUTPATIENT
Start: 2024-11-18

## 2024-11-18 NOTE — TELEPHONE ENCOUNTER
Caller: FRANCISCOKRYSTAL    Relationship: Emergency Contact    Best call back number: [phone]     Requested Prescriptions:   Requested Prescriptions     Pending Prescriptions Disp Refills    allopurinol (ZYLOPRIM) 300 MG tablet 90 tablet 0     Sig: Take 1 tablet by mouth Daily.        Pharmacy where request should be sent: [facility] [id number] - [place], [place] - [address] - [phone]  - [phone] FX     Last office visit with prescribing clinician: 3/21/2024   Last telemedicine visit with prescribing clinician: Visit date not found   Next office visit with prescribing clinician: 12/5/2024     Additional details provided by patient:     Does the patient have less than a 3 day supply:  [] Yes  [x] No    Would you like a call back once the refill request has been completed: [] Yes [] No    If the office needs to give you a call back, can they leave a voicemail: [] Yes [] No    April Yanely Patel Rep   11/18/24 15:14 EST

## 2024-12-05 ENCOUNTER — OFFICE VISIT (OUTPATIENT)
Dept: FAMILY MEDICINE CLINIC | Facility: CLINIC | Age: 84
End: 2024-12-05
Payer: MEDICARE

## 2024-12-05 ENCOUNTER — LAB (OUTPATIENT)
Dept: FAMILY MEDICINE CLINIC | Facility: CLINIC | Age: 84
End: 2024-12-05
Payer: MEDICARE

## 2024-12-05 VITALS
DIASTOLIC BLOOD PRESSURE: 68 MMHG | SYSTOLIC BLOOD PRESSURE: 132 MMHG | OXYGEN SATURATION: 98 % | WEIGHT: 187.2 LBS | HEART RATE: 64 BPM | BODY MASS INDEX: 24.02 KG/M2 | HEIGHT: 74 IN

## 2024-12-05 DIAGNOSIS — I48.21 PERMANENT ATRIAL FIBRILLATION: ICD-10-CM

## 2024-12-05 DIAGNOSIS — M10.9 GOUT, UNSPECIFIED CAUSE, UNSPECIFIED CHRONICITY, UNSPECIFIED SITE: ICD-10-CM

## 2024-12-05 DIAGNOSIS — E55.9 VITAMIN D DEFICIENCY, UNSPECIFIED: ICD-10-CM

## 2024-12-05 DIAGNOSIS — I42.0 DILATED CARDIOMYOPATHY: ICD-10-CM

## 2024-12-05 DIAGNOSIS — Z12.5 ENCOUNTER FOR SCREENING FOR MALIGNANT NEOPLASM OF PROSTATE: ICD-10-CM

## 2024-12-05 DIAGNOSIS — R30.0 DYSURIA: Primary | ICD-10-CM

## 2024-12-05 DIAGNOSIS — F03.B0 MODERATE DEMENTIA, UNSPECIFIED DEMENTIA TYPE, UNSPECIFIED WHETHER BEHAVIORAL, PSYCHOTIC, OR MOOD DISTURBANCE OR ANXIETY: ICD-10-CM

## 2024-12-05 DIAGNOSIS — I10 ESSENTIAL HYPERTENSION: ICD-10-CM

## 2024-12-05 DIAGNOSIS — Z00.00 MEDICARE ANNUAL WELLNESS VISIT, SUBSEQUENT: Primary | ICD-10-CM

## 2024-12-05 LAB
25(OH)D3 SERPL-MCNC: 67.4 NG/ML (ref 30–100)
ALBUMIN SERPL-MCNC: 3.7 G/DL (ref 3.5–5.2)
ALBUMIN/GLOB SERPL: 1 G/DL
ALP SERPL-CCNC: 77 U/L (ref 39–117)
ALT SERPL W P-5'-P-CCNC: 8 U/L (ref 1–41)
ANION GAP SERPL CALCULATED.3IONS-SCNC: 12.4 MMOL/L (ref 5–15)
AST SERPL-CCNC: 18 U/L (ref 1–40)
BACTERIA UR QL AUTO: ABNORMAL /HPF
BASOPHILS # BLD AUTO: 0.13 10*3/MM3 (ref 0–0.2)
BASOPHILS NFR BLD AUTO: 1.8 % (ref 0–1.5)
BILIRUB SERPL-MCNC: 1.1 MG/DL (ref 0–1.2)
BILIRUB UR QL STRIP: NEGATIVE
BUN SERPL-MCNC: 23 MG/DL (ref 8–23)
BUN/CREAT SERPL: 13.7 (ref 7–25)
CALCIUM SPEC-SCNC: 10 MG/DL (ref 8.6–10.5)
CHLORIDE SERPL-SCNC: 104 MMOL/L (ref 98–107)
CHOLEST SERPL-MCNC: 176 MG/DL (ref 0–200)
CLARITY UR: CLEAR
CO2 SERPL-SCNC: 26.6 MMOL/L (ref 22–29)
COLOR UR: YELLOW
CREAT SERPL-MCNC: 1.68 MG/DL (ref 0.76–1.27)
DEPRECATED RDW RBC AUTO: 50.2 FL (ref 37–54)
EGFRCR SERPLBLD CKD-EPI 2021: 39.6 ML/MIN/1.73
EOSINOPHIL # BLD AUTO: 0.23 10*3/MM3 (ref 0–0.4)
EOSINOPHIL NFR BLD AUTO: 3.1 % (ref 0.3–6.2)
ERYTHROCYTE [DISTWIDTH] IN BLOOD BY AUTOMATED COUNT: 14.2 % (ref 12.3–15.4)
GLOBULIN UR ELPH-MCNC: 3.6 GM/DL
GLUCOSE SERPL-MCNC: 80 MG/DL (ref 65–99)
GLUCOSE UR STRIP-MCNC: ABNORMAL MG/DL
HBA1C MFR BLD: 5.3 % (ref 4.8–5.6)
HCT VFR BLD AUTO: 48.3 % (ref 37.5–51)
HDLC SERPL-MCNC: 59 MG/DL (ref 40–60)
HGB BLD-MCNC: 16.7 G/DL (ref 13–17.7)
HGB UR QL STRIP.AUTO: ABNORMAL
HOLD SPECIMEN: NORMAL
HYALINE CASTS UR QL AUTO: ABNORMAL /LPF
IMM GRANULOCYTES # BLD AUTO: 0.11 10*3/MM3 (ref 0–0.05)
IMM GRANULOCYTES NFR BLD AUTO: 1.5 % (ref 0–0.5)
KETONES UR QL STRIP: NEGATIVE
LDLC SERPL CALC-MCNC: 104 MG/DL (ref 0–100)
LDLC/HDLC SERPL: 1.75 {RATIO}
LEUKOCYTE ESTERASE UR QL STRIP.AUTO: NEGATIVE
LYMPHOCYTES # BLD AUTO: 1.41 10*3/MM3 (ref 0.7–3.1)
LYMPHOCYTES NFR BLD AUTO: 19.3 % (ref 19.6–45.3)
MCH RBC QN AUTO: 33.6 PG (ref 26.6–33)
MCHC RBC AUTO-ENTMCNC: 34.6 G/DL (ref 31.5–35.7)
MCV RBC AUTO: 97.2 FL (ref 79–97)
MONOCYTES # BLD AUTO: 0.64 10*3/MM3 (ref 0.1–0.9)
MONOCYTES NFR BLD AUTO: 8.7 % (ref 5–12)
NEUTROPHILS NFR BLD AUTO: 4.8 10*3/MM3 (ref 1.7–7)
NEUTROPHILS NFR BLD AUTO: 65.6 % (ref 42.7–76)
NITRITE UR QL STRIP: NEGATIVE
NRBC BLD AUTO-RTO: 0 /100 WBC (ref 0–0.2)
PH UR STRIP.AUTO: 6.5 [PH] (ref 5–8)
PLATELET # BLD AUTO: 212 10*3/MM3 (ref 140–450)
PMV BLD AUTO: 11.7 FL (ref 6–12)
POTASSIUM SERPL-SCNC: 5.4 MMOL/L (ref 3.5–5.2)
PROT SERPL-MCNC: 7.3 G/DL (ref 6–8.5)
PROT UR QL STRIP: NEGATIVE
PSA SERPL-MCNC: 1.07 NG/ML (ref 0–4)
RBC # BLD AUTO: 4.97 10*6/MM3 (ref 4.14–5.8)
RBC # UR STRIP: ABNORMAL /HPF
REF LAB TEST METHOD: ABNORMAL
SODIUM SERPL-SCNC: 143 MMOL/L (ref 136–145)
SP GR UR STRIP: 1.01 (ref 1–1.03)
SQUAMOUS #/AREA URNS HPF: ABNORMAL /HPF
T4 FREE SERPL-MCNC: 1.23 NG/DL (ref 0.92–1.68)
TRIGL SERPL-MCNC: 69 MG/DL (ref 0–150)
TSH SERPL DL<=0.05 MIU/L-ACNC: 3.06 UIU/ML (ref 0.27–4.2)
URATE SERPL-MCNC: 4.9 MG/DL (ref 3.4–7)
UROBILINOGEN UR QL STRIP: ABNORMAL
VIT B12 BLD-MCNC: 1016 PG/ML (ref 211–946)
VLDLC SERPL-MCNC: 13 MG/DL (ref 5–40)
WBC # UR STRIP: ABNORMAL /HPF
WBC NRBC COR # BLD AUTO: 7.32 10*3/MM3 (ref 3.4–10.8)

## 2024-12-05 PROCEDURE — 36415 COLL VENOUS BLD VENIPUNCTURE: CPT | Performed by: FAMILY MEDICINE

## 2024-12-05 PROCEDURE — 81001 URINALYSIS AUTO W/SCOPE: CPT | Performed by: FAMILY MEDICINE

## 2024-12-05 PROCEDURE — G0439 PPPS, SUBSEQ VISIT: HCPCS | Performed by: FAMILY MEDICINE

## 2024-12-05 PROCEDURE — 80053 COMPREHEN METABOLIC PANEL: CPT | Performed by: FAMILY MEDICINE

## 2024-12-05 PROCEDURE — 3075F SYST BP GE 130 - 139MM HG: CPT | Performed by: FAMILY MEDICINE

## 2024-12-05 PROCEDURE — G0103 PSA SCREENING: HCPCS | Performed by: FAMILY MEDICINE

## 2024-12-05 PROCEDURE — 1170F FXNL STATUS ASSESSED: CPT | Performed by: FAMILY MEDICINE

## 2024-12-05 PROCEDURE — 82607 VITAMIN B-12: CPT | Performed by: FAMILY MEDICINE

## 2024-12-05 PROCEDURE — 85025 COMPLETE CBC W/AUTO DIFF WBC: CPT | Performed by: FAMILY MEDICINE

## 2024-12-05 PROCEDURE — 1126F AMNT PAIN NOTED NONE PRSNT: CPT | Performed by: FAMILY MEDICINE

## 2024-12-05 PROCEDURE — 1160F RVW MEDS BY RX/DR IN RCRD: CPT | Performed by: FAMILY MEDICINE

## 2024-12-05 PROCEDURE — 84550 ASSAY OF BLOOD/URIC ACID: CPT | Performed by: FAMILY MEDICINE

## 2024-12-05 PROCEDURE — 3078F DIAST BP <80 MM HG: CPT | Performed by: FAMILY MEDICINE

## 2024-12-05 PROCEDURE — 84443 ASSAY THYROID STIM HORMONE: CPT | Performed by: FAMILY MEDICINE

## 2024-12-05 PROCEDURE — 84439 ASSAY OF FREE THYROXINE: CPT | Performed by: FAMILY MEDICINE

## 2024-12-05 PROCEDURE — 1159F MED LIST DOCD IN RCRD: CPT | Performed by: FAMILY MEDICINE

## 2024-12-05 PROCEDURE — 99214 OFFICE O/P EST MOD 30 MIN: CPT | Performed by: FAMILY MEDICINE

## 2024-12-05 PROCEDURE — 82306 VITAMIN D 25 HYDROXY: CPT | Performed by: FAMILY MEDICINE

## 2024-12-05 PROCEDURE — 83036 HEMOGLOBIN GLYCOSYLATED A1C: CPT | Performed by: FAMILY MEDICINE

## 2024-12-05 PROCEDURE — 80061 LIPID PANEL: CPT | Performed by: FAMILY MEDICINE

## 2024-12-05 RX ORDER — CEPHALEXIN 500 MG/1
500 CAPSULE ORAL 3 TIMES DAILY
Qty: 21 CAPSULE | Refills: 0 | Status: SHIPPED | OUTPATIENT
Start: 2024-12-05 | End: 2024-12-12

## 2024-12-05 RX ORDER — MEMANTINE HYDROCHLORIDE 10 MG/1
10 TABLET ORAL 2 TIMES DAILY
Qty: 180 TABLET | Refills: 3 | Status: SHIPPED | OUTPATIENT
Start: 2024-12-05 | End: 2024-12-09

## 2024-12-05 NOTE — ASSESSMENT & PLAN NOTE
- Increase Namenda to 10 BID  - Cont [facility]  - Discussed neurology referral/Leqembi- defer at this time  Orders:    memantine (NAMENDA) 10 MG tablet; Take 1 tablet by mouth 2 (Two) Times a Day. After 1 week if tolerating well, can increase  to 10mg (2 tablets) daily.

## 2024-12-05 NOTE — PROGRESS NOTES
Subjective   The ABCs of the Annual Wellness Visit  Medicare Wellness Visit    Hank Ponce is a 84 y.o. patient who presents for a Medicare Wellness Visit.    The following portions of the patient's history were reviewed and   updated as appropriate: allergies, current medications, past family history, past medical history, past social history, past surgical history, and problem list.    Compared to one year ago, the patient's physical   health is the same.  Compared to one year ago, the patient's mental   health is the same.    Recent Hospitalizations:  He was not admitted to the hospital during the last year.     Current Medical Providers:  Patient Care Team:  Bunny Curran MD as PCP - General (Internal Medicine)  Chema Curran MD as Consulting Physician (Cardiology)    Outpatient Medications Prior to Visit   Medication Sig Dispense Refill    allopurinol (ZYLOPRIM) 300 MG tablet Take 1 tablet by mouth Daily. 90 tablet 0    coenzyme Q10 100 MG capsule Take 2 capsules by mouth Daily.      cyanocobalamin (VITAMIN B-12) 2500 MCG tablet tablet Take 1 tablet by mouth Daily.      empagliflozin (Jardiance) 10 MG tablet tablet Take 1 tablet by mouth Daily. 30 tablet 5    furosemide (LASIX) 20 MG tablet Take 1 tablet by mouth 2 (Two) Times a Day. 60 tablet 5    losartan (COZAAR) 25 MG tablet Take 1 tablet by mouth 2 (Two) Times a Day. 180 tablet 0    metoprolol succinate XL (TOPROL-XL) 25 MG 24 hr tablet TAKE 1/2 TABLET BY MOUTH DAILY 45 tablet 3    potassium chloride (Klor-Con M20) 20 MEQ CR tablet Take 1 tablet by mouth 2 (Two) Times a Day. 60 tablet 5    rivaroxaban (Xarelto) 15 MG tablet Take 1 tablet by mouth Daily. 90 tablet 1    vitamin D3 125 MCG (5000 UT) capsule capsule Take  by mouth Daily.      memantine (NAMENDA) 5 MG tablet Take 1 tablet by mouth Daily. After 1 week if tolerating well, can increase  to 10mg (2 tablets) daily. 90 tablet 1     No facility-administered medications  "prior to visit.     No opioid medication identified on active medication list. I have reviewed chart for other potential  high risk medication/s and harmful drug interactions in the elderly.      Aspirin is not on active medication list.  Aspirin use is not indicated based on review of current medical condition/s. Risk of harm outweighs potential benefits.  .    Patient Active Problem List   Diagnosis    Presence of biventricular implantable cardioverter-defibrillator (ICD)    Permanent atrial fibrillation    Hyperlipidemia, mixed    Mitral regurgitation    Tricuspid regurgitation    Essential hypertension    Takotsubo cardiomyopathy    Dilated cardiomyopathy    Acute on chronic systolic congestive heart failure    Atrial flutter with rapid ventricular response    Low serum vitamin B12    Heart failure with reduced ejection fraction    Stage 3 chronic kidney disease    Nonsustained ventricular tachycardia    Gout    Tuberculosis screening    Moderate dementia     Advance Care Planning Advance Directive is on file.  ACP discussion was held with the patient during this visit. Patient has an advance directive in EMR which is still valid.       Objective   Vitals:    12/05/24 0804   BP: 132/68   Pulse: 64   SpO2: 98%   Weight: 84.9 kg (187 lb 3.2 oz)   Height: 188 cm (74.02\")   PainSc: 0-No pain     Estimated body mass index is 24.02 kg/m² as calculated from the following:    Height as of this encounter: 188 cm (74.02\").    Weight as of this encounter: 84.9 kg (187 lb 3.2 oz).    BMI is within normal parameters. No other follow-up for BMI required.    Does the patient have evidence of cognitive impairment? Yes                                                                                             Health  Risk Assessment    Smoking Status:  Social History     Tobacco Use   Smoking Status Former    Current packs/day: 0.00    Average packs/day: 1 pack/day for 20.0 years (20.0 ttl pk-yrs)    Types: Cigarettes    Start " date: 1970    Quit date: 1990    Years since quittin.9    Passive exposure: Past   Smokeless Tobacco Never     Alcohol Consumption:  Social History     Substance and Sexual Activity   Alcohol Use Not Currently    Comment: occasionally     Fall Risk Screen  JESUS Fall Risk Assessment was completed, and patient is at LOW risk for falls.Assessment completed on:2024    Depression Screening   Little interest or pleasure in doing things? Not at all   Feeling down, depressed, or hopeless? Several days   PHQ-2 Total Score 1   Reports feeling down/depressed at times. Reports getting bored at his new assisted living facility ([facility])    Health Habits and Functional and Cognitive Screenin/5/2024     8:01 AM   Functional & Cognitive Status   Do you have difficulty preparing food and eating? No   Do you have difficulty bathing yourself, getting dressed or grooming yourself? No   Do you have difficulty using the toilet? No   Do you have difficulty moving around from place to place? Yes   Do you have trouble with steps or getting out of a bed or a chair? No   Current Diet Well Balanced Diet   Dental Exam Up to date   Eye Exam Not up to date   Exercise (times per week) 3 times per week   Current Exercises Include Swimming   Do you need help using the phone?  No   Are you deaf or do you have serious difficulty hearing?  No   Do you need help to go to places out of walking distance? No   Do you need help shopping? Yes   Do you need help preparing meals?  Yes   Do you need help with housework?  Yes   Do you need help with laundry? Yes   Do you need help taking your medications? Yes   Do you need help managing money? Yes   Do you ever drive or ride in a car without wearing a seat belt? No   Have you felt unusual stress, anger or loneliness in the last month? Yes   Who do you live with? Community   If you need help, do you have trouble finding someone available to you? No   Have you been bothered in  the last four weeks by sexual problems? No   Do you have difficulty concentrating, remembering or making decisions? Yes         Now in assisted living facility ([facility]). Independent in ADLs    Age-appropriate Screening Schedule:  Refer to the list below for future screening recommendations based on patient's age, sex and/or medical conditions. Orders for these recommended tests are listed in the plan section. The patient has been provided with a written plan.    Health Maintenance List  Health Maintenance   Topic Date Due    TDAP/TD VACCINES (1 - Tdap) Never done    ZOSTER VACCINE (1 of 2) Never done    RSV Vaccine - Adults (1 - 1-dose 75+ series) Never done    COVID-19 Vaccine (5 - 2024-25 season) 09/01/2024    LIPID PANEL  11/30/2024    INFLUENZA VACCINE  03/31/2025 (Originally 7/1/2024)    ANNUAL WELLNESS VISIT  12/05/2025    Pneumococcal Vaccine 65+  Completed                                                                                                                                              CMS Preventative Services Quick Reference  Risk Factors Identified During Encounter  Immunizations Discussed/Encouraged: Tdap and Shingrix    The above risks/problems have been discussed with the patient.  Pertinent information has been shared with the patient in the After Visit Summary.  An After Visit Summary and PPPS were made available to the patient.    Preventative  Colonoscopy: 6/2022, no recall  PSA: 0.727 (11/2022), ordered today  DEXA: 12/2021- normal  Shingles: Recommended  Pneumonia: Prevnar 10/2019, Pneumovax 10/2020  Tdap: Recommended, deferred  Influenza: 2024  COVID: Completed 4 shots Moderna (4/2022) and had illness    Follow Up:   Next Medicare Wellness visit to be scheduled in 1 year.    Additional E&M Note during same encounter follows:  Patient has additional, significant, and separately identifiable condition(s)/problem(s) that require work above and beyond the Medicare Wellness Visit  "    Chief Complaint  Medicare Wellness-subsequent    Subjective   HPI  Hank is also being seen today for additional medical problem/s.        Dementia: continues to have memory decline. Did previously try donepezil w/o notable benefit. Was seen by Germaine Alegre 2 months ago and memantine 10mg daily was started. This has not made any notable benefit. Son reports, continued, slow memory loss. Patient continues to have mild memory concerns- making numerous lists. 10/2024 MMSE 24/30. 4/2023 MMSE 27/30. 11/2022 MMSE 28/30     CHF: dilated cardiomyopathy. s/p defibrillator. 7/2023 echo w/ EF 36-40% w/ severe mitral and tricuspid regurg noted as well as moderately dilated LV and pulm HTN (RVSP 45-55). Maintained on lasix 20 BID, Jardiance 10 daily, metoprolol XL 12.5 daily. No SOB, orthopnea, leg swelling. Follows rosa/ CRISTHIAN Figueroa     Afib: Maintained on metoprolol XL 12.5 daily and Xarelto 15 daily. [id number] score 4 (CHF, HTN, age x2). Reportedly unable to tolerate amiodarone and Tikosyn in the past. Denies CP, palpitations, blood in stool/urine or easing bleeding. Follows rosa/ CRISTHIAN Figueroa     HTN: 132/68 today. Maintained on losartan 25 BID, metoprolol XL 12.5 daily and lasix 20 BID. Recently increased by CRISTHIAN Figueroa. No LH/dizziness, CP or SOB.      Gout: maintained on allopurinol 300 daily. No gout flare in years. Well controlled.    Objective   Vital Signs:  /68   Pulse 64   Ht 188 cm (74.02\")   Wt 84.9 kg (187 lb 3.2 oz)   SpO2 98%   BMI 24.02 kg/m²   Physical Exam  Constitutional:       General: He is not in acute distress.     Appearance: He is well-developed.   HENT:      Head: Normocephalic and atraumatic.      Right Ear: Tympanic membrane and external ear normal. There is no impacted cerumen.      Left Ear: Tympanic membrane and external ear normal. There is no impacted cerumen.      Nose: Nose normal.      Mouth/Throat:      Mouth: Mucous membranes are moist.      Pharynx: No oropharyngeal exudate " or posterior oropharyngeal erythema.   Eyes:      General: No scleral icterus.        Right eye: No discharge.         Left eye: No discharge.      Extraocular Movements: Extraocular movements intact.      Conjunctiva/sclera: Conjunctivae normal.      Pupils: Pupils are equal, round, and reactive to light.   Neck:      Thyroid: No thyromegaly.      Vascular: No carotid bruit.   Cardiovascular:      Rate and Rhythm: Normal rate and regular rhythm.      Heart sounds: Murmur heard.   Pulmonary:      Effort: Pulmonary effort is normal. No respiratory distress.      Breath sounds: Normal breath sounds. No wheezing or rales.   Abdominal:      General: Bowel sounds are normal. There is no distension.      Palpations: Abdomen is soft.      Tenderness: There is no abdominal tenderness.   Musculoskeletal:         General: Deformity (missing 2nd and 3rd digits to R hand) present. Normal range of motion.      Cervical back: Normal range of motion and neck supple.   Lymphadenopathy:      Cervical: No cervical adenopathy.   Skin:     General: Skin is warm and dry.      Findings: No rash.   Neurological:      General: No focal deficit present.      Mental Status: He is alert. Mental status is at baseline.      Cranial Nerves: No cranial nerve deficit.      Sensory: No sensory deficit.   Psychiatric:         Behavior: Behavior normal.         Thought Content: Thought content normal.           Assessment and Plan Additional age appropriate preventative wellness advice topics were discussed during today's preventative wellness exam(some topics already addressed during AWV portion of the note above):    Physical Activity: Advised cardiovascular activity 150 minutes per week as tolerated. (example brisk walk for 30 minutes, 5 days a week).     Medicare annual wellness visit, subsequent  - Counseled regarding exercise and preventative health maintenance items/immunizations below  Orders:    CBC & Differential    Comprehensive Metabolic  Panel    Hemoglobin A1c    Lipid Panel    TSH    T4, Free    Vitamin D,25-Hydroxy    Vitamin B12    PSA Screen    Uric Acid    Moderate dementia, unspecified dementia type, unspecified whether behavioral, psychotic, or mood disturbance or anxiety  - Increase Namenda to 10 BID  - Cont [facility] assisted living  - Discussed neurology referral/Leqembi- defer at this time  Orders:    memantine (NAMENDA) 10 MG tablet; Take 1 tablet by mouth 2 (Two) Times a Day. After 1 week if tolerating well, can increase  to 10mg (2 tablets) daily.    Dilated cardiomyopathy  7/2023 echo w/ EF 36-40% w/ severe mitral and tricuspid regurg noted as well as moderately dilated LV and pulm HTN (RVSP 45-55)  - Cont home Jardiance 10mg daily, metolprolol XL 12.5 daily, lasix 20 BID  - Cont [facility] f/u- Henry  Permanent atrial fibrillation  - Cont home metoprolol XL 12.5 daily and Xarelto 15mg daily  - Cont [facility] f/u- Henry  Essential hypertension  132/68 today  - Cont home lasix 20 BID, losartan 25 BID, metoprolol XL 12.5 daily  Gout, unspecified cause, unspecified chronicity, unspecified site  No recent flares  - Cont allopurinol 300 daily  Orders:    Uric Acid    Vitamin D deficiency, unspecified  Orders:    Vitamin D,25-Hydroxy    Encounter for screening for malignant neoplasm of prostate  Orders:    PSA Screen            Follow Up   Return in about 6 months (around 6/5/2025) for Recheck- 30min.  Patient was given instructions and counseling regarding his condition or for health maintenance advice. Please see specific information pulled into the AVS if appropriate.

## 2024-12-05 NOTE — ASSESSMENT & PLAN NOTE
7/2023 echo w/ EF 36-40% w/ severe mitral and tricuspid regurg noted as well as moderately dilated LV and pulm HTN (RVSP 45-55)  - Cont home Jardiance 10mg daily, metolprolol XL 12.5 daily, lasix 20 BID  - Cont [facility] f/u- Henry

## 2024-12-06 ENCOUNTER — TELEPHONE (OUTPATIENT)
Dept: FAMILY MEDICINE CLINIC | Facility: CLINIC | Age: 84
End: 2024-12-06
Payer: MEDICARE

## 2024-12-06 NOTE — TELEPHONE ENCOUNTER
Relay: Called patient, unable to reach. Left detailed voice message for patient. [facility]     ----- Message from Bunny Curran sent at 12/5/2024 11:05 PM EST -----  Labs reveal urine concerning for UTI. Antibiotics sent to pharmacy. Bloodwork reveals elevated potassium. I would like to hold potassium altogether for the next week, then restart just once daily instead of twice daily. Lets repeat urine sample and blood test in 7-10 days

## 2024-12-06 NOTE — TELEPHONE ENCOUNTER
SPOKE WITH PTS SON AND HE HAS A QUESTION ABOUT THE INSTRUCTIONS ON MEDICATION  MEMANTINE. HE WANTS TO KNOW IF PT IS SUPPOSED TO HAVE 10MG DAILY OR 20MG DAILY. HE SAID YOU WERE GOING TO DOUBLE IT BUT HE DOESN'T UNDERSTAND THE INSTRUCTIONS, PLEASE ADVISE. THANKS

## 2024-12-09 DIAGNOSIS — F03.B0 MODERATE DEMENTIA, UNSPECIFIED DEMENTIA TYPE, UNSPECIFIED WHETHER BEHAVIORAL, PSYCHOTIC, OR MOOD DISTURBANCE OR ANXIETY: ICD-10-CM

## 2024-12-09 RX ORDER — MEMANTINE HYDROCHLORIDE 10 MG/1
10 TABLET ORAL 2 TIMES DAILY
Qty: 180 TABLET | Refills: 3 | Status: SHIPPED | OUTPATIENT
Start: 2024-12-09

## 2024-12-16 RX ORDER — RIVAROXABAN 15 MG/1
15 TABLET, FILM COATED ORAL DAILY
Qty: 90 TABLET | Refills: 1 | Status: SHIPPED | OUTPATIENT
Start: 2024-12-16

## 2024-12-16 RX ORDER — LOSARTAN POTASSIUM 25 MG/1
25 TABLET ORAL 2 TIMES DAILY
Qty: 180 TABLET | Refills: 0 | Status: SHIPPED | OUTPATIENT
Start: 2024-12-16

## 2024-12-17 ENCOUNTER — LAB (OUTPATIENT)
Dept: FAMILY MEDICINE CLINIC | Facility: CLINIC | Age: 84
End: 2024-12-17
Payer: MEDICARE

## 2024-12-17 DIAGNOSIS — T83.511D URINARY TRACT INFECTION ASSOCIATED WITH CATHETERIZATION OF URINARY TRACT, UNSPECIFIED INDWELLING URINARY CATHETER TYPE, SUBSEQUENT ENCOUNTER: Primary | ICD-10-CM

## 2024-12-17 DIAGNOSIS — E87.5 HYPERKALEMIA: ICD-10-CM

## 2024-12-17 DIAGNOSIS — N39.0 URINARY TRACT INFECTION ASSOCIATED WITH CATHETERIZATION OF URINARY TRACT, UNSPECIFIED INDWELLING URINARY CATHETER TYPE, SUBSEQUENT ENCOUNTER: Primary | ICD-10-CM

## 2024-12-17 LAB
ANION GAP SERPL CALCULATED.3IONS-SCNC: 6.9 MMOL/L (ref 5–15)
BACTERIA UR QL AUTO: ABNORMAL /HPF
BILIRUB UR QL STRIP: NEGATIVE
BUN SERPL-MCNC: 23 MG/DL (ref 8–23)
BUN/CREAT SERPL: 13.9 (ref 7–25)
CALCIUM SPEC-SCNC: 9.4 MG/DL (ref 8.6–10.5)
CHLORIDE SERPL-SCNC: 104 MMOL/L (ref 98–107)
CLARITY UR: CLEAR
CO2 SERPL-SCNC: 30.1 MMOL/L (ref 22–29)
COLOR UR: YELLOW
CREAT SERPL-MCNC: 1.65 MG/DL (ref 0.76–1.27)
EGFRCR SERPLBLD CKD-EPI 2021: 40.4 ML/MIN/1.73
GLUCOSE SERPL-MCNC: 86 MG/DL (ref 65–99)
GLUCOSE UR STRIP-MCNC: ABNORMAL MG/DL
HGB UR QL STRIP.AUTO: ABNORMAL
HOLD SPECIMEN: NORMAL
HYALINE CASTS UR QL AUTO: ABNORMAL /LPF
KETONES UR QL STRIP: NEGATIVE
LEUKOCYTE ESTERASE UR QL STRIP.AUTO: NEGATIVE
NITRITE UR QL STRIP: NEGATIVE
PH UR STRIP.AUTO: 6 [PH] (ref 5–8)
POTASSIUM SERPL-SCNC: 4 MMOL/L (ref 3.5–5.2)
PROT UR QL STRIP: ABNORMAL
RBC # UR STRIP: ABNORMAL /HPF
REF LAB TEST METHOD: ABNORMAL
SODIUM SERPL-SCNC: 141 MMOL/L (ref 136–145)
SP GR UR STRIP: 1.02 (ref 1–1.03)
SQUAMOUS #/AREA URNS HPF: ABNORMAL /HPF
UROBILINOGEN UR QL STRIP: ABNORMAL
WBC # UR STRIP: ABNORMAL /HPF

## 2024-12-17 PROCEDURE — 36415 COLL VENOUS BLD VENIPUNCTURE: CPT

## 2024-12-17 PROCEDURE — 80048 BASIC METABOLIC PNL TOTAL CA: CPT | Performed by: FAMILY MEDICINE

## 2024-12-17 PROCEDURE — 81001 URINALYSIS AUTO W/SCOPE: CPT | Performed by: FAMILY MEDICINE

## 2024-12-18 ENCOUNTER — TELEPHONE (OUTPATIENT)
Dept: FAMILY MEDICINE CLINIC | Facility: CLINIC | Age: 84
End: 2024-12-18
Payer: MEDICARE

## 2024-12-18 DIAGNOSIS — F03.B0 MODERATE DEMENTIA, UNSPECIFIED DEMENTIA TYPE, UNSPECIFIED WHETHER BEHAVIORAL, PSYCHOTIC, OR MOOD DISTURBANCE OR ANXIETY: ICD-10-CM

## 2024-12-18 DIAGNOSIS — R31.9 HEMATURIA, UNSPECIFIED TYPE: Primary | ICD-10-CM

## 2024-12-18 RX ORDER — MEMANTINE HYDROCHLORIDE 10 MG/1
10 TABLET ORAL 2 TIMES DAILY
Qty: 180 TABLET | Refills: 3 | Status: SHIPPED | OUTPATIENT
Start: 2024-12-18

## 2024-12-18 RX ORDER — FUROSEMIDE 20 MG/1
20 TABLET ORAL 2 TIMES DAILY
Qty: 60 TABLET | Refills: 5 | Status: SHIPPED | OUTPATIENT
Start: 2024-12-18

## 2024-12-18 NOTE — TELEPHONE ENCOUNTER
PATIENT'S SON WENT TO [facility] TODAY AND THEY HAD NO RECORD OF US SENDING IN MEMANTINE 10 MG SO HE WOULD LIKE THAT RESENT.

## 2025-01-16 PROCEDURE — 93296 REM INTERROG EVL PM/IDS: CPT | Performed by: NURSE PRACTITIONER

## 2025-01-16 PROCEDURE — 93295 DEV INTERROG REMOTE 1/2/MLT: CPT | Performed by: NURSE PRACTITIONER

## 2025-01-17 ENCOUNTER — HOSPITAL ENCOUNTER (OUTPATIENT)
Dept: CT IMAGING | Facility: HOSPITAL | Age: 85
Discharge: HOME | End: 2025-01-17
Payer: MEDICARE

## 2025-01-17 DIAGNOSIS — R31.9 HEMATURIA, UNSPECIFIED TYPE: ICD-10-CM

## 2025-01-17 PROCEDURE — 74176 CT ABD & PELVIS W/O CONTRAST: CPT

## 2025-02-14 RX ORDER — ALLOPURINOL 300 MG/1
300 TABLET ORAL DAILY
Qty: 90 TABLET | Refills: 0 | Status: SHIPPED | OUTPATIENT
Start: 2025-02-14

## 2025-03-14 RX ORDER — POTASSIUM CHLORIDE 1500 MG/1
20 TABLET, EXTENDED RELEASE ORAL 2 TIMES DAILY
Qty: 60 TABLET | Refills: 5 | Status: SHIPPED | OUTPATIENT
Start: 2025-03-14

## 2025-03-18 ENCOUNTER — OFFICE VISIT (OUTPATIENT)
Dept: CARDIOLOGY | Facility: CLINIC | Age: 85
End: 2025-03-18
Payer: MEDICARE

## 2025-03-18 VITALS
HEIGHT: 74 IN | WEIGHT: 189 LBS | BODY MASS INDEX: 24.26 KG/M2 | RESPIRATION RATE: 18 BRPM | SYSTOLIC BLOOD PRESSURE: 121 MMHG | HEART RATE: 68 BPM | DIASTOLIC BLOOD PRESSURE: 78 MMHG | OXYGEN SATURATION: 96 %

## 2025-03-18 DIAGNOSIS — I50.23 ACUTE ON CHRONIC SYSTOLIC CONGESTIVE HEART FAILURE: ICD-10-CM

## 2025-03-18 DIAGNOSIS — I42.0 DILATED CARDIOMYOPATHY: Primary | ICD-10-CM

## 2025-03-30 NOTE — PROGRESS NOTES
Cardiology Clinic Note  Chidi Figueroa MD, PhD    Subjective:     Encounter Date:03/18/2025      Patient ID: Hank Ponce is a 85 y.o. male.    Chief Complaint:  Chief Complaint   Patient presents with    Follow-up       HPI:        I the pleasure to see this 85-year-old gentleman in clinic today.  He has a history of cardiomyopathy with biventricular ICD in place, permanent atrial fibrillation hypertension hyperlipidemia, mitral vegetation, history of stress-induced cardiomyopathy dilated cardiomyopathy with acute on chronic systolic heart failure, EF 35%, he has had some atypical chest discomfort ultimately undergoing ischemic evaluation which was abnormal.   Left heart catheterization revealed patent vessels with nonobstructive disease in the LAD, small diagonal branch with focal 90% but too small for intervention and medically treated, patent but mildly diseased RCA, circumflex also mild disease but nothing flow-limiting.   He presents back to clinic today in stable condition.  Blood pressure stable,, he is still tolerating losartan in divided doses, low-dose Lasix once or twice a day, we discussed free water and salt restriction, he continues on Xarelto chronically for permanent atrial fibrillation, he is a paced rhythm at 70 not on beta-blockers.  He is having no anginal chest pain.  As below, prior left heart catheterization revealed proximal diagonal disease only with limited myocardial supply long across the anterolateral wall, LAD with nonobstructive disease with no reversibility seen at the apex by stress testing, angiographically 40 to 50% only with sizable vessel with SORIN-3 flow, not responsible for his underlying cardiomyopathy, again he is status post BiV resynchronization and pacing with underlying atrial fibrillation. We did discuss if he has dizziness to go down to once a day on his losartan, is not on beta-blocker given heart rates paced at 70, has not on Aldactone limited by blood  pressure, we discussed change to Entresto but he would like to continue on what he is on presently, this was reaffirmed today on encounter    EKG today demonstrates atrially sensed ventricularly paced rhythm    Device interrogation VVIR settings, 3-year battery life remaining, normal function no episodes no VT     Stress test reviewed and interpreted by me demonstrates the following  Interpretation Summary        Left ventricular ejection fraction is severely reduced. .  There is no prior study available for comparison. Abnormal nuclear Apical lateral and lateral wall reversibility seen Summed difference score of 6 Dilated ventricle EF 21%.  Findings consistent with an equivocal ECG stress test.     Abnormal study  Paced rhythm biventricular  Frequent PVCs and nonsustained VT with triplets and quadruplets in stress  No specific ST changes and paced rhythm  EF severely reduced 20% now improved  Severely dilated ventricle  Reversibility seen in the apical lateral lateral wall compared to baseline moderate size, mild to moderate moderate severity     Abnormal study cannot rule out ischemia of the apical lateral wall and mid lateral wall, this would correlate possibly with disease in the diagonal branch which is very limited and too small for intervention.  Severely reduced EF  ==========================================     Left heart catheterization results reviewed with the patient today along with all images  Results below     Exam  Vitals reviewed below  Regular, paced biventricular rate 65 with no rubs gallops heave or lift, 1 out of 6 systolic ejection murmur lower sternal border  No clubbing cyanosis with trace lower extremity edema  Normal pulses normal cap refill  Warm and dry  Intact grossly  Lungs are clear to auscultation  Soft nontender nondistended  Unchanged in prior encounter     Device/[employer] BiV ICD, interrogation today reveals permanent atrial fibrillation with biventricular pacing, patient  "referred to follow-up with us for routine interrogation moving forward  VVIR settings with permanent atrial fibrillation 90% BiV paced, she has had some short runs of nonsustained VT without therapy           Assessment:       Coronary artery disease  Hyperlipidemia  Essential hypertension  Acute on chronic systolic and diastolic CHF  Dilated cardiomyopathy  Presence of BiV ICD, paced rhythm  Underlying permanent atrial fibrillation  Abnormal stress  Nonsustained VT      Plan:     Acute on chronic systolic and diastolic CHF  Continue Lasix 20 twice daily, volume status much better with 20 pound weight loss  Extra dose as needed swelling  Free water and salt restriction  Underlying EF around 35%  Blood pressure at the limits of further medication titration  Continue losartan twice daily, metoprolol succinate, Jardiance, consider Aldactone and change to Entresto later today     CAD, diagonal disease, not responsible for global cardiomyopathy, no chest pain, continue medical management, 80% lesion and small vessel, nonobstructive 40 to 50% disease in the mid LAD, RCA is patent     Continue Xarelto for stroke risk reduction with atrial fibrillation     Dilated cardiomyopathy,   Nonsustained VT  No ICD shocks  No unexplained syncope  No anginal chest pain  Medicines as above         Atrial fibrillation permanent, controlled, biventricular ICD with pacing for resynchronization  MR, stable    Essential hypertension controlled  Hyperlipidemia controlled     EF improved with resynchronization 35% from 20% along with medical therapy    Patient pending repeat echo for reevaluation of EF after ongoing titration of goal-directed medical therapy    Discussed his weight which is up 14 pounds year-over-year from prior encounter.     Device interrogation in 3 to 6 months routinely    Objective:         /78 (BP Location: Right arm, Patient Position: Sitting)   Pulse 68   Resp 18   Ht 188 cm (74\")   Wt 85.7 kg (189 lb)   " SpO2 96%   BMI 24.27 kg/m²     Physical Exam    Assessment:         Diagnoses and all orders for this visit:    1. Dilated cardiomyopathy (Primary)  -     Adult Transthoracic Echo Complete W/ Color, Spectral and Contrast if Necessary Per Protocol; Future    2. Acute on chronic systolic congestive heart failure  -     Adult Transthoracic Echo Complete W/ Color, Spectral and Contrast if Necessary Per Protocol; Future           Plan:              The pleasure to be involved in this patient's cardiovascular care.  Please call with any questions or concerns  Chidi Figueroa MD, PhD    Most recent EKG as reviewed and interpreted by me:  Procedures     Most recent echo as reviewed and interpreted by me:  Results for orders placed during the hospital encounter of 07/20/23    Adult Transthoracic Echo Complete W/ Cont if Necessary Per Protocol    Interpretation Summary    Left ventricular systolic function is moderately decreased. Left ventricular ejection fraction appears to be 36 - 40%.    The left ventricular cavity is moderately dilated.    Left ventricular wall thickness is consistent with mild concentric hypertrophy.    Left ventricular diastolic dysfunction is noted.    Moderately reduced right ventricular systolic function noted.    The right ventricular cavity is severely dilated.    The left atrial cavity is severely dilated.    The right atrial cavity is severely  dilated.    Moderate to severe mitral valve regurgitation is present.    Severe tricuspid valve regurgitation is present.    Estimated right ventricular systolic pressure from tricuspid regurgitation is moderately elevated (45-55 mmHg).    Moderate pulmonary hypertension is present.      Most recent stress test as reviewed and interpreted by me:  Results for orders placed during the hospital encounter of 01/03/22    Stress Test With Myocardial Perfusion One Day    Interpretation Summary  · Left ventricular ejection fraction is severely reduced. .  · There  is no prior study available for comparison. Abnormal nuclear Apical lateral and lateral wall reversibility seen Summed difference score of 6 Dilated ventricle EF 21%.  · Findings consistent with an equivocal ECG stress test.    Abnormal study  Paced rhythm biventricular  Frequent PVCs and nonsustained VT with triplets and quadruplets in stress  No specific ST changes and paced rhythm  EF severely reduced 20%  Severely dilated ventricle  Reversibility seen in the apical lateral lateral wall compared to baseline moderate size, mild to moderate moderate severity    Abnormal study cannot rule out ischemia of the apical lateral wall and mid lateral wall  Severely reduced EF      Most recent cardiac catheterization as reviewed interpreted by me:  Results for orders placed during the hospital encounter of 01/19/22    Cardiac Catheterization/Vascular Study    Narrative   Chidi Figueroa MD, PhD  [facility] cardiology  Date of service 1-    Procedure  1.  Left heart catheterization coronary angiography left ventriculography in ZHAO position    Indication  Cardiomyopathy, low EF, nonsustained VT, abnormal stress test    Performed consent patient was brought to the Cath Lab sterilely prepped and draped in usual fashion expose the right groin for right common femoral arterial access via micropuncture modified Seldinger technique with placement of a 6 Azeri sheath.  035 guidewire was advanced to the aortic valve followed by JL 4 and JR4 catheters for selective left and right coronary angiography.  The JR4 was used across aortic valve easily, hand-injection for LV gram, EDP assessment and pullback assessment of the transaortic valve gradient.  There was small vessel disease in the diagonal 80 to 90% but vessel well less than 2 mm in diameter with limited nonbifurcating course, we will proceed with medical therapy of this, there is nonobstructive disease in the LAD 50% in the midportion most significantly  with long segment of eccentric plaque but SORIN-3 flow throughout and no critical stenoses as it courses to and around the apex, 20 to 30% proximally, 30% distally, circumflex also had 30 to 40% in the mid but nothing critical, SORIN-3 flow throughout, RCA diffuse luminary low 40 to 30% nothing flow-limiting.    Diagnosis ultimately is nonischemic cardiomyopathy  BiV ICD is in place  Small vessel obstructive CAD in the diagonal branch which is too small for intervention should be treated medically  Nonobstructive disease in the LAD 50% in the midportion with significantly but nothing critical with SORIN-3 flow to around the apex which would not contribute to his underlying cardiomyopathy and PCI of this is unlikely to improve his EF over medical therapy which is on board    Findings  1.  128/86 opening pressure  2.  Closing pressure was unchanged  3.  LVEDP 10-12  4.  LV function LVEF 35% with dyssynchrony present  5.  No transaortic valve or LVOT gradient seen    Complications none  Blood loss less than 5 cc  Contrast used 65 cc  Moderate on sedation time of 30 minutes with IV Versed and fentanyl administered by registered nurse with complete ECG pulse oximetry and hemodynamic monitor throughout the entirety the case observed by me    Angiography  1 left main large-caliber vessel with no angiographic disease  2.  LAD is a long vessel coursing to and around the apex with 2 diagonal branches and septal perforators.  Proximal has diffuse irregularities 20 to 30% eccentric plaque, 50% in the mid but nothing flow-limiting, takeoff of the more distal diagonal branches without any flow-limiting stenosis, distal LAD diffusely irregularities 20 to 30% at most, proximal diagonal branch has an 80 to 90% focal narrowing and nothing distally, this vessel is well below 2 mm in diameter with nonbifurcating limited course along the anterolateral wall not in the area identified by the stress test at the apex or apical lateral  portion which is supplied by distal LAD and more distal second diagonal branch.  This will be treated medically  3 circumflex nondominant with 2 obtuse marginal branches, 40 % mid at most nonflow limiting  4.  RCA 20 to 30% in the midportion at most, nothing flow-limiting, SORIN-3 flow throughout    Recommendations and conclusions  1.  Nonischemic cardiomyopathy  2.  Nonobstructive CAD of large epicardial vessels 50% at most in the mid LAD, 20 to 30% RCA, 40% circumflex, 80 to 90% disease in small diagonal branch which is inconsequential and should be treated medically not contributory to his cardiomyopathy  3.  Continue secondary prevention goals for systolic heart failure, go down on Lasix to daily given prerenal azotemia and elevated creatinine on labs today on twice daily Lasix with normal filling pressures  For follow-up cardiology 2 to 4 weeks for further optimization and review of cath films    Chidi Figueroa MD, PhD    Home today    The following portions of the patient's history were reviewed and updated as appropriate: allergies, current medications, past family history, past medical history, past social history, past surgical history, and problem list.      ROS:  14 point review of systems negative except as mentioned above    Current Outpatient Medications:     allopurinol (ZYLOPRIM) 300 MG tablet, TAKE 1 TABLET BY MOUTH DAILY, Disp: 90 tablet, Rfl: 0    coenzyme Q10 100 MG capsule, Take 2 capsules by mouth Daily., Disp: , Rfl:     cyanocobalamin (VITAMIN B-12) 2500 MCG tablet tablet, Take 1 tablet by mouth Daily., Disp: , Rfl:     empagliflozin (Jardiance) 10 MG tablet tablet, Take 1 tablet by mouth Daily., Disp: 30 tablet, Rfl: 5    furosemide (LASIX) 20 MG tablet, Take 1 tablet by mouth 2 (Two) Times a Day., Disp: 60 tablet, Rfl: 5    Klor-Con M20 20 MEQ CR tablet, TAKE 1 TABLET BY MOUTH 2 TIMES A DAY (Patient taking differently: Take 1 tablet by mouth Daily.), Disp: 60 tablet, Rfl: 5    losartan  (COZAAR) 25 MG tablet, TAKE 1 TABLET BY MOUTH 2 TIMES A DAY, Disp: 180 tablet, Rfl: 0    memantine (NAMENDA) 10 MG tablet, Take 1 tablet by mouth 2 (Two) Times a Day. (Patient taking differently: Take 2 tablets by mouth 2 (Two) Times a Day.), Disp: 180 tablet, Rfl: 3    metoprolol succinate XL (TOPROL-XL) 25 MG 24 hr tablet, TAKE 1/2 TABLET BY MOUTH DAILY, Disp: 45 tablet, Rfl: 3    vitamin D3 125 MCG (5000 UT) capsule capsule, Take  by mouth Daily., Disp: , Rfl:     Xarelto 15 MG tablet, TAKE 1 TABLET BY MOUTH DAILY, Disp: 90 tablet, Rfl: 1    Problem List:  Patient Active Problem List   Diagnosis    Presence of biventricular implantable cardioverter-defibrillator (ICD)    Permanent atrial fibrillation    Hyperlipidemia, mixed    Mitral regurgitation    Tricuspid regurgitation    Essential hypertension    Takotsubo cardiomyopathy    Dilated cardiomyopathy    Acute on chronic systolic congestive heart failure    Atrial flutter with rapid ventricular response    Low serum vitamin B12    Heart failure with reduced ejection fraction    Stage 3 chronic kidney disease    Nonsustained ventricular tachycardia    Gout    Tuberculosis screening    Moderate dementia     Past Medical History:  Past Medical History:   Diagnosis Date    Acid reflux disease     Allergic rhinitis     Arthritis     gouty    Atrial fibrillation     BBB (bundle branch block)     right    Bone spur 2017    neck    Congestive heart failure 10/08/2019    Congestive heart failure (CHF)     Coronary artery disease     non obstructive    Hypertension     Mitral regurgitation     Osteoarthritis     Presence of biventricular implantable cardioverter-defibrillator (ICD) 07/29/2019    S/P ablation of atrial fibrillation 01/2017    Takotsubo cardiomyopathy     Tricuspid regurgitation     VT (ventricular tachycardia) 02/2018     Past Surgical History:  Past Surgical History:   Procedure Laterality Date    AMPUTATION      index and middle finger    CARDIAC  ABLATION  2017    [facility]    CARDIAC CATHETERIZATION  2016    non obst CAD; takotsubo CM    CARDIAC CATHETERIZATION  2017    treating medically    CARDIAC CATHETERIZATION N/A 2022    Procedure: Left Heart Cath;  Surgeon: Chidi Figueroa MD;  Location: [facility] CATH INVASIVE LOCATION;  Service: Cardiology;  Laterality: N/A;    CARDIAC DEFIBRILLATOR PLACEMENT      Bs    CARDIAC ELECTROPHYSIOLOGY PROCEDURE N/A 2021    Procedure: AV node ablation;  Surgeon: Hira Ochoa MD;  Location: [facility] CATH INVASIVE LOCATION;  Service: Cardiovascular;  Laterality: N/A;    COLONOSCOPY N/A 2022    Procedure: COLONOSCOPY with polypectomy x5;  Surgeon: KAYLIN Ruiz MD;  Location: [facility] ENDOSCOPY;  Service: Gastroenterology;  Laterality: N/A;  post: diverticulosis, hemorrhoids, polyps    FRACTURE SURGERY  2021    jaw     INTERNAL CARDIAC DEFIBRILLATOR INSERTION      BiV ICD BS    OTHER SURGICAL HISTORY  2017    Heart ablation-[facility]    ROTATOR CUFF REPAIR Right     SINUS SURGERY  2014     Social History:  Social History     Socioeconomic History    Marital status:    Tobacco Use    Smoking status: Former     Current packs/day: 0.00     Average packs/day: 1 pack/day for 20.0 years (20.0 ttl pk-yrs)     Types: Cigarettes     Start date: 1970     Quit date: 1990     Years since quittin.2     Passive exposure: Past    Smokeless tobacco: Never   Vaping Use    Vaping status: Never Used   Substance and Sexual Activity    Alcohol use: Not Currently     Comment: occasionally    Drug use: Never    Sexual activity: Defer     Allergies:  Allergies   Allergen Reactions    Hydrocodone Urinary Retention    Aricept [Donepezil] Mental Status Change     Immunizations:  Immunization History   Administered Date(s) Administered    COVID-19 (MODERNA) 1st,2nd,3rd Dose Monovalent 2021, 2021, 10/26/2021, 2022    FLUAD TRI 65YR+ 10/06/2022    Flu Vaccine  Intradermal Quad 18-64YR 09/23/2011, 09/05/2012, 09/26/2013    Fluad Quad 65+ 10/07/2019, 09/10/2020    Fluzone High-Dose 65+YRS 10/03/2014, 10/09/2015, 09/28/2016, 10/02/2017, 09/25/2018    Fluzone High-Dose 65+yrs 09/24/2021, 10/17/2021, 10/06/2022, 10/24/2023    Pneumococcal Conjugate 13-Valent (PCV13) 10/25/2019    Pneumococcal Polysaccharide (PPSV23) 10/27/2020            In-Office Procedure(s):  No orders to display        ASCVD RIsk Score::  The ASCVD Risk score (Veronica MCLIAN, et al., 2019) failed to calculate for the following reasons:    The 2019 ASCVD risk score is only valid for ages 40 to 79    Imaging:    Results for orders placed during the hospital encounter of 07/20/23    XR Chest 1 View    Narrative  XR CHEST 1 VW    Date of Exam: 7/20/2023 1:00 PM EDT    Indication: weakness, chf    Comparison: PA and lateral chest radiograph 10/18/2022.    Findings:  Moderate to marked cardiac enlargement is present, and appears increased compared to the prior study. Pulmonary vasculature appears enlarged, indistinct, suggesting congestive change, increased from prior. Fine interstitial thickening is present in both  lungs consistent with mild edema. No significant pleural fluid collection is identified. Pacemaker/defibrillator appears unchanged. No visible pneumothorax.    Impression  Impression:    1. Findings consistent with central vascular congestion and interstitial pulmonary edema.  2. Moderate to marked cardiac enlargement, which appears increased compared to 10/18/2022.      Electronically Signed: Kathie Oshea  7/20/2023 1:06 PM EDT  Workstation ID: [id number]       Results for orders placed during the hospital encounter of 01/17/25    CT Abdomen Pelvis Without Contrast    Narrative  CT ABDOMEN PELVIS WO CONTRAST    Date of Exam: 1/17/2025 9:46 AM EST    Indication: hematuria.    Comparison: Bilateral renal ultrasound 12/1/2023. CT abdomen and pelvis without and with contrast 8/1/2017.    Technique: Axial CT  images were obtained of the abdomen and pelvis without the administration of contrast. Sagittal and coronal reconstructions were performed.  Automated exposure control and iterative reconstruction methods were used.      Findings:  There are multiple nonobstructing bilateral intrarenal calculi, greatest on the right. A dominant stone in the right mid to upper renal pole measures up to 7 mm. No ureteral stone or hydronephrosis is seen. No urinary bladder stone is identified.    A cyst projecting exophytically from the left lower renal pole measures about 2.4 cm. There is an additional subtle 7 mm hyperdense lesion at the posterolateral margin of the left lower renal pole (series 2 image 20). This is thought to be unchanged from  8/1/2017, most in keeping with a benign finding    Urinary bladder has a normal appearance. Coarse calcifications are seen within a nonenlarged prostate gland.    Moderate cardiomegaly. Pacemaker leads are in place. Mild interstitial fibrosis is present in the lung bases without acute basilar consolidation. Benign calcified granuloma is seen in the left lower lobe.    There is moderate to aortic and iliac artery calcific atherosclerosis.    Gallbladder is contracted but appears within the limits. Benign calcified granulomatous changes are present within the liver and spleen. Pancreas appears mildly atrophic. Stable mild bilateral adrenal thickening without nodularity. Small fat-containing  umbilical hernia is unchanged. Diverticular changes are present throughout the colon without evidence of acute diverticulitis. No adenopathy or free fluid. Small left inguinal hernia contains only fat.    Advanced degenerative changes in the lumbar spine with upper lumbar dextroscoliotic curvature. No acute or suspicious osseous abnormalities.    Impression  1. Multiple nonobstructing bilateral renal stones. No ureteral stone or hydronephrosis.  2. Normal appearance of the urinary bladder.  3. Left renal  cyst. Additional 7 mm slightly hyperdense lesion at the posterior lateral left lower renal pole is thought to be unchanged from 2017, most in keeping with benign hemorrhagic or proteinaceous cyst.  4. Multiple additional chronic findings as described above.          Electronically Signed: Kathie Oshea MD  1/20/2025 10:45 AM EST  Workstation ID: [id number]      Results for orders placed during the hospital encounter of 01/17/25    CT Abdomen Pelvis Without Contrast    Narrative  CT ABDOMEN PELVIS WO CONTRAST    Date of Exam: 1/17/2025 9:46 AM EST    Indication: hematuria.    Comparison: Bilateral renal ultrasound 12/1/2023. CT abdomen and pelvis without and with contrast 8/1/2017.    Technique: Axial CT images were obtained of the abdomen and pelvis without the administration of contrast. Sagittal and coronal reconstructions were performed.  Automated exposure control and iterative reconstruction methods were used.      Findings:  There are multiple nonobstructing bilateral intrarenal calculi, greatest on the right. A dominant stone in the right mid to upper renal pole measures up to 7 mm. No ureteral stone or hydronephrosis is seen. No urinary bladder stone is identified.    A cyst projecting exophytically from the left lower renal pole measures about 2.4 cm. There is an additional subtle 7 mm hyperdense lesion at the posterolateral margin of the left lower renal pole (series 2 image 20). This is thought to be unchanged from  8/1/2017, most in keeping with a benign finding    Urinary bladder has a normal appearance. Coarse calcifications are seen within a nonenlarged prostate gland.    Moderate cardiomegaly. Pacemaker leads are in place. Mild interstitial fibrosis is present in the lung bases without acute basilar consolidation. Benign calcified granuloma is seen in the left lower lobe.    There is moderate to aortic and iliac artery calcific atherosclerosis.    Gallbladder is contracted but appears within the  limits. Benign calcified granulomatous changes are present within the liver and spleen. Pancreas appears mildly atrophic. Stable mild bilateral adrenal thickening without nodularity. Small fat-containing  umbilical hernia is unchanged. Diverticular changes are present throughout the colon without evidence of acute diverticulitis. No adenopathy or free fluid. Small left inguinal hernia contains only fat.    Advanced degenerative changes in the lumbar spine with upper lumbar dextroscoliotic curvature. No acute or suspicious osseous abnormalities.    Impression  1. Multiple nonobstructing bilateral renal stones. No ureteral stone or hydronephrosis.  2. Normal appearance of the urinary bladder.  3. Left renal cyst. Additional 7 mm slightly hyperdense lesion at the posterior lateral left lower renal pole is thought to be unchanged from 2017, most in keeping with benign hemorrhagic or proteinaceous cyst.  4. Multiple additional chronic findings as described above.          Electronically Signed: Kathie Oshea MD  1/20/2025 10:45 AM EST  Workstation ID: [id number]      Lab Review:   Lab on 12/17/2024   Component Date Value    Glucose 12/17/2024 86     BUN 12/17/2024 23     Creatinine 12/17/2024 1.65 (H)     Sodium 12/17/2024 141     Potassium 12/17/2024 4.0     Chloride 12/17/2024 104     CO2 12/17/2024 30.1 (H)     Calcium 12/17/2024 9.4     BUN/Creatinine Ratio 12/17/2024 13.9     Anion Gap 12/17/2024 6.9     eGFR 12/17/2024 40.4 (L)     Color, UA 12/17/2024 Yellow     Appearance, UA 12/17/2024 Clear     pH, UA 12/17/2024 6.0     Specific Gravity, UA 12/17/2024 1.025     Glucose, UA 12/17/2024 >=1000 mg/dL (3+) (A)     Ketones, UA 12/17/2024 Negative     Bilirubin, UA 12/17/2024 Negative     Blood, UA 12/17/2024 Moderate (2+) (A)     Protein, UA 12/17/2024 Trace (A)     Leuk Esterase, UA 12/17/2024 Negative     Nitrite, UA 12/17/2024 Negative     Urobilinogen, UA 12/17/2024 1.0 E.U./dL     Extra Tube 12/17/2024 Hold for  add-ons.     RBC, UA 12/17/2024 21-50 (A)     WBC, UA 12/17/2024 0-2     Bacteria, UA 12/17/2024 None Seen     Squamous Epithelial Cell* 12/17/2024 0-2     Hyaline Casts, UA 12/17/2024 0-2     Methodology 12/17/2024 Automated Microscopy    Orders Only on 12/05/2024   Component Date Value    Color, UA 12/05/2024 Yellow     Appearance, UA 12/05/2024 Clear     pH, UA 12/05/2024 6.5     Specific Gravity, UA 12/05/2024 1.011     Glucose, UA 12/05/2024 >=1000 mg/dL (3+) (A)     Ketones, UA 12/05/2024 Negative     Bilirubin, UA 12/05/2024 Negative     Blood, UA 12/05/2024 Moderate (2+) (A)     Protein, UA 12/05/2024 Negative     Leuk Esterase, UA 12/05/2024 Negative     Nitrite, UA 12/05/2024 Negative     Urobilinogen, UA 12/05/2024 1.0 E.U./dL     Extra Tube 12/05/2024 Hold for add-ons.     RBC, UA 12/05/2024 Too Numerous to Count (A)     WBC, UA 12/05/2024 0-2     Bacteria, UA 12/05/2024 None Seen     Squamous Epithelial Cell* 12/05/2024 None Seen     Hyaline Casts, UA 12/05/2024 0-2     Methodology 12/05/2024 Automated Microscopy    Office Visit on 12/05/2024   Component Date Value    Glucose 12/05/2024 80     BUN 12/05/2024 23     Creatinine 12/05/2024 1.68 (H)     Sodium 12/05/2024 143     Potassium 12/05/2024 5.4 (H)     Chloride 12/05/2024 104     CO2 12/05/2024 26.6     Calcium 12/05/2024 10.0     Total Protein 12/05/2024 7.3     Albumin 12/05/2024 3.7     ALT (SGPT) 12/05/2024 8     AST (SGOT) 12/05/2024 18     Alkaline Phosphatase 12/05/2024 77     Total Bilirubin 12/05/2024 1.1     Globulin 12/05/2024 3.6     A/G Ratio 12/05/2024 1.0     BUN/Creatinine Ratio 12/05/2024 13.7     Anion Gap 12/05/2024 12.4     eGFR 12/05/2024 39.6 (L)     Hemoglobin A1C 12/05/2024 5.30     Total Cholesterol 12/05/2024 176     Triglycerides 12/05/2024 69     HDL Cholesterol 12/05/2024 59     LDL Cholesterol  12/05/2024 104 (H)     VLDL Cholesterol 12/05/2024 13     LDL/HDL Ratio 12/05/2024 1.75     TSH 12/05/2024 3.060     Free T4  12/05/2024 1.23     25 Hydroxy, Vitamin D 12/05/2024 67.4     Vitamin B-12 12/05/2024 1,016 (H)     PSA 12/05/2024 1.070     Uric Acid 12/05/2024 4.9     WBC 12/05/2024 7.32     RBC 12/05/2024 4.97     Hemoglobin 12/05/2024 16.7     Hematocrit 12/05/2024 48.3     MCV 12/05/2024 97.2 (H)     MCH 12/05/2024 33.6 (H)     MCHC 12/05/2024 34.6     RDW 12/05/2024 14.2     RDW-SD 12/05/2024 50.2     MPV 12/05/2024 11.7     Platelets 12/05/2024 212     Neutrophil % 12/05/2024 65.6     Lymphocyte % 12/05/2024 19.3 (L)     Monocyte % 12/05/2024 8.7     Eosinophil % 12/05/2024 3.1     Basophil % 12/05/2024 1.8 (H)     Immature Grans % 12/05/2024 1.5 (H)     Neutrophils, Absolute 12/05/2024 4.80     Lymphocytes, Absolute 12/05/2024 1.41     Monocytes, Absolute 12/05/2024 0.64     Eosinophils, Absolute 12/05/2024 0.23     Basophils, Absolute 12/05/2024 0.13     Immature Grans, Absolute 12/05/2024 0.11 (H)     nRBC 12/05/2024 0.0      Recent labs reviewed and interpreted for clinical significance and application            Level of Care:           Chidi Figueroa MD  03/30/25  .

## 2025-04-17 ENCOUNTER — TELEPHONE (OUTPATIENT)
Dept: FAMILY MEDICINE CLINIC | Facility: CLINIC | Age: 85
End: 2025-04-17
Payer: MEDICARE

## 2025-04-17 RX ORDER — POTASSIUM CHLORIDE 1500 MG/1
20 TABLET, EXTENDED RELEASE ORAL DAILY
Qty: 90 TABLET | Refills: 1 | Status: SHIPPED | OUTPATIENT
Start: 2025-04-17

## 2025-04-17 NOTE — TELEPHONE ENCOUNTER
Caller: KRYSTAL SINGH    Relationship: Emergency Contact    Best call back number: [phone]     Which medication are you concerned about: Klor-Con M20 20 MEQ CR tablet     Who prescribed you this medication: DR KRYSTAL CRISTINA    When did you start taking this medication: CHANGED DOSE 2 MONTHS AGO    What are your concerns: PATIENTS SON STATED 2 MONTHS AGO, THE PATIENT HAD BLOODWORK AND DR CRISTINA LOWERED HIS DOSE OF THIS MEDICATION FROM 2 TABLETS DAILY TO ONE TABLETS DAILY.    PATIENTS SON STATED THE PHARMACY IS STILL FILLING PRESCRIPTIONS FOR 2 TABLETS DAILY, AND THE PATIENT IS RECEIVING TOO MUCH MEDICATION.    PATIENTS SON STATED THE PHARMACY ADVISED HE WOULD HAVE TO CALL THE PATIENTS DOCTOR TO HAVE THIS CHANGED.    PREFERRED PHARMACY [facility] [id number] - [place], [place] - [address] [place] [address] - [phone] [facility] [phone] FX .    PATIENTS SON IS REQUESTING THIS BE CHANGED SO THE PATIENT DOES NOT RECEIVE TOO MUCH MEDICATION.

## 2025-04-24 ENCOUNTER — HOSPITAL ENCOUNTER (OUTPATIENT)
Dept: CARDIOLOGY | Facility: HOSPITAL | Age: 85
Discharge: HOME | End: 2025-04-24
Admitting: INTERNAL MEDICINE
Payer: MEDICARE

## 2025-04-24 DIAGNOSIS — I42.0 DILATED CARDIOMYOPATHY: ICD-10-CM

## 2025-04-24 DIAGNOSIS — I50.23 ACUTE ON CHRONIC SYSTOLIC CONGESTIVE HEART FAILURE: ICD-10-CM

## 2025-04-24 LAB
AORTIC DIMENSIONLESS INDEX: 0.58 (DI)
AV MEAN PRESS GRAD SYS DOP V1V2: 3 MMHG
AV VMAX SYS DOP: 116 CM/SEC
BH CV ECHO LEFT VENTRICLE GLOBAL LONGITUDINAL STRAIN: -16.3 %
BH CV ECHO MEAS - AI P1/2T: 543 MSEC
BH CV ECHO MEAS - AO MAX PG: 5.4 MMHG
BH CV ECHO MEAS - AO V2 VTI: 23.6 CM
BH CV ECHO MEAS - AVA(I,D): 2.43 CM2
BH CV ECHO MEAS - EDV(CUBED): 185.2 ML
BH CV ECHO MEAS - EDV(MOD-SP4): 118 ML
BH CV ECHO MEAS - EF(MOD-SP4): 40.7 %
BH CV ECHO MEAS - ESV(CUBED): 85.2 ML
BH CV ECHO MEAS - ESV(MOD-SP4): 70 ML
BH CV ECHO MEAS - FS: 22.8 %
BH CV ECHO MEAS - IVS/LVPW: 0.85 CM
BH CV ECHO MEAS - IVSD: 1.1 CM
BH CV ECHO MEAS - LA DIMENSION: 5 CM
BH CV ECHO MEAS - LAT PEAK E' VEL: 14.8 CM/SEC
BH CV ECHO MEAS - LV DIASTOLIC VOL/BSA (35-75): 55.6 CM2
BH CV ECHO MEAS - LV MASS(C)D: 288.7 GRAMS
BH CV ECHO MEAS - LV MAX PG: 2.12 MMHG
BH CV ECHO MEAS - LV MEAN PG: 1 MMHG
BH CV ECHO MEAS - LV SYSTOLIC VOL/BSA (12-30): 33 CM2
BH CV ECHO MEAS - LV V1 MAX: 72.8 CM/SEC
BH CV ECHO MEAS - LV V1 VTI: 13.8 CM
BH CV ECHO MEAS - LVIDD: 5.7 CM
BH CV ECHO MEAS - LVIDS: 4.4 CM
BH CV ECHO MEAS - LVOT AREA: 4.2 CM2
BH CV ECHO MEAS - LVOT DIAM: 2.3 CM
BH CV ECHO MEAS - LVPWD: 1.3 CM
BH CV ECHO MEAS - MED PEAK E' VEL: 12.4 CM/SEC
BH CV ECHO MEAS - MR MAX PG: 101.6 MMHG
BH CV ECHO MEAS - MR MAX VEL: 504 CM/SEC
BH CV ECHO MEAS - MR MEAN PG: 59 MMHG
BH CV ECHO MEAS - MR MEAN VEL: 352 CM/SEC
BH CV ECHO MEAS - MR VTI: 163 CM
BH CV ECHO MEAS - MV A MAX VEL: 22 CM/SEC
BH CV ECHO MEAS - MV DEC SLOPE: 249 CM/SEC2
BH CV ECHO MEAS - MV DEC TIME: 0.21 SEC
BH CV ECHO MEAS - MV E MAX VEL: 72.8 CM/SEC
BH CV ECHO MEAS - MV E/A: 3.3
BH CV ECHO MEAS - MV MAX PG: 2.8 MMHG
BH CV ECHO MEAS - MV MEAN PG: 1 MMHG
BH CV ECHO MEAS - MV P1/2T: 97.3 MSEC
BH CV ECHO MEAS - MV V2 VTI: 21.1 CM
BH CV ECHO MEAS - MVA(P1/2T): 2.26 CM2
BH CV ECHO MEAS - MVA(VTI): 2.7 CM2
BH CV ECHO MEAS - PA ACC TIME: 0.14 SEC
BH CV ECHO MEAS - PA V2 MAX: 128 CM/SEC
BH CV ECHO MEAS - RAP SYSTOLE: 3 MMHG
BH CV ECHO MEAS - RV MAX PG: 2.5 MMHG
BH CV ECHO MEAS - RV V1 MAX: 79 CM/SEC
BH CV ECHO MEAS - RV V1 VTI: 13.6 CM
BH CV ECHO MEAS - RVSP: 32.8 MMHG
BH CV ECHO MEAS - SV(LVOT): 57.3 ML
BH CV ECHO MEAS - SV(MOD-SP4): 48 ML
BH CV ECHO MEAS - SVI(LVOT): 27 ML/M2
BH CV ECHO MEAS - SVI(MOD-SP4): 22.6 ML/M2
BH CV ECHO MEAS - TAPSE (>1.6): 1.22 CM
BH CV ECHO MEAS - TR MAX PG: 29.8 MMHG
BH CV ECHO MEAS - TR MAX VEL: 273 CM/SEC
BH CV ECHO MEASUREMENTS AVERAGE E/E' RATIO: 5.35
BH CV XLRA - TDI S': 16 CM/SEC
LEFT ATRIUM VOLUME INDEX: 59.4 ML/M2
LV EF BIPLANE MOD: 50 %
SINUS: 3.1 CM
STJ: 2.2 CM

## 2025-04-24 PROCEDURE — 93356 MYOCRD STRAIN IMG SPCKL TRCK: CPT

## 2025-04-24 PROCEDURE — 93306 TTE W/DOPPLER COMPLETE: CPT

## 2025-05-13 DIAGNOSIS — R41.3 MEMORY DIFFICULTY: ICD-10-CM

## 2025-05-13 DIAGNOSIS — R41.3 MEMORY PROBLEM: ICD-10-CM

## 2025-05-13 RX ORDER — RIVAROXABAN 15 MG/1
15 TABLET, FILM COATED ORAL DAILY
Qty: 90 TABLET | Refills: 1 | Status: SHIPPED | OUTPATIENT
Start: 2025-05-13

## 2025-05-13 RX ORDER — LOSARTAN POTASSIUM 25 MG/1
25 TABLET ORAL 2 TIMES DAILY
Qty: 180 TABLET | Refills: 0 | Status: SHIPPED | OUTPATIENT
Start: 2025-05-13

## 2025-05-13 RX ORDER — MEMANTINE HYDROCHLORIDE 5 MG/1
TABLET ORAL
Qty: 90 TABLET | Refills: 1 | Status: SHIPPED | OUTPATIENT
Start: 2025-05-13

## 2025-06-04 DIAGNOSIS — I42.0 DILATED CARDIOMYOPATHY: ICD-10-CM

## 2025-06-04 DIAGNOSIS — N18.31 STAGE 3A CHRONIC KIDNEY DISEASE: ICD-10-CM

## 2025-06-04 DIAGNOSIS — I50.20 HEART FAILURE WITH REDUCED EJECTION FRACTION: ICD-10-CM

## 2025-06-04 RX ORDER — EMPAGLIFLOZIN 10 MG/1
10 TABLET, FILM COATED ORAL DAILY
Qty: 30 TABLET | Refills: 5 | Status: SHIPPED | OUTPATIENT
Start: 2025-06-04

## 2025-06-06 RX ORDER — ALLOPURINOL 300 MG/1
300 TABLET ORAL DAILY
Qty: 90 TABLET | Refills: 0 | Status: SHIPPED | OUTPATIENT
Start: 2025-06-06

## 2025-06-11 RX ORDER — FUROSEMIDE 20 MG/1
20 TABLET ORAL 2 TIMES DAILY
Qty: 60 TABLET | Refills: 5 | Status: SHIPPED | OUTPATIENT
Start: 2025-06-11

## 2025-06-18 ENCOUNTER — LAB (OUTPATIENT)
Dept: FAMILY MEDICINE CLINIC | Facility: CLINIC | Age: 85
End: 2025-06-18
Payer: MEDICARE

## 2025-06-18 ENCOUNTER — OFFICE VISIT (OUTPATIENT)
Dept: FAMILY MEDICINE CLINIC | Facility: CLINIC | Age: 85
End: 2025-06-18
Payer: MEDICARE

## 2025-06-18 VITALS
BODY MASS INDEX: 24.9 KG/M2 | WEIGHT: 194 LBS | HEIGHT: 74 IN | DIASTOLIC BLOOD PRESSURE: 74 MMHG | RESPIRATION RATE: 16 BRPM | HEART RATE: 72 BPM | OXYGEN SATURATION: 97 % | SYSTOLIC BLOOD PRESSURE: 132 MMHG

## 2025-06-18 DIAGNOSIS — I42.0 DILATED CARDIOMYOPATHY: ICD-10-CM

## 2025-06-18 DIAGNOSIS — I10 ESSENTIAL HYPERTENSION: ICD-10-CM

## 2025-06-18 DIAGNOSIS — I48.21 PERMANENT ATRIAL FIBRILLATION: ICD-10-CM

## 2025-06-18 DIAGNOSIS — F03.B0 MODERATE DEMENTIA WITHOUT BEHAVIORAL DISTURBANCE, PSYCHOTIC DISTURBANCE, MOOD DISTURBANCE, OR ANXIETY, UNSPECIFIED DEMENTIA TYPE: Primary | ICD-10-CM

## 2025-06-18 DIAGNOSIS — R31.9 HEMATURIA, UNSPECIFIED TYPE: ICD-10-CM

## 2025-06-18 LAB
BILIRUB UR QL STRIP: NEGATIVE
CLARITY UR: CLEAR
COLOR UR: YELLOW
GLUCOSE UR STRIP-MCNC: ABNORMAL MG/DL
HGB UR QL STRIP.AUTO: NEGATIVE
HOLD SPECIMEN: NORMAL
KETONES UR QL STRIP: NEGATIVE
LEUKOCYTE ESTERASE UR QL STRIP.AUTO: NEGATIVE
NITRITE UR QL STRIP: NEGATIVE
PH UR STRIP.AUTO: 6 [PH] (ref 5–8)
PROT UR QL STRIP: ABNORMAL
SP GR UR STRIP: 1.02 (ref 1–1.03)
UROBILINOGEN UR QL STRIP: ABNORMAL

## 2025-06-18 PROCEDURE — 81003 URINALYSIS AUTO W/O SCOPE: CPT | Performed by: FAMILY MEDICINE

## 2025-06-18 NOTE — PROGRESS NOTES
Chief Complaint   Patient presents with    Memory Loss    Hypertension     HPI  Hank Ponce is a 85 y.o. male that presents for   Chief Complaint   Patient presents with    Memory Loss    Hypertension     Dementia: continues to have memory decline. Son reports increasing difficulty w/ long term memory as well. Now maintained on Namenda 10mg BID. Did previously try donepezil w/o notable benefit. Son reports, continued, slow memory loss. Continues to make numerous lists/notes. Living at [facility] and providing adequate support. Family is very happy w/ care at [facility]. 10/2024 MMSE 24/30. 4/2023 MMSE 27/30. 11/2022 MMSE 28/30     CHF: dilated cardiomyopathy. s/p defibrillator. 7/2023 echo w/ EF 36-40% w/ severe mitral and tricuspid regurg noted as well as moderately dilated LV and pulm HTN (RVSP 45-55). Maintained on lasix 20 BID, Jardiance 10 daily, metoprolol XL 12.5 daily. No SOB, orthopnea, leg swelling. Follows w/ CRISTHIAN Figueroa     Afib: Maintained on metoprolol XL 12.5 daily and Xarelto 15 daily. [id number] score 4 (CHF, HTN, age x2). Unable to tolerate amiodarone or Tikosyn in the past. Denies CP, palpitations, blood in stool/urine or easing bleeding. Follows rosa/ CRISTHIAN Figueroa     HTN: 132/74 today. Maintained on losartan 25 BID, metoprolol XL 12.5 daily and lasix 20 BID. Follows w/ CRISTHIAN Figueroa. No LH/dizziness, CP or SOB.     Hematuria: noted on last visit. 1/2025 CT abd/pel reassuring. Evaluated by urology and cystoscopy recommended 2/2025. Family has deferred invasive intervention given age and comorbidities.     Review of Systems  Pertinent positives of ROS documented in HPI    The following portions of the patient's history were reviewed and updated as appropriate: problem list, past medical history, past surgical history, allergies, current medication    Problem List Tab  Patient History Tab  Immunizations Tab  Medications Tab  Chart Review Tab  Care Everywhere Tab  Synopsis  Tab    PE  Vitals:    06/18/25 0859   BP: 132/74   Pulse: 72   Resp: 16   SpO2: 97%     Body mass index is 24.91 kg/m².  General: Well nourished, NAD  Head: AT/NC  Eyes: EOMI, anicteric sclera  Resp: CTAB, SCR, BS equal  CV: Irregularly irregular w/o m/r/g; 2+ pulses  GI: Soft, NT/ND, +BS  MSK: FROM, no deformity, no edema  Skin: Warm, dry, intact  Neuro: Alert and oriented. No focal deficits  Psych: Appropriate mood and affect    Imaging  No Images in the past 120 days found..    Assessment & Plan   Hank Ponce is a 85 y.o. male that presents for   Chief Complaint   Patient presents with    Memory Loss    Hypertension     Diagnoses and all orders for this visit:    1. Moderate dementia without behavioral disturbance, psychotic disturbance, mood disturbance, or anxiety, unspecified dementia type (Primary): memory loss progressive. Unclear if Namenda is beneficial   - Cont Namenda 10mg BID for now    -- Ok to taper off if family decides    2. Dilated cardiomyopathy: s/p defibrillator. 7/2023 echo w/ EF 36-40% w/ severe mitral and tricuspid regurg noted as well as moderately dilated LV and pulm HTN (RVSP 45-55)   - Cont home lasix 20mg BID, Jardiance 10mg daily, metoprolol XL 12.5mg daily   - Cont CARDS f/u- [facility]    3. Permanent atrial fibrillation   - Cont home metoprolol XL 12.5 daily and Xarelto 15 daily   - Cont CARDS f/u- [facility]    4. Essential hypertension: 132/74 today   - Cont home lasix 20mg BID, losartan 25mg BID, metoprolol XL 12.5mg daily    5. Hematuria, unspecified type: patient not interested in pursuing cystoscopy w/ urology at this time. 1/2025 CT abd/pel reassuring. Tentative plan to repeat in 1 year and defer invasive work-up if findings stable  -     Urinalysis With Culture If Indicated - Urine, Clean Catch  -     [place] Urine Culture Tube - Urine, Clean Catch    Consider tapering off Namenda by reducing to daily x2 weeks, then off.        Return in about 6 months (around 12/18/2025)  for Medicare Wellness.

## 2025-07-09 ENCOUNTER — HOSPITAL ENCOUNTER (EMERGENCY)
Facility: HOSPITAL | Age: 85
Discharge: NURSING, REHAB OR LONG-TERM CARE | End: 2025-07-09
Admitting: EMERGENCY MEDICINE
Payer: MEDICARE

## 2025-07-09 ENCOUNTER — APPOINTMENT (OUTPATIENT)
Dept: CT IMAGING | Facility: HOSPITAL | Age: 85
End: 2025-07-09
Payer: MEDICARE

## 2025-07-09 ENCOUNTER — APPOINTMENT (OUTPATIENT)
Dept: GENERAL RADIOLOGY | Facility: HOSPITAL | Age: 85
End: 2025-07-09
Payer: MEDICARE

## 2025-07-09 VITALS
TEMPERATURE: 98.4 F | HEIGHT: 74 IN | HEART RATE: 70 BPM | RESPIRATION RATE: 19 BRPM | SYSTOLIC BLOOD PRESSURE: 150 MMHG | BODY MASS INDEX: 24.9 KG/M2 | DIASTOLIC BLOOD PRESSURE: 66 MMHG | OXYGEN SATURATION: 99 % | WEIGHT: 194 LBS

## 2025-07-09 DIAGNOSIS — R10.9 INTERMITTENT ABDOMINAL PAIN: ICD-10-CM

## 2025-07-09 DIAGNOSIS — K57.92 DIVERTICULITIS: Primary | ICD-10-CM

## 2025-07-09 LAB
ALBUMIN SERPL-MCNC: 4.2 G/DL (ref 3.5–5.2)
ALBUMIN/GLOB SERPL: 1.4 G/DL
ALP SERPL-CCNC: 69 U/L (ref 39–117)
ALT SERPL W P-5'-P-CCNC: 12 U/L (ref 1–41)
ANION GAP SERPL CALCULATED.3IONS-SCNC: 12.7 MMOL/L (ref 5–15)
AST SERPL-CCNC: 26 U/L (ref 1–40)
BASOPHILS # BLD AUTO: 0.1 10*3/MM3 (ref 0–0.2)
BASOPHILS NFR BLD AUTO: 0.9 % (ref 0–1.5)
BILIRUB SERPL-MCNC: 0.9 MG/DL (ref 0–1.2)
BILIRUB UR QL STRIP: NEGATIVE
BUN SERPL-MCNC: 25 MG/DL (ref 8–23)
BUN/CREAT SERPL: 15.2 (ref 7–25)
CALCIUM SPEC-SCNC: 9.6 MG/DL (ref 8.6–10.5)
CHLORIDE SERPL-SCNC: 105 MMOL/L (ref 98–107)
CLARITY UR: CLEAR
CO2 SERPL-SCNC: 26.3 MMOL/L (ref 22–29)
COLOR UR: YELLOW
CREAT SERPL-MCNC: 1.64 MG/DL (ref 0.76–1.27)
D-LACTATE SERPL-SCNC: 0.8 MMOL/L (ref 0.3–2)
DEPRECATED RDW RBC AUTO: 59.5 FL (ref 37–54)
EGFRCR SERPLBLD CKD-EPI 2021: 40.5 ML/MIN/1.73
EOSINOPHIL # BLD AUTO: 0.22 10*3/MM3 (ref 0–0.4)
EOSINOPHIL NFR BLD AUTO: 2 % (ref 0.3–6.2)
ERYTHROCYTE [DISTWIDTH] IN BLOOD BY AUTOMATED COUNT: 15.9 % (ref 12.3–15.4)
GLOBULIN UR ELPH-MCNC: 2.9 GM/DL
GLUCOSE SERPL-MCNC: 102 MG/DL (ref 65–99)
GLUCOSE UR STRIP-MCNC: ABNORMAL MG/DL
HCT VFR BLD AUTO: 50.2 % (ref 37.5–51)
HGB BLD-MCNC: 16.4 G/DL (ref 13–17.7)
HGB UR QL STRIP.AUTO: NEGATIVE
HOLD SPECIMEN: NORMAL
IMM GRANULOCYTES # BLD AUTO: 0.09 10*3/MM3 (ref 0–0.05)
IMM GRANULOCYTES NFR BLD AUTO: 0.8 % (ref 0–0.5)
KETONES UR QL STRIP: NEGATIVE
LEUKOCYTE ESTERASE UR QL STRIP.AUTO: NEGATIVE
LIPASE SERPL-CCNC: 166 U/L (ref 13–60)
LYMPHOCYTES # BLD AUTO: 1.93 10*3/MM3 (ref 0.7–3.1)
LYMPHOCYTES NFR BLD AUTO: 17.8 % (ref 19.6–45.3)
MCH RBC QN AUTO: 32.9 PG (ref 26.6–33)
MCHC RBC AUTO-ENTMCNC: 32.7 G/DL (ref 31.5–35.7)
MCV RBC AUTO: 100.8 FL (ref 79–97)
MONOCYTES # BLD AUTO: 1.03 10*3/MM3 (ref 0.1–0.9)
MONOCYTES NFR BLD AUTO: 9.5 % (ref 5–12)
NEUTROPHILS NFR BLD AUTO: 69 % (ref 42.7–76)
NEUTROPHILS NFR BLD AUTO: 7.46 10*3/MM3 (ref 1.7–7)
NITRITE UR QL STRIP: NEGATIVE
NRBC BLD AUTO-RTO: 0 /100 WBC (ref 0–0.2)
PH UR STRIP.AUTO: 6.5 [PH] (ref 5–8)
PLATELET # BLD AUTO: 170 10*3/MM3 (ref 140–450)
PMV BLD AUTO: 11 FL (ref 6–12)
POTASSIUM SERPL-SCNC: 4 MMOL/L (ref 3.5–5.2)
PROT SERPL-MCNC: 7.1 G/DL (ref 6–8.5)
PROT UR QL STRIP: NEGATIVE
RBC # BLD AUTO: 4.98 10*6/MM3 (ref 4.14–5.8)
SODIUM SERPL-SCNC: 144 MMOL/L (ref 136–145)
SP GR UR STRIP: 1.01 (ref 1–1.03)
UROBILINOGEN UR QL STRIP: ABNORMAL
WBC NRBC COR # BLD AUTO: 10.83 10*3/MM3 (ref 3.4–10.8)
WHOLE BLOOD HOLD COAG: NORMAL

## 2025-07-09 PROCEDURE — 74018 RADEX ABDOMEN 1 VIEW: CPT

## 2025-07-09 PROCEDURE — 99285 EMERGENCY DEPT VISIT HI MDM: CPT

## 2025-07-09 PROCEDURE — 25010000002 PIPERACILLIN SOD-TAZOBACTAM PER 1 G

## 2025-07-09 PROCEDURE — 81003 URINALYSIS AUTO W/O SCOPE: CPT

## 2025-07-09 PROCEDURE — 80053 COMPREHEN METABOLIC PANEL: CPT

## 2025-07-09 PROCEDURE — 85025 COMPLETE CBC W/AUTO DIFF WBC: CPT

## 2025-07-09 PROCEDURE — 74177 CT ABD & PELVIS W/CONTRAST: CPT

## 2025-07-09 PROCEDURE — 96365 THER/PROPH/DIAG IV INF INIT: CPT

## 2025-07-09 PROCEDURE — 83690 ASSAY OF LIPASE: CPT

## 2025-07-09 PROCEDURE — 25810000003 SODIUM CHLORIDE 0.9 % SOLUTION

## 2025-07-09 PROCEDURE — 25510000001 IOPAMIDOL PER 1 ML

## 2025-07-09 PROCEDURE — 83605 ASSAY OF LACTIC ACID: CPT

## 2025-07-09 RX ORDER — IOPAMIDOL 755 MG/ML
100 INJECTION, SOLUTION INTRAVASCULAR
Status: COMPLETED | OUTPATIENT
Start: 2025-07-09 | End: 2025-07-09

## 2025-07-09 RX ORDER — SODIUM CHLORIDE 0.9 % (FLUSH) 0.9 %
10 SYRINGE (ML) INJECTION AS NEEDED
Status: DISCONTINUED | OUTPATIENT
Start: 2025-07-09 | End: 2025-07-09 | Stop reason: HOSPADM

## 2025-07-09 RX ADMIN — PIPERACILLIN AND TAZOBACTAM 3.38 G: 3; .375 INJECTION, POWDER, FOR SOLUTION INTRAVENOUS at 19:42

## 2025-07-09 RX ADMIN — IOPAMIDOL 100 ML: 755 INJECTION, SOLUTION INTRAVENOUS at 18:54

## 2025-07-09 RX ADMIN — SODIUM CHLORIDE 500 ML: 9 INJECTION, SOLUTION INTRAVENOUS at 18:05

## 2025-07-09 NOTE — ED PROVIDER NOTES
Subjective   Chief Complaint   Patient presents with    Abdominal Pain     C/o LLQ abdominal pain since this morning. Pt denies any n/v/d or dysuria       History of Present Illness  Patient is an 85-year-old gentleman who arrives today with his son with reports of left lower quadrant pain.  He is a resident at assisted living and has dementia.  He went to the nurses station to complain of left lower quadrant pain however by the time he arrived here he no longer had any pain and had no complaints.  Review of Systems   Gastrointestinal:  Positive for abdominal pain (Resolved upon arrival).   All other systems reviewed and are negative.      Past Medical History:   Diagnosis Date    Acid reflux disease     Allergic rhinitis     Arthritis     gouty    Atrial fibrillation     BBB (bundle branch block)     right    Bone spur 2017    neck    Congestive heart failure 10/08/2019    Congestive heart failure (CHF)     Coronary artery disease     non obstructive    Hypertension     Mitral regurgitation     Osteoarthritis     Presence of biventricular implantable cardioverter-defibrillator (ICD) 07/29/2019    S/P ablation of atrial fibrillation 01/2017    Takotsubo cardiomyopathy     Tricuspid regurgitation     VT (ventricular tachycardia) 02/2018       Allergies   Allergen Reactions    Hydrocodone Urinary Retention    Aricept [Donepezil] Mental Status Change       Past Surgical History:   Procedure Laterality Date    AMPUTATION      index and middle finger    CARDIAC ABLATION  08/22/2017    Located within [facility]    CARDIAC CATHETERIZATION  12/08/2016    non obst CAD; takotsubo CM    CARDIAC CATHETERIZATION  07/24/2017    treating medically    CARDIAC CATHETERIZATION N/A 1/19/2022    Procedure: Left Heart Cath;  Surgeon: Chidi Figueroa MD;  Location: [facility] CATH INVASIVE LOCATION;  Service: Cardiology;  Laterality: N/A;    CARDIAC DEFIBRILLATOR PLACEMENT  2017        CARDIAC ELECTROPHYSIOLOGY PROCEDURE N/A 4/14/2021    Procedure: AV  node ablation;  Surgeon: Hira Ochoa MD;  Location: [facility] CATH INVASIVE LOCATION;  Service: Cardiovascular;  Laterality: N/A;    COLONOSCOPY N/A 2022    Procedure: COLONOSCOPY with polypectomy x5;  Surgeon: KAYLIN Ruiz MD;  Location: [facility] ENDOSCOPY;  Service: Gastroenterology;  Laterality: N/A;  post: diverticulosis, hemorrhoids, polyps    FRACTURE SURGERY  2021    jaw     INTERNAL CARDIAC DEFIBRILLATOR INSERTION      BiV ICD BS    OTHER SURGICAL HISTORY  2017    Heart ablation-[facility]    ROTATOR CUFF REPAIR Right     SINUS SURGERY  2014       Family History   Problem Relation Age of Onset    Parkinsonism Mother     Alcohol abuse Father        Social History     Socioeconomic History    Marital status:    Tobacco Use    Smoking status: Former     Current packs/day: 0.00     Average packs/day: 1 pack/day for 20.0 years (20.0 ttl pk-yrs)     Types: Cigarettes     Start date: 1970     Quit date: 1990     Years since quittin.5     Passive exposure: Past    Smokeless tobacco: Never   Vaping Use    Vaping status: Never Used   Substance and Sexual Activity    Alcohol use: Not Currently     Comment: occasionally    Drug use: Never    Sexual activity: Not Currently     Partners: Female           Objective   Physical Exam  Vitals and nursing note reviewed.   Constitutional:       General: He is not in acute distress.     Appearance: He is well-developed and normal weight. He is not ill-appearing, toxic-appearing or diaphoretic.   HENT:      Head: Normocephalic and atraumatic.      Mouth/Throat:      Mouth: Mucous membranes are moist.      Pharynx: Oropharynx is clear.   Eyes:      Extraocular Movements: Extraocular movements intact.      Pupils: Pupils are equal, round, and reactive to light.   Cardiovascular:      Rate and Rhythm: Normal rate and regular rhythm.      Heart sounds: Normal heart sounds.      Comments: Has pacemaker defibrillator  Pulmonary:      Effort:  "Pulmonary effort is normal.      Breath sounds: Normal breath sounds.   Abdominal:      General: Abdomen is flat. Bowel sounds are normal.      Palpations: Abdomen is soft.      Tenderness: There is abdominal tenderness in the left lower quadrant. There is no right CVA tenderness, left CVA tenderness, guarding or rebound. Negative signs include Echeverria's sign and McBurney's sign.      Hernia: No hernia is present.   Skin:     General: Skin is warm and dry.      Capillary Refill: Capillary refill takes less than 2 seconds.   Neurological:      General: No focal deficit present.      Mental Status: He is alert and oriented to person, place, and time.   Psychiatric:         Mood and Affect: Mood normal.         Behavior: Behavior normal.         Procedures           ED Course        /66   Pulse 70   Temp 98.4 °F (36.9 °C) (Oral)   Resp 19   Ht 188 cm (74\")   Wt 88 kg (194 lb)   SpO2 99%   BMI 24.91 kg/m²   Labs Reviewed   COMPREHENSIVE METABOLIC PANEL - Abnormal; Notable for the following components:       Result Value    Glucose 102 (*)     BUN 25.0 (*)     Creatinine 1.64 (*)     eGFR 40.5 (*)     All other components within normal limits    Narrative:     GFR Categories in Chronic Kidney Disease (CKD)              GFR Category          GFR (mL/min/1.73)    Interpretation  G1                    90 or greater        Normal or high (1)  G2                    60-89                Mild decrease (1)  G3a                   45-59                Mild to moderate decrease  G3b                   30-44                Moderate to severe decrease  G4                    15-29                Severe decrease  G5                    14 or less           Kidney failure    (1)In the absence of evidence of kidney disease, neither GFR category G1 or G2 fulfill the criteria for CKD.    eGFR calculation 2021 CKD-EPI creatinine equation, which does not include race as a factor   LIPASE - Abnormal; Notable for the following " components:    Lipase 166 (*)     All other components within normal limits   URINALYSIS W/ MICROSCOPIC IF INDICATED (NO CULTURE) - Abnormal; Notable for the following components:    Glucose, UA >=1000 mg/dL (3+) (*)     All other components within normal limits    Narrative:     Urine microscopic not indicated.   CBC WITH AUTO DIFFERENTIAL - Abnormal; Notable for the following components:    WBC 10.83 (*)     .8 (*)     RDW 15.9 (*)     RDW-SD 59.5 (*)     Lymphocyte % 17.8 (*)     Immature Grans % 0.8 (*)     Neutrophils, Absolute 7.46 (*)     Monocytes, Absolute 1.03 (*)     Immature Grans, Absolute 0.09 (*)     All other components within normal limits   POC LACTATE - Normal   CBC AND DIFFERENTIAL    Narrative:     The following orders were created for panel order CBC & Differential.  Procedure                               Abnormality         Status                     ---------                               -----------         ------                     CBC Auto Differential[[id number]]        Abnormal            Final result                 Please view results for these tests on the individual orders.   EXTRA TUBES    Narrative:     The following orders were created for panel order Extra Tubes.  Procedure                               Abnormality         Status                     ---------                               -----------         ------                     Gold Top - SST[[id number]]                                   Final result               Light Blue Top[[id number]]                                   Final result                 Please view results for these tests on the individual orders.   GOLD TOP - SST   LIGHT BLUE TOP     Medications   sodium chloride 0.9 % flush 10 mL (has no administration in time range)   piperacillin-tazobactam (ZOSYN) 3.375 g IVPB in 100 mL NS MBP (CD) (3.375 g Intravenous New Bag 7/9/25 1942)   sodium chloride 0.9 % bolus 500 mL (500 mL Intravenous New Bag 7/9/25  8415)   iopamidol (ISOVUE-370) 76 % injection 100 mL (100 mL Intravenous Given 7/9/25 1844)     CT Abdomen Pelvis With Contrast  Result Date: 7/9/2025  Impression: Acute diverticulitis of the distal descending and proximal sigmoid colon. Considering short segment involvement, consider follow-up colonoscopy to rule out underlying malignancy. Mild peripancreatic fat stranding/inflammatory change, which can be seen with acute pancreatitis. Recommend correlation with serum lipase. Fat and fluid containing left inguinal hernia. Bilateral nonobstructing nephrolithiasis. Electronically Signed: Bharat Conde MD  7/9/2025 7:06 PM EDT  Workstation ID: [id number]    XR Abdomen KUB  Result Date: 7/9/2025  Impression: KUB demonstrating an unremarkable bowel gas pattern. A small amount of stool is seen in the colon. Electronically Signed: Deny Rothman MD  7/9/2025 5:40 PM EDT  Workstation ID: [id number]                                                   Medical Decision Making  Problems Addressed:  Diverticulitis: complicated acute illness or injury  Intermittent abdominal pain: complicated acute illness or injury    Amount and/or Complexity of Data Reviewed  Labs: ordered.  Radiology: ordered.    Risk  Prescription drug management.      Patient presents to the ED for the above complaint, underwent the above exam and workup.    EKG reviewed: Not clinically indicated    Imaging reviewed: Reviewed interpreted by both myself and Dr. Marti, ED attending results as above    Reviewed external records from [facility]    Differential diagnosis considered: Bowel obstruction, diverticulitis, constipation    Patient was treated with the following medications while in the ED;   Medications   sodium chloride 0.9 % flush 10 mL (has no administration in time range)   piperacillin-tazobactam (ZOSYN) 3.375 g IVPB in 100 mL NS MBP (CD) (3.375 g Intravenous New Bag 7/9/25 1942)   sodium chloride 0.9 % bolus 500 mL (500 mL  Intravenous New Bag 7/9/25 9875)   iopamidol (ISOVUE-370) 76 % injection 100 mL (100 mL Intravenous Given 7/9/25 7868)     Upon evaluation of patient IV established labs and imaging were obtained.  Results as above.  Patient presentation consistent with diverticulitis.  Did have elevated lipase however has no complaints of pain or discomfort no nausea or vomiting.  He is completely pain-free at this point.  Will treat diverticulitis as he does have a mildly elevated white count.  Treated with Augmentin and will follow-up with primary, Dr. Curran.  This was discussed with both patient and his son and they are agreeable to this plan with no further questions.    Consideration was given for admission, but the patient was stable for outpatient management as patient was ambulatory, nontoxic, stable, and afebrile.  Exam as above.    Disposition: Discussed need to follow-up diagnostics, including incidental findings.  Discharged with instructions to obtain outpatient follow-up with patient's symptoms and findings, with strict return precautions if patient develops new or worsening symptoms.    This document is intended for medical expert use only. Reading of this document by patients and/or patient's family without participating medical staff guidance may result in misinterpretation and unintended morbidity.  Any interpretation of such data is the responsibility of the patient and/or family member responsible for the patient in concert with their primary or specialist providers, not to be left for sources of online searches such as [employer], [employer] or similar queries. Relying on these approaches to knowledge may result in misinterpretation, misguided goals of care and even death should patients or family members try recommendations outside of the realm of professional medical care in a supervised inpatient environment.     Final diagnoses:   Diverticulitis   Intermittent abdominal pain       ED Disposition  ED Disposition        ED Disposition   Discharge    Condition   Stable    Comment   --               Bunny Curran MD  [address] [facility]  [address]  [place] [id number] [phone]               Medication List        New Prescriptions      amoxicillin-clavulanate 875-125 MG per tablet  Commonly known as: AUGMENTIN  Take 1 tablet by mouth 2 (Two) Times a Day for 7 days.            Changed      memantine 10 MG tablet  Commonly known as: NAMENDA  Take 1 tablet by mouth 2 (Two) Times a Day.  What changed: how much to take               Where to Get Your Medications        These medications were sent to [facility] [id number] - [place], [place] - [address] - [phone]  - [phone] FX  [address] [place] [address]      Phone: [phone]   amoxicillin-clavulanate 875-125 MG per tablet            Allison Gallego, APRN  07/09/25 2005

## 2025-07-09 NOTE — DISCHARGE INSTRUCTIONS
Take antibiotics to completion.  Follow-up with primary care, call to make an appointment.  Return to the ER for any new or worsening symptoms.

## 2025-08-12 ENCOUNTER — TELEPHONE (OUTPATIENT)
Dept: CARDIOLOGY | Facility: CLINIC | Age: 85
End: 2025-08-12
Payer: MEDICARE

## 2025-08-14 LAB
MC_CV_MDC_IDC_RATE_1: 200
MC_CV_MDC_IDC_SHOCK_MEASURED_IMPEDANCE: 59
MC_CV_MDC_IDC_THERAPIES: NORMAL
MC_CV_MDC_IDC_ZONE_ID: 1
MDC_IDC_MSMT_BATTERY_REMAINING_LONGEVITY: 30 MO
MDC_IDC_MSMT_BATTERY_REMAINING_PERCENTAGE: 47 %
MDC_IDC_MSMT_BATTERY_STATUS: NORMAL
MDC_IDC_MSMT_CAP_CHARGE_TIME: 13.3
MDC_IDC_MSMT_LEADCHNL_LV_IMPEDANCE_VALUE: 894
MDC_IDC_MSMT_LEADCHNL_LV_PACING_THRESHOLD_POLARITY: NORMAL
MDC_IDC_MSMT_LEADCHNL_LV_SENSING_INTR_AMPL: 6.1
MDC_IDC_MSMT_LEADCHNL_RA_IMPEDANCE_VALUE: 699
MDC_IDC_MSMT_LEADCHNL_RA_PACING_THRESHOLD_POLARITY: NORMAL
MDC_IDC_MSMT_LEADCHNL_RV_IMPEDANCE_VALUE: 453
MDC_IDC_MSMT_LEADCHNL_RV_PACING_THRESHOLD_POLARITY: NORMAL
MDC_IDC_PG_IMPLANT_DTM: NORMAL
MDC_IDC_PG_MFG: NORMAL
MDC_IDC_PG_MODEL: NORMAL
MDC_IDC_PG_SERIAL: NORMAL
MDC_IDC_PG_TYPE: NORMAL
MDC_IDC_SESS_DTM: NORMAL
MDC_IDC_SESS_TYPE: NORMAL
MDC_IDC_SET_BRADY_AT_MODE_SWITCH_RATE: 170
MDC_IDC_SET_BRADY_LOWRATE: 70
MDC_IDC_SET_BRADY_MAX_SENSOR_RATE: 115
MDC_IDC_SET_BRADY_MODE: NORMAL
MDC_IDC_SET_CRT_LVRV_DELAY: 40
MDC_IDC_SET_CRT_PACED_CHAMBERS: NORMAL
MDC_IDC_SET_LEADCHNL_LV_PACING_AMPLITUDE: 2.5
MDC_IDC_SET_LEADCHNL_LV_PACING_PULSEWIDTH: 0.4
MDC_IDC_SET_LEADCHNL_RA_PACING_POLARITY: NORMAL
MDC_IDC_SET_LEADCHNL_RA_SENSING_POLARITY: NORMAL
MDC_IDC_SET_LEADCHNL_RA_SENSING_SENSITIVITY: 1.5
MDC_IDC_SET_LEADCHNL_RV_PACING_AMPLITUDE: 2
MDC_IDC_SET_LEADCHNL_RV_PACING_POLARITY: NORMAL
MDC_IDC_SET_LEADCHNL_RV_PACING_PULSEWIDTH: 0.4
MDC_IDC_SET_LEADCHNL_RV_SENSING_POLARITY: NORMAL
MDC_IDC_SET_LEADCHNL_RV_SENSING_SENSITIVITY: 0.6
MDC_IDC_SET_ZONE_STATUS: NORMAL
MDC_IDC_SET_ZONE_TYPE: NORMAL
MDC_IDC_STAT_BRADY_RA_PERCENT_PACED: 0
MDC_IDC_STAT_BRADY_RV_PERCENT_PACED: 89
MDC_IDC_STAT_CRT_LV_PERCENT_PACED: 98
MDC_IDC_STAT_TACHYTHERAPY_ATP_DELIVERED_RECENT: 0
MDC_IDC_STAT_TACHYTHERAPY_SHOCKS_ABORTED_RECENT: 0
MDC_IDC_STAT_TACHYTHERAPY_SHOCKS_DELIVERED_RECENT: 0

## 2025-08-14 PROCEDURE — 93296 REM INTERROG EVL PM/IDS: CPT | Performed by: NURSE PRACTITIONER

## 2025-08-14 PROCEDURE — 93295 DEV INTERROG REMOTE 1/2/MLT: CPT | Performed by: NURSE PRACTITIONER

## (undated) DEVICE — PK ENDO GI 50

## (undated) DEVICE — PK TRY HEART CATH 50

## (undated) DEVICE — Device: Brand: REFERENCE PATCH CARTO 3

## (undated) DEVICE — 3M™ PATIENT PLATE, CORDED, SPLIT, LARGE, 40 PER CASE, 1179: Brand: 3M™

## (undated) DEVICE — CATH MPAC STP 6F: Brand: SUPER TORQUE

## (undated) DEVICE — GW PTFE EMERALD HEPCOAT FC J TIP STD .035 3MM 150CM

## (undated) DEVICE — Device: Brand: EZ STEER NAV DS

## (undated) DEVICE — PINNACLE INTRODUCER SHEATH: Brand: PINNACLE

## (undated) DEVICE — SNAR POLYP CAPTIVATOR HEX 27MM 240CM

## (undated) DEVICE — ELECTRD DEFIB M/FUNC PROPADZ RADIOL 2PK

## (undated) DEVICE — ST ACC MICROPUNCTURE STFF/CANN PLAT/TP 4F 21G 40CM

## (undated) DEVICE — Device: Brand: CARTO 3

## (undated) DEVICE — TRAP WIDEEYE POLYP